# Patient Record
Sex: FEMALE | Race: WHITE | NOT HISPANIC OR LATINO | Employment: FULL TIME | URBAN - METROPOLITAN AREA
[De-identification: names, ages, dates, MRNs, and addresses within clinical notes are randomized per-mention and may not be internally consistent; named-entity substitution may affect disease eponyms.]

---

## 2018-03-05 ENCOUNTER — LAB REQUISITION (OUTPATIENT)
Dept: LAB | Facility: HOSPITAL | Age: 63
End: 2018-03-05
Payer: MEDICARE

## 2018-03-05 ENCOUNTER — LAB REQUISITION (OUTPATIENT)
Dept: LAB | Facility: CLINIC | Age: 63
End: 2018-03-05
Payer: MEDICARE

## 2018-03-05 DIAGNOSIS — E66.01 MORBID (SEVERE) OBESITY DUE TO EXCESS CALORIES (CMS/HCC): ICD-10-CM

## 2018-03-05 DIAGNOSIS — E53.8 DEFICIENCY OF OTHER SPECIFIED B GROUP VITAMINS (CODE): ICD-10-CM

## 2018-03-05 DIAGNOSIS — I48.91 ATRIAL FIBRILLATION (CMS/HCC): ICD-10-CM

## 2018-03-05 DIAGNOSIS — E56.9 VITAMIN DEFICIENCY: ICD-10-CM

## 2018-03-05 DIAGNOSIS — E44.1 MILD PROTEIN-CALORIE MALNUTRITION (CMS/HCC): ICD-10-CM

## 2018-03-05 DIAGNOSIS — E63.9 NUTRITIONAL DEFICIENCY: ICD-10-CM

## 2018-03-05 DIAGNOSIS — E55.9 VITAMIN D DEFICIENCY: ICD-10-CM

## 2018-03-05 LAB
25(OH)D3 SERPL-MCNC: 37 NG/ML (ref 30–100)
ALBUMIN SERPL-MCNC: 4 G/DL (ref 3.4–5)
ALP SERPL-CCNC: 116 IU/L (ref 35–126)
ALT SERPL-CCNC: 57 IU/L (ref 11–54)
ANION GAP SERPL CALC-SCNC: 9 MEQ/L (ref 3–15)
AST SERPL-CCNC: 37 IU/L (ref 15–41)
BASOPHILS # BLD: 0.04 K/UL (ref 0.01–0.1)
BASOPHILS NFR BLD: 0.8 %
BILIRUB SERPL-MCNC: 1 MG/DL (ref 0.3–1.2)
BUN SERPL-MCNC: 20 MG/DL (ref 8–20)
CALCIUM SERPL-MCNC: 10 MG/DL (ref 8.9–10.3)
CALCIUM SERPL-MCNC: 10 MG/DL (ref 8.9–10.3)
CHLORIDE SERPL-SCNC: 108 MMOL/L (ref 98–109)
CHOLEST SERPL-MCNC: 152 MG/DL
CO2 SERPL-SCNC: 28 MMOL/L (ref 22–32)
CREAT SERPL-MCNC: 0.6 MG/DL (ref 0.6–1.1)
EOSINOPHIL # BLD: 0.15 K/UL (ref 0.04–0.36)
EOSINOPHIL NFR BLD: 2.9 %
ERYTHROCYTE [DISTWIDTH] IN BLOOD BY AUTOMATED COUNT: 15.3 % (ref 11.7–14.4)
FERRITIN SERPL-MCNC: 33 NG/ML (ref 11–250)
FOLATE SERPL-MCNC: >20 NG/ML
GFR SERPL CREATININE-BSD FRML MDRD: >60 ML/MIN/1.73M*2
GLUCOSE SERPL-MCNC: 90 MG/DL (ref 70–99)
HCT VFR BLDCO AUTO: 38.8 % (ref 35–45)
HDLC SERPL-MCNC: 63 MG/DL
HDLC SERPL: 2.4 {RATIO}
HGB BLD-MCNC: 12.5 G/DL (ref 11.8–15.7)
IMM GRANULOCYTES # BLD AUTO: 0.01 K/UL (ref 0–0.08)
IMM GRANULOCYTES NFR BLD AUTO: 0.2 %
INR PPP: 3.3 INR
IRON SATN MFR SERPL: 26 % (ref 15–45)
IRON SERPL-MCNC: 101 UG/DL (ref 35–150)
LDLC SERPL CALC-MCNC: 72 MG/DL
LYMPHOCYTES # BLD: 1.83 K/UL (ref 1.2–3.5)
LYMPHOCYTES NFR BLD: 35.5 %
MCH RBC QN AUTO: 29.9 PG (ref 28–33.2)
MCHC RBC AUTO-ENTMCNC: 32.2 G/DL (ref 32.2–35.5)
MCV RBC AUTO: 92.8 FL (ref 83–98)
MONOCYTES # BLD: 0.49 K/UL (ref 0.28–0.8)
MONOCYTES NFR BLD: 9.5 %
NEUTROPHILS # BLD: 2.63 K/UL (ref 1.7–7)
NEUTS SEG NFR BLD: 51.1 %
NONHDLC SERPL-MCNC: 89 MG/DL
NRBC BLD-RTO: 0 %
PDW BLD AUTO: 11.5 FL (ref 9.4–12.3)
PLATELET # BLD AUTO: 206 K/UL (ref 150–369)
POTASSIUM SERPL-SCNC: 4.3 MMOL/L (ref 3.6–5.1)
PREALB SERPL-MCNC: 27 MG/DL (ref 18–38)
PROT SERPL-MCNC: 6.3 G/DL (ref 6–8.2)
PROTHROMBIN TIME: 34 SEC. (ref 12.2–14.5)
PTH-INTACT SERPL-MCNC: 52.9 PG/ML (ref 12–88)
RBC # BLD AUTO: 4.18 M/UL (ref 3.93–5.22)
SODIUM SERPL-SCNC: 145 MMOL/L (ref 136–144)
TIBC SERPL-MCNC: 383 UG/DL (ref 270–460)
TRIGL SERPL-MCNC: 84 MG/DL (ref 30–149)
TSH SERPL DL<=0.05 MIU/L-ACNC: 1.91 MIU/ML (ref 0.34–5.6)
UIBC SERPL-MCNC: 282 UG/DL (ref 180–360)
VIT B12 SERPL-MCNC: 756 PG/ML (ref 180–914)
WBC # BLD AUTO: 5.15 K/UL (ref 3.8–10.5)

## 2018-03-05 PROCEDURE — 84425 ASSAY OF VITAMIN B-1: CPT | Performed by: SURGERY

## 2018-03-05 PROCEDURE — 83970 ASSAY OF PARATHORMONE: CPT

## 2018-03-05 PROCEDURE — 82306 VITAMIN D 25 HYDROXY: CPT

## 2018-03-05 PROCEDURE — 84443 ASSAY THYROID STIM HORMONE: CPT

## 2018-03-05 PROCEDURE — 80053 COMPREHEN METABOLIC PANEL: CPT

## 2018-03-05 PROCEDURE — 84134 ASSAY OF PREALBUMIN: CPT | Performed by: SURGERY

## 2018-03-05 PROCEDURE — 80053 COMPREHEN METABOLIC PANEL: CPT | Performed by: SURGERY

## 2018-03-05 PROCEDURE — 82746 ASSAY OF FOLIC ACID SERUM: CPT | Performed by: SURGERY

## 2018-03-05 PROCEDURE — 82306 VITAMIN D 25 HYDROXY: CPT | Performed by: SURGERY

## 2018-03-05 PROCEDURE — 83550 IRON BINDING TEST: CPT | Performed by: SURGERY

## 2018-03-05 PROCEDURE — 84443 ASSAY THYROID STIM HORMONE: CPT | Performed by: SURGERY

## 2018-03-05 PROCEDURE — 83550 IRON BINDING TEST: CPT

## 2018-03-05 PROCEDURE — 82746 ASSAY OF FOLIC ACID SERUM: CPT

## 2018-03-05 PROCEDURE — 85025 COMPLETE CBC W/AUTO DIFF WBC: CPT | Performed by: SURGERY

## 2018-03-05 PROCEDURE — 84590 ASSAY OF VITAMIN A: CPT

## 2018-03-05 PROCEDURE — 85610 PROTHROMBIN TIME: CPT | Performed by: INTERNAL MEDICINE

## 2018-03-05 PROCEDURE — 82607 VITAMIN B-12: CPT | Performed by: SURGERY

## 2018-03-05 PROCEDURE — 84590 ASSAY OF VITAMIN A: CPT | Performed by: SURGERY

## 2018-03-05 PROCEDURE — 82607 VITAMIN B-12: CPT

## 2018-03-05 PROCEDURE — 82728 ASSAY OF FERRITIN: CPT

## 2018-03-05 PROCEDURE — 80061 LIPID PANEL: CPT

## 2018-03-05 PROCEDURE — 82728 ASSAY OF FERRITIN: CPT | Performed by: SURGERY

## 2018-03-05 PROCEDURE — 83970 ASSAY OF PARATHORMONE: CPT | Performed by: SURGERY

## 2018-03-05 PROCEDURE — 84134 ASSAY OF PREALBUMIN: CPT

## 2018-03-05 PROCEDURE — 80061 LIPID PANEL: CPT | Performed by: SURGERY

## 2018-03-05 PROCEDURE — 84425 ASSAY OF VITAMIN B-1: CPT

## 2018-03-05 PROCEDURE — 85025 COMPLETE CBC W/AUTO DIFF WBC: CPT

## 2018-03-08 LAB — VIT A SERPL-MCNC: 47 MCG/DL (ref 38–98)

## 2018-03-09 LAB — VIT B1 SERPL-SCNC: 20 NMOL/L (ref 8–30)

## 2018-03-14 ENCOUNTER — OFFICE VISIT (OUTPATIENT)
Dept: PRIMARY CARE | Facility: CLINIC | Age: 63
End: 2018-03-14
Attending: FAMILY MEDICINE
Payer: MEDICARE

## 2018-03-14 ENCOUNTER — DOCUMENTATION (OUTPATIENT)
Dept: BARIATRICS | Facility: CLINIC | Age: 63
End: 2018-03-14

## 2018-03-14 VITALS
HEART RATE: 59 BPM | HEIGHT: 63 IN | WEIGHT: 185 LBS | SYSTOLIC BLOOD PRESSURE: 98 MMHG | DIASTOLIC BLOOD PRESSURE: 64 MMHG | BODY MASS INDEX: 32.78 KG/M2 | TEMPERATURE: 98.6 F | RESPIRATION RATE: 17 BRPM | OXYGEN SATURATION: 98 %

## 2018-03-14 VITALS — HEIGHT: 63 IN | BODY MASS INDEX: 30.85 KG/M2 | WEIGHT: 174.1 LBS

## 2018-03-14 DIAGNOSIS — G47.33 OBSTRUCTIVE SLEEP APNEA: Chronic | ICD-10-CM

## 2018-03-14 DIAGNOSIS — J45.20 MILD INTERMITTENT ASTHMA WITHOUT COMPLICATION: Chronic | ICD-10-CM

## 2018-03-14 DIAGNOSIS — Z13.9 ENCOUNTER FOR SCREENING: ICD-10-CM

## 2018-03-14 DIAGNOSIS — M15.9 POLYOSTEOARTHRITIS, UNSPECIFIED: Chronic | ICD-10-CM

## 2018-03-14 DIAGNOSIS — E78.00 PURE HYPERCHOLESTEROLEMIA: Chronic | ICD-10-CM

## 2018-03-14 DIAGNOSIS — Z12.39 BREAST CANCER SCREENING: ICD-10-CM

## 2018-03-14 DIAGNOSIS — I26.99 OTHER PULMONARY EMBOLISM WITHOUT ACUTE COR PULMONALE, UNSPECIFIED CHRONICITY (CMS/HCC): Chronic | ICD-10-CM

## 2018-03-14 DIAGNOSIS — M10.9 ACUTE GOUT INVOLVING TOE OF LEFT FOOT, UNSPECIFIED CAUSE: Primary | ICD-10-CM

## 2018-03-14 DIAGNOSIS — K44.9 DIAPHRAGMATIC HERNIA WITHOUT OBSTRUCTION OR GANGRENE: Chronic | ICD-10-CM

## 2018-03-14 DIAGNOSIS — E66.09 OBESITY DUE TO EXCESS CALORIES WITHOUT SERIOUS COMORBIDITY, UNSPECIFIED CLASSIFICATION: Chronic | ICD-10-CM

## 2018-03-14 DIAGNOSIS — R73.01 IFG (IMPAIRED FASTING GLUCOSE): ICD-10-CM

## 2018-03-14 DIAGNOSIS — M79.675 TOE PAIN, LEFT: ICD-10-CM

## 2018-03-14 PROBLEM — E66.9 OBESITY: Status: ACTIVE | Noted: 2017-09-21

## 2018-03-14 PROBLEM — E66.9 OBESITY: Chronic | Status: ACTIVE | Noted: 2017-09-21

## 2018-03-14 PROCEDURE — 99214 OFFICE O/P EST MOD 30 MIN: CPT | Performed by: FAMILY MEDICINE

## 2018-03-14 RX ORDER — ALBUTEROL SULFATE 0.83 MG/ML
SOLUTION RESPIRATORY (INHALATION)
COMMUNITY
Start: 2016-07-29 | End: 2018-12-20

## 2018-03-14 RX ORDER — WARFARIN 4 MG/1
4 TABLET ORAL DAILY
COMMUNITY
End: 2018-08-16 | Stop reason: SDUPTHER

## 2018-03-14 RX ORDER — DILTIAZEM HYDROCHLORIDE 120 MG/1
120 CAPSULE, COATED, EXTENDED RELEASE ORAL
Refills: 1 | COMMUNITY
Start: 2017-12-12 | End: 2018-08-23 | Stop reason: SDUPTHER

## 2018-03-14 RX ORDER — ALBUTEROL SULFATE 90 UG/1
INHALANT RESPIRATORY (INHALATION)
COMMUNITY
Start: 2016-07-29 | End: 2018-12-20 | Stop reason: SDUPTHER

## 2018-03-14 RX ORDER — ATORVASTATIN CALCIUM 40 MG/1
40 TABLET, FILM COATED ORAL DAILY
Qty: 90 TABLET | Refills: 3 | Status: SHIPPED | OUTPATIENT
Start: 2018-03-14 | End: 2018-08-23 | Stop reason: SDUPTHER

## 2018-03-14 RX ORDER — ALPRAZOLAM 0.25 MG/1
0.25 TABLET ORAL
COMMUNITY
Start: 2016-07-29 | End: 2019-04-26 | Stop reason: ALTCHOICE

## 2018-03-14 RX ORDER — ATORVASTATIN CALCIUM 40 MG/1
40 TABLET, FILM COATED ORAL
Refills: 1 | COMMUNITY
Start: 2017-12-27 | End: 2018-03-14

## 2018-03-14 RX ORDER — INDOMETHACIN 50 MG/1
50 CAPSULE ORAL 3 TIMES DAILY PRN
Qty: 30 CAPSULE | Refills: 2 | Status: SHIPPED | OUTPATIENT
Start: 2018-03-14 | End: 2019-04-26 | Stop reason: ALTCHOICE

## 2018-03-14 RX ORDER — ATORVASTATIN CALCIUM 40 MG/1
40 TABLET, FILM COATED ORAL
COMMUNITY
Start: 2017-09-14 | End: 2018-03-14 | Stop reason: SDUPTHER

## 2018-03-14 RX ORDER — FLUTICASONE PROPIONATE 50 MCG
SPRAY, SUSPENSION (ML) NASAL
COMMUNITY
Start: 2016-03-18 | End: 2019-03-06 | Stop reason: SDUPTHER

## 2018-03-14 RX ORDER — VERAPAMIL HYDROCHLORIDE 120 MG/1
120 CAPSULE, EXTENDED RELEASE ORAL
COMMUNITY
Start: 2017-12-19 | End: 2018-08-23 | Stop reason: SDUPTHER

## 2018-03-14 RX ORDER — ATORVASTATIN CALCIUM 10 MG/1
40 TABLET, FILM COATED ORAL
COMMUNITY
Start: 2017-09-14 | End: 2018-03-14

## 2018-03-14 RX ORDER — CLOBETASOL PROPIONATE 0.5 MG/G
0.05 CREAM TOPICAL
COMMUNITY
Start: 2016-03-18 | End: 2019-04-26 | Stop reason: ALTCHOICE

## 2018-03-14 RX ORDER — DILTIAZEM HYDROCHLORIDE 120 MG/1
120 CAPSULE, EXTENDED RELEASE ORAL
COMMUNITY
Start: 2016-09-30 | End: 2018-08-23 | Stop reason: SDUPTHER

## 2018-03-14 RX ORDER — PSYLLIUM HUSK 0.4 G
1 CAPSULE ORAL DAILY
COMMUNITY
Start: 2016-03-18

## 2018-03-14 ASSESSMENT — ENCOUNTER SYMPTOMS
COUGH: 0
EYE ITCHING: 0
ABDOMINAL PAIN: 0
ACTIVITY CHANGE: 0
VOMITING: 0
APPETITE CHANGE: 0
TROUBLE SWALLOWING: 0
DYSURIA: 0
COLOR CHANGE: 0
PALPITATIONS: 0
MYALGIAS: 0
EYE PAIN: 0
EYE DISCHARGE: 0
NERVOUS/ANXIOUS: 0
NAUSEA: 0
FATIGUE: 0
CHEST TIGHTNESS: 0
DIARRHEA: 0
SINUS PRESSURE: 0
JOINT SWELLING: 1
DIZZINESS: 0
SINUS PAIN: 0
SHORTNESS OF BREATH: 0
FEVER: 0
ARTHRALGIAS: 1
CONFUSION: 0
BLOOD IN STOOL: 0
SORE THROAT: 0
HEADACHES: 0
ADENOPATHY: 0
BACK PAIN: 0
AGITATION: 0

## 2018-03-14 NOTE — PROGRESS NOTES
"Subjective      Patient ID: Jennifer Aly is a 62 y.o. female.    63 yo female p/f f/u of all chronic medical conditions, medication check, and chart review. Pt also c/o left toe pain which onset about 2-3 months ago. Pt feels it is gout, and describes the pain as localized, sharp, and intense. Erythema noted at great toe joint, no tophi noted on assessment. Pt has been using her boyfriend's allopurinol when her \"flares\" onset, but she needs her own medications.     CHRONIC CONDITIONS    IFG  Diet  Exercise  Blood sugar at home doing well  A1C 5.5    Lipids  Diet  Exercise  On atorvastatin 40 mg  Totally stable last check in August - would like to have checked today again    Asthma  Stable  Continue meds  Follow up with pulmonologist PRN    HTN  Diet  TLC  Lisinopril  Sees cards regularly     KIM  On c-pap  Sleep study completed        The following have been reviewed and updated as appropriate in this visit:  Problems       Review of Systems   Constitutional: Negative for activity change, appetite change, fatigue and fever.   HENT: Negative for congestion, sinus pain, sinus pressure, sore throat and trouble swallowing.    Eyes: Negative for pain, discharge, itching and visual disturbance.   Respiratory: Negative for cough, chest tightness and shortness of breath.    Cardiovascular: Negative for chest pain, palpitations and leg swelling.   Gastrointestinal: Negative for abdominal pain, blood in stool, diarrhea, nausea and vomiting.   Endocrine: Negative for cold intolerance and heat intolerance.   Genitourinary: Negative for dysuria.   Musculoskeletal: Positive for arthralgias and joint swelling. Negative for back pain, gait problem and myalgias.        Left great toe joint pain, no tophi noted.   Point tenderness noted.   Decreased ROM in toe joint.   Skin: Negative for color change and rash.   Allergic/Immunologic: Negative for environmental allergies and food allergies.   Neurological: Negative for dizziness and " headaches.   Hematological: Negative for adenopathy.   Psychiatric/Behavioral: Negative for agitation, behavioral problems and confusion. The patient is not nervous/anxious.        Objective     Physical Exam   Constitutional: She is oriented to person, place, and time. She appears well-developed and well-nourished.   HENT:   Head: Normocephalic and atraumatic.   Right Ear: External ear normal.   Left Ear: External ear normal.   Nose: Nose normal.   Mouth/Throat: Oropharynx is clear and moist.   Eyes: Conjunctivae and EOM are normal. Pupils are equal, round, and reactive to light.   Neck: Normal range of motion. Neck supple. No thyromegaly present.   Cardiovascular: Normal rate, regular rhythm, normal heart sounds and intact distal pulses.    No murmur heard.  Pulmonary/Chest: Effort normal and breath sounds normal. No respiratory distress. She has no wheezes. She has no rales.   Abdominal: Soft. Bowel sounds are normal. She exhibits no distension and no mass. There is no tenderness. There is no guarding.   Musculoskeletal: She exhibits no edema or tenderness.   Left great toe joint pain, no tophi noted.   Point tenderness noted.   Decreased ROM in toe joint.   Neurological: She is alert and oriented to person, place, and time. She displays normal reflexes. No cranial nerve deficit.   Skin: Skin is warm and dry. Capillary refill takes less than 2 seconds. No rash noted.   Psychiatric: She has a normal mood and affect. Her behavior is normal. Judgment and thought content normal.       Assessment/Plan   Problem List Items Addressed This Visit        Other    Acute gout involving toe of left foot - Primary (Chronic)     Left great toe joint pain onset a few months prior after a surgery. Pt states pain is extremely intense, she can barely tolerate the weight of a sheet on the toe.   Mild erythema, inflammation, and irritation noted at great toe joint.   No tophi noted at joint site.   Point tenderness noted on  assessment.   Decreased ROM in toe joint.         Toe pain, left (Chronic)     Left great toe joint pain onset a few months prior after a surgery. Pt states pain is extremely intense, she can barely tolerate the weight of a sheet on the toe.   Mild erythema, inflammation, and irritation noted at great toe joint.   No tophi noted at joint site.   Point tenderness noted on assessment.   Decreased ROM in toe joint.         Polyosteoarthritis, unspecified (Chronic)     Take medications, both OTC and prescribed, as ordered for acute pain.   Rest, ice, and elevation of affected extremity(ies).   Continue to monitor for worsening or change in pain quality or quantity.   F/U PRN for new or worsening pain.         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 Pr adventitious breath sounds on exam today  Continue current treatment and monitor at this time         Pure hypercholesterolemia (Chronic)     Continue current medication regimen as discussed and prescribed. Diet/TLC reinforced this visit, and pt verbalizes an understanding of the importance of both. Continue to monitor pt's progress with lipid panel/CMP as indicated/ordered. Exercise regimen reinforced and discussed today.          Relevant Medications    atorvastatin (LIPITOR) 40 mg tablet    Other Relevant Orders    Comprehensive metabolic panel    Lipid panel    IFG (impaired fasting glucose) (Chronic)     Pt has a present PMH of IFG.   Would like to have her HGB A1C done today.   Last done in August 2017 - 5.5   Diet, TLC reinforced today  No current med regimen         Relevant Orders    Hemoglobin A1c    Diaphragmatic hernia without obstruction or gangrene (Chronic)     Stable at this time  No new s/s  Continue current management           Obesity (Chronic)     Encouraged diet, TLC, weight loss  Exercise regimen reinforced  Bariatric surgery in the past  Continue current care          Obstructive sleep apnea (Chronic)     Encouraged CPAP use  Educated re: risks of KIM and non-compliance with CPAP or prescribed treatment. Pt verbalized an understanding of edu.   Continue current management and f/u w/ pulm PRN.         Unspecified asthma, uncomplicated (Chronic)     Inhalers PRN  Stable at this time - no acute exacerbations or complications noted.          Relevant Medications    albuterol 2.5 mg /3 mL (0.083 %) nebulizer solution    albuterol HFA (VENTOLIN HFA) 90 mcg/actuation inhaler    Encounter for screening     FLU SHOT: UTD  LIPID PANEL/CMP: UTD  MAMMOGRAMS: Due 4/18/18 - script given today  COLONOSCOPY: Due this summer with Dr. Rock - Dr. Rock from [facility] addresses this area.   GYN CARE/PAPS: UTD per patient  PCV-13: UTD  PPSV-23: UTD           Breast cancer screening    Relevant Orders    BI SCREENING MAMMOGRAM BILATERAL          No exam data present      Demarco Moreau, DO  3/14/2018

## 2018-03-14 NOTE — PATIENT INSTRUCTIONS
Patient Education     Gout  Gout is painful swelling that can occur in some of your joints. Gout is a type of arthritis. This condition is caused by having too much uric acid in your body. Uric acid is a chemical that forms when your body breaks down substances called purines. Purines are important for building body proteins.  When your body has too much uric acid, sharp crystals can form and build up inside your joints. This causes pain and swelling. Gout attacks can happen quickly and be very painful (acute gout). Over time, the attacks can affect more joints and become more frequent (chronic gout). Gout can also cause uric acid to build up under your skin and inside your kidneys.  What are the causes?  This condition is caused by too much uric acid in your blood. This can occur because:  · Your kidneys do not remove enough uric acid from your blood. This is the most common cause.  · Your body makes too much uric acid. This can occur with some cancers and cancer treatments. It can also occur if your body is breaking down too many red blood cells (hemolytic anemia).  · You eat too many foods that are high in purines. These foods include organ meats and some seafood. Alcohol, especially beer, is also high in purines.  A gout attack may be triggered by trauma or stress.  What increases the risk?  This condition is more likely to develop in people who:  · Have a family history of gout.  · Are male and middle-aged.  · Are female and have gone through menopause.  · Are obese.  · Frequently drink alcohol, especially beer.  · Are dehydrated.  · Lose weight too quickly.  · Have an organ transplant.  · Have lead poisoning.  · Take certain medicines, including aspirin, cyclosporine, diuretics, levodopa, and niacin.  · Have kidney disease or psoriasis.  What are the signs or symptoms?  An attack of acute gout happens quickly. It usually occurs in just one joint. The most common place is the big toe. Attacks often start at  night. Other joints that may be affected include joints of the feet, ankle, knee, fingers, wrist, or elbow. Symptoms may include:  · Severe pain.  · Warmth.  · Swelling.  · Stiffness.  · Tenderness. The affected joint may be very painful to touch.  · Shiny, red, or purple skin.  · Chills and fever.  Chronic gout may cause symptoms more frequently. More joints may be involved. You may also have white or yellow lumps (tophi) on your hands or feet or in other areas near your joints.  How is this diagnosed?  This condition is diagnosed based on your symptoms, medical history, and physical exam. You may have tests, such as:  · Blood tests to measure uric acid levels.  · Removal of joint fluid with a needle (aspiration) to look for uric acid crystals.  · X-rays to look for joint damage.  How is this treated?  Treatment for this condition has two phases: treating an acute attack and preventing future attacks. Acute gout treatment may include medicines to reduce pain and swelling, including:  · NSAIDs.  · Steroids. These are strong anti-inflammatory medicines that can be taken by mouth (orally) or injected into a joint.  · Colchicine. This medicine relieves pain and swelling when it is taken soon after an attack. It can be given orally or through an IV tube.  Preventive treatment may include:  · Daily use of smaller doses of NSAIDs or colchicine.  · Use of a medicine that reduces uric acid levels in your blood.  · Changes to your diet. You may need to see a specialist about healthy eating (dietitian).  Follow these instructions at home:  During a Gout Attack  · If directed, apply ice to the affected area:  ¨ Put ice in a plastic bag.  ¨ Place a towel between your skin and the bag.  ¨ Leave the ice on for 20 minutes, 2-3 times a day.  · Rest the joint as much as possible. If the affected joint is in your leg, you may be given crutches to use.  · Raise (elevate) the affected joint above the level of your heart as often as  possible.  · Drink enough fluids to keep your urine clear or pale yellow.  · Take over-the-counter and prescription medicines only as told by your health care provider.  · Do not drive or operate heavy machinery while taking prescription pain medicine.  · Follow instructions from your health care provider about eating or drinking restrictions.  · Return to your normal activities as told by your health care provider. Ask your health care provider what activities are safe for you.  Avoiding Future Gout Attacks  · Follow a low-purine diet as told by your dietitian or health care provider. Avoid foods and drinks that are high in purines, including liver, kidney, anchovies, asparagus, herring, mushrooms, mussels, and beer.  · Limit alcohol intake to no more than 1 drink a day for nonpregnant women and 2 drinks a day for men. One drink equals 12 oz of beer, 5 oz of wine, or 1½ oz of hard liquor.  · Maintain a healthy weight or lose weight if you are overweight. If you want to lose weight, talk with your health care provider. It is important that you do not lose weight too quickly.  · Start or maintain an exercise program as told by your health care provider.  · Drink enough fluids to keep your urine clear or pale yellow.  · Take over-the-counter and prescription medicines only as told by your health care provider.  · Keep all follow-up visits as told by your health care provider. This is important.  Contact a health care provider if:  · You have another gout attack.  · You continue to have symptoms of a gout attack after10 days of treatment.  · You have side effects from your medicines.  · You have chills or a fever.  · You have burning pain when you urinate.  · You have pain in your lower back or belly.  Get help right away if:  · You have severe or uncontrolled pain.  · You cannot urinate.  This information is not intended to replace advice given to you by your health care provider. Make sure you discuss any questions  you have with your health care provider.  Document Released: 12/15/2001 Document Revised: 05/25/2017 Document Reviewed: 09/29/2016  [employer]vier Interactive Patient Education © 2017 [employer]

## 2018-03-15 ENCOUNTER — TELEPHONE (OUTPATIENT)
Dept: PRIMARY CARE | Facility: CLINIC | Age: 63
End: 2018-03-15

## 2018-03-15 NOTE — TELEPHONE ENCOUNTER
Jennifer at [facility] in [place] called and needed to talk to Dr Moreau about filling patient's RX for Indomethacin, since patient is already taking Warfarin Jennifer wanted to let the doctor know that this will increase bleeding, please call Jennifer at [phone]

## 2018-03-15 NOTE — TELEPHONE ENCOUNTER
I called and spoke with pharmacist.  Pt rarely gets gout - pt is aware of increased risk, and will hold on Wrfarin day she takes and call and knows to stop if any increased bruising/bleeding etc.

## 2018-03-19 ENCOUNTER — OFFICE VISIT (OUTPATIENT)
Dept: BARIATRICS | Facility: CLINIC | Age: 63
End: 2018-03-19
Payer: MEDICARE

## 2018-03-19 VITALS
WEIGHT: 184.4 LBS | TEMPERATURE: 97.3 F | BODY MASS INDEX: 32.67 KG/M2 | HEART RATE: 52 BPM | DIASTOLIC BLOOD PRESSURE: 68 MMHG | HEIGHT: 63 IN | SYSTOLIC BLOOD PRESSURE: 106 MMHG

## 2018-03-19 DIAGNOSIS — Z11.8 ENCOUNTER FOR SCREENING FOR OTHER INFECTIOUS AND PARASITIC DISEASES: ICD-10-CM

## 2018-03-19 DIAGNOSIS — K21.9 GASTROESOPHAGEAL REFLUX DISEASE WITHOUT ESOPHAGITIS: ICD-10-CM

## 2018-03-19 DIAGNOSIS — K91.2 POSTSURGICAL MALABSORPTION: Primary | ICD-10-CM

## 2018-03-19 DIAGNOSIS — E44.1 PROTEIN-CALORIE MALNUTRITION, MILD (CMS/HCC): ICD-10-CM

## 2018-03-19 DIAGNOSIS — E66.01 MORBID OBESITY (CMS/HCC): ICD-10-CM

## 2018-03-19 DIAGNOSIS — E78.00 PURE HYPERCHOLESTEROLEMIA: Chronic | ICD-10-CM

## 2018-03-19 DIAGNOSIS — E56.9 MULTIPLE VITAMIN DEFICIENCY: ICD-10-CM

## 2018-03-19 DIAGNOSIS — G47.33 OBSTRUCTIVE SLEEP APNEA: Chronic | ICD-10-CM

## 2018-03-19 DIAGNOSIS — E63.9 NUTRITIONAL DEFICIENCY: ICD-10-CM

## 2018-03-19 DIAGNOSIS — Z98.84 BARIATRIC SURGERY STATUS: ICD-10-CM

## 2018-03-19 DIAGNOSIS — I26.99 OTHER PULMONARY EMBOLISM WITHOUT ACUTE COR PULMONALE, UNSPECIFIED CHRONICITY (CMS/HCC): Chronic | ICD-10-CM

## 2018-03-19 DIAGNOSIS — E66.01 MORBID OBESITY DUE TO EXCESS CALORIES (CMS/HCC): ICD-10-CM

## 2018-03-19 DIAGNOSIS — K44.9 DIAPHRAGMATIC HERNIA WITHOUT OBSTRUCTION OR GANGRENE: Chronic | ICD-10-CM

## 2018-03-19 DIAGNOSIS — E55.9 VITAMIN D DEFICIENCY: ICD-10-CM

## 2018-03-19 PROCEDURE — 99214 OFFICE O/P EST MOD 30 MIN: CPT | Performed by: SURGERY

## 2018-03-19 ASSESSMENT — ENCOUNTER SYMPTOMS
CARDIOVASCULAR NEGATIVE: 1
GASTROINTESTINAL NEGATIVE: 1
HEMATOLOGIC/LYMPHATIC NEGATIVE: 1
NEUROLOGICAL NEGATIVE: 1
ENDOCRINE NEGATIVE: 1
RESPIRATORY NEGATIVE: 1
MUSCULOSKELETAL NEGATIVE: 1
CONSTITUTIONAL NEGATIVE: 1
PSYCHIATRIC NEGATIVE: 1

## 2018-03-19 NOTE — PROGRESS NOTES
Patient ID: Jennifer Aly                              : 1955  MRN: [id number]                                            Visit Date: 3/19/2018  Encounter Provider: Yimi Osman  Referring Provider: Demarco Moreau DO      HPI:  Jennifer Aly is a 62 y.o. female who presents in the office today for their 1 year  follow-up visit.   She is s/p Lap Alexander-en-Y gastric bypass, performed by myself on  2017 at [facility].  The patient's postoperative course has not been complicated.  She is tolerating a bariatric diet. Activity is progressing at the recommended level.    She has resolved the following co-morbidities since having bariatric surgery, none.   We have reviewed the patient’s SF-12 form.    Labs reviewed, she was aware to decrease her B12, 2017.     Labs ok, 2017. Her A1c was checked by PCP and was 5.5. She had come off diabetes medications and b/p medications.    Her cholesterol medication was decreased in half.   She saw exercise and has an appt with nutrition next week.   She saw hematology as well and states everything was ok.    Her labs are OK 2018. She saw exercise and nutrition.  She gained 10 lbs over the holidays. She knows she needs to get back on track.  She is not tracking her food intake, but she will start.  She reports pain in her knees which is affecting her ability to exercise. She will try other options.  She is having occasional glass of wine.    Past Medical History: She  has a past medical history of Arthritis; DMII (diabetes mellitus, type 2) (CMS/[facility]) ([facility]); Fistula; Gout; HLD (hyperlipidemia); HTN (hypertension); IFG (impaired fasting glucose); Obesity; KIM (obstructive sleep apnea); Polyarthritis; Pulmonary emboli (CMS/[facility]) ([facility]); and Torn rotator cuff..  Past Surgical History: She  has a past surgical history that includes Rotator cuff repair (Left, 2016); rectourethral fistula repair (, 2017); Total knee arthroplasty (Bilateral, );  Trigger finger release (Right, 2012); Cardiac catheterization (11/2012); IVC filter retrieval (2012); Total knee arthroplasty (Right, 2012); IVC filter retrieval (02/2013); Ulnar nerve repair (2013); Appendectomy (2008); Total abdominal hysterectomy (1987); and Alexander-en-y procedure (02/07/2017)..  Medication: She has a current medication list which includes the following prescription(s): albuterol, albuterol hfa, atorvastatin, calcium carbonate-vitamin d3, diltiazem cd, diltiazem xr, fluticasone, warfarin, alprazolam, clobetasol, cyanocobalamin-cobamamide, indomethacin, and verapamil pm..  Allergies: She is allergic to sulfa (sulfonamide antibiotics)..    Nutrition:   Jennifer ACOSTA's food preferences include poor food choices. She started doing more protein shake meal replacements to get back on track.  She is taking their vitamin supplements  She is taking their protein supplements  She is following a proper nutritional diet, high in protein and low in fat, 3 meals a day plus 2 snacks.    Exercise:  Jennifer ACOSTA reports they exercise: irregularly . Her exercise is limited by knee pain.  We discussed both her short and long term program goals. We discussed her exercise options.  We discussed the importance of exercise counselor follow-up, achieving a sustained target heart rate, tracking her exercise routine weekly, pursing aerobic exercise at least 30 minutes per day, 5 days per week.    Social History:   We have advised the patient against using tobacco products.  We have advised the Jennifer ACOSTA to avoid aspirin, NSAID medications, both prescription and over the counter products, as these medications can cause ulcers and bleeding.  There use should be only under the direction of the her  own medical specialist.  The patient has been counseled about this and will comply.  We have additionally advised the Jennifer ACOSTA we do not recommend alcohol consumption following bariatric surgery. The calories from alcohol have no benefit in their  "daily requirements, and sugars and carbohydrates are high. Patients may experience unexpected responses to alcohol after bariatric surgery and should avoid it if possible.  She has been advised we have psychological services available to discuss concerns regarding alcohol intake an other addictive or impulsive behavior both before and after surgery. If you do consume alcohol, you may experience metabolic and digestive issues..  History   Smoking Status   • Former Smoker   • Years: 15.00   • Quit date: 1987   Smokeless Tobacco   • Never Used     History   Alcohol Use   • Yes     Comment: occasional wine       Review of Systems  Review of Systems   Constitutional: Negative.    Respiratory: Negative.    Cardiovascular: Negative.    Gastrointestinal: Negative.    Endocrine: Negative.    Genitourinary: Negative.    Musculoskeletal: Negative.    Skin: Negative.    Neurological: Negative.    Hematological: Negative.    Psychiatric/Behavioral: Negative.        Vitals:  Her  height is 1.588 m (5' 2.5\") and weight is 83.6 kg (184 lb 6.4 oz). Her temporal temperature is 36.3 °C (97.3 °F). Her blood pressure is 106/68 and her pulse is 52 (abnormal).   Weight trend:   Wt Readings from Last 5 Encounters:   03/19/18 83.6 kg (184 lb 6.4 oz)   09/21/17 79 kg (174 lb 1.6 oz)   03/14/18 83.9 kg (185 lb)    body mass index is 33.19 kg/m².    Physical Exam:  Physical Exam   Constitutional: She is oriented to person, place, and time. She appears well-developed.   HENT:   Head: Normocephalic.   Neck: Normal range of motion.   Cardiovascular: Normal rate and regular rhythm.    Pulmonary/Chest: Effort normal and breath sounds normal.   Abdominal: Soft. Bowel sounds are normal.   Musculoskeletal: Normal range of motion.   Neurological: She is alert and oriented to person, place, and time.   Skin: Skin is warm and dry.   Psychiatric: She has a normal mood and affect. Her behavior is normal.   Vitals reviewed.    Jennifer ACOSTA  does not have any " pertinent problems on file.  Her incisions are well healed and without complications.    Assessment/Plan   Assessment /Plan    We have discussed the importance of tracking her food intake and exercise either online, in a mobile application on a smartphone device or on paper.  We have discussed the importance of regular aerobic exercise at least 30 minutes per day, 5 days a week including achieving and measuring their target heart rate.  We discussed avoiding NSAIDS and ASA products unless prescribed by her own physician to reduce the incidence of ulcers or bleeding.  We discussed the importance of taking her bariatric protein and vitamin supplements regularly and the importance of regular follow-up blood work, regular attendance at the support group meetings and regular follow-up with the dietician and exercise counselors to help maintain their success with weight loss and resolution of co-morbidites.  We again discussed the importance of following a healthy diet, reducing alcohol and calorie intake, the risks of transfer of addictions and the need to continue with all of their healthy habits even after they have reached their goal just to maintain their weight loss and prevent weight regain and recurrence of co-morbidities.  We discussed the difference between dieting and exercise and alone and that after bariatric surgery is that the gut hormone physiology and food intake and absorption are changed to reduce hunger and allow regaining of self control that would otherwise be difficult to achieve on their own.    Jennifer ACOSTA is somewhat compliant with a healthy lifestyle, diet and level of exercise needed to maintain their health and weight loss.  She   has been instructed to take  g  of protein, all vitamins as listed by our program (myself, nutritionist and nurses), and up to 64 oz of fluid daily.  They have been instructed on a nutritious and healthy diet and the importance of daily regular exercise.  We have  No discussed together that the patient will comply with all future aspects of our program including diet, exercise, follow-up, future testing and all recommendations I make to them now and in all upcoming and future appointments.  They understand it is vitally important to comply with this and may compromise their health in the future if they do not do as requested.   Jennifer ACOSTA will need nutrition, physical therapy in 1 year .       Orders Placed This Encounter   Procedures   • CBC and differential     Standing Status:   Future     Standing Expiration Date:   3/19/2019   • Comprehensive metabolic panel     Standing Status:   Future     Standing Expiration Date:   3/19/2019   • Ferritin     Standing Status:   Future     Standing Expiration Date:   3/19/2019   • Folate     Standing Status:   Future     Standing Expiration Date:   3/19/2019   • Hemoglobin A1c     Standing Status:   Future     Standing Expiration Date:   3/19/2019   • Iron and TIBC     Standing Status:   Future     Standing Expiration Date:   3/19/2019   • Lipid panel     Standing Status:   Future     Standing Expiration Date:   3/19/2019   • Prealbumin     Standing Status:   Future     Standing Expiration Date:   3/19/2019   • PTH, intact     Standing Status:   Future     Standing Expiration Date:   3/19/2019   • TSH 3rd generation     Standing Status:   Future     Standing Expiration Date:   3/19/2019   • Vitamin A     Standing Status:   Future     Standing Expiration Date:   3/19/2019     Order Specific Question:   QUEST Has the patient been fasting for 8 hours?     Answer:   Yes   • Vitamin B1, whole blood ( thiamine)     Standing Status:   Future     Standing Expiration Date:   3/19/2019     Order Specific Question:   QUEST Has the patient been fasting for 8 hours?     Answer:   Yes   • Vitamin B12     Standing Status:   Future     Standing Expiration Date:   3/19/2019   • Vitamin D, 25- hydroxy     Standing Status:   Future     Standing Expiration Date:    3/19/2019   • Ambulatory referral to Nutrition Services     Standing Status:   Future     Standing Expiration Date:   9/19/2018     Referral Priority:   Routine     Referral Type:   Consultation     Referral Reason:   Specialty Services Required     Requested Specialty:   Nutrition     Number of Visits Requested:   1   • Ambulatory Referral to Cardiac Rehab (Exercise)     Order Specific Question:   Admit to outpatient Cardiac Rehab Program?     Answer:   Yes     Order Specific Question:   Please select the appropriate guidelines from the following:     Answer:   Have MEDICAL DIRECTOR complete renewal orders and follow-up related progress     Order Specific Question:   Patient will attend exercise sessions how many times per week?     Answer:   3 days per week     No Follow-up on file.    I have spent 25 minutes with this patient and > 50% of the time spent counseling  the patient.    Yimi Osman MD 3/19/2018

## 2018-04-18 ENCOUNTER — OFFICE VISIT (OUTPATIENT)
Dept: FAMILY MEDICINE | Facility: CLINIC | Age: 63
End: 2018-04-18
Payer: MEDICARE

## 2018-04-18 VITALS
RESPIRATION RATE: 22 BRPM | SYSTOLIC BLOOD PRESSURE: 124 MMHG | DIASTOLIC BLOOD PRESSURE: 68 MMHG | OXYGEN SATURATION: 98 % | WEIGHT: 190 LBS | HEART RATE: 55 BPM | TEMPERATURE: 97.1 F

## 2018-04-18 DIAGNOSIS — H66.006 RECURRENT ACUTE SUPPURATIVE OTITIS MEDIA WITHOUT SPONTANEOUS RUPTURE OF TYMPANIC MEMBRANE OF BOTH SIDES: Primary | ICD-10-CM

## 2018-04-18 PROCEDURE — 99213 OFFICE O/P EST LOW 20 MIN: CPT | Performed by: PHYSICIAN ASSISTANT

## 2018-04-18 NOTE — MR AVS SNAPSHOT
"              After Visit Summary   2018    Berna Uribe    MRN: [id number]           Patient Information     Date Of Birth          1955        Visit Information        Provider Department      2018 2:00 PM Devin Bloom PA-C [facility]        Today's Diagnoses     Recurrent acute suppurative otitis media without spontaneous rupture of tympanic membrane of both sides    -  1       Follow-ups after your visit        Who to contact     If you have questions or need follow up information about today's clinic visit or your schedule please contact [facility] directly at [phone].  Normal or non-critical lab and imaging results will be communicated to you by [employer], letter or phone within 4 business days after the clinic has received the results. If you do not hear from us within 7 days, please contact the clinic through [employer] or phone. If you have a critical or abnormal lab result, we will notify you by phone as soon as possible.  Submit refill requests through [facility] or call your pharmacy and they will forward the refill request to us. Please allow 3 business days for your refill to be completed.          Additional Information About Your Visit        MyChart Information     [facility] lets you send messages to your doctor, view your test results, renew your prescriptions, schedule appointments and more. To sign up, go to [email or web address] . Click on \"Log in\" on the left side of the screen, which will take you to the Welcome page. Then click on \"Sign up Now\" on the right side of the page.     You will be asked to enter the access code listed below, as well as some personal information. Please follow the directions to create your username and password.     Your access code is: [email or web address]  Expires: 2018  2:59 PM     Your access code will  in 90 days. If you need help or a new code, please call your [facility] or [phone].        Care " EveryWhere ID     This is your Care EveryWhere ID. This could be used by other organizations to access your [place] medical records  [id number]        Your Vitals Were     Pulse Temperature Respirations Pulse Oximetry          55 97.1  F (36.2  C) (Tympanic) 22 98%         Blood Pressure from Last 3 Encounters:   04/18/18 124/68   07/21/16 140/81    Weight from Last 3 Encounters:   04/18/18 190 lb (86.2 kg)              Today, you had the following     No orders found for display         Today's Medication Changes          These changes are accurate as of 4/18/18  2:59 PM.  If you have any questions, ask your nurse or doctor.               Start taking these medicines.        Dose/Directions    amoxicillin-clavulanate 875-125 MG per tablet   Commonly known as:  AUGMENTIN   Used for:  Recurrent acute suppurative otitis media without spontaneous rupture of tympanic membrane of both sides   Started by:  Devin Bloom PA-C        Dose:  1 tablet   Take 1 tablet by mouth 2 times daily   Quantity:  20 tablet   Refills:  0            Where to get your medicines      These medications were sent to [facility] [address] [place] [address] [address]  [address] [address]     Phone:  [phone]     amoxicillin-clavulanate 875-125 MG per tablet                Primary Care Provider Office Phone # Fax #    [facility] [phone] [address] [place] [address]        Equal Access to Services     HEIDI ESPITIA AH: Hadii sarai riverao Soniels, waaxda luqadaha, qaybta kaalmada adeegyada, waxlizbeth praveen weiss. So [facility] [phone].    ATENCIÓN: Si habla español, tiene a hill disposición servicios gratuitos de asistencia lingüística. Llame al [phone].    We comply with applicable federal civil rights laws and [place] laws. We do not discriminate on the basis of race, color, national origin, age, disability, sex, sexual orientation,  or gender identity.            Thank you!     Thank you for choosing [facility]  for your care. Our goal is always to provide you with excellent care. Hearing back from our patients is one way we can continue to improve our services. Please take a few minutes to complete the written survey that you may receive in the mail after your visit with us. Thank you!             Your Updated Medication List - Protect others around you: Learn how to safely use, store and throw away your medicines at www.disposemymeds.org.          This list is accurate as of 4/18/18  2:59 PM.  Always use your most recent med list.                   Brand Name Dispense Instructions for use Diagnosis    albuterol 108 (90 Base) MCG/ACT Inhaler    PROAIR HFA/PROVENTIL HFA/VENTOLIN HFA     Inhale 2 puffs into the lungs every 6 hours        amoxicillin-clavulanate 875-125 MG per tablet    AUGMENTIN    20 tablet    Take 1 tablet by mouth 2 times daily    Recurrent acute suppurative otitis media without spontaneous rupture of tympanic membrane of both sides       ATORVASTATIN CALCIUM PO      Take 40 mg by mouth        B-12 500 MCG Tabs           Calcium carb-Vitamin D 500 mg [place]-200 units 500-200 MG-UNIT per tablet    OSCAL with D;Oyster Shell Calcium     Take 1 tablet by mouth 2 times daily (with meals)        CENTRUM ADULTS PO           diltiazem 120 MG 24 hr ER beaded capsule    TIAZAC     Take by mouth daily        fluticasone 50 MCG/BLIST Aepb    FLOVENT DISKUS     Inhale 1 puff into the lungs every 12 hours        WARFARIN SODIUM PO      Take 8 mg by mouth        XANAX PO      Take 0.25 mg by mouth as needed for anxiety

## 2018-04-18 NOTE — NURSING NOTE
Chief Complaint   Patient presents with     URI       Initial /68 (BP Location: Right arm, Patient Position: Sitting, Cuff Size: Adult Regular)  Pulse 55  Temp 97.1  F (36.2  C) (Tympanic)  Resp 22  Wt 190 lb (86.2 kg)  SpO2 98% There is no height or weight on file to calculate BMI.    Rogerio Bloom ordered bilateral ear irrigation.  Patient tolerated procedure well and Rogerio checked ears after irrigations. Emilia Handy,CMA

## 2018-04-18 NOTE — PROGRESS NOTES
HPI    SUBJECTIVE:   Berna Uribe is a 62 year old female who presents to clinic today for the following health issues:    ENT Symptoms             Symptoms: cc Present Absent Comment   Fever/Chills   x    Fatigue   x    Muscle Aches   x    Eye Irritation  x  Itching   Sneezing  x     Nasal Wade/Drg  x     Sinus Pressure/Pain  x     Loss of smell   x    Dental pain   x    Sore Throat   x    Swollen Glands   x    Ear Pain/Fullness  x     Cough  x     Wheeze   x    Chest Pain   x    Shortness of breath   x    Rash       Other         Symptom duration:  Monday    Symptom severity:     Treatments tried:     Contacts:  Family     Problem list and histories reviewed & adjusted, as indicated.  Additional history: as documented    Patient Active Problem List   Diagnosis     On anticoagulant therapy     Past Surgical History:   Procedure Laterality Date     CARDIAC SURGERY         Social History   Substance Use Topics     Smoking status: Never Smoker     Smokeless tobacco: Never Used     Alcohol use Not on file     History reviewed. No pertinent family history.      Current Outpatient Prescriptions   Medication Sig Dispense Refill     albuterol (PROAIR HFA, PROVENTIL HFA, VENTOLIN HFA) 108 (90 BASE) MCG/ACT inhaler Inhale 2 puffs into the lungs every 6 hours       ALPRAZolam (XANAX PO) Take 0.25 mg by mouth as needed for anxiety       amoxicillin-clavulanate (AUGMENTIN) 875-125 MG per tablet Take 1 tablet by mouth 2 times daily 20 tablet 0     ATORVASTATIN CALCIUM PO Take 40 mg by mouth        calcium carb 1250 mg, 500 mg [place],/vitamin D 200 units (OSCAL WITH D) 500-200 MG-UNIT per tablet Take 1 tablet by mouth 2 times daily (with meals)       Cyanocobalamin (B-12) 500 MCG TABS        diltiazem (TIAZAC) 120 MG 24 hr ER beaded capsule Take by mouth daily       fluticasone (FLOVENT DISKUS) 50 MCG/BLIST AEPB Inhale 1 puff into the lungs every 12 hours       Multiple Vitamins-Minerals (CENTRUM ADULTS PO)        WARFARIN SODIUM PO  Take 8 mg by mouth        Allergies   Allergen Reactions     Sulfa Drugs Hives     Labs reviewed in EPIC    Reviewed and updated as needed this visit by clinical staff  Tobacco  Allergies  Meds  Med Hx  Surg Hx  Fam Hx  Soc Hx      Reviewed and updated as needed this visit by Provider       Review of Systems   Constitutional: Negative for chills and fever.   HENT: Positive for congestion, ear pain and hearing loss. Negative for ear discharge and sore throat.    Eyes: Negative for pain, discharge and redness.   Respiratory: Positive for cough. Negative for sputum production, shortness of breath, wheezing and stridor.    Cardiovascular: Negative for chest pain.   Gastrointestinal: Negative for nausea and vomiting.   Genitourinary: Negative.    Musculoskeletal: Negative.    Skin: Negative for itching and rash.   Neurological: Negative.  Negative for weakness and headaches.   Endo/Heme/Allergies: Negative.    Psychiatric/Behavioral: Negative.          Physical Exam   Constitutional: She is oriented to person, place, and time and well-developed, well-nourished, and in no distress.   HENT:   Head: Normocephalic and atraumatic.   Right Ear: Hearing, external ear and ear canal normal. Tympanic membrane is injected, erythematous and bulging. Tympanic membrane mobility is abnormal. A middle ear effusion is present.   Left Ear: Hearing, external ear and ear canal normal. Tympanic membrane is injected, erythematous and bulging. Tympanic membrane mobility is abnormal. A middle ear effusion is present.   Nose: Rhinorrhea present.   Mouth/Throat: Oropharyngeal exudate present.   Bilateral cerumen impactions are present.  This was irrigated until clear.   Eyes: Conjunctivae, EOM and lids are normal. Pupils are equal, round, and reactive to light.   Neck: Normal range of motion and full passive range of motion without pain. Neck supple. Carotid bruit is not present. No tracheal deviation present. No thyroid mass and no  thyromegaly present.   Cardiovascular: Normal rate, regular rhythm, normal heart sounds and normal pulses.    Pulmonary/Chest: Effort normal and breath sounds normal.   Abdominal: Soft. Normal appearance. There is no tenderness.   Musculoskeletal: Normal range of motion.   Lymphadenopathy:     She has no cervical adenopathy.   Neurological: She is alert and oriented to person, place, and time.   Skin: Skin is warm, dry and intact.   Psychiatric: Mood, memory, affect and judgment normal.       (H66.006) Recurrent acute suppurative otitis media without spontaneous rupture of tympanic membrane of both sides  (primary encounter diagnosis)  Comment:   Plan: amoxicillin-clavulanate (AUGMENTIN) 875-125 MG         per tablet            Follow-up if not improving

## 2018-04-18 NOTE — NURSING NOTE
Chief Complaint   Patient presents with     URI       Initial /68 (BP Location: Right arm, Patient Position: Sitting, Cuff Size: Adult Regular)  Pulse 55  Temp 97.1  F (36.2  C) (Tympanic)  Resp 22  Wt 190 lb (86.2 kg)  SpO2 98% There is no height or weight on file to calculate BMI.      Health Maintenance that is potentially due pending provider review:  Mammogram, Pap Smear and Colonoscopy/FIT    Patient here visiting Will have done at home     Is there anyone who you would like to be able to receive your results? No  If yes have patient fill out MARIANNE GUTIÉRREZ, [place]

## 2018-04-19 ASSESSMENT — ENCOUNTER SYMPTOMS
CHILLS: 0
PSYCHIATRIC NEGATIVE: 1
WHEEZING: 0
COUGH: 1
EYE DISCHARGE: 0
EYE PAIN: 0
FEVER: 0
EYE REDNESS: 0
WEAKNESS: 0
MUSCULOSKELETAL NEGATIVE: 1
NEUROLOGICAL NEGATIVE: 1
NAUSEA: 0
VOMITING: 0
STRIDOR: 0
SHORTNESS OF BREATH: 0
HEADACHES: 0
SORE THROAT: 0
SPUTUM PRODUCTION: 0

## 2018-04-30 ENCOUNTER — OFFICE VISIT (OUTPATIENT)
Dept: PRIMARY CARE | Facility: CLINIC | Age: 63
End: 2018-04-30
Payer: MEDICARE

## 2018-04-30 ENCOUNTER — TELEPHONE (OUTPATIENT)
Dept: PRIMARY CARE | Facility: CLINIC | Age: 63
End: 2018-04-30

## 2018-04-30 VITALS
OXYGEN SATURATION: 97 % | WEIGHT: 191 LBS | HEIGHT: 63 IN | SYSTOLIC BLOOD PRESSURE: 118 MMHG | DIASTOLIC BLOOD PRESSURE: 60 MMHG | TEMPERATURE: 97.8 F | RESPIRATION RATE: 17 BRPM | HEART RATE: 58 BPM | BODY MASS INDEX: 33.84 KG/M2

## 2018-04-30 DIAGNOSIS — E78.00 PURE HYPERCHOLESTEROLEMIA: Chronic | ICD-10-CM

## 2018-04-30 DIAGNOSIS — T81.718D IATROGENIC PULMONARY EMBOLISM AND INFARCTION, SUBSEQUENT ENCOUNTER (CMS/HCC): Primary | ICD-10-CM

## 2018-04-30 DIAGNOSIS — D50.8 IRON DEFICIENCY ANEMIA SECONDARY TO INADEQUATE DIETARY IRON INTAKE: ICD-10-CM

## 2018-04-30 DIAGNOSIS — I48.91 ATRIAL FIBRILLATION, UNSPECIFIED TYPE (CMS/HCC): ICD-10-CM

## 2018-04-30 DIAGNOSIS — I27.82 OTHER CHRONIC PULMONARY EMBOLISM WITHOUT ACUTE COR PULMONALE: Chronic | ICD-10-CM

## 2018-04-30 DIAGNOSIS — I48.0 PAF (PAROXYSMAL ATRIAL FIBRILLATION) (CMS/HCC): ICD-10-CM

## 2018-04-30 DIAGNOSIS — E11.618 TYPE 2 DIABETES MELLITUS WITH OTHER DIABETIC ARTHROPATHY, WITHOUT LONG-TERM CURRENT USE OF INSULIN: ICD-10-CM

## 2018-04-30 DIAGNOSIS — G47.33 OBSTRUCTIVE SLEEP APNEA: Chronic | ICD-10-CM

## 2018-04-30 DIAGNOSIS — I26.99 IATROGENIC PULMONARY EMBOLISM AND INFARCTION, SUBSEQUENT ENCOUNTER (CMS/HCC): Primary | ICD-10-CM

## 2018-04-30 DIAGNOSIS — I48.20 CHRONIC ATRIAL FIBRILLATION (CMS/HCC): ICD-10-CM

## 2018-04-30 DIAGNOSIS — I25.10 CORONARY ARTERY DISEASE INVOLVING NATIVE CORONARY ARTERY OF NATIVE HEART WITHOUT ANGINA PECTORIS: ICD-10-CM

## 2018-04-30 DIAGNOSIS — I10 ESSENTIAL HYPERTENSION: ICD-10-CM

## 2018-04-30 PROCEDURE — 99214 OFFICE O/P EST MOD 30 MIN: CPT | Performed by: FAMILY MEDICINE

## 2018-04-30 ASSESSMENT — ENCOUNTER SYMPTOMS
CONFUSION: 0
TROUBLE SWALLOWING: 0
ABDOMINAL PAIN: 0
FATIGUE: 0
HEADACHES: 0
SINUS PRESSURE: 0
DIARRHEA: 0
BLOOD IN STOOL: 0
EYE ITCHING: 0
EYE DISCHARGE: 0
NAUSEA: 0
SORE THROAT: 0
SHORTNESS OF BREATH: 1
ADENOPATHY: 0
BACK PAIN: 0
DIZZINESS: 0
CHEST TIGHTNESS: 0
APPETITE CHANGE: 0
FEVER: 0
DYSURIA: 0
ARTHRALGIAS: 1
PALPITATIONS: 0
COUGH: 0
MYALGIAS: 1
VOMITING: 0
EYE PAIN: 0
COLOR CHANGE: 0
AGITATION: 0
ACTIVITY CHANGE: 0
SINUS PAIN: 0

## 2018-04-30 NOTE — ASSESSMENT & PLAN NOTE
Stable  Lab Results   Component Value Date    HGBA1C 5.5 08/25/2017   diet and TLC  No meds  Resolved post surgery

## 2018-04-30 NOTE — PROGRESS NOTES
Subjective      Patient ID: Jennifer Aly is a 62 y.o. female.    63 yo for check up-has papers to fill out... Remains on disability     presenting today for 3-6 mo. follow-up. pt had bariatric surgery in October 2016 and has since lost almost 90 lbs. Last A1C is 5.5, BP controlled and WNL, and pt is in good spirits. She does have multiple medical issues and has Hx PE... Pt currently still taking warfarin - UTD with hematologist appointments and management -     CHRONIC CONDITIONS    DM  Diet  Exercise  Blood sugar at home doing well  A1C 5.5    Lipids  Diet  Exercise  On atorvastatin AND WANTS TO LOWER TO 40mg    Asthma  Stable  Continue meds  Follow up with pulmonologist PRN    HTN  Diet  TLC  Lisinopril  Sees cards regularly - last appt yesterday    KIM  On c-pap  Sleep study soon    Hx PE  meds  Will follow    Hx CAD  Baseline    OA-mult joints  Stable  Baseline    PAF?  NSR today          The following have been reviewed and updated as appropriate in this visit:         Past Medical History:   Diagnosis Date   • Arthritis    • DMII (diabetes mellitus, type 2) (CMS/HCC) ([facility])    • Fistula    • Gout    • HLD (hyperlipidemia)    • HTN (hypertension)    • IFG (impaired fasting glucose)    • Obesity    • KIM (obstructive sleep apnea)     uses a c-pap   • Polyarthritis    • Pulmonary emboli (CMS/HCC) ([facility])     pulmonary emboli   • Torn rotator cuff        Past Surgical History:   Procedure Laterality Date   • APPENDECTOMY  2008   • CARDIAC CATHETERIZATION  11/2012   • IVC FILTER RETRIEVAL  2012   • IVC FILTER RETRIEVAL  02/2013   • RECTOURETHRAL FISTULA REPAIR  2008, 5/2017   • ROTATOR CUFF REPAIR Left 01/2016   • ALTAF-EN-Y PROCEDURE  02/07/2017   • TOTAL ABDOMINAL HYSTERECTOMY  1987   • TOTAL KNEE ARTHROPLASTY Bilateral 2010   • TOTAL KNEE ARTHROPLASTY Right 2012    revision   • TRIGGER FINGER RELEASE Right 2012    right thumb   • ULNAR NERVE REPAIR  2013       Family History   Problem Relation Age of Onset   •  Diabetes Mother    • Lung cancer Mother    • Alzheimer's disease Mother    • Diabetes type II Father    • Heart failure Father        Social History     Social History   • Marital status:      Spouse name: N/A   • Number of children: N/A   • Years of education: N/A     Occupational History   • Not on file.     Social History Main Topics   • Smoking status: Former Smoker     Years: 15.00     Quit date: 1987   • Smokeless tobacco: Never Used   • Alcohol use Yes      Comment: occasional wine   • Drug use: No   • Sexual activity: Not Currently     Other Topics Concern   • Not on file     Social History Narrative   • No narrative on file       Allergies   Allergen Reactions   • Sulfa (Sulfonamide Antibiotics) Hives       Current Outpatient Prescriptions   Medication Sig Dispense Refill   • albuterol 2.5 mg /3 mL (0.083 %) nebulizer solution inhale 3 milliliter by nebulization route as needed only every 4-6 hours for shortness of breath     • albuterol HFA (VENTOLIN HFA) 90 mcg/actuation inhaler inhale 2 puff by inhalation route  every 4 - 6 hours as needed     • ALPRAZolam (XANAX) 0.25 mg tablet 0.25 mg.     • atorvastatin (LIPITOR) 40 mg tablet Take 1 tablet (40 mg total) by mouth daily. 90 tablet 3   • calcium carbonate-vitamin D3 500 mg(1,250mg) -200 unit per tablet No SIG Entered     • clobetasol (TEMOVATE) 0.05 % cream 0.05 %.     • cyanocobalamin-cobamamide (B12) 5,000-100 mcg lozenge No SIG Entered     • diltiazem CD (CARDIZEM CD) 120 mg 24 hr capsule Take 120 mg by mouth once daily.  1   • diltiazem XR (DILT-XR) 120 mg 24 hr capsule 120 mg.     • fluticasone (FLONASE) 50 mcg/actuation nasal spray inhale 1 spray by intranasal route  every day in each nostril     • indomethacin (INDOCIN) 50 mg capsule Take 1 capsule (50 mg total) by mouth 3 (three) times a day as needed for mild pain. (Patient not taking: Reported on 3/19/2018 ) 30 capsule 2   • verapamil PM (VERELAN PM) 120 mg 24 hr capsule 120 mg.     •  warfarin (COUMADIN) 4 mg tablet Take 4 mg by mouth once daily. 8mg everyday except Friday, 10 mg       No current facility-administered medications for this visit.        Review of Systems   Constitutional: Negative for activity change, appetite change, fatigue and fever.   HENT: Negative for congestion, sinus pain, sinus pressure, sore throat and trouble swallowing.    Eyes: Negative for pain, discharge, itching and visual disturbance.   Respiratory: Positive for shortness of breath. Negative for cough and chest tightness.    Cardiovascular: Negative for chest pain, palpitations and leg swelling.   Gastrointestinal: Negative for abdominal pain, blood in stool, diarrhea, nausea and vomiting.   Endocrine: Negative for cold intolerance and heat intolerance.   Genitourinary: Negative for dysuria.   Musculoskeletal: Positive for arthralgias and myalgias. Negative for back pain and gait problem.   Skin: Negative for color change and rash.   Allergic/Immunologic: Negative for environmental allergies and food allergies.   Neurological: Negative for dizziness and headaches.   Hematological: Negative for adenopathy.   Psychiatric/Behavioral: Negative for agitation, behavioral problems and confusion.       Objective     Vitals:    04/30/18 1107   BP: 118/60   Pulse: (!) 58   Resp: 17   Temp: 36.6 °C (97.8 °F)   SpO2: 97%       Physical Exam   Constitutional: She is oriented to person, place, and time. She appears well-developed and well-nourished.   HENT:   Head: Normocephalic and atraumatic.   Right Ear: External ear normal.   Left Ear: External ear normal.   Nose: Nose normal.   Mouth/Throat: Oropharynx is clear and moist.   Eyes: Conjunctivae and EOM are normal. Pupils are equal, round, and reactive to light.   Neck: Neck supple. No thyromegaly present.   Cardiovascular: Normal rate, regular rhythm, normal heart sounds and intact distal pulses.    No murmur heard.  Pulmonary/Chest: Effort normal and breath sounds normal.  No respiratory distress. She has no wheezes. She has no rales.   Abdominal: Soft. Bowel sounds are normal. She exhibits no distension and no mass. There is no tenderness. There is no guarding.   Musculoskeletal: She exhibits no edema or tenderness.   Decreased ROM in joints, weakness and no real  strength R hand/UE with weakness, Joint pains throughout   Neurological: She is alert and oriented to person, place, and time. She displays normal reflexes. No cranial nerve deficit.   Limited ROM, with Mild OA - Joint pains   Skin: Skin is warm and dry. Capillary refill takes less than 2 seconds. No rash noted.   Psychiatric: She has a normal mood and affect. Her behavior is normal. Judgment and thought content normal.       Assessment/Plan     Problem List        Other    Other pulmonary embolism without acute cor pulmonale (CMS/HCC) (HCC) (Chronic)    Overview     Overview:          Current Assessment & Plan     Sees specialist  Was on warfarin  Will follow         Pure hypercholesterolemia (Chronic)    Overview     Overview:          Current Assessment & Plan     Continue to take all your medications as instructed.  Eat healthy with less cholesterol rich and fatty foods.  Maintain a healthy weight by dieting and exercising.  Eating well and maintaining a healthy weight and a good cholesterol panel can decrease your risk of future heart disease and stroke. Come to your next appointment as scheduled.   Lab Results   Component Value Date    CHOL 152 03/05/2018    CHOL 105 09/14/2017    CHOL 119 08/25/2017     Lab Results   Component Value Date    HDL 63 03/05/2018    HDL 42 (L) 09/14/2017    HDL 45 (L) 08/25/2017     Lab Results   Component Value Date    LDLCALC 72 03/05/2018    LDLCALC 49 09/14/2017    LDLCALC 57 08/25/2017     Lab Results   Component Value Date    TRIG 84 03/05/2018    TRIG 69 09/14/2017    TRIG 87 08/25/2017     No results found for: CHOLHDL         Obstructive sleep apnea (Chronic)    Overview      Overview:          Current Assessment & Plan     Stable  C-PAP         Essential hypertension    Current Assessment & Plan     Continue to take all your blood pressure medications as instructed,  Stay well by eating a healthy diet with less salt and fatty foods. Maintain a healthy weight, and keep your regularly scheduled doctor appointments.          Type 2 diabetes mellitus with diabetic arthropathy (CMS/HCC) ([facility])    Current Assessment & Plan     Stable  Lab Results   Component Value Date    HGBA1C 5.5 08/25/2017   diet and TLC  No meds  Resolved post surgery         Coronary artery disease involving native coronary artery of native heart without angina pectoris    Current Assessment & Plan     Baseline  Cont all meds         Iron deficiency anemia secondary to inadequate dietary iron intake    Current Assessment & Plan     Secondary to Gastric bypass  Will check          PAF (paroxysmal atrial fibrillation) (CMS/HCC) ([facility])    Current Assessment & Plan     NSR  Is on Warf for PE  Stable  Will follow         RESOLVED: Chronic atrial fibrillation (CMS/HCC) ([facility])    Current Assessment & Plan     Stable  meds  baseline               DIAGNOSIS  1. Iatrogenic pulmonary embolism and infarction, subsequent encounter (CMS/HCC) ([facility])    2. Atrial fibrillation, unspecified type (CMS/HCC) ([facility])    3. Essential hypertension    4. Pure hypercholesterolemia    5. Other chronic pulmonary embolism without acute cor pulmonale (CMS/HCC) ([facility])    6. Type 2 diabetes mellitus with other diabetic arthropathy, without long-term current use of insulin (CMS/HCC) ([facility])    7. Chronic atrial fibrillation (CMS/HCC) ([facility])    8. Obstructive sleep apnea    9. Coronary artery disease involving native coronary artery of native heart without angina pectoris    10. Iron deficiency anemia secondary to inadequate dietary iron intake    11. PAF (paroxysmal atrial fibrillation) (CMS/HCC) ([facility])        No exam data present    Demarco Moreau  DO  4/30/2018

## 2018-04-30 NOTE — ASSESSMENT & PLAN NOTE
Continue to take all your blood pressure medications as instructed,  Stay well by eating a healthy diet with less salt and fatty foods. Maintain a healthy weight, and keep your regularly scheduled doctor appointments.

## 2018-04-30 NOTE — ASSESSMENT & PLAN NOTE
Continue to take all your medications as instructed.  Eat healthy with less cholesterol rich and fatty foods.  Maintain a healthy weight by dieting and exercising.  Eating well and maintaining a healthy weight and a good cholesterol panel can decrease your risk of future heart disease and stroke. Come to your next appointment as scheduled.   Lab Results   Component Value Date    CHOL 152 03/05/2018    CHOL 105 09/14/2017    CHOL 119 08/25/2017     Lab Results   Component Value Date    HDL 63 03/05/2018    HDL 42 (L) 09/14/2017    HDL 45 (L) 08/25/2017     Lab Results   Component Value Date    LDLCALC 72 03/05/2018    LDLCALC 49 09/14/2017    LDLCALC 57 08/25/2017     Lab Results   Component Value Date    TRIG 84 03/05/2018    TRIG 69 09/14/2017    TRIG 87 08/25/2017     No results found for: CHOLHDL

## 2018-05-01 ENCOUNTER — APPOINTMENT (OUTPATIENT)
Dept: RADIOLOGY | Facility: CLINIC | Age: 63
End: 2018-05-01
Attending: FAMILY MEDICINE
Payer: MEDICARE

## 2018-05-01 ENCOUNTER — APPOINTMENT (OUTPATIENT)
Dept: LAB | Facility: CLINIC | Age: 63
End: 2018-05-01
Attending: FAMILY MEDICINE
Payer: MEDICARE

## 2018-05-01 DIAGNOSIS — E11.618 TYPE 2 DIABETES MELLITUS WITH OTHER DIABETIC ARTHROPATHY, WITHOUT LONG-TERM CURRENT USE OF INSULIN: ICD-10-CM

## 2018-05-01 DIAGNOSIS — T81.718D IATROGENIC PULMONARY EMBOLISM AND INFARCTION, SUBSEQUENT ENCOUNTER (CMS/HCC): ICD-10-CM

## 2018-05-01 DIAGNOSIS — D50.8 IRON DEFICIENCY ANEMIA SECONDARY TO INADEQUATE DIETARY IRON INTAKE: ICD-10-CM

## 2018-05-01 DIAGNOSIS — Z12.39 BREAST CANCER SCREENING: ICD-10-CM

## 2018-05-01 DIAGNOSIS — I26.99 IATROGENIC PULMONARY EMBOLISM AND INFARCTION, SUBSEQUENT ENCOUNTER (CMS/HCC): ICD-10-CM

## 2018-05-01 DIAGNOSIS — E78.00 PURE HYPERCHOLESTEROLEMIA: Chronic | ICD-10-CM

## 2018-05-01 LAB
ALBUMIN SERPL-MCNC: 3.9 G/DL (ref 3.4–5)
ALP SERPL-CCNC: 111 IU/L (ref 35–126)
ALT SERPL-CCNC: 43 IU/L (ref 11–54)
ANION GAP SERPL CALC-SCNC: 7 MEQ/L (ref 3–15)
AST SERPL-CCNC: 24 IU/L (ref 15–41)
BASOPHILS # BLD: 0.02 K/UL (ref 0.01–0.1)
BASOPHILS NFR BLD: 0.3 %
BILIRUB SERPL-MCNC: 1 MG/DL (ref 0.3–1.2)
BUN SERPL-MCNC: 22 MG/DL (ref 8–20)
CALCIUM SERPL-MCNC: 9.7 MG/DL (ref 8.9–10.3)
CHLORIDE SERPL-SCNC: 106 MMOL/L (ref 98–109)
CHOLEST SERPL-MCNC: 150 MG/DL
CO2 SERPL-SCNC: 29 MMOL/L (ref 22–32)
CREAT SERPL-MCNC: 0.6 MG/DL (ref 0.6–1.1)
DIFFERENTIAL METHOD BLD: NORMAL
EOSINOPHIL # BLD: 0.18 K/UL (ref 0.04–0.36)
EOSINOPHIL NFR BLD: 3 %
ERYTHROCYTE [DISTWIDTH] IN BLOOD BY AUTOMATED COUNT: 14.8 % (ref 11.7–14.4)
GFR SERPL CREATININE-BSD FRML MDRD: >60 ML/MIN/1.73M*2
GLUCOSE SERPL-MCNC: 85 MG/DL (ref 70–99)
HCT VFR BLDCO AUTO: 39.8 % (ref 35–45)
HDLC SERPL-MCNC: 57 MG/DL
HDLC SERPL: 2.6 {RATIO}
HGB BLD-MCNC: 12.9 G/DL (ref 11.8–15.7)
IMM GRANULOCYTES # BLD AUTO: 0.01 K/UL (ref 0–0.08)
IMM GRANULOCYTES NFR BLD AUTO: 0.2 %
INR PPP: 2.2 INR
LDLC SERPL CALC-MCNC: 75 MG/DL
LYMPHOCYTES # BLD: 1.85 K/UL (ref 1.2–3.5)
LYMPHOCYTES NFR BLD: 31.1 %
MCH RBC QN AUTO: 30.2 PG (ref 28–33.2)
MCHC RBC AUTO-ENTMCNC: 32.4 G/DL (ref 32.2–35.5)
MCV RBC AUTO: 93.2 FL (ref 83–98)
MONOCYTES # BLD: 0.44 K/UL (ref 0.28–0.8)
MONOCYTES NFR BLD: 7.4 %
NEUTROPHILS # BLD: 3.44 K/UL (ref 1.7–7)
NEUTS SEG NFR BLD: 58 %
NONHDLC SERPL-MCNC: 93 MG/DL
NRBC BLD-RTO: 0 %
PDW BLD AUTO: 11 FL (ref 9.4–12.3)
PLATELET # BLD AUTO: 207 K/UL (ref 150–369)
POTASSIUM SERPL-SCNC: 4 MMOL/L (ref 3.6–5.1)
PROT SERPL-MCNC: 6.3 G/DL (ref 6–8.2)
PROTHROMBIN TIME: 24.5 SEC. (ref 12.2–14.5)
RBC # BLD AUTO: 4.27 M/UL (ref 3.93–5.22)
SODIUM SERPL-SCNC: 142 MMOL/L (ref 136–144)
TRIGL SERPL-MCNC: 89 MG/DL (ref 30–149)
WBC # BLD AUTO: 5.94 K/UL (ref 3.8–10.5)

## 2018-05-01 PROCEDURE — 77067 SCR MAMMO BI INCL CAD: CPT

## 2018-05-01 PROCEDURE — 85025 COMPLETE CBC W/AUTO DIFF WBC: CPT

## 2018-05-01 PROCEDURE — 83036 HEMOGLOBIN GLYCOSYLATED A1C: CPT

## 2018-05-01 PROCEDURE — 80061 LIPID PANEL: CPT

## 2018-05-01 PROCEDURE — 80053 COMPREHEN METABOLIC PANEL: CPT

## 2018-05-01 PROCEDURE — 85610 PROTHROMBIN TIME: CPT

## 2018-05-01 PROCEDURE — 36415 COLL VENOUS BLD VENIPUNCTURE: CPT

## 2018-05-02 LAB
EST. AVERAGE GLUCOSE BLD GHB EST-MCNC: 143 MG/DL
HBA1C MFR BLD HPLC: 6.6 %

## 2018-05-02 RX ORDER — AMOXICILLIN 500 MG/1
TABLET, FILM COATED ORAL
Qty: 8 TABLET | Refills: 1 | Status: SHIPPED | OUTPATIENT
Start: 2018-05-02 | End: 2019-04-26 | Stop reason: ALTCHOICE

## 2018-05-02 NOTE — TELEPHONE ENCOUNTER
From: Jennifer Aly  Sent: 4/30/2018 2:40 PM EDT  Subject: Medication Renewal Request    Jennifer Aly would like a refill of the following medications:     Other - Amoxicillin 500 mg    Preferred pharmacy: [facility] [id number] [facility] [address]  Delivery method: Pickup

## 2018-05-02 NOTE — TELEPHONE ENCOUNTER
----- Message from Demarco Moreau, DO sent at 5/2/2018  9:46 AM EDT -----  Please call Jennifer and let know Labs area all stable and INR is 2.2-assure we have the correct dose of her Warfarin, and she can get INR in 1 mo, all else stable too - good job

## 2018-05-04 NOTE — TELEPHONE ENCOUNTER
"Left message informing patient that results were available and \"okay\". Encouraged her to continue her diet and to call back if she wanted specifics.   "

## 2018-05-04 NOTE — TELEPHONE ENCOUNTER
----- Message from Kala Mane RN sent at 5/4/2018  9:10 AM EDT -----      ----- Message -----  From: Demarco Moreau DO  Sent: 5/2/2018   2:22 PM  To: Mannie Gamez MA    Let her know her A1c crept up, but still in stable level, cont diet and eating well, with exercise, repeat in 6 mo

## 2018-06-21 ENCOUNTER — TELEPHONE (OUTPATIENT)
Dept: PRIMARY CARE | Facility: CLINIC | Age: 63
End: 2018-06-21

## 2018-06-21 RX ORDER — AZITHROMYCIN 250 MG/1
TABLET, FILM COATED ORAL
Qty: 6 TABLET | Refills: 0 | Status: SHIPPED | OUTPATIENT
Start: 2018-06-21 | End: 2018-12-20 | Stop reason: SDUPTHER

## 2018-06-21 RX ORDER — PROMETHAZINE HYDROCHLORIDE AND DEXTROMETHORPHAN HYDROBROMIDE 6.25; 15 MG/5ML; MG/5ML
5 SYRUP ORAL EVERY 4 HOURS PRN
Qty: 118 ML | Refills: 0 | Status: SHIPPED | OUTPATIENT
Start: 2018-06-21 | End: 2018-12-20 | Stop reason: SDUPTHER

## 2018-08-16 RX ORDER — WARFARIN 4 MG/1
4 TABLET ORAL DAILY
Qty: 200 TABLET | Refills: 3 | Status: SHIPPED | OUTPATIENT
Start: 2018-08-16 | End: 2019-03-06 | Stop reason: SDUPTHER

## 2018-08-23 ENCOUNTER — OFFICE VISIT (OUTPATIENT)
Dept: PRIMARY CARE | Facility: CLINIC | Age: 63
End: 2018-08-23
Payer: MEDICARE

## 2018-08-23 ENCOUNTER — APPOINTMENT (OUTPATIENT)
Dept: LAB | Facility: CLINIC | Age: 63
End: 2018-08-23
Attending: FAMILY MEDICINE
Payer: MEDICARE

## 2018-08-23 VITALS
HEIGHT: 63 IN | WEIGHT: 196 LBS | HEART RATE: 62 BPM | RESPIRATION RATE: 17 BRPM | TEMPERATURE: 98.2 F | BODY MASS INDEX: 34.73 KG/M2 | DIASTOLIC BLOOD PRESSURE: 66 MMHG | SYSTOLIC BLOOD PRESSURE: 116 MMHG

## 2018-08-23 DIAGNOSIS — R07.9 CHEST PAIN, UNSPECIFIED TYPE: ICD-10-CM

## 2018-08-23 DIAGNOSIS — I26.92 CHRONIC SADDLE PULMONARY EMBOLISM WITHOUT ACUTE COR PULMONALE (CMS/HCC): ICD-10-CM

## 2018-08-23 DIAGNOSIS — I27.82 OTHER CHRONIC PULMONARY EMBOLISM WITHOUT ACUTE COR PULMONALE: Chronic | ICD-10-CM

## 2018-08-23 DIAGNOSIS — E11.618 TYPE 2 DIABETES MELLITUS WITH OTHER DIABETIC ARTHROPATHY, WITHOUT LONG-TERM CURRENT USE OF INSULIN: ICD-10-CM

## 2018-08-23 DIAGNOSIS — E66.01 MORBID OBESITY DUE TO EXCESS CALORIES (CMS/HCC): Chronic | ICD-10-CM

## 2018-08-23 DIAGNOSIS — I10 ESSENTIAL HYPERTENSION: ICD-10-CM

## 2018-08-23 DIAGNOSIS — K21.9 GERD WITHOUT ESOPHAGITIS: ICD-10-CM

## 2018-08-23 DIAGNOSIS — I27.82 CHRONIC SADDLE PULMONARY EMBOLISM WITHOUT ACUTE COR PULMONALE (CMS/HCC): ICD-10-CM

## 2018-08-23 DIAGNOSIS — R10.11 RIGHT UPPER QUADRANT PAIN: Primary | ICD-10-CM

## 2018-08-23 DIAGNOSIS — M54.9 UPPER BACK PAIN: ICD-10-CM

## 2018-08-23 PROBLEM — E11.9 DMII (DIABETES MELLITUS, TYPE 2) (CMS/HCC): Status: ACTIVE | Noted: 2018-04-30

## 2018-08-23 LAB
INR PPP: 2.3 INR
PROTHROMBIN TIME: 25.5 SEC. (ref 12.2–14.5)

## 2018-08-23 PROCEDURE — 36415 COLL VENOUS BLD VENIPUNCTURE: CPT

## 2018-08-23 PROCEDURE — 93000 ELECTROCARDIOGRAM COMPLETE: CPT | Performed by: FAMILY MEDICINE

## 2018-08-23 PROCEDURE — 85610 PROTHROMBIN TIME: CPT

## 2018-08-23 PROCEDURE — 99214 OFFICE O/P EST MOD 30 MIN: CPT | Performed by: FAMILY MEDICINE

## 2018-08-23 RX ORDER — DILTIAZEM HYDROCHLORIDE 120 MG/1
120 CAPSULE, EXTENDED RELEASE ORAL DAILY
Qty: 90 CAPSULE | Refills: 3 | Status: SHIPPED | OUTPATIENT
Start: 2018-08-23 | End: 2019-03-06 | Stop reason: SDUPTHER

## 2018-08-23 RX ORDER — OMEPRAZOLE 20 MG/1
20 CAPSULE, DELAYED RELEASE ORAL
Qty: 90 CAPSULE | Refills: 1 | Status: SHIPPED | OUTPATIENT
Start: 2018-08-23 | End: 2019-07-29 | Stop reason: ALTCHOICE

## 2018-08-23 RX ORDER — ATORVASTATIN CALCIUM 40 MG/1
40 TABLET, FILM COATED ORAL DAILY
Qty: 90 TABLET | Refills: 3 | Status: SHIPPED | OUTPATIENT
Start: 2018-08-23 | End: 2019-03-06 | Stop reason: SDUPTHER

## 2018-08-23 ASSESSMENT — ENCOUNTER SYMPTOMS
PALPITATIONS: 0
ACTIVITY CHANGE: 0
SINUS PRESSURE: 0
DIARRHEA: 0
ADENOPATHY: 0
SHORTNESS OF BREATH: 0
CHEST TIGHTNESS: 1
EYE PAIN: 0
DYSURIA: 0
ARTHRALGIAS: 0
FEVER: 0
VOMITING: 0
COLOR CHANGE: 0
BACK PAIN: 1
FATIGUE: 0
NAUSEA: 1
EYE ITCHING: 0
APPETITE CHANGE: 0
BLOOD IN STOOL: 0
ABDOMINAL PAIN: 1
HEADACHES: 0
COUGH: 0
SINUS PAIN: 0
TROUBLE SWALLOWING: 0
CONFUSION: 0
SORE THROAT: 0
AGITATION: 0
MYALGIAS: 0
DIZZINESS: 0
EYE DISCHARGE: 0

## 2018-08-23 NOTE — PATIENT INSTRUCTIONS
Feel better soon. Let us know what the results of the gallbladder ultrasound are, and we can discuss next steps.    Come back and see us in September/october for your flu shot!

## 2018-08-23 NOTE — ASSESSMENT & PLAN NOTE
Upper back pain directly parallel to location of chest pain. Tender on palpation.    EKG to chest for abnormal EKG signs.

## 2018-08-23 NOTE — ASSESSMENT & PLAN NOTE
Consistent pain in upper right quadrant followed by nausea when eating.    Positive McBurney's sign.    Order ultrasound of gallbladder  Diet and LTC reinforced

## 2018-08-23 NOTE — PROGRESS NOTES
Subjective      Patient ID: Jennifer Aly is a 63 y.o. female.    64 yo for check up,.. Has been feeling nauseated with chest and back discomfort.. back and chest aches, sharp pain in sternum area and hurts to take deep breaths. started over weekend, does see cardio and last visit went well. nauseated after eating.     Pt had bariatric surgery in October 2016 and has since lost almost 90 lbs. Last A1C is 6.6, BP controlled and WNL, and pt is in good spirits. She does have multiple medical issues and has Hx PE... Pt currently still taking warfarin - UTD with hematologist appointments and management -     Patient has gained 20 lbs this summer. Still has kept 70lbs off since the surgery. Patient is visibly in pain just sitting on table. Pain is in center of upper back (stabbing with knife) and in sternum (pressure and stabbing pain). This ~5 days duration. Patient did not eat today to avoid the nausea it causes. Wakes up in the middle of the night from the pain in the back. Has had multiple loose stools recently.    Patient would like refill of diltiazem CD (CARDIZEM CD) 120 mg 24 hr capsule, warfarin, and atorvostatin today       CHRONIC CONDITIONS     DM  Diet  Exercise  Blood sugar at home doing well  Lab Results       Component                Value               Date                       HGBA1C                   6.6 (H)             05/01/2018                 Lipids  Diet  Exercise  On atorvastatin AND WANTS TO LOWER TO 40mg     Asthma  Stable  Continue meds  Follow up with pulmonologist PRN     HTN  Diet  TLC  Lisinopril  Sees cards regularly - last appt yesterday     KIM  On c-pap  Sleep study soon     Hx PE  meds  Will follow     Hx CAD  Baseline     OA-mult joints  Stable  Baseline     PAF?  NSR today        The following have been reviewed and updated as appropriate in this visit:         Past Medical History:   Diagnosis Date   • Arthritis    • DMII (diabetes mellitus, type 2) (CMS/HCC) (H[facility])    • Fistula    •  Gout    • HLD (hyperlipidemia)    • HTN (hypertension)    • IFG (impaired fasting glucose)    • Obesity    • KIM (obstructive sleep apnea)     uses a c-pap   • Polyarthritis    • Pulmonary emboli (CMS/HCC) (HCC)     pulmonary emboli   • Torn rotator cuff        Past Surgical History:   Procedure Laterality Date   • APPENDECTOMY  2008   • CARDIAC CATHETERIZATION  11/2012   • IVC FILTER RETRIEVAL  2012   • IVC FILTER RETRIEVAL  02/2013   • RECTOURETHRAL FISTULA REPAIR  2008, 5/2017   • ROTATOR CUFF REPAIR Left 01/2016   • ALTAF-EN-Y PROCEDURE  02/07/2017   • TOTAL ABDOMINAL HYSTERECTOMY  1987   • TOTAL KNEE ARTHROPLASTY Bilateral 2010   • TOTAL KNEE ARTHROPLASTY Right 2012    revision   • TRIGGER FINGER RELEASE Right 2012    right thumb   • ULNAR NERVE REPAIR  2013       Family History   Problem Relation Age of Onset   • Diabetes Mother    • Lung cancer Mother    • Alzheimer's disease Mother    • Diabetes type II Father    • Heart failure Father        Social History     Social History   • Marital status:      Spouse name: N/A   • Number of children: N/A   • Years of education: N/A     Occupational History   • Not on file.     Social History Main Topics   • Smoking status: Former Smoker     Years: 15.00     Quit date: 1987   • Smokeless tobacco: Never Used   • Alcohol use Yes      Comment: occasional wine   • Drug use: No   • Sexual activity: Not Currently     Other Topics Concern   • Not on file     Social History Narrative   • No narrative on file       Allergies   Allergen Reactions   • Sulfa (Sulfonamide Antibiotics) Hives       Current Outpatient Prescriptions   Medication Sig Dispense Refill   • albuterol 2.5 mg /3 mL (0.083 %) nebulizer solution inhale 3 milliliter by nebulization route as needed only every 4-6 hours for shortness of breath     • albuterol HFA (VENTOLIN HFA) 90 mcg/actuation inhaler inhale 2 puff by inhalation route  every 4 - 6 hours as needed     • ALPRAZolam (XANAX) 0.25 mg tablet 0.25  mg.     • amoxicillin (AMOXIL) 500 mg tablet Take four by mouth prior to appointment(s) 8 tablet 1   • atorvastatin (LIPITOR) 40 mg tablet Take 1 tablet (40 mg total) by mouth daily. 90 tablet 3   • calcium carbonate-vitamin D3 500 mg(1,250mg) -200 unit per tablet No SIG Entered     • clobetasol (TEMOVATE) 0.05 % cream 0.05 %.     • cyanocobalamin-cobamamide (B12) 5,000-100 mcg lozenge No SIG Entered     • diltiazem XR (DILT-XR) 120 mg 24 hr capsule Take 1 capsule (120 mg total) by mouth daily. 90 capsule 3   • fluticasone (FLONASE) 50 mcg/actuation nasal spray inhale 1 spray by intranasal route  every day in each nostril     • indomethacin (INDOCIN) 50 mg capsule Take 1 capsule (50 mg total) by mouth 3 (three) times a day as needed for mild pain. (Patient not taking: Reported on 3/19/2018 ) 30 capsule 2   • omeprazole (PriLOSEC) 20 mg capsule Take 1 capsule (20 mg total) by mouth daily before breakfast. 90 capsule 1   • warfarin (COUMADIN) 4 mg tablet Take 1 tablet (4 mg total) by mouth once daily. 8mg everyday , except once per week take two and a half pills 200 tablet 3     No current facility-administered medications for this visit.        Review of Systems   Constitutional: Negative for activity change, appetite change, fatigue and fever.   HENT: Negative for congestion, sinus pain, sinus pressure, sore throat and trouble swallowing.    Eyes: Negative for pain, discharge, itching and visual disturbance.   Respiratory: Positive for chest tightness. Negative for cough and shortness of breath.    Cardiovascular: Negative for chest pain, palpitations and leg swelling.   Gastrointestinal: Positive for abdominal pain and nausea. Negative for blood in stool, diarrhea and vomiting.   Endocrine: Negative for cold intolerance and heat intolerance.   Genitourinary: Negative for dysuria.   Musculoskeletal: Positive for back pain. Negative for arthralgias, gait problem and myalgias.   Skin: Negative for color change and  rash.   Allergic/Immunologic: Negative for environmental allergies and food allergies.   Neurological: Negative for dizziness and headaches.   Hematological: Negative for adenopathy.   Psychiatric/Behavioral: Negative for agitation, behavioral problems and confusion.       Objective     Vitals:    08/23/18 1102   BP: 116/66   Pulse: 62   Resp: 17   Temp: 36.8 °C (98.2 °F)       Physical Exam   Constitutional: She is oriented to person, place, and time. She appears well-developed and well-nourished.   HENT:   Head: Normocephalic and atraumatic.   Right Ear: External ear normal.   Left Ear: External ear normal.   Nose: Nose normal.   Mouth/Throat: Oropharynx is clear and moist.   Eyes: Conjunctivae and EOM are normal. Pupils are equal, round, and reactive to light.   Neck: Normal range of motion. Neck supple. No thyromegaly present.   Cardiovascular: Normal rate, regular rhythm, normal heart sounds and intact distal pulses.    No murmur heard.  Reproducible chest pain midepigastric/retrosternal area   Pulmonary/Chest: Effort normal and breath sounds normal. No respiratory distress. She has no wheezes. She has no rales.   Abdominal: Soft. Bowel sounds are normal. She exhibits no distension and no mass. There is tenderness. There is positive Pugh's sign. There is no rigidity, no rebound and no guarding.   Musculoskeletal: Normal range of motion. She exhibits no edema or tenderness.   Neurological: She is alert and oriented to person, place, and time. She displays normal reflexes. No cranial nerve deficit.   Skin: Skin is warm and dry. Capillary refill takes less than 2 seconds. No rash noted.   Psychiatric: She has a normal mood and affect. Her behavior is normal. Judgment and thought content normal.       Assessment/Plan     Problem List Items Addressed This Visit        Other    Morbid obesity due to excess calories (CMS/HCC) (HCC) (Chronic)    Overview     Overview:          Current Assessment & Plan     Patient  has kept 70lbs off since bariatric surgery.  Encouraged dieting and exercise         Other pulmonary embolism without acute cor pulmonale (CMS/HCC) ([facility]) (Chronic)    Overview     Overview:          Current Assessment & Plan     Is on Warfarin, told needs to have for life    Will follow         Relevant Orders    Protime-INR    Essential hypertension    Current Assessment & Plan     Stable  meds         Relevant Medications    diltiazem XR (DILT-XR) 120 mg 24 hr capsule    Right upper quadrant pain - Primary    Current Assessment & Plan     Consistent pain in upper right quadrant followed by nausea when eating.    Positive McBurney's sign.    Order ultrasound of gallbladder  Diet and LTC reinforced         Relevant Orders    ULTRASOUND GALLBLADDER    Chest pain    Current Assessment & Plan     Retrosternal chest pain of continuous duration.    EKG to chest for abnormal signs to be safe         Relevant Orders    ECG 12 LEAD OFFICE PERFORMED (Completed)    DMII (diabetes mellitus, type 2) (CMS/[facility]) ([facility])    Current Assessment & Plan     Lab Results   Component Value Date    HGBA1C 6.6 (H) 05/01/2018   Stable  No meds  Diet and TLC    Will follow          Upper back pain    Current Assessment & Plan     Upper back pain directly parallel to location of chest pain. Tender on palpation.    EKG to chest for abnormal EKG signs.         Relevant Orders    ECG 12 LEAD OFFICE PERFORMED (Completed)    GERD without esophagitis    Current Assessment & Plan     CHECK US to R/O GB disease  May be component of GERD  Add PPI  Will folow         Relevant Medications    omeprazole (PriLOSEC) 20 mg capsule      Other Visit Diagnoses     Chronic saddle pulmonary embolism without acute cor pulmonale (CMS/HCC) ([facility])         Relevant Orders    Protime-INR          DIAGNOSIS  1. Right upper quadrant pain    2. Chest pain, unspecified type    3. Upper back pain    4. Morbid obesity due to excess calories (CMS/HCC) ([facility])    5. Type 2 diabetes  mellitus with other diabetic arthropathy, without long-term current use of insulin (CMS/HCC) (HCC)    6. Essential hypertension    7. GERD without esophagitis    8. Other chronic pulmonary embolism without acute cor pulmonale (CMS/HCC) (HCC)    9. Chronic saddle pulmonary embolism without acute cor pulmonale (CMS/HCC) (HCC)         No exam data present        Demarco Moreau DO  8/23/2018

## 2018-08-24 ENCOUNTER — HOSPITAL ENCOUNTER (OUTPATIENT)
Dept: RADIOLOGY | Facility: HOSPITAL | Age: 63
Discharge: HOME | End: 2018-08-24
Attending: FAMILY MEDICINE
Payer: MEDICARE

## 2018-08-24 ENCOUNTER — TELEPHONE (OUTPATIENT)
Dept: PRIMARY CARE | Facility: CLINIC | Age: 63
End: 2018-08-24

## 2018-08-24 DIAGNOSIS — R10.11 RIGHT UPPER QUADRANT PAIN: ICD-10-CM

## 2018-08-24 PROCEDURE — 76705 ECHO EXAM OF ABDOMEN: CPT

## 2018-08-24 NOTE — TELEPHONE ENCOUNTER
----- Message from Demarco Moreau DO sent at 8/24/2018  7:38 AM EDT -----  wilfredo Chand Jennifer know her INR is at goal at 2.3, same dose warfarin [8 mg daily], (SHe takes 4mg , 2 pills daily, so same dose - confirm her dose and med rec in chart if need be, thx) repeat in 1 mo

## 2018-08-28 NOTE — TELEPHONE ENCOUNTER
Called patient and informed patient of INR results and to cont. Taking the same dosage on 8mg of warfarin. Patient is taking 2 of her 4mg.

## 2018-10-23 ENCOUNTER — OFFICE VISIT (OUTPATIENT)
Dept: FAMILY MEDICINE | Facility: CLINIC | Age: 63
End: 2018-10-23
Payer: MEDICARE

## 2018-10-23 VITALS
HEART RATE: 67 BPM | TEMPERATURE: 98.7 F | BODY MASS INDEX: 36.44 KG/M2 | WEIGHT: 198 LBS | OXYGEN SATURATION: 95 % | DIASTOLIC BLOOD PRESSURE: 74 MMHG | HEIGHT: 62 IN | SYSTOLIC BLOOD PRESSURE: 132 MMHG | RESPIRATION RATE: 16 BRPM

## 2018-10-23 DIAGNOSIS — I48.0 PAF (PAROXYSMAL ATRIAL FIBRILLATION) (CMS/HCC): Primary | ICD-10-CM

## 2018-10-23 DIAGNOSIS — N39.0 CHRONIC UTI: ICD-10-CM

## 2018-10-23 DIAGNOSIS — E11.618 TYPE 2 DIABETES MELLITUS WITH OTHER DIABETIC ARTHROPATHY, WITHOUT LONG-TERM CURRENT USE OF INSULIN: ICD-10-CM

## 2018-10-23 DIAGNOSIS — Z11.1 SCREENING-PULMONARY TB: ICD-10-CM

## 2018-10-23 DIAGNOSIS — I10 ESSENTIAL HYPERTENSION: ICD-10-CM

## 2018-10-23 LAB
EST. AVERAGE GLUCOSE BLD GHB EST-MCNC: 114 MG/DL
HBA1C MFR BLD HPLC: 5.6 %
INDURATION: 1 MM
INR PPP: 2.6 INR
PROTHROMBIN TIME: 28.2 SEC (ref 12.2–14.5)
TB SKIN TEST: NEGATIVE

## 2018-10-23 PROCEDURE — 86580 TB INTRADERMAL TEST: CPT | Performed by: FAMILY MEDICINE

## 2018-10-23 PROCEDURE — 85610 PROTHROMBIN TIME: CPT | Performed by: FAMILY MEDICINE

## 2018-10-23 PROCEDURE — 99214 OFFICE O/P EST MOD 30 MIN: CPT | Performed by: FAMILY MEDICINE

## 2018-10-23 PROCEDURE — 83036 HEMOGLOBIN GLYCOSYLATED A1C: CPT | Performed by: FAMILY MEDICINE

## 2018-10-23 RX ORDER — CIPROFLOXACIN 500 MG/1
500 TABLET ORAL 2 TIMES DAILY
Qty: 14 TABLET | Refills: 0 | Status: SHIPPED | OUTPATIENT
Start: 2018-10-23 | End: 2019-04-26 | Stop reason: ALTCHOICE

## 2018-10-23 ASSESSMENT — ENCOUNTER SYMPTOMS
CHEST TIGHTNESS: 0
AGITATION: 0
DIZZINESS: 0
HEADACHES: 0
SINUS PRESSURE: 0
SINUS PAIN: 0
COUGH: 0
SHORTNESS OF BREATH: 0
DIARRHEA: 0
ADENOPATHY: 0
COLOR CHANGE: 0
ARTHRALGIAS: 0
DYSURIA: 0
BACK PAIN: 0
EYE ITCHING: 0
ABDOMINAL PAIN: 0
APPETITE CHANGE: 0
FEVER: 0
MYALGIAS: 0
CONFUSION: 0
BLOOD IN STOOL: 0
ACTIVITY CHANGE: 0
VOMITING: 0
TROUBLE SWALLOWING: 0
SORE THROAT: 0
EYE PAIN: 0
FATIGUE: 0
NAUSEA: 0
PALPITATIONS: 0
EYE DISCHARGE: 0

## 2018-10-23 NOTE — PROGRESS NOTES
Subjective      Patient ID: Jennifer Aly is a 63 y.o. female.    63 year old presents for physical exam and needs Tb test for new job.  Due for A1C and INR repeat labs.      CHRONIC CONDITIONS     DM  Diet  Exercise  Blood sugar at home doing well  Lab Results       Component                Value               Date                       HGBA1C                   6.6 (H)             05/01/2018                  Lipids  Diet  Exercise  On atorvastatin AND WANTS TO LOWER TO 40mg  Lab Results       Component                Value               Date                       CHOL                     150                 05/01/2018                 CHOL                     152                 03/05/2018                 CHOL                     105                 09/14/2017            Lab Results       Component                Value               Date                       HDL                      57                  05/01/2018                 HDL                      63                  03/05/2018                 HDL                      42 (L)              09/14/2017            Lab Results       Component                Value               Date                       LDLCALC                  75                  05/01/2018                 LDLCALC                  72                  03/05/2018                 LDLCALC                  49                  09/14/2017            Lab Results       Component                Value               Date                       TRIG                     89                  05/01/2018                 TRIG                     84                  03/05/2018                 TRIG                     69                  09/14/2017            No results found for: CHOLHDL         Asthma  Stable  Continue meds  Follow up with pulmonologist PRN     HTN  Diet  TLC  Lisinopril  Sees cards regularly - last appt yesterday     KIM  On c-pap  Sleep study soon     Hx PE  meds  Will follow     Hx  CAD  Baseline     OA-mult joints  Stable  Baseline     PAF?  NSR today              The following have been reviewed and updated as appropriate in this visit:         Past Medical History:   Diagnosis Date   • Arthritis    • DMII (diabetes mellitus, type 2) (CMS/[facility]) ([facility])    • Fistula    • Gout    • HLD (hyperlipidemia)    • HTN (hypertension)    • IFG (impaired fasting glucose)    • Obesity    • KIM (obstructive sleep apnea)     uses a c-pap   • Polyarthritis    • Pulmonary emboli (CMS/[facility]) ([facility])     pulmonary emboli   • Torn rotator cuff        Past Surgical History:   Procedure Laterality Date   • APPENDECTOMY  2008   • CARDIAC CATHETERIZATION  11/2012   • IVC FILTER RETRIEVAL  2012   • IVC FILTER RETRIEVAL  02/2013   • RECTOURETHRAL FISTULA REPAIR  2008, 5/2017   • ROTATOR CUFF REPAIR Left 01/2016   • ALTAF-EN-Y PROCEDURE  02/07/2017   • TOTAL ABDOMINAL HYSTERECTOMY  1987   • TOTAL KNEE ARTHROPLASTY Bilateral 2010   • TOTAL KNEE ARTHROPLASTY Right 2012    revision   • TRIGGER FINGER RELEASE Right 2012    right thumb   • ULNAR NERVE REPAIR  2013       Family History   Problem Relation Age of Onset   • Diabetes Mother    • Lung cancer Mother    • Alzheimer's disease Mother    • Diabetes type II Father    • Heart failure Father        Social History     Social History   • Marital status:      Spouse name: N/A   • Number of children: N/A   • Years of education: N/A     Occupational History   • Not on file.     Social History Main Topics   • Smoking status: Former Smoker     Years: 15.00     Quit date: 1987   • Smokeless tobacco: Never Used   • Alcohol use Yes      Comment: occasional wine   • Drug use: No   • Sexual activity: Not Currently     Other Topics Concern   • Not on file     Social History Narrative   • No narrative on file       Allergies   Allergen Reactions   • Sulfa (Sulfonamide Antibiotics) Hives       Current Outpatient Prescriptions   Medication Sig Dispense Refill   • albuterol 2.5 mg /3 mL  (0.083 %) nebulizer solution inhale 3 milliliter by nebulization route as needed only every 4-6 hours for shortness of breath     • albuterol HFA (VENTOLIN HFA) 90 mcg/actuation inhaler inhale 2 puff by inhalation route  every 4 - 6 hours as needed     • ALPRAZolam (XANAX) 0.25 mg tablet 0.25 mg.     • amoxicillin (AMOXIL) 500 mg tablet Take four by mouth prior to appointment(s) 8 tablet 1   • atorvastatin (LIPITOR) 40 mg tablet Take 1 tablet (40 mg total) by mouth daily. 90 tablet 3   • calcium carbonate-vitamin D3 500 mg(1,250mg) -200 unit per tablet No SIG Entered     • clobetasol (TEMOVATE) 0.05 % cream 0.05 %.     • cyanocobalamin-cobamamide (B12) 5,000-100 mcg lozenge No SIG Entered     • diltiazem XR (DILT-XR) 120 mg 24 hr capsule Take 1 capsule (120 mg total) by mouth daily. 90 capsule 3   • fluticasone (FLONASE) 50 mcg/actuation nasal spray inhale 1 spray by intranasal route  every day in each nostril     • indomethacin (INDOCIN) 50 mg capsule Take 1 capsule (50 mg total) by mouth 3 (three) times a day as needed for mild pain. (Patient not taking: Reported on 3/19/2018 ) 30 capsule 2   • omeprazole (PriLOSEC) 20 mg capsule Take 1 capsule (20 mg total) by mouth daily before breakfast. 90 capsule 1   • warfarin (COUMADIN) 4 mg tablet Take 1 tablet (4 mg total) by mouth once daily. 8mg everyday , except once per week take two and a half pills 200 tablet 3     No current facility-administered medications for this visit.        Review of Systems   Constitutional: Negative for activity change, appetite change, fatigue and fever.   HENT: Negative for congestion, sinus pain, sinus pressure, sore throat and trouble swallowing.    Eyes: Negative for pain, discharge, itching and visual disturbance.   Respiratory: Negative for cough, chest tightness and shortness of breath.    Cardiovascular: Negative for chest pain, palpitations and leg swelling.   Gastrointestinal: Negative for abdominal pain, blood in stool,  diarrhea, nausea and vomiting.   Endocrine: Negative for cold intolerance and heat intolerance.   Genitourinary: Negative for dysuria.   Musculoskeletal: Negative for arthralgias, back pain, gait problem and myalgias.   Skin: Negative for color change and rash.   Allergic/Immunologic: Negative for environmental allergies and food allergies.   Neurological: Negative for dizziness and headaches.   Hematological: Negative for adenopathy.   Psychiatric/Behavioral: Negative for agitation, behavioral problems and confusion.       Objective     There were no vitals filed for this visit.    Physical Exam   Constitutional: She is oriented to person, place, and time. She appears well-developed and well-nourished.   HENT:   Head: Normocephalic and atraumatic.   Right Ear: External ear normal.   Left Ear: External ear normal.   Nose: Nose normal.   Mouth/Throat: Oropharynx is clear and moist.   Eyes: Conjunctivae and EOM are normal. Pupils are equal, round, and reactive to light.   Neck: Normal range of motion. Neck supple. No thyromegaly present.   Cardiovascular: Normal rate, regular rhythm, normal heart sounds and intact distal pulses.    No murmur heard.  Pulmonary/Chest: Effort normal and breath sounds normal. No respiratory distress. She has no wheezes. She has no rales.   Abdominal: Soft. Bowel sounds are normal. She exhibits no distension and no mass. There is no tenderness. There is no guarding.   Musculoskeletal: Normal range of motion. She exhibits no edema or tenderness.   Neurological: She is alert and oriented to person, place, and time. She displays normal reflexes. No cranial nerve deficit.   Skin: Skin is warm and dry. Capillary refill takes less than 2 seconds. No rash noted.   Psychiatric: She has a normal mood and affect. Her behavior is normal. Judgment and thought content normal.       Assessment/Plan     Problem List Items Addressed This Visit     None          DIAGNOSIS  No diagnosis found.    No exam  data present        Demarco Moreau, DO  10/23/2018

## 2018-10-26 ENCOUNTER — TELEPHONE (OUTPATIENT)
Dept: FAMILY MEDICINE | Facility: CLINIC | Age: 63
End: 2018-10-26

## 2018-10-26 NOTE — TELEPHONE ENCOUNTER
----- Message from Demarco Moreau DO sent at 10/26/2018  8:13 AM EDT -----  wilfredo Hoangn know her Sugar is excellent with an A1c of 5.6! And her INR is at goal at 2.6, so keep up  The great work and all same meds/doses. Repeat INR monthly.

## 2018-12-18 ENCOUNTER — TELEPHONE (OUTPATIENT)
Dept: PRIMARY CARE | Facility: CLINIC | Age: 63
End: 2018-12-18

## 2018-12-18 NOTE — TELEPHONE ENCOUNTER
Called patient back regarding request for abx for sinus infection and cough. Patient states her sx's are a lot of chest congestion and coughing a lot, sinus pressure, post nasal drip and when blowing her nose she has green mucus and blood with it, patient denies any fever and hasn't taken anything OTC at this time. Her sx's started on Sunday.

## 2018-12-18 NOTE — TELEPHONE ENCOUNTER
She can try otc mucinex for cough and chest congestion. advil 2-3 po tid for nasal congestion and fevers chills. Have her make appt this week if sx's are not improved

## 2018-12-18 NOTE — TELEPHONE ENCOUNTER
Jennifer is requesting antibiotics for a sinus infection and cough.  The patient can be reached at [phone].  Declined appointment. The patient can be reached at [phone].

## 2018-12-20 ENCOUNTER — OFFICE VISIT (OUTPATIENT)
Dept: PRIMARY CARE | Facility: CLINIC | Age: 63
End: 2018-12-20
Payer: MEDICARE

## 2018-12-20 VITALS
SYSTOLIC BLOOD PRESSURE: 112 MMHG | HEART RATE: 58 BPM | OXYGEN SATURATION: 97 % | HEIGHT: 62 IN | RESPIRATION RATE: 16 BRPM | TEMPERATURE: 98.3 F | DIASTOLIC BLOOD PRESSURE: 68 MMHG

## 2018-12-20 DIAGNOSIS — J22 LOWER RESP. TRACT INFECTION: Primary | ICD-10-CM

## 2018-12-20 PROCEDURE — 99213 OFFICE O/P EST LOW 20 MIN: CPT | Performed by: NURSE PRACTITIONER

## 2018-12-20 RX ORDER — ALBUTEROL SULFATE 90 UG/1
2 INHALANT RESPIRATORY (INHALATION) EVERY 4 HOURS PRN
Qty: 1 INHALER | Refills: 3 | Status: SHIPPED | OUTPATIENT
Start: 2018-12-20 | End: 2019-04-26 | Stop reason: ALTCHOICE

## 2018-12-20 RX ORDER — PROMETHAZINE HYDROCHLORIDE AND DEXTROMETHORPHAN HYDROBROMIDE 6.25; 15 MG/5ML; MG/5ML
5 SYRUP ORAL EVERY 6 HOURS PRN
Qty: 118 ML | Refills: 0 | Status: SHIPPED | OUTPATIENT
Start: 2018-12-20 | End: 2019-07-29 | Stop reason: ALTCHOICE

## 2018-12-20 RX ORDER — AZITHROMYCIN 250 MG/1
TABLET, FILM COATED ORAL
Qty: 6 TABLET | Refills: 0 | Status: SHIPPED | OUTPATIENT
Start: 2018-12-20 | End: 2019-07-29 | Stop reason: ALTCHOICE

## 2018-12-20 ASSESSMENT — ENCOUNTER SYMPTOMS
MYALGIAS: 0
VOMITING: 0
CHILLS: 0
RHINORRHEA: 1
SINUS PAIN: 0
PALPITATIONS: 0
NERVOUS/ANXIOUS: 0
ADENOPATHY: 0
SHORTNESS OF BREATH: 0
FEVER: 0
ARTHRALGIAS: 0
SORE THROAT: 1
SINUS PRESSURE: 1
ABDOMINAL DISTENTION: 0
WHEEZING: 0
FATIGUE: 1
ACTIVITY CHANGE: 0
ABDOMINAL PAIN: 0
STRIDOR: 0
DIAPHORESIS: 0
CHEST TIGHTNESS: 0
COUGH: 1
CONSTIPATION: 0
DIARRHEA: 0
DYSURIA: 0
NAUSEA: 1

## 2018-12-20 NOTE — ASSESSMENT & PLAN NOTE
No PMH of COPD, recurrent CAP or pneumonia subtypes, smoking history, or emphysema. History of asthma. Symptoms ongoing for approximately 7 days with no relief. Likely diagnosis consistent with CAP. CURB-65 criteria not met.     PLAN:   · Treat with Z-pack as prescribed.  · Continue Mucinex PRN.   · Start Flonase daily for sinus and nasal congestion.    · Refilled inhalers - continue to use PRN for SOB, wheezing, ROGERS.   · PO fluids and rest encouraged.  · Pt educated regarding all medications and treatments. Verbalized an understanding of all care.  · No CXR or lab diagnostics indicated at this time.  · Hand hygiene encouraged..   · Pt instructed to f/u in 7-10 days if symptoms have not improved, or have changed/worsened. Will consider CXR, labs, and possible med/treatment adjustment at this time.   · Present to the ER if you experience a worsening of symptoms, or if you experience the following new symptoms: chest pain, shortness of breath, dyspnea, vomiting, excessive or severe nausea, recurrent and/or frequent diarrhea, severe new pain, new onset headache, vision changes or ringing in the ears, syncope or syncopal episodes. Present for any notice of abnormal or unexpected bleeding. Call the office for any further questions or concerns.

## 2018-12-20 NOTE — PROGRESS NOTES
Kath Madden, CELESTE, CRNP, FNP-BC    [facility]  [address]  [place] [address]  Phone: [phone]  Fax: [phone]     History of Present Illness     Subjective     Patient ID: Jennifer Aly is a 63 y.o. female.    64 yo female pt of Dr. Moreau's presenting for cough, nasal congestion, sinus congestion, PND, and generalized malaise from s/s. No fever at home. No n/v/d/c. There has been a progression in s/s and no relief from OTC remedies. She has a history significant for asthma, but no bronchitis, emphysema, PNA, COPD. She denies SOB, ROGERS, CP, headache.          Past Medical/Surgical/Family/Social History     The following have been reviewed and updated as appropriate in this visit:  Tobacco  Allergies  Meds  Problems  Med Hx  Surg Hx  Fam Hx  Soc Hx          Past Medical History:   Diagnosis Date   • Arthritis    • DMII (diabetes mellitus, type 2) (CMS/HCC) ([facility])    • Fistula    • Gout    • HLD (hyperlipidemia)    • HTN (hypertension)    • IFG (impaired fasting glucose)    • Obesity    • KIM (obstructive sleep apnea)     uses a c-pap   • Polyarthritis    • Pulmonary emboli (CMS/HCC) ([facility])     pulmonary emboli   • Torn rotator cuff        Past Surgical History:   Procedure Laterality Date   • APPENDECTOMY  2008   • CARDIAC CATHETERIZATION  11/2012   • IVC FILTER RETRIEVAL  2012   • IVC FILTER RETRIEVAL  02/2013   • RECTOURETHRAL FISTULA REPAIR  2008, 5/2017   • ROTATOR CUFF REPAIR Left 01/2016   • ALTAF-EN-Y PROCEDURE  02/07/2017   • TOTAL ABDOMINAL HYSTERECTOMY  1987   • TOTAL KNEE ARTHROPLASTY Bilateral 2010   • TOTAL KNEE ARTHROPLASTY Right 2012    revision   • TRIGGER FINGER RELEASE Right 2012    right thumb   • ULNAR NERVE REPAIR  2013       Family History   Problem Relation Age of Onset   • Diabetes Mother    • Lung cancer Mother    • Alzheimer's disease Mother    • Diabetes type II Father    • Heart failure Father        Social History      Social History   • Marital status:      Spouse name: N/A   • Number of children: N/A   • Years of education: N/A     Occupational History   • Not on file.     Social History Main Topics   • Smoking status: Former Smoker     Years: 15.00     Quit date: 1987   • Smokeless tobacco: Never Used   • Alcohol use Yes      Comment: occasional wine   • Drug use: No   • Sexual activity: Not Currently     Other Topics Concern   • Not on file     Social History Narrative   • No narrative on file        Allergies and Medications     Allergies   Allergen Reactions   • Sulfa (Sulfonamide Antibiotics) Hives         Current Outpatient Prescriptions:   •  albuterol HFA (VENTOLIN HFA) 90 mcg/actuation inhaler, Inhale 2 puffs every 4 (four) hours as needed for wheezing., Disp: 1 Inhaler, Rfl: 3  •  ALPRAZolam (XANAX) 0.25 mg tablet, 0.25 mg., Disp: , Rfl:   •  amoxicillin (AMOXIL) 500 mg tablet, Take four by mouth prior to appointment(s), Disp: 8 tablet, Rfl: 1  •  atorvastatin (LIPITOR) 40 mg tablet, Take 1 tablet (40 mg total) by mouth daily., Disp: 90 tablet, Rfl: 3  •  azithromycin (ZITHROMAX) 250 mg tablet, Take 2 tablets the first day, then 1 tablet daily for 4 days., Disp: 6 tablet, Rfl: 0  •  calcium carbonate-vitamin D3 500 mg(1,250mg) -200 unit per tablet, No SIG Entered, Disp: , Rfl:   •  clobetasol (TEMOVATE) 0.05 % cream, 0.05 %., Disp: , Rfl:   •  cyanocobalamin-cobamamide (B12) 5,000-100 mcg lozenge, No SIG Entered, Disp: , Rfl:   •  diltiazem XR (DILT-XR) 120 mg 24 hr capsule, Take 1 capsule (120 mg total) by mouth daily., Disp: 90 capsule, Rfl: 3  •  fluticasone (FLONASE) 50 mcg/actuation nasal spray, inhale 1 spray by intranasal route  every day in each nostril, Disp: , Rfl:   •  indomethacin (INDOCIN) 50 mg capsule, Take 1 capsule (50 mg total) by mouth 3 (three) times a day as needed for mild pain. (Patient not taking: Reported on 3/19/2018 ), Disp: 30 capsule, Rfl: 2  •  omeprazole (PriLOSEC) 20 mg  "capsule, Take 1 capsule (20 mg total) by mouth daily before breakfast., Disp: 90 capsule, Rfl: 1  •  promethazine-DM (PROMETHAZINE-DM) 6.25-15 mg/5 mL syrup, Take 5 mL by mouth every 6 (six) hours as needed for cough for up to 10 days., Disp: 118 mL, Rfl: 0  •  warfarin (COUMADIN) 4 mg tablet, Take 1 tablet (4 mg total) by mouth once daily. 8mg everyday , except once per week take two and a half pills, Disp: 200 tablet, Rfl: 3     Review of Systems     Review of Systems   Constitutional: Positive for fatigue. Negative for activity change, chills, diaphoresis and fever.   HENT: Positive for congestion, postnasal drip, rhinorrhea, sinus pressure, sneezing and sore throat. Negative for sinus pain.         \"scratchy and raspy\" throat.    Respiratory: Positive for cough. Negative for chest tightness, shortness of breath, wheezing and stridor.    Cardiovascular: Negative for chest pain, palpitations and leg swelling.   Gastrointestinal: Positive for nausea. Negative for abdominal distention, abdominal pain, constipation, diarrhea and vomiting.        Relates nausea to PND   Genitourinary: Negative for dysuria.   Musculoskeletal: Negative for arthralgias and myalgias.   Skin: Negative for rash.   Hematological: Negative for adenopathy.   Psychiatric/Behavioral: The patient is not nervous/anxious.         Physical Examination     Objective     Vitals:    12/20/18 0958   BP: 112/68   BP Location: Left upper arm   Patient Position: Sitting   Pulse: (!) 58   Resp: 16   Temp: 36.8 °C (98.3 °F)   TempSrc: Oral   SpO2: 97%   Height: 1.575 m (5' 2\")       Wt Readings from Last 2 Encounters:   10/23/18 89.8 kg (198 lb)   08/23/18 88.9 kg (196 lb)       Ht Readings from Last 2 Encounters:   12/20/18 1.575 m (5' 2\")   10/23/18 1.575 m (5' 2\")       BP Readings from Last 2 Encounters:   12/20/18 112/68   10/23/18 132/74       Physical Exam   Constitutional: She is oriented to person, place, and time. Vital signs are normal.  Non-toxic " appearance. She does not have a sickly appearance. She does not appear ill. No distress.   HENT:   Head: Normocephalic and atraumatic.   Right Ear: Hearing, external ear and ear canal normal. Tympanic membrane is not erythematous. A middle ear effusion is present. No decreased hearing is noted.   Left Ear: Hearing, external ear and ear canal normal. Tympanic membrane is not erythematous. A middle ear effusion is present. No decreased hearing is noted.   Nose: Rhinorrhea present. No epistaxis.  No foreign bodies. Right sinus exhibits maxillary sinus tenderness and frontal sinus tenderness. Left sinus exhibits maxillary sinus tenderness and frontal sinus tenderness.   Mouth/Throat: Uvula is midline and mucous membranes are normal. Posterior oropharyngeal erythema present. No oropharyngeal exudate. Tonsils are 2+ on the right. Tonsils are 2+ on the left. No tonsillar exudate.   Eyes: Conjunctivae and lids are normal. Pupils are equal, round, and reactive to light. Lids are everted and swept, no foreign bodies found.   Neck: Trachea normal, normal range of motion, full passive range of motion without pain and phonation normal. Neck supple. No JVD present. No neck rigidity. Normal range of motion present.   Cardiovascular: Normal rate, regular rhythm and normal heart sounds.    Pulses:       Carotid pulses are 2+ on the right side, and 2+ on the left side.       Radial pulses are 2+ on the right side, and 2+ on the left side.   Pulmonary/Chest: Effort normal and breath sounds normal. No accessory muscle usage or stridor. No tachypnea and no bradypnea. No respiratory distress. She has no decreased breath sounds. She has no wheezes. She has no rhonchi. She has no rales.   Abdominal: Soft. Normal appearance and bowel sounds are normal. There is no tenderness.   Musculoskeletal: Normal range of motion.   Lymphadenopathy:        Head (right side): No submental, no submandibular, no preauricular and no posterior auricular  adenopathy present.        Head (left side): No submental, no submandibular, no preauricular and no posterior auricular adenopathy present.        Right cervical: No superficial cervical adenopathy present.       Left cervical: No superficial cervical adenopathy present.   Neurological: She is alert and oriented to person, place, and time.   Skin: Skin is warm, dry and intact. No rash noted.   Psychiatric: She has a normal mood and affect. Her speech is normal and behavior is normal. Judgment and thought content normal. Her mood appears not anxious. Cognition and memory are normal. She does not exhibit a depressed mood.        Laboratory Results     Lab Results   Component Value Date    WBC 5.94 05/01/2018    WBC 5.15 03/05/2018    HGB 12.9 05/01/2018    HGB 12.5 03/05/2018    HCT 39.8 05/01/2018    HCT 38.8 03/05/2018    MCV 93.2 05/01/2018    MCV 92.8 03/05/2018     05/01/2018     03/05/2018        Lab Results   Component Value Date    GLUCOSE 85 05/01/2018    GLUCOSE 90 03/05/2018    CALCIUM 9.7 05/01/2018    CALCIUM 10.0 03/05/2018    CALCIUM 10.0 03/05/2018     05/01/2018     (H) 03/05/2018    K 4.0 05/01/2018    K 4.3 03/05/2018    CO2 29 05/01/2018    CO2 28 03/05/2018     05/01/2018     03/05/2018    BUN 22 (H) 05/01/2018    BUN 20 03/05/2018    CREATININE 0.6 05/01/2018    CREATININE 0.6 03/05/2018    ALKPHOS 111 05/01/2018    ALKPHOS 116 03/05/2018    AST 24 05/01/2018    AST 37 03/05/2018    ALT 43 05/01/2018    ALT 57 (H) 03/05/2018    BILITOT 1.0 05/01/2018    BILITOT 1.0 03/05/2018    ALBUMIN 3.9 05/01/2018    ALBUMIN 4.0 03/05/2018       Lab Results   Component Value Date    CHOL 150 05/01/2018    CHOL 152 03/05/2018     Lab Results   Component Value Date    HDL 57 05/01/2018    HDL 63 03/05/2018     Lab Results   Component Value Date    LDLCALC 75 05/01/2018    LDLCALC 72 03/05/2018     Lab Results   Component Value Date    TRIG 89 05/01/2018    TRIG 84  03/05/2018       Lab Results   Component Value Date    TSH 1.91 03/05/2018    TSH 1.76 09/14/2017       Lab Results   Component Value Date    HGBA1C 5.6 10/23/2018    HGBA1C 6.6 (H) 05/01/2018       No results found for: HEPCAB      Immunization History   Administered Date(s) Administered   • Influenza Split High Dose Preservative Free IM 10/25/2016, 09/14/2017   • Influenza Vaccine High Dose 65 And Older 09/25/2018   • PPD Test 10/23/2018   • Pneumococcal Conjugate 13-Valent 10/25/2016, 09/14/2017   • Zoster 10/25/2017         Health Maintenance Topics with due status: Overdue       Topic Date Due    Medicare Annual Wellness Visit 1955    Hepatitis C Screening 1955    Dilated Retinal Exam 06/30/1963    Diabetic Foot Exam 06/30/1963    DTaP, Tdap, and Td Vaccines 06/30/1972    Urine Protein Screening 06/30/1972    DEXA Scan 06/30/2018    Annual Falls Risk Screening 06/30/2018    Pneumococcal 65+ Years/ Low and Medium Risk 09/14/2018     Health Maintenance Topics with due status: Not Due       Topic Last Completion Date    Colonoscopy 10/15/2013    Mammogram 05/01/2018    Hemoglobin A1C 10/23/2018     Health Maintenance Topics with due status: Completed / Addressed / Aged Out       Topic Last Completion Date    Zoster Vaccines 10/25/2017    Influenza Vaccine 09/25/2018    HPV Vaccines Aged Out    Meningococcal Vaccine Aged Out    HIB Vaccines Aged Out    IPV Vaccines Aged Out          Assessment and Plan     Assessment/Plan     Problem List Items Addressed This Visit     Lower resp. tract infection - Primary    Current Assessment & Plan     No PMH of COPD, recurrent CAP or pneumonia subtypes, smoking history, or emphysema. History of asthma. Symptoms ongoing for approximately 7 days with no relief. Likely diagnosis consistent with CAP. CURB-65 criteria not met.     PLAN:   · Treat with Z-pack as prescribed.  · Continue Mucinex PRN.   · Start Flonase daily for sinus and nasal congestion.    · Refilled  inhalers - continue to use PRN for SOB, wheezing, ROGERS.   · PO fluids and rest encouraged.  · Pt educated regarding all medications and treatments. Verbalized an understanding of all care.  · No CXR or lab diagnostics indicated at this time.  · Hand hygiene encouraged..   · Pt instructed to f/u in 7-10 days if symptoms have not improved, or have changed/worsened. Will consider CXR, labs, and possible med/treatment adjustment at this time.   · Present to the ER if you experience a worsening of symptoms, or if you experience the following new symptoms: chest pain, shortness of breath, dyspnea, vomiting, excessive or severe nausea, recurrent and/or frequent diarrhea, severe new pain, new onset headache, vision changes or ringing in the ears, syncope or syncopal episodes. Present for any notice of abnormal or unexpected bleeding. Call the office for any further questions or concerns.            Relevant Medications    albuterol HFA (VENTOLIN HFA) 90 mcg/actuation inhaler    azithromycin (ZITHROMAX) 250 mg tablet    promethazine-DM (PROMETHAZINE-DM) 6.25-15 mg/5 mL syrup          Return if symptoms worsen or fail to improve.    No orders of the defined types were placed in this encounter.             Kath Madden DNP    12/20/2018

## 2018-12-20 NOTE — PATIENT INSTRUCTIONS
You may pick-up Cortisone over-the-counter ointment for dryness and flaking in/by the ear canals. Apply a thin film daily as needed for dryness, flaking, and inflammation.

## 2019-03-06 ENCOUNTER — TELEPHONE (OUTPATIENT)
Dept: PRIMARY CARE | Facility: CLINIC | Age: 64
End: 2019-03-06

## 2019-03-06 ENCOUNTER — OFFICE VISIT (OUTPATIENT)
Dept: PRIMARY CARE | Facility: CLINIC | Age: 64
End: 2019-03-06
Payer: MEDICARE

## 2019-03-06 VITALS
HEART RATE: 61 BPM | DIASTOLIC BLOOD PRESSURE: 76 MMHG | WEIGHT: 198 LBS | TEMPERATURE: 97.9 F | BODY MASS INDEX: 36.44 KG/M2 | RESPIRATION RATE: 17 BRPM | HEIGHT: 62 IN | OXYGEN SATURATION: 98 % | SYSTOLIC BLOOD PRESSURE: 124 MMHG

## 2019-03-06 DIAGNOSIS — E11.618 TYPE 2 DIABETES MELLITUS WITH OTHER DIABETIC ARTHROPATHY, WITHOUT LONG-TERM CURRENT USE OF INSULIN: ICD-10-CM

## 2019-03-06 DIAGNOSIS — J30.2 SEASONAL ALLERGIES: ICD-10-CM

## 2019-03-06 DIAGNOSIS — I25.10 CORONARY ARTERY DISEASE INVOLVING NATIVE CORONARY ARTERY OF NATIVE HEART WITHOUT ANGINA PECTORIS: ICD-10-CM

## 2019-03-06 DIAGNOSIS — E78.00 PURE HYPERCHOLESTEROLEMIA: Chronic | ICD-10-CM

## 2019-03-06 DIAGNOSIS — H61.23 BILATERAL IMPACTED CERUMEN: ICD-10-CM

## 2019-03-06 DIAGNOSIS — R91.1 PULMONARY NODULE: ICD-10-CM

## 2019-03-06 DIAGNOSIS — H61.22 IMPACTED CERUMEN OF LEFT EAR: ICD-10-CM

## 2019-03-06 DIAGNOSIS — I48.0 PAF (PAROXYSMAL ATRIAL FIBRILLATION) (CMS/HCC): Primary | ICD-10-CM

## 2019-03-06 DIAGNOSIS — H65.492 CHRONIC OTITIS MEDIA OF LEFT EAR WITH EFFUSION: ICD-10-CM

## 2019-03-06 DIAGNOSIS — I10 ESSENTIAL HYPERTENSION: ICD-10-CM

## 2019-03-06 PROBLEM — H91.93 BILATERAL HEARING LOSS: Status: ACTIVE | Noted: 2019-03-06

## 2019-03-06 PROCEDURE — 99214 OFFICE O/P EST MOD 30 MIN: CPT | Performed by: FAMILY MEDICINE

## 2019-03-06 RX ORDER — DILTIAZEM HYDROCHLORIDE 120 MG/1
120 CAPSULE, EXTENDED RELEASE ORAL DAILY
Qty: 90 CAPSULE | Refills: 3 | Status: SHIPPED | OUTPATIENT
Start: 2019-03-06 | End: 2019-09-26 | Stop reason: SDUPTHER

## 2019-03-06 RX ORDER — WARFARIN 4 MG/1
TABLET ORAL
Qty: 200 TABLET | Refills: 1 | Status: SHIPPED | OUTPATIENT
Start: 2019-03-06 | End: 2019-07-17 | Stop reason: SDUPTHER

## 2019-03-06 RX ORDER — WARFARIN 4 MG/1
4 TABLET ORAL DAILY
Qty: 200 TABLET | Refills: 3 | Status: SHIPPED | OUTPATIENT
Start: 2019-03-06 | End: 2019-03-06 | Stop reason: SDUPTHER

## 2019-03-06 RX ORDER — ATORVASTATIN CALCIUM 40 MG/1
40 TABLET, FILM COATED ORAL DAILY
Qty: 90 TABLET | Refills: 3 | Status: SHIPPED | OUTPATIENT
Start: 2019-03-06 | End: 2019-08-13 | Stop reason: SDUPTHER

## 2019-03-06 RX ORDER — FLUTICASONE PROPIONATE 50 MCG
1 SPRAY, SUSPENSION (ML) NASAL DAILY PRN
Qty: 1 BOTTLE | Refills: 3 | Status: SHIPPED | OUTPATIENT
Start: 2019-03-06 | End: 2019-10-29 | Stop reason: SDUPTHER

## 2019-03-06 ASSESSMENT — ENCOUNTER SYMPTOMS
DIZZINESS: 0
APPETITE CHANGE: 0
CONFUSION: 0
BLOOD IN STOOL: 0
PALPITATIONS: 0
SINUS PRESSURE: 1
EYE ITCHING: 0
ADENOPATHY: 0
BACK PAIN: 0
SHORTNESS OF BREATH: 0
SORE THROAT: 0
ACTIVITY CHANGE: 0
DIARRHEA: 0
ARTHRALGIAS: 0
MYALGIAS: 0
TROUBLE SWALLOWING: 0
COLOR CHANGE: 0
FATIGUE: 0
DYSURIA: 0
COUGH: 1
HEADACHES: 0
NAUSEA: 0
EYE PAIN: 0
RHINORRHEA: 1
FEVER: 0
AGITATION: 0
VOMITING: 0
ABDOMINAL PAIN: 0
SINUS PAIN: 1
EYE DISCHARGE: 0

## 2019-03-06 NOTE — ASSESSMENT & PLAN NOTE
Lab Results   Component Value Date    CHOL 150 05/01/2018    CHOL 152 03/05/2018    CHOL 105 09/14/2017     Lab Results   Component Value Date    HDL 57 05/01/2018    HDL 63 03/05/2018    HDL 42 (L) 09/14/2017     Lab Results   Component Value Date    LDLCALC 75 05/01/2018    LDLCALC 72 03/05/2018    LDLCALC 49 09/14/2017     Lab Results   Component Value Date    TRIG 89 05/01/2018    TRIG 84 03/05/2018    TRIG 69 09/14/2017     No results found for: CHOLHDL   Continue to take all your medications as instructed.  Eat healthy with less cholesterol rich and fatty foods.  Maintain a healthy weight by dieting and exercising.  Eating well and maintaining a healthy weight and a good cholesterol panel can decrease your risk of future heart disease and stroke. Come to your next appointment as scheduled. Stay well, God Bless.

## 2019-03-06 NOTE — ASSESSMENT & PLAN NOTE
Stable  Continue current meds as prescribed  Diet and TLC reinforced  Stable on exam today  Continue to monitor

## 2019-03-06 NOTE — PROCEDURES
Ear cerumen removal  Date/Time: 3/6/2019 10:27 AM  Performed by: JULIA MORGAN  Authorized by: JULIA MORGAN     Consent:     Consent obtained:  Verbal    Consent given by:  Patient    Risks discussed:  Bleeding, infection, pain, dizziness, incomplete removal and TM perforation    Alternatives discussed:  No treatment  Procedure details:     Location:  L ear and R ear    Procedure type: irrigation    Post-procedure details:     Inspection:  TM intact    Hearing quality:  Improved    Patient tolerance of procedure:  Tolerated well, no immediate complications

## 2019-03-06 NOTE — PROGRESS NOTES
Subjective      Patient ID: Jennifer Aly is a 63 y.o. female.    63 yr old female presents in for the follow up of active and chronic illnesses.She also has complaints of  ear fullness, nose bleed when blowing nose,nasal congestion,post nasal drip,facial congestion,non productive cough for 2 weeks.Denies any fever,headache,diarrhea or vomiting.    (Wants ears cleaned out and wants to see ENT with LEft ear effusions).    Pt also had Hx of Pumonary nodule and wants CT chest ordered to follow that.       CHRONIC CONDITIONS     DM  Diet  Exercise  Blood sugar at home doing well  Lab Results       Component                Value               Date                       HGBA1C                   5.6                 10/23/2018              Lipids  Diet  Exercise  On atorvastatin AND WANTS TO LOWER TO 40mg  Lab Results       Component                Value               Date                       CHOL                     150                 05/01/2018                 CHOL                     152                 03/05/2018                 CHOL                     105                 09/14/2017            Lab Results       Component                Value               Date                       HDL                      57                  05/01/2018                 HDL                      63                  03/05/2018                 HDL                      42 (L)              09/14/2017            Lab Results       Component                Value               Date                       LDLCALC                  75                  05/01/2018                 LDLCALC                  72                  03/05/2018                 LDLCALC                  49                  09/14/2017            Lab Results       Component                Value               Date                       TRIG                     89                  05/01/2018                 TRIG                     84                  03/05/2018                 TRIG                      69                  09/14/2017            No results found for: CHOLHDL        The following have been reviewed and updated as appropriate in this visit:         Past Medical History:   Diagnosis Date   • Arthritis    • DMII (diabetes mellitus, type 2) (CMS/HCC) ([facility])    • Fistula    • Gout    • HLD (hyperlipidemia)    • HTN (hypertension)    • IFG (impaired fasting glucose)    • Obesity    • KIM (obstructive sleep apnea)     uses a c-pap   • Polyarthritis    • Pulmonary emboli (CMS/HCC) ([facility])     pulmonary emboli   • Torn rotator cuff        Past Surgical History:   Procedure Laterality Date   • APPENDECTOMY  2008   • CARDIAC CATHETERIZATION  11/2012   • IVC FILTER RETRIEVAL  2012   • IVC FILTER RETRIEVAL  02/2013   • RECTOURETHRAL FISTULA REPAIR  2008, 5/2017   • ROTATOR CUFF REPAIR Left 01/2016   • ALTAF-EN-Y PROCEDURE  02/07/2017   • TOTAL ABDOMINAL HYSTERECTOMY  1987   • TOTAL KNEE ARTHROPLASTY Bilateral 2010   • TOTAL KNEE ARTHROPLASTY Right 2012    revision   • TRIGGER FINGER RELEASE Right 2012    right thumb   • ULNAR NERVE REPAIR  2013       Family History   Problem Relation Age of Onset   • Diabetes Mother    • Lung cancer Mother    • Alzheimer's disease Mother    • Diabetes type II Father    • Heart failure Father        Social History     Social History   • Marital status:      Spouse name: N/A   • Number of children: N/A   • Years of education: N/A     Occupational History   • Not on file.     Social History Main Topics   • Smoking status: Former Smoker     Years: 15.00     Quit date: 1987   • Smokeless tobacco: Never Used   • Alcohol use Yes      Comment: occasional wine   • Drug use: No   • Sexual activity: Not Currently     Other Topics Concern   • Not on file     Social History Narrative   • No narrative on file       Allergies   Allergen Reactions   • Sulfa (Sulfonamide Antibiotics) Hives       Current Outpatient Prescriptions   Medication Sig Dispense Refill   • albuterol  HFA (VENTOLIN HFA) 90 mcg/actuation inhaler Inhale 2 puffs every 4 (four) hours as needed for wheezing. 1 Inhaler 3   • ALPRAZolam (XANAX) 0.25 mg tablet 0.25 mg.     • amoxicillin (AMOXIL) 500 mg tablet Take four by mouth prior to appointment(s) 8 tablet 1   • atorvastatin (LIPITOR) 40 mg tablet Take 1 tablet (40 mg total) by mouth daily. 90 tablet 3   • calcium carbonate-vitamin D3 500 mg(1,250mg) -200 unit per tablet No SIG Entered     • clobetasol (TEMOVATE) 0.05 % cream 0.05 %.     • cyanocobalamin-cobamamide (B12) 5,000-100 mcg lozenge No SIG Entered     • diltiazem XR (DILT-XR) 120 mg 24 hr capsule Take 1 capsule (120 mg total) by mouth daily. 90 capsule 3   • fluticasone (FLONASE) 50 mcg/actuation nasal spray Administer 1 spray into each nostril daily as needed for rhinitis. 1 Bottle 3   • indomethacin (INDOCIN) 50 mg capsule Take 1 capsule (50 mg total) by mouth 3 (three) times a day as needed for mild pain. (Patient not taking: Reported on 3/19/2018 ) 30 capsule 2   • warfarin (COUMADIN) 4 mg tablet Take 1 tablet (4 mg total) by mouth once daily. 8mg everyday , except once per week take two and a half pills 200 tablet 3     No current facility-administered medications for this visit.        Review of Systems   Constitutional: Negative for activity change, appetite change, fatigue and fever.   HENT: Positive for congestion, ear discharge, ear pain, nosebleeds, rhinorrhea, sinus pain, sinus pressure and sneezing. Negative for sore throat and trouble swallowing.         Ear discharge from left ear   Eyes: Negative for pain, discharge, itching and visual disturbance.   Respiratory: Positive for cough. Negative for shortness of breath.    Cardiovascular: Negative for chest pain, palpitations and leg swelling.   Gastrointestinal: Negative for abdominal pain, blood in stool, diarrhea, nausea and vomiting.   Endocrine: Negative for cold intolerance and heat intolerance.   Genitourinary: Negative for dysuria.    Musculoskeletal: Negative for arthralgias, back pain, gait problem and myalgias.   Skin: Negative for color change and rash.   Allergic/Immunologic: Negative for environmental allergies and food allergies.   Neurological: Negative for dizziness and headaches.   Hematological: Negative for adenopathy.   Psychiatric/Behavioral: Negative for agitation, behavioral problems and confusion.       Objective     Vitals:    03/06/19 0954   BP: 124/76   Pulse: 61   Resp: 17   Temp: 36.6 °C (97.9 °F)   SpO2: 98%       Physical Exam   Constitutional: She is oriented to person, place, and time. She appears well-developed and well-nourished.   HENT:   Head: Normocephalic and atraumatic.   Right Ear: External ear normal.   Left Ear: External ear normal.   Nose: Nose normal.   Mouth/Throat: Oropharynx is clear and moist.   PND,nasal mucosal erythema  Ear cerumen L>R   Eyes: Conjunctivae and EOM are normal. Pupils are equal, round, and reactive to light.   Neck: Normal range of motion. Neck supple. No thyromegaly present.   Cardiovascular: Normal rate, regular rhythm, normal heart sounds and intact distal pulses.    No murmur heard.  Pulmonary/Chest: Effort normal and breath sounds normal. No respiratory distress. She has no wheezes. She has no rales.   Abdominal: Soft. Bowel sounds are normal. She exhibits no distension and no mass. There is no tenderness. There is no guarding.   Musculoskeletal: Normal range of motion. She exhibits no edema or tenderness.   Neurological: She is alert and oriented to person, place, and time. She displays normal reflexes. No cranial nerve deficit.   Skin: Skin is warm and dry. Capillary refill takes less than 2 seconds. No rash noted.   Psychiatric: She has a normal mood and affect. Her behavior is normal. Judgment and thought content normal.       Assessment/Plan     Problem List Items Addressed This Visit        Other    Pure hypercholesterolemia (Chronic)    Overview     Overview:           Current Assessment & Plan     Lab Results   Component Value Date    CHOL 150 05/01/2018    CHOL 152 03/05/2018    CHOL 105 09/14/2017     Lab Results   Component Value Date    HDL 57 05/01/2018    HDL 63 03/05/2018    HDL 42 (L) 09/14/2017     Lab Results   Component Value Date    LDLCALC 75 05/01/2018    LDLCALC 72 03/05/2018    LDLCALC 49 09/14/2017     Lab Results   Component Value Date    TRIG 89 05/01/2018    TRIG 84 03/05/2018    TRIG 69 09/14/2017     No results found for: CHOLHDL   Continue to take all your medications as instructed.  Eat healthy with less cholesterol rich and fatty foods.  Maintain a healthy weight by dieting and exercising.  Eating well and maintaining a healthy weight and a good cholesterol panel can decrease your risk of future heart disease and stroke. Come to your next appointment as scheduled. Stay well, God Bless.         Relevant Medications    atorvastatin (LIPITOR) 40 mg tablet    Other Relevant Orders    Lipid panel    Comprehensive metabolic panel    PAF (paroxysmal atrial fibrillation) (CMS/[facility]) ([facility]) - Primary    Current Assessment & Plan     On coumadin  Follows up with cardiologist           Relevant Medications    atorvastatin (LIPITOR) 40 mg tablet    diltiazem XR (DILT-XR) 120 mg 24 hr capsule    warfarin (COUMADIN) 4 mg tablet    Other Relevant Orders    Protime-INR    Coronary artery disease involving native coronary artery of native heart without angina pectoris    Current Assessment & Plan     Stable  TLC reviewed         Relevant Medications    atorvastatin (LIPITOR) 40 mg tablet    diltiazem XR (DILT-XR) 120 mg 24 hr capsule    Essential hypertension    Current Assessment & Plan     Stable  Continue current meds as prescribed  Diet and TLC reinforced  Stable on exam today  Continue to monitor          Relevant Medications    diltiazem XR (DILT-XR) 120 mg 24 hr capsule    DMII (diabetes mellitus, type 2) (CMS/[facility]) ([facility])    Current Assessment & Plan     Diet  controlled         Relevant Orders    Hemoglobin A1c    Comprehensive metabolic panel    Seasonal allergies    Current Assessment & Plan     Rest  Fluids encouraged  Flonase nasal drops         Bilateral impacted cerumen    Current Assessment & Plan     Chronic issue  Irrigated today         Chronic otitis media of left ear with effusion    Current Assessment & Plan     ENT eval         Relevant Orders    Ambulatory referral to ENT          DIAGNOSIS  1. PAF (paroxysmal atrial fibrillation) (CMS/HCC) ([facility])    2. Coronary artery disease involving native coronary artery of native heart without angina pectoris    3. Pure hypercholesterolemia    4. Essential hypertension    5. Type 2 diabetes mellitus with other diabetic arthropathy, without long-term current use of insulin (CMS/[facility])    6. Seasonal allergies    7. Impacted cerumen of left ear    8. Chronic otitis media of left ear with effusion    9. Bilateral impacted cerumen        No exam data present        Demarco Moreau,   3/6/2019

## 2019-03-07 ENCOUNTER — APPOINTMENT (OUTPATIENT)
Dept: LAB | Facility: CLINIC | Age: 64
End: 2019-03-07
Attending: FAMILY MEDICINE
Payer: MEDICARE

## 2019-03-07 DIAGNOSIS — E78.00 PURE HYPERCHOLESTEROLEMIA: Chronic | ICD-10-CM

## 2019-03-07 DIAGNOSIS — I48.0 PAF (PAROXYSMAL ATRIAL FIBRILLATION) (CMS/HCC): ICD-10-CM

## 2019-03-07 DIAGNOSIS — E11.618 TYPE 2 DIABETES MELLITUS WITH OTHER DIABETIC ARTHROPATHY, WITHOUT LONG-TERM CURRENT USE OF INSULIN: ICD-10-CM

## 2019-03-07 LAB
ALBUMIN SERPL-MCNC: 3.4 G/DL (ref 3.4–5)
ALP SERPL-CCNC: 120 IU/L (ref 35–126)
ALT SERPL-CCNC: 29 IU/L (ref 11–54)
ANION GAP SERPL CALC-SCNC: 12 MEQ/L (ref 3–15)
AST SERPL-CCNC: 26 IU/L (ref 15–41)
BILIRUB SERPL-MCNC: 0.7 MG/DL (ref 0.3–1.2)
BUN SERPL-MCNC: 22 MG/DL (ref 8–20)
CALCIUM SERPL-MCNC: 9.6 MG/DL (ref 8.9–10.3)
CHLORIDE SERPL-SCNC: 104 MEQ/L (ref 98–109)
CHOLEST SERPL-MCNC: 144 MG/DL
CO2 SERPL-SCNC: 25 MEQ/L (ref 22–32)
CREAT SERPL-MCNC: 0.5 MG/DL
EST. AVERAGE GLUCOSE BLD GHB EST-MCNC: 120 MG/DL
GFR SERPL CREATININE-BSD FRML MDRD: >60 ML/MIN/1.73M*2
GLUCOSE SERPL-MCNC: 87 MG/DL (ref 70–99)
HBA1C MFR BLD HPLC: 5.8 %
HDLC SERPL-MCNC: 59 MG/DL
HDLC SERPL: 2.4 {RATIO}
INR PPP: 2.4 INR
LDLC SERPL CALC-MCNC: 74 MG/DL
NONHDLC SERPL-MCNC: 85 MG/DL
POTASSIUM SERPL-SCNC: 4.4 MEQ/L (ref 3.6–5.1)
PROT SERPL-MCNC: 6.5 G/DL (ref 6–8.2)
PROTHROMBIN TIME: 25 SEC (ref 12.2–14.5)
SODIUM SERPL-SCNC: 141 MEQ/L (ref 136–144)
TRIGL SERPL-MCNC: 53 MG/DL (ref 30–149)

## 2019-03-07 PROCEDURE — 36415 COLL VENOUS BLD VENIPUNCTURE: CPT

## 2019-03-07 PROCEDURE — 80061 LIPID PANEL: CPT

## 2019-03-07 PROCEDURE — 80053 COMPREHEN METABOLIC PANEL: CPT

## 2019-03-07 PROCEDURE — 85610 PROTHROMBIN TIME: CPT

## 2019-03-07 PROCEDURE — 83036 HEMOGLOBIN GLYCOSYLATED A1C: CPT

## 2019-03-07 NOTE — TELEPHONE ENCOUNTER
----- Message from Demarco Moreau, DO sent at 3/7/2019  3:43 PM EST -----  For now, let Jennifer Know INR stable at 2.4 so same dose warfarin and repeat in 1 mo, other labs still pending, THX

## 2019-03-07 NOTE — TELEPHONE ENCOUNTER
Thx Kala,I forgot to order it yesterday, I appended the note and ordered it, thx, if she needs PA or whatever, let her know thx

## 2019-03-07 NOTE — TELEPHONE ENCOUNTER
Pt informed of lab results, and verbalized understanding. Pt asking if CT of chest was ordered. Will forward to Dr. Moreau.

## 2019-03-08 NOTE — TELEPHONE ENCOUNTER
Pt informed CT ordered. Requesting test req mailed. Done. Pt will schedule when she receives slip after going away this wknd.

## 2019-03-21 ENCOUNTER — HOSPITAL ENCOUNTER (OUTPATIENT)
Dept: RADIOLOGY | Facility: CLINIC | Age: 64
Discharge: HOME | End: 2019-03-21
Attending: FAMILY MEDICINE
Payer: MEDICARE

## 2019-03-21 DIAGNOSIS — R91.1 PULMONARY NODULE: ICD-10-CM

## 2019-03-21 PROCEDURE — 71250 CT THORAX DX C-: CPT

## 2019-03-22 ENCOUNTER — TELEPHONE (OUTPATIENT)
Dept: PRIMARY CARE | Facility: CLINIC | Age: 64
End: 2019-03-22

## 2019-03-22 NOTE — TELEPHONE ENCOUNTER
----- Message from Mannie Gamez MA sent at 3/21/2019  2:12 PM EDT -----      ----- Message -----  From: Demarco Moreau DO  Sent: 3/21/2019   1:56 PM  To: PAOLA Stokes know the CT was stable and shows Multiple small nodules so to be safe it is recommended by the Radiology society to have a repeat CT in 1 year (please put in a reminder for yourself to order, thx)

## 2019-03-22 NOTE — TELEPHONE ENCOUNTER
Spoke with pt, and informed her of test results as per message below. Pt verbalized understanding, and has no questions at this time. Pt will get repeat CT in 1 year.

## 2019-04-02 ENCOUNTER — TELEPHONE (OUTPATIENT)
Dept: BARIATRICS | Facility: CLINIC | Age: 64
End: 2019-04-02

## 2019-04-02 ENCOUNTER — TRANSCRIBE ORDERS (OUTPATIENT)
Dept: SCHEDULING | Age: 64
End: 2019-04-02

## 2019-04-02 DIAGNOSIS — E44.1 MILD PROTEIN-CALORIE MALNUTRITION (CMS/HCC): ICD-10-CM

## 2019-04-02 DIAGNOSIS — E55.9 VITAMIN D DEFICIENCY: ICD-10-CM

## 2019-04-02 DIAGNOSIS — E78.00 PURE HYPERCHOLESTEROLEMIA: Chronic | ICD-10-CM

## 2019-04-02 DIAGNOSIS — Z13.21 ENCOUNTER FOR SCREENING FOR NUTRITIONAL DISORDER: ICD-10-CM

## 2019-04-02 DIAGNOSIS — E46 PROTEIN-CALORIE MALNUTRITION, UNSPECIFIED SEVERITY (CMS/HCC): ICD-10-CM

## 2019-04-02 DIAGNOSIS — Z13.21 ENCOUNTER FOR VITAMIN DEFICIENCY SCREENING: ICD-10-CM

## 2019-04-02 DIAGNOSIS — Z13.228 ENCOUNTER FOR SCREENING FOR METABOLIC DISORDER: ICD-10-CM

## 2019-04-02 DIAGNOSIS — I10 ESSENTIAL HYPERTENSION: ICD-10-CM

## 2019-04-02 DIAGNOSIS — E63.9 NUTRITIONAL DEFICIENCY: Primary | ICD-10-CM

## 2019-04-02 DIAGNOSIS — Z98.84 BARIATRIC SURGERY STATUS: Primary | ICD-10-CM

## 2019-04-02 DIAGNOSIS — E11.618 TYPE 2 DIABETES MELLITUS WITH OTHER DIABETIC ARTHROPATHY, WITHOUT LONG-TERM CURRENT USE OF INSULIN: ICD-10-CM

## 2019-04-03 ENCOUNTER — OFFICE VISIT (OUTPATIENT)
Dept: NUTRITION | Facility: CLINIC | Age: 64
End: 2019-04-03
Attending: SURGERY
Payer: MEDICARE

## 2019-04-03 VITALS — WEIGHT: 198 LBS | BODY MASS INDEX: 36.44 KG/M2 | HEIGHT: 62 IN

## 2019-04-03 DIAGNOSIS — E11.00 TYPE 2 DIABETES MELLITUS WITH HYPEROSMOLARITY WITHOUT COMA, WITHOUT LONG-TERM CURRENT USE OF INSULIN (CMS/HCC): Primary | ICD-10-CM

## 2019-04-03 DIAGNOSIS — E63.9 NUTRITIONAL DEFICIENCY: ICD-10-CM

## 2019-04-03 PROCEDURE — 97802 MEDICAL NUTRITION INDIV IN: CPT

## 2019-04-03 NOTE — PATIENT INSTRUCTIONS
Follow 4436-6889 calorie/d meal plan, 100g PRO/d, 100-135g Carb/d  Read labels  Add lean protein to high fiber carb  Use My Fitness Pal/similiar to track

## 2019-04-03 NOTE — PROGRESS NOTES
"The [facility]ariat[facility] at [facility]  Post-Operative Nutrition    Patient: Jennifer Aly       : 1955  MRN: [id number]    Last visit Wt: 183 lb 18       Current Wt: 198 lb Ht:Height: 157.5 cm (5' 2\")    IBW: 121 lb BMI: Body mass index is 36.21 kg/m².     Pt Goal Wt/Reasonable BW: 145 lb  Jennifer Aly was seen for :2 year s/p RYGB    The following services were provided:  Reinforced Healthy Life Long Eating Habits  Reviewed patient compliance with:  Protein goals (at least 100g)/d  Fluid intake (64 fl oz) Recommend increase !  Vitamin and mineral consumption  Instructed on label reading  Individualized meal plan completed: 1835-7575 calories/day 100-135 (g) carb/day  Discussed add lean protein to high fiber CHO (follow meal plan as recommended) !  Pt reports walking 12,000-14,000 steps/d (has lower back pain/stenosis of spine)    Follow up  Jennifer ACOSTA recommended to return for further nutrition counseling in 1 year/prn     Jennifer ACOSTA recommended to follow yearly visits with dietitian or as needed.    BACILIO Casey,RDN,CDE   19 6:31 PM    "

## 2019-04-04 ENCOUNTER — APPOINTMENT (OUTPATIENT)
Dept: LAB | Facility: CLINIC | Age: 64
End: 2019-04-04
Attending: FAMILY MEDICINE
Payer: MEDICARE

## 2019-04-04 DIAGNOSIS — E55.9 VITAMIN D DEFICIENCY: ICD-10-CM

## 2019-04-04 DIAGNOSIS — E78.00 PURE HYPERCHOLESTEROLEMIA: Chronic | ICD-10-CM

## 2019-04-04 DIAGNOSIS — I26.99 IATROGENIC PULMONARY EMBOLISM AND INFARCTION, SUBSEQUENT ENCOUNTER (CMS/HCC): ICD-10-CM

## 2019-04-04 DIAGNOSIS — Z13.21 ENCOUNTER FOR VITAMIN DEFICIENCY SCREENING: ICD-10-CM

## 2019-04-04 DIAGNOSIS — I10 ESSENTIAL HYPERTENSION: ICD-10-CM

## 2019-04-04 DIAGNOSIS — Z13.228 ENCOUNTER FOR SCREENING FOR METABOLIC DISORDER: ICD-10-CM

## 2019-04-04 DIAGNOSIS — Z13.21 ENCOUNTER FOR SCREENING FOR NUTRITIONAL DISORDER: ICD-10-CM

## 2019-04-04 DIAGNOSIS — E46 PROTEIN-CALORIE MALNUTRITION, UNSPECIFIED SEVERITY (CMS/HCC): ICD-10-CM

## 2019-04-04 DIAGNOSIS — T81.718D IATROGENIC PULMONARY EMBOLISM AND INFARCTION, SUBSEQUENT ENCOUNTER (CMS/HCC): ICD-10-CM

## 2019-04-04 DIAGNOSIS — E44.1 MILD PROTEIN-CALORIE MALNUTRITION (CMS/HCC): ICD-10-CM

## 2019-04-04 DIAGNOSIS — Z98.84 BARIATRIC SURGERY STATUS: ICD-10-CM

## 2019-04-04 DIAGNOSIS — I48.91 ATRIAL FIBRILLATION, UNSPECIFIED TYPE (CMS/HCC): ICD-10-CM

## 2019-04-04 DIAGNOSIS — E11.618 TYPE 2 DIABETES MELLITUS WITH OTHER DIABETIC ARTHROPATHY, WITHOUT LONG-TERM CURRENT USE OF INSULIN: ICD-10-CM

## 2019-04-04 LAB
25(OH)D3 SERPL-MCNC: 31 NG/ML (ref 30–100)
ALBUMIN SERPL-MCNC: 3.7 G/DL (ref 3.4–5)
ALP SERPL-CCNC: 123 IU/L (ref 35–126)
ALT SERPL-CCNC: 33 IU/L (ref 11–54)
ANION GAP SERPL CALC-SCNC: 9 MEQ/L (ref 3–15)
AST SERPL-CCNC: 28 IU/L (ref 15–41)
BASOPHILS # BLD: 0.02 K/UL (ref 0.01–0.1)
BASOPHILS NFR BLD: 0.3 %
BILIRUB SERPL-MCNC: 0.7 MG/DL (ref 0.3–1.2)
BUN SERPL-MCNC: 16 MG/DL (ref 8–20)
CALCIUM SERPL-MCNC: 9.8 MG/DL (ref 8.9–10.3)
CALCIUM SERPL-MCNC: 9.8 MG/DL (ref 8.9–10.3)
CHLORIDE SERPL-SCNC: 110 MEQ/L (ref 98–109)
CHOLEST SERPL-MCNC: 157 MG/DL
CO2 SERPL-SCNC: 23 MEQ/L (ref 22–32)
CREAT SERPL-MCNC: 0.5 MG/DL
DIFFERENTIAL METHOD BLD: NORMAL
EOSINOPHIL # BLD: 0.12 K/UL (ref 0.04–0.36)
EOSINOPHIL NFR BLD: 1.9 %
ERYTHROCYTE [DISTWIDTH] IN BLOOD BY AUTOMATED COUNT: 14.7 % (ref 11.7–14.4)
EST. AVERAGE GLUCOSE BLD GHB EST-MCNC: 123 MG/DL
FERRITIN SERPL-MCNC: 44 NG/ML (ref 11–250)
FOLATE SERPL-MCNC: >20 NG/ML
GFR SERPL CREATININE-BSD FRML MDRD: >60 ML/MIN/1.73M*2
GLUCOSE SERPL-MCNC: 84 MG/DL (ref 70–99)
HBA1C MFR BLD HPLC: 5.9 %
HCT VFR BLDCO AUTO: 39.5 %
HDLC SERPL-MCNC: 56 MG/DL
HDLC SERPL: 2.8 {RATIO}
HGB BLD-MCNC: 12.9 G/DL
IMM GRANULOCYTES # BLD AUTO: 0.02 K/UL (ref 0–0.08)
IMM GRANULOCYTES NFR BLD AUTO: 0.3 %
INR PPP: 2.5 INR
IRON SATN MFR SERPL: 24 % (ref 15–45)
IRON SERPL-MCNC: 88 UG/DL (ref 35–150)
LDLC SERPL CALC-MCNC: 90 MG/DL
LYMPHOCYTES # BLD: 1.7 K/UL (ref 1.2–3.5)
LYMPHOCYTES NFR BLD: 27.1 %
MCH RBC QN AUTO: 30.4 PG (ref 28–33.2)
MCHC RBC AUTO-ENTMCNC: 32.7 G/DL (ref 32.2–35.5)
MCV RBC AUTO: 92.9 FL (ref 83–98)
MONOCYTES # BLD: 0.46 K/UL (ref 0.28–0.8)
MONOCYTES NFR BLD: 7.3 %
NEUTROPHILS # BLD: 3.95 K/UL (ref 1.7–7)
NEUTS SEG NFR BLD: 63.1 %
NONHDLC SERPL-MCNC: 101 MG/DL
NRBC BLD-RTO: 0 %
PDW BLD AUTO: 10.8 FL (ref 9.4–12.3)
PLATELET # BLD AUTO: 213 K/UL
POTASSIUM SERPL-SCNC: 4.5 MEQ/L (ref 3.6–5.1)
PREALB SERPL-MCNC: 24.5 MG/DL (ref 18–38)
PROT SERPL-MCNC: 6.3 G/DL (ref 6–8.2)
PROTHROMBIN TIME: 26 SEC (ref 12.2–14.5)
PTH-INTACT SERPL-MCNC: 97.4 PG/ML (ref 12–88)
RBC # BLD AUTO: 4.25 M/UL (ref 3.93–5.22)
SODIUM SERPL-SCNC: 142 MEQ/L (ref 136–144)
TIBC SERPL-MCNC: 372 UG/DL (ref 270–460)
TRIGL SERPL-MCNC: 56 MG/DL (ref 30–149)
TSH SERPL DL<=0.05 MIU/L-ACNC: 1.03 MIU/L (ref 0.34–5.6)
UIBC SERPL-MCNC: 284 UG/DL (ref 180–360)
VIT B12 SERPL-MCNC: 673 PG/ML (ref 180–914)
WBC # BLD AUTO: 6.27 K/UL

## 2019-04-04 PROCEDURE — 84425 ASSAY OF VITAMIN B-1: CPT

## 2019-04-04 PROCEDURE — 85610 PROTHROMBIN TIME: CPT

## 2019-04-04 PROCEDURE — 83550 IRON BINDING TEST: CPT

## 2019-04-04 PROCEDURE — 83970 ASSAY OF PARATHORMONE: CPT

## 2019-04-04 PROCEDURE — 36415 COLL VENOUS BLD VENIPUNCTURE: CPT

## 2019-04-04 PROCEDURE — 84443 ASSAY THYROID STIM HORMONE: CPT

## 2019-04-04 PROCEDURE — 82728 ASSAY OF FERRITIN: CPT

## 2019-04-04 PROCEDURE — 80053 COMPREHEN METABOLIC PANEL: CPT

## 2019-04-04 PROCEDURE — 84134 ASSAY OF PREALBUMIN: CPT

## 2019-04-04 PROCEDURE — 83036 HEMOGLOBIN GLYCOSYLATED A1C: CPT

## 2019-04-04 PROCEDURE — 82306 VITAMIN D 25 HYDROXY: CPT

## 2019-04-04 PROCEDURE — 80061 LIPID PANEL: CPT

## 2019-04-04 PROCEDURE — 82746 ASSAY OF FOLIC ACID SERUM: CPT

## 2019-04-04 PROCEDURE — 82607 VITAMIN B-12: CPT

## 2019-04-04 PROCEDURE — 84590 ASSAY OF VITAMIN A: CPT

## 2019-04-04 PROCEDURE — 85025 COMPLETE CBC W/AUTO DIFF WBC: CPT

## 2019-04-10 ENCOUNTER — TREATMENT (OUTPATIENT)
Dept: CARDIAC REHAB | Facility: CLINIC | Age: 64
End: 2019-04-10
Attending: SURGERY
Payer: MEDICARE

## 2019-04-10 VITALS — BODY MASS INDEX: 35.44 KG/M2 | HEIGHT: 63 IN | WEIGHT: 200 LBS

## 2019-04-10 DIAGNOSIS — Z98.84 STATUS POST BARIATRIC SURGERY: Primary | ICD-10-CM

## 2019-04-10 NOTE — PROGRESS NOTES
BARIATRIC INITIAL AND FOLLOW-UP FITNESS ASSESSMENTS    Patient Name: Jennifer Aly          04/10/19  :  1955  AGE: 63 y.o.        Referring Physician:  Yimi Osman MD      Today's Assessment: 2 Year Follow-up    PATIENT HISTORY AND INTAKE INFORMATION:    HPI: 2 year post op     OTHER COMPLAINT: Lower back pain,     PRECAUTIONS: NA    RECENT FALL: no    CURRENT EXERCISE FUNCTION: NA,     LIMITS FOR EXERCISE: Back pain    EXERCISE/ENVIRONMENTAL BARRIERS: Boyfriend and brother are both dealing with cancer    RECREATIONAL ACTIVITIES: Some walking through work,     OCCUPATION: Part time      TRANSPORTATION: Drive    MARITAL STATUS: Co-habitating    LIVING ARRANGEMENTS:  Number of Floors: 2          Steps to Entry: 3     CHILDREN/SIGNIFICANT OTHERS: 1      EDUCATIONAL NEEDS ASSESSMENT:    Learning Readiness:  Willing to learn    Education Plan: Family involvement, Written material and Demonstration    Barriers to Education/Goal Achievement: No barriers          PATIENT GOALS:    1. Do more structured Ex Goal Status: NOT MET    2. Have back examined by Dr  Goal Status: NOT MET    3.  Goal Status: NOT MET      FOLLOW-UP GOALS:    1. Is the patient following exercise prescription?  Goal Status: NOT MET    2.     3.       TRAINING TOOLS REVIEWED:    Device: Fit Bit, Smartphone Apps and Exercise Attire        BIOMETRIC MEASUREMENTS:         6 MINUTE WALK TEST EVALUATION:           COMMENTS:     Pt does not do any regular Ex and states that she really cant do it for a number of reasons. She attributes most of it to her back problems that have gotten worse and for taking care of her brother and boyfriend who are dealing with cancer. Pt states I don't get a lot of this nutrition./ Ex stuff. I asked her specifically what she didn't understand and she could not answer me. I stated she needs to begin doing regular Ex with structure to it, not just relying on her walking at work and steps per day. I  discussed what she can do to negate her back issues and still get in moderate intensity Ex.    She did well on the 6MWT.  Pt given updated Ex-Rx.             Performed By:   Don Bob   04/10/19

## 2019-04-10 NOTE — PROGRESS NOTES
Patient ID: Jennifer Aly                              : 1955  MRN: [id number]                                            Visit Date: 4/15/2019  Encounter Provider: Renee Ortiz  Referring Provider: No ref. provider found      HPI:  Jennifer Aly is a 63 y.o. female who presents in the office today for their 2 year  follow-up visit.   She is s/p Lap Alexander-en-Y gastric bypass, performed by Dr. Osman on  2017 at [facility].  The patient's postoperative course has not been complicated.  She is tolerating a bariatric diet. Activity is progressing at the recommended level.    She has resolved the following co-morbidities since having bariatric surgery, none.   We have reviewed the patient’s SF-12 form.    Labs reviewed, she was aware to decrease her B12, 2017.     Labs ok, 2017. Her A1c was checked by PCP and was 5.5. She had come off diabetes medications and b/p medications.    Her cholesterol medication was decreased in half.    She saw exercise and had an appt with nutrition next week.   She saw hematology as well and stated everything was ok.    Her labs were OK 2018. She saw exercise and nutrition.  She gained 10 lbs over the holidays. She knew she needed to get back on track.  She was not tracking her food intake, but she was to start.  She reported pain in her knees which was affecting her ability to exercise. She was to try other options.  She was having an occasional glass of wine.    She had lab work 2019. Her A1C was 5.9. Her PTH was slightly elevated with high normal calcium. Her PCP is planning to recheck this in 3-6 months.  She saw the dietician. She was advised to add protein to high fiber CHO.  She saw the exercise physiologist. She is not doing regular exercise.  Her lowest weight was 175 lbs 6 months after surgery. Her goal is 160 lbs.  She is doing protein shakes in the morning for breakfast. She was recommended to have another during the afternoon.   She has  a lot of family stress. One of her brothers , other brother has terminal cancer, her boyfriend has cancer.   I recommended she see Juan Luis Tuttle, the psychologist.   She has back pain which limits her exercise. She feels like she doesn't have the time to do water aerobics.  She has not seen anyone for her back pain yet.    Past Medical History: She  has a past medical history of Arthritis; DMII (diabetes mellitus, type 2) (CMS/HCC) (Formerly [facility]); Fistula; Gout; HLD (hyperlipidemia); HTN (hypertension); IFG (impaired fasting glucose); Obesity; KIM (obstructive sleep apnea); Polyarthritis; Pulmonary emboli (CMS/HCC) (Formerly [facility]); and Torn rotator cuff..  Past Surgical History: She  has a past surgical history that includes Rotator cuff repair (Left, 2016); rectourethral fistula repair (, 2017); Total knee arthroplasty (Bilateral, ); Trigger finger release (Right, ); Cardiac catheterization (2012); IVC filter retrieval (); Total knee arthroplasty (Right, ); IVC filter retrieval (2013); Ulnar nerve repair (); Appendectomy (); Total abdominal hysterectomy (); and Alexander-en-y procedure (2017)..  Medication: She has a current medication list which includes the following prescription(s): albuterol hfa, alprazolam, atorvastatin, calcium carbonate-vitamin d3, clobetasol, diltiazem cd, warfarin, amoxicillin, cyanocobalamin-cobamamide, diltiazem xr, fluticasone propionate, and indomethacin..  Allergies: She is allergic to sulfa (sulfonamide antibiotics)..    Nutrition:   Jennifer ACOSTA's food preferences include healthy food choices.  Jennifer ACOSTA's eating habits are otherwise healthy except for large portions.   She is taking their vitamin supplements  She is taking their protein supplements  She is following a proper nutritional diet, high in protein and low in fat, 3 meals a day plus 2 snacks.    Exercise:  Jennifer ACOSTA reports they exercise: walking 10,000-14,000 steps per day .  We discussed both her short  "and long term program goals. We discussed her exercise options.  We discussed the importance of exercise counselor follow-up, achieving a sustained target heart rate, tracking her exercise routine weekly, pursing aerobic exercise at least 30 minutes per day, 5 days per week.    Social History:   We have advised the patient against using tobacco products.  We have additionally advised the Jennifer R we do not recommend alcohol consumption following bariatric surgery. The calories from alcohol have no benefit in their daily requirements, and sugars and carbohydrates are high. Patients may experience unexpected responses to alcohol after bariatric surgery and should avoid it if possible.  She has been advised we have psychological services available to discuss concerns regarding alcohol intake an other addictive or impulsive behavior both before and after surgery. If you do consume alcohol, you may experience metabolic and digestive issues..  History   Smoking Status   • Former Smoker   • Years: 15.00   • Quit date: 1987   Smokeless Tobacco   • Never Used     History   Alcohol Use   • Yes     Comment: occasional wine       Review of Systems  Review of Systems   Constitutional: Negative.    Respiratory: Negative.    Cardiovascular: Negative.    Gastrointestinal: Negative.    Endocrine: Negative.    Genitourinary: Negative.    Musculoskeletal: Positive for back pain.   Skin: Negative.    Neurological: Negative.    Hematological: Negative.    Psychiatric/Behavioral: Negative.        Vitals:  Her  height is 1.588 m (5' 2.5\") and weight is 90.4 kg (199 lb 4.8 oz). Her temperature is 36.9 °C (98.5 °F). Her blood pressure is 134/70 and her pulse is 63. Her oxygen saturation is 94%.   Weight trend:   Wt Readings from Last 5 Encounters:   04/15/19 90.4 kg (199 lb 4.8 oz)   04/10/19 90.7 kg (200 lb)   04/03/19 89.8 kg (198 lb)   03/06/19 89.8 kg (198 lb)   10/23/18 89.8 kg (198 lb)    body mass index is 35.87 kg/m².    Physical " Exam:  Physical Exam   Constitutional: She is oriented to person, place, and time. She appears well-developed.   HENT:   Head: Normocephalic.   Eyes: Conjunctivae are normal.   Neck: Normal range of motion.   Cardiovascular: Normal rate and regular rhythm.    Pulmonary/Chest: Effort normal and breath sounds normal.   Abdominal: Soft. Bowel sounds are normal.   Musculoskeletal: Normal range of motion.   Neurological: She is alert and oriented to person, place, and time.   Skin: Skin is warm and dry.   Psychiatric: She has a normal mood and affect. Her behavior is normal.   Vitals reviewed.      Her incisions are well healed and without complications.    Patient Active Problem List   Diagnosis   • Diaphragmatic hernia without obstruction or gangrene   • Morbid obesity due to excess calories (CMS/[facility])   • Obstructive sleep apnea   • Polyosteoarthritis, unspecified   • Other pulmonary embolism without acute cor pulmonale (CMS/[facility])   • Unspecified asthma, uncomplicated   • Pure hypercholesterolemia   • Encounter for screening   • Acute gout involving toe of left foot   • Toe pain, left   • Breast cancer screening   • IFG (impaired fasting glucose)   • Essential hypertension   • DMII (diabetes mellitus, type 2) (CMS/[facility]) ([facility])   • Coronary artery disease involving native coronary artery of native heart without angina pectoris   • Iron deficiency anemia secondary to inadequate dietary iron intake   • PAF (paroxysmal atrial fibrillation) (CMS/[facility]) ([facility])   • Chest pain   • Right upper quadrant pain   • Upper back pain   • GERD without esophagitis   • Screening-pulmonary TB   • Chronic UTI   • Lower resp. tract infection   • Seasonal allergies   • Bilateral impacted cerumen   • Bilateral hearing loss   • Pulmonary nodule   • Chronic otitis media of left ear with effusion       Assessment/Plan     We have discussed the importance of tracking her food intake and exercise either online, in a mobile application on a smartphone  device or on paper.  We have discussed the importance of regular aerobic exercise at least 30 minutes per day, 5 days a week including achieving and measuring their target heart rate.  We discussed avoiding aspirin, NSAID medications, both prescription and over the counter products, as these medications can cause ulcers and bleeding.  Their use should be only under the direction of the patient's own medical specialist.  The patient has been counseled about this and will comply.  We discussed the importance of taking her bariatric protein and vitamin supplements regularly and the importance of regular follow-up blood work, regular attendance at the support group meetings and regular follow-up with the dietician and exercise counselors to help maintain their success with weight loss and resolution of co-morbidites.  We again discussed the importance of following a healthy diet, reducing alcohol and calorie intake, the risks of transfer of addictions and the need to continue with all of their healthy habits even after they have reached their goal just to maintain their weight loss and prevent weight regain and recurrence of co-morbidities.  We discussed the difference between dieting and exercise and alone and that after bariatric surgery is that the gut hormone physiology and food intake and absorption are changed to reduce hunger and allow regaining of self control that would otherwise be difficult to achieve on their own.    Jennifer ACOSTA is somewhat compliant with a healthy lifestyle, diet and level of exercise needed to maintain their health and weight loss.  She   has been instructed to take  g  of protein, all vitamins as listed by our program (myself, nutritionist and nurses), and up to 64 oz of fluid daily.  They have been instructed on a nutritious and healthy diet and the importance of daily regular exercise.  We have discussed together that the patient will comply with all future aspects of our program  including diet, exercise, follow-up, future testing and all recommendations I make to them now and in all upcoming and future appointments.  They understand it is vitally important to comply with this and may compromise their health in the future if they do not do as requested.   Jennifer ACOSTA will need dietician and exercise physiologist follow-up in 1 year .       Orders Placed This Encounter   Procedures   • CBC and differential     Standing Status:   Future     Standing Expiration Date:   4/15/2020   • Comprehensive metabolic panel     Standing Status:   Future     Standing Expiration Date:   4/15/2020   • Ferritin     Standing Status:   Future     Standing Expiration Date:   4/15/2020   • Folate     Standing Status:   Future     Standing Expiration Date:   4/15/2020   • Iron and TIBC     Standing Status:   Future     Standing Expiration Date:   4/15/2020   • Lipid panel     Standing Status:   Future     Standing Expiration Date:   4/15/2020   • Prealbumin     Standing Status:   Future     Standing Expiration Date:   4/15/2020   • PTH, intact     Standing Status:   Future     Standing Expiration Date:   4/15/2020   • TSH 3rd generation     Standing Status:   Future     Standing Expiration Date:   4/15/2020   • Vitamin A     Standing Status:   Future     Standing Expiration Date:   4/15/2020   • Vitamin B1, whole blood ( thiamine)     Standing Status:   Future     Standing Expiration Date:   4/15/2020   • Vitamin B12     Standing Status:   Future     Standing Expiration Date:   4/15/2020   • Vitamin D, 25- hydroxy     Standing Status:   Future     Standing Expiration Date:   4/15/2020   • Hemoglobin A1c     Standing Status:   Future     Standing Expiration Date:   4/15/2020   • Ambulatory referral to Nutrition Services     Standing Status:   Future     Standing Expiration Date:   10/15/2019     Referral Priority:   Routine     Referral Type:   Consultation     Referral Reason:   Specialty Services Required     Requested  Specialty:   Nutrition     Number of Visits Requested:   1   • Ambulatory referral to Psychology     Standing Status:   Future     Standing Expiration Date:   10/15/2019     Referral Priority:   Routine     Referral Type:   Psychiatric     Referral Reason:   Specialty Services Required     Requested Specialty:   Psychology     Number of Visits Requested:   10   • Ambulatory Referral to Cardiac Rehab (Exercise)     Scheduling Instructions:      **EMERGENCY ORDERS**      -  Use ACLS protocols in case of emergency      -  Activate and follow Code Blue guidelines      -  Nitroglycerin GR 1/150 SL.PRN-chest pain      - Follow approved emergency treatment protocols      - Call Emergency Response Team, or 911 if ambulatory, if patient is unstable      - Implement diabetic protocol for monitoring safe blood sugar levels     Order Specific Question:   Enter patient into the Outpatient Cardiac Rehab Service     Answer:   Yes     Order Specific Question:   Nurse will complete initial interview, medical record review and cardiovascular assessment     Answer:   Yes     Order Specific Question:   Perform 6 minute walk test as part of pre and post cardiac rehab to evaluate functional status     Answer:   Yes     Return in about 6 months (around 10/15/2019).    I have spent 15 minutes with this patient and > 50% of the time spent counseling  the patient.    CHON Nagy 4/15/2019

## 2019-04-11 LAB
VIT A SERPL-MCNC: 48 MCG/DL (ref 38–98)
VIT B1 BLD-SCNC: 157 NMOL/L (ref 78–185)

## 2019-04-15 ENCOUNTER — OFFICE VISIT (OUTPATIENT)
Dept: BARIATRICS | Facility: CLINIC | Age: 64
End: 2019-04-15
Payer: MEDICARE

## 2019-04-15 VITALS
WEIGHT: 199.3 LBS | TEMPERATURE: 98.5 F | HEART RATE: 63 BPM | DIASTOLIC BLOOD PRESSURE: 70 MMHG | HEIGHT: 63 IN | SYSTOLIC BLOOD PRESSURE: 134 MMHG | OXYGEN SATURATION: 94 % | BODY MASS INDEX: 35.31 KG/M2

## 2019-04-15 DIAGNOSIS — I10 ESSENTIAL HYPERTENSION: ICD-10-CM

## 2019-04-15 DIAGNOSIS — E55.9 VITAMIN D DEFICIENCY: ICD-10-CM

## 2019-04-15 DIAGNOSIS — Z13.21 ENCOUNTER FOR SCREENING FOR NUTRITIONAL DISORDER: ICD-10-CM

## 2019-04-15 DIAGNOSIS — Z13.228 ENCOUNTER FOR SCREENING FOR METABOLIC DISORDER: ICD-10-CM

## 2019-04-15 DIAGNOSIS — E46 PROTEIN-CALORIE MALNUTRITION, UNSPECIFIED SEVERITY (CMS/HCC): ICD-10-CM

## 2019-04-15 DIAGNOSIS — K91.2 POSTSURGICAL MALABSORPTION: ICD-10-CM

## 2019-04-15 DIAGNOSIS — E11.618 TYPE 2 DIABETES MELLITUS WITH OTHER DIABETIC ARTHROPATHY, WITHOUT LONG-TERM CURRENT USE OF INSULIN: ICD-10-CM

## 2019-04-15 DIAGNOSIS — Z98.84 STATUS POST BARIATRIC SURGERY: Primary | ICD-10-CM

## 2019-04-15 DIAGNOSIS — Z13.21 ENCOUNTER FOR VITAMIN DEFICIENCY SCREENING: ICD-10-CM

## 2019-04-15 PROCEDURE — 99213 OFFICE O/P EST LOW 20 MIN: CPT | Performed by: NURSE PRACTITIONER

## 2019-04-15 RX ORDER — DILTIAZEM HYDROCHLORIDE 120 MG/1
120 CAPSULE, COATED, EXTENDED RELEASE ORAL DAILY
COMMUNITY
Start: 2019-03-28 | End: 2019-04-26 | Stop reason: ALTCHOICE

## 2019-04-15 ASSESSMENT — ENCOUNTER SYMPTOMS
HEMATOLOGIC/LYMPHATIC NEGATIVE: 1
ENDOCRINE NEGATIVE: 1
GASTROINTESTINAL NEGATIVE: 1
PSYCHIATRIC NEGATIVE: 1
NEUROLOGICAL NEGATIVE: 1
CARDIOVASCULAR NEGATIVE: 1
BACK PAIN: 1
RESPIRATORY NEGATIVE: 1
CONSTITUTIONAL NEGATIVE: 1

## 2019-04-15 NOTE — LETTER
April 15, 2019     Demarco Moreau DO  [address] [facility] [address]  [facility] [address]    Patient: Jennifer Aly   YOB: 1955   Date of Visit: 4/15/2019       Dear Dr. Moreau:    Thank you for referring Jennifer Aly to me for evaluation. Below are my notes for this consultation.    If you have questions, please do not hesitate to call me. I look forward to following your patient along with you.         Sincerely,        CHON Nagy        CC: No Recipients  Renee Ortiz CRNP  4/15/2019  9:26 AM  Sign at close encounter  Patient ID: Jennifer Aly                              : 1955  MRN: [id number]                                            Visit Date: 4/15/2019  Encounter Provider: Renee Ortiz  Referring Provider: No ref. provider found      HPI:  Jennifer Aly is a 63 y.o. female who presents in the office today for their 2 year  follow-up visit.   She is s/p Lap Alexander-en-Y gastric bypass, performed by Dr. Osman on  2017 at [facility].  The patient's postoperative course has not been complicated.  She is tolerating a bariatric diet. Activity is progressing at the recommended level.    She has resolved the following co-morbidities since having bariatric surgery, none.   We have reviewed the patient’s SF-12 form.    Labs reviewed, she was aware to decrease her B12, 2017.     Labs ok, 2017. Her A1c was checked by PCP and was 5.5. She had come off diabetes medications and b/p medications.    Her cholesterol medication was decreased in half.    She saw exercise and had an appt with nutrition next week.   She saw hematology as well and stated everything was ok.    Her labs were OK 2018. She saw exercise and nutrition.  She gained 10 lbs over the holidays. She knew she needed to get back on track.  She was not tracking her food intake, but she was to start.  She reported pain in her knees which was affecting her ability to exercise. She was to  try other options.  She was having an occasional glass of wine.    She had lab work 2019. Her A1C was 5.9. Her PTH was slightly elevated with high normal calcium. Her PCP is planning to recheck this in 3-6 months.  She saw the dietician. She was advised to add protein to high fiber CHO.  She saw the exercise physiologist. She is not doing regular exercise.  Her lowest weight was 175 lbs 6 months after surgery. Her goal is 160 lbs.  She is doing protein shakes in the morning for breakfast. She was recommended to have another during the afternoon.   She has a lot of family stress. One of her brothers , other brother has terminal cancer, her boyfriend has cancer.   I recommended she see Juan Luis Tuttle, the psychologist.   She has back pain which limits her exercise. She feels like she doesn't have the time to do water aerobics.  She has not seen anyone for her back pain yet.    Past Medical History: She  has a past medical history of Arthritis; DMII (diabetes mellitus, type 2) (CMS/HCC) ([facility]); Fistula; Gout; HLD (hyperlipidemia); HTN (hypertension); IFG (impaired fasting glucose); Obesity; KIM (obstructive sleep apnea); Polyarthritis; Pulmonary emboli (CMS/HCC) ([facility]); and Torn rotator cuff..  Past Surgical History: She  has a past surgical history that includes Rotator cuff repair (Left, 2016); rectourethral fistula repair (, 2017); Total knee arthroplasty (Bilateral, ); Trigger finger release (Right, ); Cardiac catheterization (2012); IVC filter retrieval (); Total knee arthroplasty (Right, ); IVC filter retrieval (2013); Ulnar nerve repair (); Appendectomy (); Total abdominal hysterectomy (); and Alexander-en-y procedure (2017)..  Medication: She has a current medication list which includes the following prescription(s): albuterol hfa, alprazolam, atorvastatin, calcium carbonate-vitamin d3, clobetasol, diltiazem cd, warfarin, amoxicillin, cyanocobalamin-cobamamide,  diltiazem xr, fluticasone propionate, and indomethacin..  Allergies: She is allergic to sulfa (sulfonamide antibiotics)..    Nutrition:   Jennifer ACOSTA's food preferences include healthy food choices.  Jennifer ACOSTA's eating habits are otherwise healthy except for large portions.   She is taking their vitamin supplements  She is taking their protein supplements  She is following a proper nutritional diet, high in protein and low in fat, 3 meals a day plus 2 snacks.    Exercise:  Jennifer ACOSTA reports they exercise: walking 10,000-14,000 steps per day .  We discussed both her short and long term program goals. We discussed her exercise options.  We discussed the importance of exercise counselor follow-up, achieving a sustained target heart rate, tracking her exercise routine weekly, pursing aerobic exercise at least 30 minutes per day, 5 days per week.    Social History:   We have advised the patient against using tobacco products.  We have additionally advised the Jennifer ACOSTA we do not recommend alcohol consumption following bariatric surgery. The calories from alcohol have no benefit in their daily requirements, and sugars and carbohydrates are high. Patients may experience unexpected responses to alcohol after bariatric surgery and should avoid it if possible.  She has been advised we have psychological services available to discuss concerns regarding alcohol intake an other addictive or impulsive behavior both before and after surgery. If you do consume alcohol, you may experience metabolic and digestive issues..  History   Smoking Status   • Former Smoker   • Years: 15.00   • Quit date: 1987   Smokeless Tobacco   • Never Used     History   Alcohol Use   • Yes     Comment: occasional wine       Review of Systems  Review of Systems   Constitutional: Negative.    Respiratory: Negative.    Cardiovascular: Negative.    Gastrointestinal: Negative.    Endocrine: Negative.    Genitourinary: Negative.    Musculoskeletal: Positive for back pain.  "  Skin: Negative.    Neurological: Negative.    Hematological: Negative.    Psychiatric/Behavioral: Negative.        Vitals:  Her  height is 1.588 m (5' 2.5\") and weight is 90.4 kg (199 lb 4.8 oz). Her temperature is 36.9 °C (98.5 °F). Her blood pressure is 134/70 and her pulse is 63. Her oxygen saturation is 94%.   Weight trend:   Wt Readings from Last 5 Encounters:   04/15/19 90.4 kg (199 lb 4.8 oz)   04/10/19 90.7 kg (200 lb)   04/03/19 89.8 kg (198 lb)   03/06/19 89.8 kg (198 lb)   10/23/18 89.8 kg (198 lb)    body mass index is 35.87 kg/m².    Physical Exam:  Physical Exam   Constitutional: She is oriented to person, place, and time. She appears well-developed.   HENT:   Head: Normocephalic.   Eyes: Conjunctivae are normal.   Neck: Normal range of motion.   Cardiovascular: Normal rate and regular rhythm.    Pulmonary/Chest: Effort normal and breath sounds normal.   Abdominal: Soft. Bowel sounds are normal.   Musculoskeletal: Normal range of motion.   Neurological: She is alert and oriented to person, place, and time.   Skin: Skin is warm and dry.   Psychiatric: She has a normal mood and affect. Her behavior is normal.   Vitals reviewed.      Her incisions are well healed and without complications.    Patient Active Problem List   Diagnosis   • Diaphragmatic hernia without obstruction or gangrene   • Morbid obesity due to excess calories (CMS/[facility])   • Obstructive sleep apnea   • Polyosteoarthritis, unspecified   • Other pulmonary embolism without acute cor pulmonale (CMS/[facility])   • Unspecified asthma, uncomplicated   • Pure hypercholesterolemia   • Encounter for screening   • Acute gout involving toe of left foot   • Toe pain, left   • Breast cancer screening   • IFG (impaired fasting glucose)   • Essential hypertension   • DMII (diabetes mellitus, type 2) (CMS/HCC) ([facility])   • Coronary artery disease involving native coronary artery of native heart without angina pectoris   • Iron deficiency anemia secondary to " inadequate dietary iron intake   • PAF (paroxysmal atrial fibrillation) (CMS/HCC) ([facility])   • Chest pain   • Right upper quadrant pain   • Upper back pain   • GERD without esophagitis   • Screening-pulmonary TB   • Chronic UTI   • Lower resp. tract infection   • Seasonal allergies   • Bilateral impacted cerumen   • Bilateral hearing loss   • Pulmonary nodule   • Chronic otitis media of left ear with effusion       Assessment/Plan     We have discussed the importance of tracking her food intake and exercise either online, in a mobile application on a smartphone device or on paper.  We have discussed the importance of regular aerobic exercise at least 30 minutes per day, 5 days a week including achieving and measuring their target heart rate.  We discussed avoiding aspirin, NSAID medications, both prescription and over the counter products, as these medications can cause ulcers and bleeding.  Their use should be only under the direction of the patient's own medical specialist.  The patient has been counseled about this and will comply.  We discussed the importance of taking her bariatric protein and vitamin supplements regularly and the importance of regular follow-up blood work, regular attendance at the support group meetings and regular follow-up with the dietician and exercise counselors to help maintain their success with weight loss and resolution of co-morbidites.  We again discussed the importance of following a healthy diet, reducing alcohol and calorie intake, the risks of transfer of addictions and the need to continue with all of their healthy habits even after they have reached their goal just to maintain their weight loss and prevent weight regain and recurrence of co-morbidities.  We discussed the difference between dieting and exercise and alone and that after bariatric surgery is that the gut hormone physiology and food intake and absorption are changed to reduce hunger and allow regaining of self  control that would otherwise be difficult to achieve on their own.    Jennifer ACOSTA is somewhat compliant with a healthy lifestyle, diet and level of exercise needed to maintain their health and weight loss.  She   has been instructed to take  g  of protein, all vitamins as listed by our program (myself, nutritionist and nurses), and up to 64 oz of fluid daily.  They have been instructed on a nutritious and healthy diet and the importance of daily regular exercise.  We have discussed together that the patient will comply with all future aspects of our program including diet, exercise, follow-up, future testing and all recommendations I make to them now and in all upcoming and future appointments.  They understand it is vitally important to comply with this and may compromise their health in the future if they do not do as requested.   Jennifer ACOSTA will need dietician and exercise physiologist follow-up in 1 year .       Orders Placed This Encounter   Procedures   • CBC and differential     Standing Status:   Future     Standing Expiration Date:   4/15/2020   • Comprehensive metabolic panel     Standing Status:   Future     Standing Expiration Date:   4/15/2020   • Ferritin     Standing Status:   Future     Standing Expiration Date:   4/15/2020   • Folate     Standing Status:   Future     Standing Expiration Date:   4/15/2020   • Iron and TIBC     Standing Status:   Future     Standing Expiration Date:   4/15/2020   • Lipid panel     Standing Status:   Future     Standing Expiration Date:   4/15/2020   • Prealbumin     Standing Status:   Future     Standing Expiration Date:   4/15/2020   • PTH, intact     Standing Status:   Future     Standing Expiration Date:   4/15/2020   • TSH 3rd generation     Standing Status:   Future     Standing Expiration Date:   4/15/2020   • Vitamin A     Standing Status:   Future     Standing Expiration Date:   4/15/2020   • Vitamin B1, whole blood ( thiamine)     Standing Status:   Future      Standing Expiration Date:   4/15/2020   • Vitamin B12     Standing Status:   Future     Standing Expiration Date:   4/15/2020   • Vitamin D, 25- hydroxy     Standing Status:   Future     Standing Expiration Date:   4/15/2020   • Hemoglobin A1c     Standing Status:   Future     Standing Expiration Date:   4/15/2020   • Ambulatory referral to Nutrition Services     Standing Status:   Future     Standing Expiration Date:   10/15/2019     Referral Priority:   Routine     Referral Type:   Consultation     Referral Reason:   Specialty Services Required     Requested Specialty:   Nutrition     Number of Visits Requested:   1   • Ambulatory referral to Psychology     Standing Status:   Future     Standing Expiration Date:   10/15/2019     Referral Priority:   Routine     Referral Type:   Psychiatric     Referral Reason:   Specialty Services Required     Requested Specialty:   Psychology     Number of Visits Requested:   10   • Ambulatory Referral to Cardiac Rehab (Exercise)     Scheduling Instructions:      **EMERGENCY ORDERS**      -  Use ACLS protocols in case of emergency      -  Activate and follow Code Blue guidelines      -  Nitroglycerin GR 1/150 SL.PRN-chest pain      - Follow approved emergency treatment protocols      - Call Emergency Response Team, or 911 if ambulatory, if patient is unstable      - Implement diabetic protocol for monitoring safe blood sugar levels     Order Specific Question:   Enter patient into the Outpatient Cardiac Rehab Service     Answer:   Yes     Order Specific Question:   Nurse will complete initial interview, medical record review and cardiovascular assessment     Answer:   Yes     Order Specific Question:   Perform 6 minute walk test as part of pre and post cardiac rehab to evaluate functional status     Answer:   Yes     Return in about 6 months (around 10/15/2019).    I have spent 15 minutes with this patient and > 50% of the time spent counseling  the patient.    Renee Ortiz,  CHON 4/15/2019

## 2019-04-26 ENCOUNTER — OFFICE VISIT (OUTPATIENT)
Dept: OTOLARYNGOLOGY | Facility: CLINIC | Age: 64
End: 2019-04-26
Payer: MEDICARE

## 2019-04-26 ENCOUNTER — PROCEDURE VISIT (OUTPATIENT)
Dept: OTOLARYNGOLOGY | Facility: CLINIC | Age: 64
End: 2019-04-26
Payer: MEDICARE

## 2019-04-26 VITALS
HEART RATE: 59 BPM | BODY MASS INDEX: 36.62 KG/M2 | HEIGHT: 62 IN | OXYGEN SATURATION: 98 % | SYSTOLIC BLOOD PRESSURE: 128 MMHG | DIASTOLIC BLOOD PRESSURE: 82 MMHG | WEIGHT: 199 LBS

## 2019-04-26 DIAGNOSIS — H90.41 SENSORINEURAL HEARING LOSS (SNHL) OF RIGHT EAR WITH UNRESTRICTED HEARING OF LEFT EAR: Primary | ICD-10-CM

## 2019-04-26 DIAGNOSIS — H61.23 BILATERAL IMPACTED CERUMEN: Primary | ICD-10-CM

## 2019-04-26 DIAGNOSIS — J31.0 CHRONIC RHINITIS: ICD-10-CM

## 2019-04-26 DIAGNOSIS — H91.90 PERCEIVED HEARING LOSS: ICD-10-CM

## 2019-04-26 PROBLEM — H65.492 CHRONIC OTITIS MEDIA OF LEFT EAR WITH EFFUSION: Status: RESOLVED | Noted: 2019-03-06 | Resolved: 2019-04-26

## 2019-04-26 PROBLEM — H91.93 BILATERAL HEARING LOSS: Status: RESOLVED | Noted: 2019-03-06 | Resolved: 2019-04-26

## 2019-04-26 PROCEDURE — 99999 PR OFFICE/OUTPT VISIT,PROCEDURE ONLY: CPT | Performed by: AUDIOLOGIST

## 2019-04-26 PROCEDURE — 92567 TYMPANOMETRY: CPT | Performed by: AUDIOLOGIST

## 2019-04-26 PROCEDURE — 99203 OFFICE O/P NEW LOW 30 MIN: CPT | Mod: 25 | Performed by: OTOLARYNGOLOGY

## 2019-04-26 PROCEDURE — 92557 COMPREHENSIVE HEARING TEST: CPT | Performed by: AUDIOLOGIST

## 2019-04-26 RX ORDER — CLOTRIMAZOLE AND BETAMETHASONE DIPROPIONATE 10; .64 MG/G; MG/G
CREAM TOPICAL NIGHTLY
Qty: 45 G | Refills: 3 | Status: SHIPPED | OUTPATIENT
Start: 2019-04-26 | End: 2019-07-30 | Stop reason: HOSPADM

## 2019-04-26 ASSESSMENT — ENCOUNTER SYMPTOMS
SHORTNESS OF BREATH: 0
CONSTIPATION: 0
POLYPHAGIA: 0
WHEEZING: 0
ADENOPATHY: 0
WEAKNESS: 0
DIZZINESS: 0
SINUS PRESSURE: 0
SLEEP DISTURBANCE: 0
DYSPHORIC MOOD: 0
VOICE CHANGE: 0
ARTHRALGIAS: 1
MYALGIAS: 0
DIARRHEA: 0
BACK PAIN: 0
NAUSEA: 0
COUGH: 0
RHINORRHEA: 0
TROUBLE SWALLOWING: 0
NERVOUS/ANXIOUS: 0
FATIGUE: 0
HEADACHES: 0
PALPITATIONS: 0
FEVER: 0
FREQUENCY: 0
VOMITING: 0

## 2019-04-26 NOTE — PATIENT INSTRUCTIONS
1.  You do not have hearing loss at all.  We will check your hearing again in a few years.  2.  Debrox, 2 drops to each ear on the first and 15th nights of the month.  3.  Apply Lotrisone cream to the external ear canal opening at bedtime for 2 weeks and then use one night per week as needed.  4.  NeilMed saline sinus rinse once daily.  Use distilled water and salt packet.  5.  Ayr saline gel, apply in the nostrils with a Q-tip at bedtime.  6.  Take a break from fluticasone for now.  In 10 days begin Rhinocort, 1 squirt to each nostril daily.  7.  Follow-up for ear check in 6 months.

## 2019-04-26 NOTE — PROGRESS NOTES
[facility]  [address], [address]  [place] [address]  Phone: [phone]  Fax: [phone]      Patient ID: Jennifer Aly                              : 1955    Visit Date: 2019  Referring Provider: Demarco Moreau DO    Chief Complaint: Cerumen Impaction and Hearing Loss      Jennifer Aly is a 63 y.o. female who presented to the [place] office today for consultation in regard to frequent cerumen impaction formation, postnasal drainage and concerns about possible hearing loss.    Jennifer has had frequent cerumen impactions for years.  She often gets her ears flushed out.  She wonders if there is anything she can do proactively to minimize the cerumen buildup.  Recently her ears have felt blocked and her hearing has seemed diminished.  She has never undergone an audiogram and would like a baseline test because of episodic perceptions of hearing loss.  She has never undergone otologic surgery.  She has not been exposed to excessive amounts of loud noise.  She does not suffer from tinnitus.    Jennifer does experience postnasal drainage.  Her secretions are clear.  Right now her nose is a bit stuffy to when she relates this to the spring allergy season.  She has not had an acute upper respiratory infection nor the flu.  She does use CPAP for obstructive sleep apnea.    Review of Systems   Constitutional: Negative for fatigue and fever.   HENT: Positive for congestion, hearing loss and postnasal drip. Negative for nosebleeds, rhinorrhea, sinus pressure, tinnitus, trouble swallowing and voice change.         Ear fullness.  Hyposmia.   Eyes: Negative for visual disturbance.   Respiratory: Negative for cough, shortness of breath and wheezing.    Cardiovascular: Negative for chest pain and palpitations.   Gastrointestinal: Negative for constipation, diarrhea, nausea and vomiting.   Endocrine: Negative for polyphagia and polyuria.   Genitourinary: Negative for frequency.  "  Musculoskeletal: Positive for arthralgias. Negative for back pain, gait problem and myalgias.   Skin: Negative for rash.   Allergic/Immunologic: Negative for environmental allergies.   Neurological: Negative for dizziness, weakness and headaches.   Hematological: Negative for adenopathy.   Psychiatric/Behavioral: Negative for dysphoric mood and sleep disturbance. The patient is not nervous/anxious.        Current Medications: Atorvastatin, calcium carbonate-vitamin d3, diltiazem xr, fluticasone propionate, and warfarin.    Past Medical History: Obstructive sleep apnea treated with CPAP.  History of pulmonary embolism.  Coronary artery disease.  Hypertension.  History of atrial fibrillation.  GERD.  Chronic UTI.  Osteoarthritis.  History of morbid obesity now status post gastric bypass surgery with significant weight loss.  Hyperlipidemia.  Type 2 diabetes mellitus.  Gout.    Past Surgical History: Status post appendectomy, cardiac catheterization, IVC filter placement and retrieval, rectourethral fistula repair, left rotator cuff surgery, Alexander-en-Y gastric bypass surgery, total abdominal hysterectomy, bilateral total knee replacements, trigger finger release and ulnar nerve repair.    Social History: The patient is .  She has 1 adult child.  She is a .  She rarely drinks alcohol on a social basis.  She quit smoking over 35 years ago.    Family History: Mother: Lung cancer.  Father: Diabetes mellitus.    Allergies: Sulfa (sulfonamide antibiotics)    Physical Exam:  Vitals: /82 (BP Location: Left upper arm, Patient Position: Sitting)   Pulse (!) 59   Ht 1.575 m (5' 2\")   Wt 90.3 kg (199 lb)   SpO2 98%   BMI 36.40 kg/m²   General:  Well-developed, well-nourished 63 y.o. in no acute distress.  Voice: Normal without hoarseness, breathiness or stridor.  Head/face:  No scars or lesions.  No parotid masses. No presinus tenderness. Seventh cranial nerves intact.  Eyes:  Extraocular movements " and gaze alignment normal.  Ears: Auricles normal.  Cerumen removed bilaterally. Eczema at external auditory meati.  Tympanic membranes clear, mobile and without retractions.  Nose:  Dorsum straight.  Septum sinsuoidal.  Turbinates edematous, normal in size and orientation.  No pus, polyps or crusts.  Oral Cavity/oropharynx:  Normal tongue thrust. Normal gag reflex. No masses, leukoplakia, erythroplakia, ulcers or other abnormalities at the tongue, floor of mouth, buccal mucosa, palate or posterior pharyngeal wall.  Larynx/hypopharynx:  Normal supraglottis.  Vocal folds smooth and white.  Arytenoids edematous and mobile.  Interarytenoid and post-glottic mucosa edematous.  No masses at the base of tongue, vallecula or piriform sinuses.  Neck:  No masses, adenopathy, cervical spasm or thyroid enlargement.  Cranial Nerves II through XII: Grossly intact.  Lungs: Clear throughout.  Heart: Regular rate and rhythm.  Mental status: Awake, alert and oriented ×3.  No depression or anxiety.    Audiogram: Basically normal hearing across all frequencies bilaterally with a tiny sensorineural loss at 8000 Hz on the right only.  Discrimination scores: 96% at 60 dB bilaterally.    Tympanogram: Normal bilaterally.      Impression:  1.  Bilateral cerumen impactions, removed with symptom relief.  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  2.  Chronic rhinitis.  3.  Perceived hearing loss.  The patient's hearing is actually excellent.    Recommendations and Plan:  1.  The patient was reassured about her excellent hearing.  2.  Debrox, 2 drops to each ear on the first and 15th nights of the month.  3.  Apply Lotrisone cream to the external ear canal opening at bedtime for 2 weeks and then use one night per week as needed.  4.  NeilMed saline sinus rinse once daily.  Use distilled water and salt packet.  5.  Ayr saline gel, apply in the nostrils with a Q-tip at bedtime.  6.  Take a break from fluticasone for now.  In 10 days  begin Rhinocort, 1 squirt to each nostril daily.  7.  Follow-up for ear check in 6 months.        Tita Man MD

## 2019-05-09 NOTE — TELEPHONE ENCOUNTER
Pt called, severe head cold with productive green mucus, chest and nasal airway, asking for abx   Patient Refused

## 2019-05-16 ENCOUNTER — TRANSCRIBE ORDERS (OUTPATIENT)
Dept: SCHEDULING | Age: 64
End: 2019-05-16

## 2019-05-16 DIAGNOSIS — Z12.31 ENCOUNTER FOR SCREENING MAMMOGRAM FOR MALIGNANT NEOPLASM OF BREAST: Primary | ICD-10-CM

## 2019-05-21 ENCOUNTER — HOSPITAL ENCOUNTER (OUTPATIENT)
Dept: RADIOLOGY | Facility: CLINIC | Age: 64
Discharge: HOME | End: 2019-05-21
Attending: FAMILY MEDICINE
Payer: MEDICARE

## 2019-05-21 DIAGNOSIS — Z12.31 ENCOUNTER FOR SCREENING MAMMOGRAM FOR MALIGNANT NEOPLASM OF BREAST: ICD-10-CM

## 2019-05-21 PROCEDURE — 77067 SCR MAMMO BI INCL CAD: CPT

## 2019-06-13 ENCOUNTER — PREP FOR CASE (OUTPATIENT)
Dept: SURGERY | Facility: CLINIC | Age: 64
End: 2019-06-13

## 2019-06-13 DIAGNOSIS — Z86.0100 HISTORY OF COLONIC POLYPS: Primary | ICD-10-CM

## 2019-06-13 RX ORDER — SODIUM CHLORIDE 9 MG/ML
INJECTION, SOLUTION INTRAVENOUS CONTINUOUS
Status: CANCELLED | OUTPATIENT
Start: 2019-06-13 | End: 2019-06-14

## 2019-06-13 NOTE — PROGRESS NOTES
Scheduled for colonoscopy in July.  She requested a low volume prep because of her history of gastric bypass.  Prepopik ordered.  There is commonly problems with insurance coverage for this, she may need to pay out-of-pocket.  Most of my patients say it is worth it!

## 2019-07-15 ENCOUNTER — TELEPHONE (OUTPATIENT)
Dept: PRIMARY CARE | Facility: CLINIC | Age: 64
End: 2019-07-15

## 2019-07-15 DIAGNOSIS — I25.10 CORONARY ARTERY DISEASE INVOLVING NATIVE CORONARY ARTERY OF NATIVE HEART WITHOUT ANGINA PECTORIS: ICD-10-CM

## 2019-07-15 DIAGNOSIS — I48.0 PAF (PAROXYSMAL ATRIAL FIBRILLATION) (CMS/HCC): Primary | ICD-10-CM

## 2019-07-15 NOTE — TELEPHONE ENCOUNTER
Sent her a script for INR she requested, also asking if it is okay to hold inr 1 week prior to colonoscopy

## 2019-07-17 ENCOUNTER — TELEPHONE (OUTPATIENT)
Dept: PRIMARY CARE | Facility: CLINIC | Age: 64
End: 2019-07-17

## 2019-07-17 LAB
INR PPP: 3.3 (ref 0.8–1.2)
PROTHROMBIN TIME: 32.1 SEC (ref 9.1–12)

## 2019-07-17 RX ORDER — WARFARIN 4 MG/1
TABLET ORAL
Qty: 200 TABLET | Refills: 1
Start: 2019-07-17 | End: 2019-07-29 | Stop reason: SDUPTHER

## 2019-07-17 NOTE — TELEPHONE ENCOUNTER
Adjusted dose coumadin    I called Jennifer, Mild elevation of INR-so take 8 mg daily except T/TH only take one tablet (4 mg) those days, repeat INR at next visit on the 29 th.

## 2019-07-29 ENCOUNTER — CONSULT (OUTPATIENT)
Dept: PRIMARY CARE | Facility: CLINIC | Age: 64
End: 2019-07-29
Payer: MEDICARE

## 2019-07-29 VITALS
TEMPERATURE: 98.1 F | HEIGHT: 63 IN | HEART RATE: 53 BPM | WEIGHT: 197 LBS | BODY MASS INDEX: 34.91 KG/M2 | OXYGEN SATURATION: 96 % | SYSTOLIC BLOOD PRESSURE: 118 MMHG | DIASTOLIC BLOOD PRESSURE: 82 MMHG | RESPIRATION RATE: 15 BRPM

## 2019-07-29 DIAGNOSIS — Z87.440 HISTORY OF UTI: ICD-10-CM

## 2019-07-29 DIAGNOSIS — G47.33 OBSTRUCTIVE SLEEP APNEA: Chronic | ICD-10-CM

## 2019-07-29 DIAGNOSIS — E78.5 HYPERLIPIDEMIA, UNSPECIFIED HYPERLIPIDEMIA TYPE: ICD-10-CM

## 2019-07-29 DIAGNOSIS — Z01.818 PRE-OP EVALUATION: ICD-10-CM

## 2019-07-29 DIAGNOSIS — E78.00 PURE HYPERCHOLESTEROLEMIA: Chronic | ICD-10-CM

## 2019-07-29 DIAGNOSIS — I27.82 CHRONIC SADDLE PULMONARY EMBOLISM WITHOUT ACUTE COR PULMONALE (CMS/HCC): ICD-10-CM

## 2019-07-29 DIAGNOSIS — H25.12 AGE-RELATED NUCLEAR CATARACT OF LEFT EYE: Primary | ICD-10-CM

## 2019-07-29 DIAGNOSIS — I27.82 OTHER CHRONIC PULMONARY EMBOLISM WITHOUT ACUTE COR PULMONALE: Chronic | ICD-10-CM

## 2019-07-29 DIAGNOSIS — I26.92 CHRONIC SADDLE PULMONARY EMBOLISM WITHOUT ACUTE COR PULMONALE (CMS/HCC): ICD-10-CM

## 2019-07-29 PROBLEM — H25.092 AGE-RELATED INCIPIENT CATARACT OF LEFT EYE: Status: ACTIVE | Noted: 2019-07-29

## 2019-07-29 PROBLEM — E11.9 DMII (DIABETES MELLITUS, TYPE 2) (CMS/HCC): Status: RESOLVED | Noted: 2018-04-30 | Resolved: 2019-07-29

## 2019-07-29 PROCEDURE — 99214 OFFICE O/P EST MOD 30 MIN: CPT | Performed by: FAMILY MEDICINE

## 2019-07-29 PROCEDURE — 93000 ELECTROCARDIOGRAM COMPLETE: CPT | Performed by: FAMILY MEDICINE

## 2019-07-29 RX ORDER — DIFLUPREDNATE 0.5 MG/ML
EMULSION OPHTHALMIC
COMMUNITY
Start: 2019-07-18 | End: 2019-10-29 | Stop reason: ALTCHOICE

## 2019-07-29 RX ORDER — ALBUTEROL SULFATE 90 UG/1
INHALANT RESPIRATORY (INHALATION)
COMMUNITY
Start: 2019-05-21 | End: 2019-10-29 | Stop reason: ALTCHOICE

## 2019-07-29 RX ORDER — KETOCONAZOLE 20 MG/G
CREAM TOPICAL
Refills: 2 | COMMUNITY
Start: 2019-05-23 | End: 2020-01-25 | Stop reason: ENTERED-IN-ERROR

## 2019-07-29 RX ORDER — NITROFURANTOIN 25; 75 MG/1; MG/1
CAPSULE ORAL
COMMUNITY
Start: 2019-07-15 | End: 2019-07-29 | Stop reason: ALTCHOICE

## 2019-07-29 RX ORDER — DILTIAZEM HYDROCHLORIDE 120 MG/1
120 CAPSULE, EXTENDED RELEASE ORAL
Refills: 4 | COMMUNITY
Start: 2019-05-21 | End: 2019-07-29 | Stop reason: ALTCHOICE

## 2019-07-29 RX ORDER — NEPAFENAC 3 MG/ML
SUSPENSION/ DROPS OPHTHALMIC
COMMUNITY
Start: 2019-07-18 | End: 2019-10-29 | Stop reason: ALTCHOICE

## 2019-07-29 RX ORDER — CIPROFLOXACIN 500 MG/1
500 TABLET ORAL 2 TIMES DAILY
Qty: 14 TABLET | Refills: 0 | Status: SHIPPED | OUTPATIENT
Start: 2019-07-29 | End: 2019-12-31

## 2019-07-29 RX ORDER — WARFARIN 4 MG/1
TABLET ORAL
Qty: 200 TABLET | Refills: 1 | Status: ON HOLD | OUTPATIENT
Start: 2019-07-29 | End: 2019-07-30

## 2019-07-29 RX ORDER — WARFARIN 4 MG/1
TABLET ORAL
Qty: 200 TABLET | Refills: 1 | Status: SHIPPED | OUTPATIENT
Start: 2019-07-29 | End: 2019-07-29 | Stop reason: SDUPTHER

## 2019-07-29 RX ORDER — BETAMETHASONE DIPROPIONATE 0.5 MG/G
CREAM TOPICAL
Refills: 3 | COMMUNITY
Start: 2019-05-24 | End: 2020-01-25

## 2019-07-29 RX ORDER — WARFARIN 4 MG/1
TABLET ORAL
Qty: 68 TABLET | Refills: 2 | Status: CANCELLED | OUTPATIENT
Start: 2019-07-29

## 2019-07-29 RX ORDER — BESIFLOXACIN 6 MG/ML
SUSPENSION OPHTHALMIC
COMMUNITY
Start: 2019-07-18 | End: 2019-10-29 | Stop reason: ALTCHOICE

## 2019-07-29 ASSESSMENT — ENCOUNTER SYMPTOMS
SORE THROAT: 0
PALPITATIONS: 0
AGITATION: 0
MYALGIAS: 0
ACTIVITY CHANGE: 0
EYE PAIN: 0
SINUS PRESSURE: 0
ARTHRALGIAS: 0
SHORTNESS OF BREATH: 0
NAUSEA: 0
FATIGUE: 0
FEVER: 0
DYSURIA: 0
DIZZINESS: 0
COUGH: 0
EYE ITCHING: 0
ABDOMINAL PAIN: 0
HEADACHES: 0
COLOR CHANGE: 0
ADENOPATHY: 0
DIARRHEA: 0
CHEST TIGHTNESS: 0
TROUBLE SWALLOWING: 0
CONFUSION: 0
SINUS PAIN: 0
BACK PAIN: 0
BLOOD IN STOOL: 0
APPETITE CHANGE: 0
EYE DISCHARGE: 0
VOMITING: 0

## 2019-07-29 NOTE — ASSESSMENT & PLAN NOTE
Full Clearance done today  Previous Anaesthesia Complications: NO  Etoh Risk: NO  Pulmonary Risk: NO  Pe/dvt Risk: MILD with Hx PE-will check about Using Coumadin with minimal to no blood loss surgery   Delirium Risk: NO   Labs/EKG: Obtain EKG today  Stable for IOLI

## 2019-07-29 NOTE — PATIENT INSTRUCTIONS
Overall you're doing well. We have completed our pre-op evaluation, and you are all cleared. Please continue your medication as instructed. Stay hydrated and enjoy the shore!

## 2019-07-29 NOTE — ASSESSMENT & PLAN NOTE
Lab Results   Component Value Date    CHOL 157 04/04/2019    CHOL 144 03/07/2019    CHOL 150 05/01/2018     Lab Results   Component Value Date    HDL 56 04/04/2019    HDL 59 03/07/2019    HDL 57 05/01/2018     Lab Results   Component Value Date    LDLCALC 90 04/04/2019    LDLCALC 74 03/07/2019    LDLCALC 75 05/01/2018     Lab Results   Component Value Date    TRIG 56 04/04/2019    TRIG 53 03/07/2019    TRIG 89 05/01/2018     No results found for: CHOLHDL  Diet, exercise  Stable on med

## 2019-07-29 NOTE — TELEPHONE ENCOUNTER
Medicine Refill Request    Last Office Visit: 3/6/2019  Next Office Visit: 7/29/2019        Current Outpatient Prescriptions:   •  atorvastatin (LIPITOR) 40 mg tablet, Take 1 tablet (40 mg total) by mouth daily., Disp: 90 tablet, Rfl: 3  •  calcium carbonate-vitamin D3 500 mg(1,250mg) -200 unit per tablet, No SIG Entered, Disp: , Rfl:   •  diltiazem XR (DILT-XR) 120 mg 24 hr capsule, Take 1 capsule (120 mg total) by mouth daily., Disp: 90 capsule, Rfl: 3  •  fluticasone (FLONASE) 50 mcg/actuation nasal spray, Administer 1 spray into each nostril daily as needed for rhinitis., Disp: 1 Bottle, Rfl: 3  •  warfarin (COUMADIN) 4 mg tablet, Take 2 by mouth daily (8 mg daily) except T/TH take one (4mg), Disp: 200 tablet, Rfl: 1      BP Readings from Last 3 Encounters:   04/26/19 128/82   04/15/19 134/70   03/06/19 124/76       Recent Lab results:  Lab Results   Component Value Date    CHOL 157 04/04/2019   ,   Lab Results   Component Value Date    HDL 56 04/04/2019   ,   Lab Results   Component Value Date    LDLCALC 90 04/04/2019   ,   Lab Results   Component Value Date    TRIG 56 04/04/2019        Lab Results   Component Value Date    GLUCOSE 84 04/04/2019   ,   Lab Results   Component Value Date    HGBA1C 5.9 (H) 04/04/2019         Lab Results   Component Value Date    CREATININE 0.5 (L) 04/04/2019       Lab Results   Component Value Date    TSH 1.03 04/04/2019

## 2019-07-29 NOTE — ASSESSMENT & PLAN NOTE
Lab Results   Component Value Date    INR 3.3 (H) 07/16/2019    INR 2.5 04/04/2019    INR 2.4 03/07/2019     Coumadin on hold for procedure  Cont monitoring INR  Will resume Coumadin after procedures

## 2019-07-29 NOTE — PROGRESS NOTES
Subjective      Patient ID: Jennifer Aly is a 64 y.o. female.    65 yo female presenting at the clinic for pre-op check. Patient is having left eye cataract removal with lens placement and laser surgery on 8/8/19. Patient is also having her routing colonoscopy done on 7/30/19, currently on liquid diet for the procedure.    Chronic illnesses:    Arthritis  -stable  -chronic aches  - b/l TKR    Gout  -no symptoms    Rectal fistula  -Follow up with GI  -Surgery done x2 in the past  -Colonoscopy on 7/30/19    HLD  Lab Results       Component                Value               Date                       CHOL                     157                 04/04/2019                 CHOL                     144                 03/07/2019                 CHOL                     150                 05/01/2018            Lab Results       Component                Value               Date                       HDL                      56                  04/04/2019                 HDL                      59                  03/07/2019                 HDL                      57                  05/01/2018            Lab Results       Component                Value               Date                       LDLCALC                  90                  04/04/2019                 LDLCALC                  74                  03/07/2019                 LDLCALC                  75                  05/01/2018            Lab Results       Component                Value               Date                       TRIG                     56                  04/04/2019                 TRIG                     53                  03/07/2019                 TRIG                     89                  05/01/2018            No results found for: CHOLHDL  -stable on med  -diet management    KIM  -uses CPAP at night  -stable    Pulmonary emboli  -on Warfarin  -Lab Results       Component                Value               Date                       INR                       3.3 (H)             07/16/2019                 INR                      2.5                 04/04/2019                 INR                      2.4                 03/07/2019                Psoriasis  -stable        The following have been reviewed and updated as appropriate in this visit:  Meds         Past Medical History:   Diagnosis Date   • Arthritis    • DMII (diabetes mellitus, type 2) (CMS/HCC) (Formerly [facility])    • Fistula    • Gout    • HLD (hyperlipidemia)    • HTN (hypertension)    • IFG (impaired fasting glucose)    • Obesity    • Polyarthritis    • Pulmonary emboli (CMS/Formerly [facility]) ([facility]ly [facility])     pulmonary emboli   • Torn rotator cuff        Past Surgical History:   Procedure Laterality Date   • APPENDECTOMY  2008   • CARDIAC CATHETERIZATION  11/2012   • IVC FILTER RETRIEVAL  2012   • IVC FILTER RETRIEVAL  02/2013   • RECTOURETHRAL FISTULA REPAIR  2008, 5/2017   • ROTATOR CUFF REPAIR Left 01/2016   • ALTAF-EN-Y PROCEDURE  02/07/2017   • TOTAL ABDOMINAL HYSTERECTOMY  1987   • TOTAL KNEE ARTHROPLASTY Bilateral 2010   • TOTAL KNEE ARTHROPLASTY Right 2012    revision   • TRIGGER FINGER RELEASE Right 2012    right thumb   • ULNAR NERVE REPAIR  2013       Family History   Problem Relation Age of Onset   • Diabetes Mother    • Lung cancer Mother    • Alzheimer's disease Mother    • Diabetes type II Father    • Heart failure Father        Social History     Social History   • Marital status:      Spouse name: N/A   • Number of children: N/A   • Years of education: N/A     Occupational History   • Not on file.     Social History Main Topics   • Smoking status: Former Smoker     Years: 15.00     Quit date: 1987   • Smokeless tobacco: Never Used   • Alcohol use Yes      Comment: occasional wine   • Drug use: No   • Sexual activity: Not Currently     Other Topics Concern   • Not on file     Social History Narrative   • No narrative on file       Allergies   Allergen Reactions   • Sulfa (Sulfonamide  Antibiotics) Hives       Current Outpatient Prescriptions   Medication Sig Dispense Refill   • albuterol HFA (VENTOLIN HFA) 90 mcg/actuation inhaler      • atorvastatin (LIPITOR) 40 mg tablet Take 1 tablet (40 mg total) by mouth daily. 90 tablet 3   • BESIVANCE 0.6 % drops,suspension      • betamethasone, augmented, (DIPROLENE-AF) 0.05 % cream APPLY 1 APPLICATION TO EXTREMITIES TWO TIMES A DAY FOR 14 DAYS  3   • calcium carbonate-vitamin D3 500 mg(1,250mg) -200 unit per tablet No SIG Entered     • clotrimazole-betamethasone (LOTRISONE) 1-0.05 % cream Apply topically nightly for 7 days. Apply  to opening of the outer ears at bedtime for 2 weeks then use at night once every week or 2. 45 g 3   • DUREZOL 0.05 % drops      • fluticasone (FLONASE) 50 mcg/actuation nasal spray Administer 1 spray into each nostril daily as needed for rhinitis. 1 Bottle 3   • ILEVRO 0.3 % drops,suspension      • ketoconazole (NIZORAL) 2 % cream APPLY 1 APPLICATION TO AFFECTED AREA ON ABDOMEN TWO TIMES A DAY FOR 3 WEEKS  2   • PREPOPIK solution TAKE AS DIRECTED PRIOR TO TEST  0   • warfarin (COUMADIN) 4 mg tablet Take 2 by mouth daily (8 mg daily) except T/TH take one (4mg) 200 tablet 1   • ciprofloxacin (CIPRO) 500 mg tablet Take 1 tablet (500 mg total) by mouth 2 (two) times a day for 7 days. 14 tablet 0   • diltiazem XR (DILT-XR) 120 mg 24 hr capsule Take 1 capsule (120 mg total) by mouth daily. 90 capsule 3     No current facility-administered medications for this visit.        Review of Systems   Constitutional: Negative for activity change, appetite change, fatigue and fever.   HENT: Negative for congestion, sinus pain, sinus pressure, sore throat and trouble swallowing.    Eyes: Negative for pain, discharge, itching and visual disturbance.   Respiratory: Negative for cough, chest tightness and shortness of breath.    Cardiovascular: Negative for chest pain, palpitations and leg swelling.   Gastrointestinal: Negative for abdominal  pain, blood in stool, diarrhea, nausea and vomiting.   Endocrine: Negative for cold intolerance and heat intolerance.   Genitourinary: Negative for dysuria.   Musculoskeletal: Negative for arthralgias, back pain, gait problem and myalgias.   Skin: Negative for color change and rash.   Allergic/Immunologic: Negative for environmental allergies and food allergies.   Neurological: Negative for dizziness and headaches.        Tingling at right 4th and 5th finger   Hematological: Negative for adenopathy.   Psychiatric/Behavioral: Negative for agitation, behavioral problems and confusion.       Objective     Vitals:    07/29/19 1547   BP: 118/82   Pulse: (!) 53   Resp: 15   Temp: 36.7 °C (98.1 °F)   SpO2: 96%       Physical Exam   Constitutional: She is oriented to person, place, and time. She appears well-developed and well-nourished.   HENT:   Head: Normocephalic and atraumatic.   Right Ear: External ear normal.   Left Ear: External ear normal.   Nose: Nose normal.   Mouth/Throat: Oropharynx is clear and moist.   Eyes: Pupils are equal, round, and reactive to light. Conjunctivae and EOM are normal.   Neck: Normal range of motion. Neck supple. No thyromegaly present.   Cardiovascular: Normal rate, regular rhythm, normal heart sounds and intact distal pulses.    No murmur heard.  Pulmonary/Chest: Effort normal and breath sounds normal. No respiratory distress. She has no wheezes. She has no rales.   Abdominal: Soft. Bowel sounds are normal. She exhibits no distension and no mass. There is no tenderness. There is no guarding.   Musculoskeletal: Normal range of motion. She exhibits no edema or tenderness.   Neurological: She is alert and oriented to person, place, and time. She displays normal reflexes. No cranial nerve deficit.   Skin: Skin is warm and dry. Capillary refill takes less than 2 seconds. No rash noted.   Psychiatric: She has a normal mood and affect. Her behavior is normal. Judgment and thought content normal.        Assessment/Plan     Problem List Items Addressed This Visit        Nervous    Age-related nuclear cataract of left eye    Current Assessment & Plan     Pt has cataract removal with lens placement and laser surgery scheduled for 8/8/19         Relevant Medications    BESIVANCE 0.6 % drops,suspension    DUREZOL 0.05 % drops    ILEVRO 0.3 % drops,suspension       Respiratory    Pulmonary emboli (CMS/HCC) (HCC) (Chronic)    Overview     Overview:          Current Assessment & Plan     Lab Results   Component Value Date    INR 3.3 (H) 07/16/2019    INR 2.5 04/04/2019    INR 2.4 03/07/2019     Coumadin on hold for procedure  Cont monitoring INR  Will resume Coumadin after procedures         Obstructive sleep apnea - Primary (Chronic)    Overview     Overview:          Current Assessment & Plan     Stable on CPAP             Genitourinary    History of UTI    Current Assessment & Plan     Meds to neftaly as needed-pt knows her S./S         Relevant Medications    ciprofloxacin (CIPRO) 500 mg tablet       Endocrine/Metabolic    Pure hypercholesterolemia (Chronic)    Overview     Overview:          HLD (hyperlipidemia)    Current Assessment & Plan     Lab Results   Component Value Date    CHOL 157 04/04/2019    CHOL 144 03/07/2019    CHOL 150 05/01/2018     Lab Results   Component Value Date    HDL 56 04/04/2019    HDL 59 03/07/2019    HDL 57 05/01/2018     Lab Results   Component Value Date    LDLCALC 90 04/04/2019    LDLCALC 74 03/07/2019    LDLCALC 75 05/01/2018     Lab Results   Component Value Date    TRIG 56 04/04/2019    TRIG 53 03/07/2019    TRIG 89 05/01/2018     No results found for: CHOLHDL  Diet, exercise  Stable on med         Relevant Orders    ECG 12 LEAD OFFICE PERFORMED       Other    Pre-op evaluation    Current Assessment & Plan     Full Clearance done today  Previous Anaesthesia Complications: NO  Etoh Risk: NO  Pulmonary Risk: NO  Pe/dvt Risk: MILD with Hx PE-will check about Using Coumadin with  minimal to no blood loss surgery   Delirium Risk: NO   Labs/EKG: Obtain EKG today  Stable for IOLI            Relevant Orders    ECG 12 LEAD OFFICE PERFORMED          DIAGNOSIS  1. Obstructive sleep apnea    2. Pre-op evaluation    3. Other chronic pulmonary embolism without acute cor pulmonale (CMS/HCC)    4. Age-related nuclear cataract of left eye    5. Hyperlipidemia, unspecified hyperlipidemia type    6. Pure hypercholesterolemia    7. History of UTI        No exam data present        Demarco Moreau,   7/29/2019

## 2019-07-30 ENCOUNTER — ANESTHESIA (OUTPATIENT)
Dept: ENDOSCOPY | Facility: HOSPITAL | Age: 64
End: 2019-07-30
Payer: MEDICARE

## 2019-07-30 ENCOUNTER — HOSPITAL ENCOUNTER (OUTPATIENT)
Facility: HOSPITAL | Age: 64
Discharge: HOME | End: 2019-07-30
Attending: COLON & RECTAL SURGERY | Admitting: COLON & RECTAL SURGERY
Payer: MEDICARE

## 2019-07-30 ENCOUNTER — ANESTHESIA EVENT (OUTPATIENT)
Dept: ENDOSCOPY | Facility: HOSPITAL | Age: 64
End: 2019-07-30
Payer: MEDICARE

## 2019-07-30 VITALS
HEART RATE: 61 BPM | DIASTOLIC BLOOD PRESSURE: 67 MMHG | TEMPERATURE: 98 F | SYSTOLIC BLOOD PRESSURE: 134 MMHG | RESPIRATION RATE: 20 BRPM | OXYGEN SATURATION: 100 % | HEIGHT: 62 IN | WEIGHT: 195 LBS | BODY MASS INDEX: 35.88 KG/M2

## 2019-07-30 DIAGNOSIS — Z86.0100 HISTORY OF COLONIC POLYPS: ICD-10-CM

## 2019-07-30 PROCEDURE — 0DBP8ZX EXCISION OF RECTUM, VIA NATURAL OR ARTIFICIAL OPENING ENDOSCOPIC, DIAGNOSTIC: ICD-10-PCS | Performed by: COLON & RECTAL SURGERY

## 2019-07-30 PROCEDURE — 88305 TISSUE EXAM BY PATHOLOGIST: CPT | Performed by: COLON & RECTAL SURGERY

## 2019-07-30 PROCEDURE — 37000002 HC ANESTHESIA MAC: Performed by: COLON & RECTAL SURGERY

## 2019-07-30 PROCEDURE — 25000000 HC PHARMACY GENERAL: Performed by: NURSE ANESTHETIST, CERTIFIED REGISTERED

## 2019-07-30 PROCEDURE — 75000020 HC COLONSCOPY SNARE: Performed by: COLON & RECTAL SURGERY

## 2019-07-30 PROCEDURE — 63600000 HC DRUGS/DETAIL CODE: Performed by: NURSE ANESTHETIST, CERTIFIED REGISTERED

## 2019-07-30 PROCEDURE — 25800000 HC PHARMACY IV SOLUTIONS: Performed by: NURSE ANESTHETIST, CERTIFIED REGISTERED

## 2019-07-30 PROCEDURE — 45385 COLONOSCOPY W/LESION REMOVAL: CPT | Performed by: COLON & RECTAL SURGERY

## 2019-07-30 RX ORDER — WARFARIN 4 MG/1
TABLET ORAL
Qty: 200 TABLET | Refills: 1 | Status: SHIPPED | OUTPATIENT
Start: 2019-08-04 | End: 2019-08-06 | Stop reason: SDUPTHER

## 2019-07-30 RX ORDER — PROPOFOL 10 MG/ML
INJECTION, EMULSION INTRAVENOUS CONTINUOUS PRN
Status: DISCONTINUED | OUTPATIENT
Start: 2019-07-30 | End: 2019-07-30 | Stop reason: SURG

## 2019-07-30 RX ORDER — SODIUM CHLORIDE 9 MG/ML
INJECTION, SOLUTION INTRAVENOUS CONTINUOUS
Status: DISCONTINUED | OUTPATIENT
Start: 2019-07-30 | End: 2019-07-30 | Stop reason: HOSPADM

## 2019-07-30 RX ORDER — LIDOCAINE HYDROCHLORIDE 10 MG/ML
INJECTION, SOLUTION EPIDURAL; INFILTRATION; INTRACAUDAL; PERINEURAL AS NEEDED
Status: DISCONTINUED | OUTPATIENT
Start: 2019-07-30 | End: 2019-07-30 | Stop reason: SURG

## 2019-07-30 RX ORDER — SODIUM CHLORIDE 9 MG/ML
INJECTION, SOLUTION INTRAVENOUS CONTINUOUS PRN
Status: DISCONTINUED | OUTPATIENT
Start: 2019-07-30 | End: 2019-07-30 | Stop reason: SURG

## 2019-07-30 RX ORDER — PROPOFOL 200MG/20ML
SYRINGE (ML) INTRAVENOUS AS NEEDED
Status: DISCONTINUED | OUTPATIENT
Start: 2019-07-30 | End: 2019-07-30 | Stop reason: SURG

## 2019-07-30 RX ADMIN — PROPOFOL 140 MCG/KG/MIN: 10 INJECTION, EMULSION INTRAVENOUS at 11:17

## 2019-07-30 RX ADMIN — SODIUM CHLORIDE: 9 INJECTION, SOLUTION INTRAVENOUS at 11:11

## 2019-07-30 RX ADMIN — PROPOFOL 20 MG: 10 INJECTION, EMULSION INTRAVENOUS at 11:20

## 2019-07-30 RX ADMIN — LIDOCAINE HYDROCHLORIDE 5 ML: 10 INJECTION, SOLUTION EPIDURAL; INFILTRATION; INTRACAUDAL; PERINEURAL at 11:17

## 2019-07-30 RX ADMIN — PROPOFOL 80 MG: 10 INJECTION, EMULSION INTRAVENOUS at 11:17

## 2019-07-30 ASSESSMENT — ENCOUNTER SYMPTOMS
DIARRHEA: 0
CONSTIPATION: 1
BLOOD IN STOOL: 0

## 2019-07-30 ASSESSMENT — PAIN SCALES - GENERAL: PAIN_LEVEL: 0

## 2019-07-30 NOTE — OP NOTE
_______________________________________________________________________________  Patient Name: Jennifer Aly             Procedure Date: 7/30/2019 10:57 AM  MRN: [id number]                     Account Number: [id number]  YOB: 1955              Age: 64  Gender: Female                        Note Status: Finalized  Attending MD: ELLY PRESTON MD  _______________________________________________________________________________  Procedure:           Colonoscopy  Indications:         High risk colon cancer surveillance: Personal history of  colonic polyps  Providers:           ELLY PRESTON MD (Doctor)  Referring MD:        JULIA MORGAN, KAMILA  Requesting Provider:  Medicines:           Monitored Anesthesia Care  Complications:       No immediate complications.  _______________________________________________________________________________  Procedure:           After I obtained informed consent, the scope was passed  under direct vision. Throughout the procedure, the  patient's blood pressure, pulse, and oxygen saturations  were monitored continuously. The endoscope was  introduced through the anus and advanced to the cecum,  identified by appendiceal orifice and ileocecal valve.  The colonoscopy was somewhat difficult due to  significant looping. Successful completion of the  procedure was aided by using manual pressure,  straightening and shortening the scope to obtain bowel  loop reduction and using scope torsion. The patient  tolerated the procedure well. The quality of the bowel  preparation was excellent.  Findings:  Many medium-mouthed diverticula were found in the sigmoid colon.  A 5 mm polyp was found in the rectum. The polyp was sessile. The polyp  was removed with a hot snare. Resection and retrieval were complete.  The exam was otherwise without abnormality on direct and retroflexion  views.  Impression:          - Diverticulosis in the sigmoid colon.  - One 5 mm polyp  (adenomatous) in the rectum, removed  with a hot snare. Resected and retrieved.  - The examination was otherwise normal on direct and  retroflexion views.  Recommendation:      - Telephone my office for pathology results in 1 week.  - Repeat colonoscopy in 5 years for surveillance.  Procedure Code(s):   --- Professional ---  00475, Colonoscopy, flexible; with removal of tumor(s),  polyp(s), or other lesion(s) by snare technique  Diagnosis Code(s):   --- Professional ---  Z86.010, Personal history of colonic polyps  D12.8, Benign neoplasm of rectum  K57.30, Diverticulosis of large intestine without  perforation or abscess without bleeding  CPT copyright 2018 [employer]. All rights reserved.  The codes documented in this report are preliminary and upon  review may  be revised to meet current compliance requirements.  Attending Participation:  I personally performed the entire procedure.  Larry Preston JR, MD  __________________  LARRY PRESTON MD  7/30/2019 11:43:07 AM  This report has been signed electronically.  Number of Addenda: 0  Note Initiated On: 7/30/2019 10:57 AM

## 2019-07-30 NOTE — ANESTHESIA POSTPROCEDURE EVALUATION
Patient: Jennifer Aly    Procedure Summary     Date:  07/30/19 Room / Location:  [facility] (D) / [facility]    Anesthesia Start:  1111 Anesthesia Stop:  1146    Procedure:  FLEXIBLE COLONOSCOPY PROXIMAL TO SPLENIC FLEXURE WITH REMOVAL OF TUMOR USING SNARE (N/A Anus) Diagnosis:       History of colonic polyps      (History of colonic polyps [Z86.010])    Provider:  Larry Rock MD Responsible Provider:  Gilberto Mckeon MD    Anesthesia Type:  MAC ASA Status:  3          Anesthesia Type: MAC  PACU Vitals  7/30/2019 1141 - 7/30/2019 1237      7/30/2019 1146 7/30/2019 1150 7/30/2019 1200 7/30/2019 1210    BP: (!)  109/59 114/63 (!)  120/57 134/67    Pulse: (!)  55 (!)  55 (!)  54 61    Resp: - 19 (!)  21 20    SpO2: 100 % 98 % 100 % 100 %            Anesthesia Post Evaluation    Pain score: 0  Pain management: adequate  Patient location during evaluation: PACU  Patient participation: complete - patient participated  Level of consciousness: awake and alert  Cardiovascular status: acceptable  Airway Patency: adequate  Respiratory status: acceptable  Hydration status: acceptable  Anesthetic complications: no

## 2019-07-30 NOTE — OR SURGEON
Pre-Procedure patient identification:  I am the primary operating surgeon/proceduralist and I have identified the patient on 07/30/19 at 11:05 AM Larry Rock MD  Phone Number: [phone]

## 2019-07-30 NOTE — ANESTHESIA PREPROCEDURE EVALUATION
Anesthesia ROS/MED HX      Pulmonary    asthma   Sleep apnea and CPAP Compliant  Cardiovascular   CAD   hypertension  GI/Hepatic   GERD    Control: well controlled  Endo/Other   Diabetes  Body Habitus: Obese      Past Surgical History:   Procedure Laterality Date   • APPENDECTOMY  2008   • CARDIAC CATHETERIZATION  11/2012   • IVC FILTER RETRIEVAL  2012   • IVC FILTER RETRIEVAL  02/2013   • RECTOURETHRAL FISTULA REPAIR  2008, 5/2017   • ROTATOR CUFF REPAIR Left 01/2016   • ALTAF-EN-Y PROCEDURE  02/07/2017   • TOTAL ABDOMINAL HYSTERECTOMY  1987   • TOTAL KNEE ARTHROPLASTY Bilateral 2010   • TOTAL KNEE ARTHROPLASTY Right 2012    revision   • TRIGGER FINGER RELEASE Right 2012    right thumb   • ULNAR NERVE REPAIR  2013       Physical Exam    Airway   Mallampati: II   TM distance: <3 FB   Neck ROM: full  Cardiovascular    Rhythm: regular   Rate: normal  Pulmonary    clear to auscultation        Anesthesia Plan    Plan: MAC    Technique: MAC     Airway: natural airway / supplemental oxygen       patient did not smoke on day of surgery  ASA 3  Blood Products:   Use of Blood Products Discussed: No   Anesthetic plan and risks discussed with: patient  Induction:    intravenous   Parents Present: No  Postop Plan:   Patient Disposition: phase II then home   Pain Management: IV analgesics

## 2019-07-30 NOTE — H&P
General Surgery History and Physical      HPI     Patient is a 64 y.o. female who presents for colonoscopy for a history of colon polyps.  Her last colonoscopy was October 2013 and a tubular adenoma was removed.  A 5-year follow-up was recommended.  She was due for this in October 2018.     Medical History:   Past Medical History:   Diagnosis Date   • Arthritis    • DMII (diabetes mellitus, type 2) (CMS/HCC) (HCC)    • Fistula    • Gout    • HLD (hyperlipidemia)    • HTN (hypertension)    • IFG (impaired fasting glucose)    • Obesity    • Polyarthritis    • Pulmonary emboli (CMS/HCC) (HCC)     pulmonary emboli   • Torn rotator cuff        Surgical History:   Past Surgical History:   Procedure Laterality Date   • APPENDECTOMY  2008   • CARDIAC CATHETERIZATION  11/2012   • IVC FILTER RETRIEVAL  2012   • IVC FILTER RETRIEVAL  02/2013   • RECTOURETHRAL FISTULA REPAIR  2008, 5/2017   • ROTATOR CUFF REPAIR Left 01/2016   • ALTAF-EN-Y PROCEDURE  02/07/2017   • TOTAL ABDOMINAL HYSTERECTOMY  1987   • TOTAL KNEE ARTHROPLASTY Bilateral 2010   • TOTAL KNEE ARTHROPLASTY Right 2012    revision   • TRIGGER FINGER RELEASE Right 2012    right thumb   • ULNAR NERVE REPAIR  2013       Social History:   Social History     Social History Narrative   • No narrative on file       Family History:   Family History   Problem Relation Age of Onset   • Diabetes Mother    • Lung cancer Mother    • Alzheimer's disease Mother    • Diabetes type II Father    • Heart failure Father        Allergies: Sulfa (sulfonamide antibiotics)    Home Medications:  •  warfarin, Take 2 by mouth daily (8 mg daily) except T/TH take one (4mg)  •  albuterol HFA,   •  atorvastatin, Take 1 tablet (40 mg total) by mouth daily.  •  BESIVANCE,   •  betamethasone (augmented), APPLY 1 APPLICATION TO EXTREMITIES TWO TIMES A DAY FOR 14 DAYS  •  calcium carbonate-vitamin D3, No SIG Entered  •  ciprofloxacin, Take 1 tablet (500 mg total) by mouth 2 (two) times a day for 7  days.  •  [] clotrimazole-betamethasone, Apply topically nightly for 7 days. Apply  to opening of the outer ears at bedtime for 2 weeks then use at night once every week or 2.  •  diltiazem XR, Take 1 capsule (120 mg total) by mouth daily.  •  DUREZOL,   •  fluticasone propionate, Administer 1 spray into each nostril daily as needed for rhinitis.  •  ILEVRO,   •  ketoconazole, APPLY 1 APPLICATION TO AFFECTED AREA ON ABDOMEN TWO TIMES A DAY FOR 3 WEEKS  •  PREPOPIK, TAKE AS DIRECTED PRIOR TO TEST    Current Medications:      Review of Systems   Gastrointestinal: Positive for constipation. Negative for blood in stool and diarrhea.   All other systems reviewed and are negative.      Objective     Vital Signs for the last 24 hours:  Temp:  [36.7 °C (98 °F)] 36.7 °C (98 °F)  Heart Rate:  [65] 65  Resp:  [16] 16  BP: (178)/(81) 178/81    Physicial Exam  Lungs: clear to auscultation bilaterally  Heart: regular rate and rhythm, S1, S2 normal, no murmur, click, rub or gallop  Abdomen: soft, non-tender; bowel sounds normal; no masses, no organomegaly      Plan: Proceed with endoscopy as scheduled.

## 2019-07-31 LAB
CASE RPRT: NORMAL
CLINICAL INFO: NORMAL
PATH REPORT.FINAL DX SPEC: NORMAL
PATH REPORT.GROSS SPEC: NORMAL

## 2019-08-06 RX ORDER — WARFARIN 4 MG/1
TABLET ORAL
Qty: 200 TABLET | Refills: 1 | Status: SHIPPED | OUTPATIENT
Start: 2019-08-06 | End: 2019-12-23 | Stop reason: SDUPTHER

## 2019-08-06 NOTE — TELEPHONE ENCOUNTER
Medicine Refill Request    Last Office Visit: 3/6/2019  Next Office Visit: Visit date not found        Current Outpatient Prescriptions:   •  albuterol HFA (VENTOLIN HFA) 90 mcg/actuation inhaler, , Disp: , Rfl:   •  atorvastatin (LIPITOR) 40 mg tablet, Take 1 tablet (40 mg total) by mouth daily., Disp: 90 tablet, Rfl: 3  •  BESIVANCE 0.6 % drops,suspension, , Disp: , Rfl:   •  betamethasone, augmented, (DIPROLENE-AF) 0.05 % cream, APPLY 1 APPLICATION TO EXTREMITIES TWO TIMES A DAY FOR 14 DAYS, Disp: , Rfl: 3  •  calcium carbonate-vitamin D3 500 mg(1,250mg) -200 unit per tablet, No SIG Entered, Disp: , Rfl:   •  diltiazem XR (DILT-XR) 120 mg 24 hr capsule, Take 1 capsule (120 mg total) by mouth daily., Disp: 90 capsule, Rfl: 3  •  DUREZOL 0.05 % drops, , Disp: , Rfl:   •  fluticasone (FLONASE) 50 mcg/actuation nasal spray, Administer 1 spray into each nostril daily as needed for rhinitis., Disp: 1 Bottle, Rfl: 3  •  ILEVRO 0.3 % drops,suspension, , Disp: , Rfl:   •  ketoconazole (NIZORAL) 2 % cream, APPLY 1 APPLICATION TO AFFECTED AREA ON ABDOMEN TWO TIMES A DAY FOR 3 WEEKS, Disp: , Rfl: 2  •  PREPOPIK solution, TAKE AS DIRECTED PRIOR TO TEST, Disp: , Rfl: 0  •  warfarin (COUMADIN) 4 mg tablet, Take 2 by mouth daily (8 mg daily) except T/TH take one (4mg), Disp: 200 tablet, Rfl: 1      BP Readings from Last 3 Encounters:   07/30/19 134/67   07/29/19 118/82   04/26/19 128/82       Recent Lab results:  Lab Results   Component Value Date    CHOL 157 04/04/2019   ,   Lab Results   Component Value Date    HDL 56 04/04/2019   ,   Lab Results   Component Value Date    LDLCALC 90 04/04/2019   ,   Lab Results   Component Value Date    TRIG 56 04/04/2019        Lab Results   Component Value Date    GLUCOSE 84 04/04/2019   ,   Lab Results   Component Value Date    HGBA1C 5.9 (H) 04/04/2019         Lab Results   Component Value Date    CREATININE 0.5 (L) 04/04/2019       Lab Results   Component Value Date    TSH 1.03 04/04/2019

## 2019-08-13 RX ORDER — ATORVASTATIN CALCIUM 40 MG/1
40 TABLET, FILM COATED ORAL DAILY
Qty: 90 TABLET | Refills: 3 | Status: SHIPPED | OUTPATIENT
Start: 2019-08-13 | End: 2019-08-15 | Stop reason: SDUPTHER

## 2019-08-13 NOTE — TELEPHONE ENCOUNTER
Do you have enough medication for the next 5 days?: 4 pills left - 4 days     Did you request this medication through your pharmacy or our patient portal in the last day or two? Yes   Name of medication requested: atorvastatin Lipitor   Medication Strength: 40 mg    Mediation Directions: take 1 a day     Quantity Requested (example 30/90):   90 tablets Number of refills requested: unknown    System Generated Preferred Pharmacy(s)  are as follows.  If more than one pharmacy displays please identify which one should be used for this call    Has the pharmacy information below been confirmed with the patient?         [id number] - [place], [place] - [address] [address] [facility]  [address] [address] [address]  Phone: [phone] Fax: [phone]    [facility] #[id number] - [address] [address]  Phone: [phone] Fax: [phone]          If necessary, provide pharmacy details below. [facility]   Is this pharmacy a mail order pharmacy?:  NO  Pharmacy Name: [facility]  Pharmacy City: [address] [place] [address]  Pharmacy Telephone: [phone]    Additional Comments:   Left pills at the [facility] and [facility] wont refill her Rx     Next Encounter with this provider: 1/27/2020

## 2019-08-15 RX ORDER — ATORVASTATIN CALCIUM 40 MG/1
TABLET, FILM COATED ORAL
Qty: 90 TABLET | Refills: 0 | Status: SHIPPED | OUTPATIENT
Start: 2019-08-15 | End: 2020-01-20

## 2019-08-15 NOTE — TELEPHONE ENCOUNTER
Medicine Refill Request    Last Office Visit: 3/6/2019  Next Office Visit: 1/27/2020        Current Outpatient Prescriptions:   •  albuterol HFA (VENTOLIN HFA) 90 mcg/actuation inhaler, , Disp: , Rfl:   •  atorvastatin (LIPITOR) 40 mg tablet, Take 1 tablet (40 mg total) by mouth daily., Disp: 90 tablet, Rfl: 3  •  BESIVANCE 0.6 % drops,suspension, , Disp: , Rfl:   •  betamethasone, augmented, (DIPROLENE-AF) 0.05 % cream, APPLY 1 APPLICATION TO EXTREMITIES TWO TIMES A DAY FOR 14 DAYS, Disp: , Rfl: 3  •  calcium carbonate-vitamin D3 500 mg(1,250mg) -200 unit per tablet, No SIG Entered, Disp: , Rfl:   •  diltiazem XR (DILT-XR) 120 mg 24 hr capsule, Take 1 capsule (120 mg total) by mouth daily., Disp: 90 capsule, Rfl: 3  •  DUREZOL 0.05 % drops, , Disp: , Rfl:   •  fluticasone (FLONASE) 50 mcg/actuation nasal spray, Administer 1 spray into each nostril daily as needed for rhinitis., Disp: 1 Bottle, Rfl: 3  •  ILEVRO 0.3 % drops,suspension, , Disp: , Rfl:   •  ketoconazole (NIZORAL) 2 % cream, APPLY 1 APPLICATION TO AFFECTED AREA ON ABDOMEN TWO TIMES A DAY FOR 3 WEEKS, Disp: , Rfl: 2  •  PREPOPIK solution, TAKE AS DIRECTED PRIOR TO TEST, Disp: , Rfl: 0  •  warfarin (COUMADIN) 4 mg tablet, Take 2 by mouth daily (8 mg daily) except T/TH take one (4mg), Disp: 200 tablet, Rfl: 1      BP Readings from Last 3 Encounters:   07/30/19 134/67   07/29/19 118/82   04/26/19 128/82       Recent Lab results:  Lab Results   Component Value Date    CHOL 157 04/04/2019   ,   Lab Results   Component Value Date    HDL 56 04/04/2019   ,   Lab Results   Component Value Date    LDLCALC 90 04/04/2019   ,   Lab Results   Component Value Date    TRIG 56 04/04/2019        Lab Results   Component Value Date    GLUCOSE 84 04/04/2019   ,   Lab Results   Component Value Date    HGBA1C 5.9 (H) 04/04/2019         Lab Results   Component Value Date    CREATININE 0.5 (L) 04/04/2019       Lab Results   Component Value Date    TSH 1.03 04/04/2019

## 2019-09-26 RX ORDER — DILTIAZEM HYDROCHLORIDE 120 MG/1
CAPSULE, EXTENDED RELEASE ORAL
Qty: 90 CAPSULE | Refills: 3 | Status: SHIPPED | OUTPATIENT
Start: 2019-09-26 | End: 2020-09-01 | Stop reason: SDUPTHER

## 2019-09-26 NOTE — TELEPHONE ENCOUNTER
Medicine Refill Request    Last Office Visit: 3/6/2019  Next Office Visit: 1/27/2020        Current Outpatient Prescriptions:   •  albuterol HFA (VENTOLIN HFA) 90 mcg/actuation inhaler, , Disp: , Rfl:   •  atorvastatin (LIPITOR) 40 mg tablet, TAKE 1 TABLET BY MOUTH EVERY DAY, Disp: 90 tablet, Rfl: 0  •  BESIVANCE 0.6 % drops,suspension, , Disp: , Rfl:   •  betamethasone, augmented, (DIPROLENE-AF) 0.05 % cream, APPLY 1 APPLICATION TO EXTREMITIES TWO TIMES A DAY FOR 14 DAYS, Disp: , Rfl: 3  •  calcium carbonate-vitamin D3 500 mg(1,250mg) -200 unit per tablet, No SIG Entered, Disp: , Rfl:   •  diltiazem XR (DILT-XR) 120 mg 24 hr capsule, Take 1 capsule (120 mg total) by mouth daily., Disp: 90 capsule, Rfl: 3  •  DUREZOL 0.05 % drops, , Disp: , Rfl:   •  fluticasone (FLONASE) 50 mcg/actuation nasal spray, Administer 1 spray into each nostril daily as needed for rhinitis., Disp: 1 Bottle, Rfl: 3  •  ILEVRO 0.3 % drops,suspension, , Disp: , Rfl:   •  ketoconazole (NIZORAL) 2 % cream, APPLY 1 APPLICATION TO AFFECTED AREA ON ABDOMEN TWO TIMES A DAY FOR 3 WEEKS, Disp: , Rfl: 2  •  PREPOPIK solution, TAKE AS DIRECTED PRIOR TO TEST, Disp: , Rfl: 0  •  warfarin (COUMADIN) 4 mg tablet, Take 2 by mouth daily (8 mg daily) except T/TH take one (4mg), Disp: 200 tablet, Rfl: 1      BP Readings from Last 3 Encounters:   07/30/19 134/67   07/29/19 118/82   04/26/19 128/82       Recent Lab results:  Lab Results   Component Value Date    CHOL 157 04/04/2019   ,   Lab Results   Component Value Date    HDL 56 04/04/2019   ,   Lab Results   Component Value Date    LDLCALC 90 04/04/2019   ,   Lab Results   Component Value Date    TRIG 56 04/04/2019        Lab Results   Component Value Date    GLUCOSE 84 04/04/2019   ,   Lab Results   Component Value Date    HGBA1C 5.9 (H) 04/04/2019         Lab Results   Component Value Date    CREATININE 0.5 (L) 04/04/2019       Lab Results   Component Value Date    TSH 1.03 04/04/2019

## 2019-10-29 ENCOUNTER — OFFICE VISIT (OUTPATIENT)
Dept: OTOLARYNGOLOGY | Facility: CLINIC | Age: 64
End: 2019-10-29
Payer: MEDICARE

## 2019-10-29 VITALS
WEIGHT: 189 LBS | OXYGEN SATURATION: 98 % | BODY MASS INDEX: 34.78 KG/M2 | DIASTOLIC BLOOD PRESSURE: 84 MMHG | HEIGHT: 62 IN | HEART RATE: 55 BPM | SYSTOLIC BLOOD PRESSURE: 132 MMHG

## 2019-10-29 DIAGNOSIS — H61.23 BILATERAL IMPACTED CERUMEN: Primary | ICD-10-CM

## 2019-10-29 DIAGNOSIS — J30.2 SEASONAL ALLERGIC RHINITIS DUE TO FUNGAL SPORES: ICD-10-CM

## 2019-10-29 DIAGNOSIS — R43.8 HYPOSMIA: ICD-10-CM

## 2019-10-29 PROBLEM — Z01.818 PRE-OP EVALUATION: Status: RESOLVED | Noted: 2019-07-29 | Resolved: 2019-10-29

## 2019-10-29 PROBLEM — Z87.440 HISTORY OF UTI: Status: RESOLVED | Noted: 2019-07-29 | Resolved: 2019-10-29

## 2019-10-29 PROBLEM — R07.9 CHEST PAIN: Status: RESOLVED | Noted: 2018-08-23 | Resolved: 2019-10-29

## 2019-10-29 PROBLEM — Z12.39 BREAST CANCER SCREENING: Status: RESOLVED | Noted: 2018-03-14 | Resolved: 2019-10-29

## 2019-10-29 PROBLEM — M54.9 UPPER BACK PAIN: Status: RESOLVED | Noted: 2018-08-23 | Resolved: 2019-10-29

## 2019-10-29 PROBLEM — J22 LOWER RESP. TRACT INFECTION: Status: RESOLVED | Noted: 2018-12-20 | Resolved: 2019-10-29

## 2019-10-29 PROBLEM — R10.11 RIGHT UPPER QUADRANT PAIN: Status: RESOLVED | Noted: 2018-08-23 | Resolved: 2019-10-29

## 2019-10-29 PROBLEM — Z11.1 SCREENING-PULMONARY TB: Status: RESOLVED | Noted: 2018-10-23 | Resolved: 2019-10-29

## 2019-10-29 PROBLEM — Z13.9 ENCOUNTER FOR SCREENING: Status: RESOLVED | Noted: 2018-03-14 | Resolved: 2019-10-29

## 2019-10-29 PROCEDURE — 99213 OFFICE O/P EST LOW 20 MIN: CPT | Mod: 25 | Performed by: OTOLARYNGOLOGY

## 2019-10-29 PROCEDURE — 69210 REMOVE IMPACTED EAR WAX UNI: CPT | Performed by: OTOLARYNGOLOGY

## 2019-10-29 RX ORDER — FLUTICASONE PROPIONATE 50 MCG
1 SPRAY, SUSPENSION (ML) NASAL DAILY PRN
Qty: 1 BOTTLE | Refills: 3 | Status: SHIPPED | OUTPATIENT
Start: 2019-10-29 | End: 2020-12-21 | Stop reason: SDUPTHER

## 2019-10-29 ASSESSMENT — ENCOUNTER SYMPTOMS
SINUS PRESSURE: 0
COUGH: 0
ARTHRALGIAS: 1
HEADACHES: 0
FREQUENCY: 0
MYALGIAS: 0
VOICE CHANGE: 0
WHEEZING: 0
SLEEP DISTURBANCE: 0
POLYPHAGIA: 0
WEAKNESS: 0
DIARRHEA: 0
SHORTNESS OF BREATH: 0
RHINORRHEA: 0
BACK PAIN: 0
DIZZINESS: 0
ADENOPATHY: 0
NERVOUS/ANXIOUS: 0
DYSPHORIC MOOD: 0
VOMITING: 0
CONSTIPATION: 0
NAUSEA: 0
TROUBLE SWALLOWING: 0
FATIGUE: 0
FEVER: 0
PALPITATIONS: 0

## 2019-10-29 NOTE — PATIENT INSTRUCTIONS
1. Dez Med saline sinus rinse, once daily.  2. Flonase, 1 squirt to each nostril daily for 2 months.  3. Lotrisone cream to outer ears at bedtime for 10 nights.  4. Try the olfactory challenges for sense of smell - candles: lemon, brenda, pine, cinnamon, etc. -sniff these 2x per day.  5. Beta Carotene, 1 tablet daily.  6. Alpha Lipoic acid, 500 mg daily.  7. Follow up in 6 months.

## 2019-10-29 NOTE — PROCEDURES
Cerumen removal  Date/Time: 10/29/2019 2:04 PM  Performed by: Tita Man MD  Authorized by: Tita Man MD     Consent:     Consent obtained:  Verbal    Consent given by:  Patient  Post-procedure details:     Patient tolerance of procedure:  Tolerated well, no immediate complications  Comments:      Meticulous cerumen removal was performed bilaterally with curette under binocular magnification. The external auditory canals are patent.  The tympanic membranes are clear, mobile and without retraction or scar.

## 2019-10-29 NOTE — PROGRESS NOTES
[facility]  [address], [address]  [place] [address]  Phone: [phone]  Fax: [phone]      Patient ID: Jennifer Aly                              : 1955    Visit Date: 10/29/2019  Referring Provider: Demarco Moreau DO    Chief Complaint: Cerumen impactions and hyposmia      Jennifer Aly returned to the [place] office today in regard to her history of frequent cerumen formation as well as chronic hyposmia.    Jennifer's ears have been very blocked and somewhat itchy.  She has not been using the Lotrisone cream for her ears nor any Debrox.  She continues with postnasal drainage and hyposmia.  The significant diminution in her sense of smell began more than 4 years ago when she suffered from a severe viral illness.  Ever since, she has been frustrated by the fact that her sense of smell is relatively poor.  Once in a rare a while she will experience a fragrance or a food aroma that is normal.  Jennifer has not had an upper respiratory infection, cough, shortness of breath or wheezing this fall.    Review of Systems   Constitutional: Negative for fatigue and fever.   HENT: Positive for hearing loss and postnasal drip. Negative for congestion, nosebleeds, rhinorrhea, sinus pressure, tinnitus, trouble swallowing and voice change.         Ear fullness.   Eyes: Negative for visual disturbance.   Respiratory: Negative for cough, shortness of breath and wheezing.    Cardiovascular: Negative for chest pain and palpitations.   Gastrointestinal: Negative for constipation, diarrhea, nausea and vomiting.   Endocrine: Negative for polyphagia and polyuria.   Genitourinary: Negative for frequency.   Musculoskeletal: Positive for arthralgias. Negative for back pain, gait problem and myalgias.   Skin: Negative for rash.   Allergic/Immunologic: Negative for environmental allergies.   Neurological: Negative for dizziness, weakness and headaches.   Hematological: Negative for adenopathy.  "  Psychiatric/Behavioral: Negative for dysphoric mood and sleep disturbance. The patient is not nervous/anxious.        Current Medications: Atorvastatin, betamethasone (augmented), calcium carbonate-vitamin d3, diltiazem, Flonase, ketoconazole, and warfarin.    Physical Exam:    Visit Vitals  /84 (BP Location: Left upper arm, Patient Position: Sitting)   Pulse (!) 55   Ht 1.575 m (5' 2\")   Wt 85.7 kg (189 lb)   SpO2 98%   BMI 34.57 kg/m²     General:  Well-developed, well-nourished 64 y.o. who is in no acute distress.  Voice: Normal without hoarseness, breathiness or stridor.  Head/face:  No scars or lesions.  No parotid masses. No presinus tenderness. Seventh cranial nerves intact.  Eyes:  Extraocular movements and gaze alignment normal.  Ears: Auricles normal.  Cerumen removed bilaterally.  Tympanic membranes clear, mobile and without retraction or scar.  Nose:  Dorsum straight.  Septum sinusoidal but without obstruction.  Turbinates purple and edematous but normal in size and orientation.  No pus, polyps or crusts.  Oral Cavity/oropharynx: Normal tongue thrust. Normal gag reflex. No masses, leukoplakia, erythroplakia, ulcers or other abnormalities at the tongue, floor of mouth, buccal mucosa, palate or posterior pharyngeal wall.  Clear postnasal drainage.  Larynx/hypopharynx:  Normal supraglottis.  Vocal folds smooth and white.  Arytenoids edematous and mobile.  Interarytenoid and post-glottic mucosa normal.  No masses at the base of tongue, vallecula or piriform sinuses.  Neck:  No masses, adenopathy, cervical spasm or thyroid enlargement.  Cranial Nerves II through XII: Grossly intact.  Mental status: Awake, alert and oriented ×3.  No anxiety or depression.      Cerumen removal  Date/Time: 10/29/2019 2:04 PM  Performed by: Tita Man MD  Authorized by: Tita Man MD     Consent:     Consent obtained:  Verbal    Consent given by:  Patient  Post-procedure details:     Patient tolerance " of procedure:  Tolerated well, no immediate complications  Comments:      Meticulous cerumen removal was performed bilaterally with curette under binocular magnification. The external auditory canals are patent.  The tympanic membranes are clear, mobile and without retraction or scar.  The skin at the external auditory meati is scaly and red consistent with eczema.      Impression:  1.  Bilateral cerumen impactions, removed with symptom relief.  Mild eczematous changes at the external auditory canals.    2.  Virally induced hyposmia.  The chance for recovery is small but the patient would like to try some strategies to see if she can improve in any way.  3.  Allergic rhinitis.    Recommendations and Plan:  1. Dez Med Saline Sinus Rinse, once daily.  2. Flonase, 1 squirt to each nostril daily for 2 months.  3. Lotrisone cream to outer ears at bedtime for 10 nights.  Thereafter, use once or twice per month.  4. Olfactory challenges to enhance sense of smell - candles: lemon, brenda, pine, cinnamon, etc. -sniff these 2x per day.  The patient and I discussed this technique at  length.  5. Beta Carotene, 1 tablet daily.  6. Alpha Lipoic acid, 500 mg daily.  7. Follow up in 6 months.        Tita Man MD

## 2019-12-23 RX ORDER — WARFARIN 4 MG/1
TABLET ORAL
Qty: 200 TABLET | Refills: 1 | Status: ON HOLD | OUTPATIENT
Start: 2019-12-23 | End: 2020-02-06 | Stop reason: SDUPTHER

## 2019-12-31 ENCOUNTER — OFFICE VISIT (OUTPATIENT)
Dept: PRIMARY CARE | Facility: CLINIC | Age: 64
End: 2019-12-31
Payer: MEDICARE

## 2019-12-31 ENCOUNTER — APPOINTMENT (OUTPATIENT)
Dept: LAB | Facility: CLINIC | Age: 64
End: 2019-12-31
Attending: FAMILY MEDICINE
Payer: MEDICARE

## 2019-12-31 VITALS
SYSTOLIC BLOOD PRESSURE: 130 MMHG | HEART RATE: 58 BPM | OXYGEN SATURATION: 97 % | BODY MASS INDEX: 34.78 KG/M2 | DIASTOLIC BLOOD PRESSURE: 62 MMHG | WEIGHT: 189 LBS | HEIGHT: 62 IN | TEMPERATURE: 98 F

## 2019-12-31 DIAGNOSIS — I48.0 PAF (PAROXYSMAL ATRIAL FIBRILLATION) (CMS/HCC): ICD-10-CM

## 2019-12-31 DIAGNOSIS — I10 ESSENTIAL HYPERTENSION: ICD-10-CM

## 2019-12-31 DIAGNOSIS — M19.90 ARTHRITIS: ICD-10-CM

## 2019-12-31 DIAGNOSIS — I27.82 OTHER CHRONIC PULMONARY EMBOLISM WITHOUT ACUTE COR PULMONALE: Chronic | ICD-10-CM

## 2019-12-31 DIAGNOSIS — E78.5 HYPERLIPIDEMIA, UNSPECIFIED HYPERLIPIDEMIA TYPE: ICD-10-CM

## 2019-12-31 DIAGNOSIS — M54.2 NECK PAIN: ICD-10-CM

## 2019-12-31 DIAGNOSIS — I25.10 CORONARY ARTERY DISEASE INVOLVING NATIVE CORONARY ARTERY OF NATIVE HEART WITHOUT ANGINA PECTORIS: ICD-10-CM

## 2019-12-31 DIAGNOSIS — Z79.01 CHRONIC ANTICOAGULATION: ICD-10-CM

## 2019-12-31 DIAGNOSIS — S16.1XXA NECK STRAIN, INITIAL ENCOUNTER: Primary | ICD-10-CM

## 2019-12-31 LAB
INR PPP: 1.4 INR
PROTHROMBIN TIME: 17.3 SEC (ref 12.2–14.5)

## 2019-12-31 PROCEDURE — 99214 OFFICE O/P EST MOD 30 MIN: CPT | Performed by: FAMILY MEDICINE

## 2019-12-31 PROCEDURE — 36415 COLL VENOUS BLD VENIPUNCTURE: CPT

## 2019-12-31 PROCEDURE — 85610 PROTHROMBIN TIME: CPT

## 2019-12-31 RX ORDER — PREDNISONE 10 MG/1
TABLET ORAL
Qty: 78 TABLET | Refills: 0 | Status: SHIPPED | OUTPATIENT
Start: 2019-12-31 | End: 2020-01-25

## 2019-12-31 RX ORDER — CYCLOBENZAPRINE HCL 5 MG
5 TABLET ORAL 3 TIMES DAILY PRN
Qty: 30 TABLET | Refills: 0 | Status: SHIPPED | OUTPATIENT
Start: 2019-12-31 | End: 2020-02-10 | Stop reason: SDUPTHER

## 2019-12-31 NOTE — PROGRESS NOTES
OFFICE VISIT NOTE ESTABLISHED    GINETTE PAULINO DO        PATIENT NAME:Jennifer Aly  PATIENT : 1955  CHRISTA: 2019    CC:   Chief Complaint   Patient presents with   • Neck Pain     stiff neck 10 days  (on coumadin for PE )         HPI   Jennifer Aly is a 64 y.o. female with afib on ocumadin, hld presenting for sds visit. She is new to me . Pt of dr barnes    #neck stiffness, pain  10 day duration of right neck strain pain   she has tried heat and ice without much relief   she feels the patches menthol are unhelpful   denies associated numbness, tingling, weaknesss of UEs bl, headaches, vomit, nausea  She admits to getting these pains frequently in past    #athritis  - chronic aches per pcp notes    #pulmonary embolism  #HTN  - coumadin 8mg daily except on tues and Thursday she takes 4mg daily.   - diltiazem 120 daily  - has not had inr checks since 6 months ago  - denies bleeding problems    #HLD- lipitor 40 daily        The following have been reviewed and updated as appropriate in this visit: active problem list, medication list, allergies, family history, social history, health maintenance          Past Medical History:   Diagnosis Date   • Arthritis    • DMII (diabetes mellitus, type 2) (CMS/HCC)    • Fistula    • Gout    • HLD (hyperlipidemia)    • HTN (hypertension)    • IFG (impaired fasting glucose)    • Obesity    • Polyarthritis    • Pulmonary emboli (CMS/HCC)     pulmonary emboli   • Torn rotator cuff          Past Surgical History:   Procedure Laterality Date   • APPENDECTOMY     • CARDIAC CATHETERIZATION  2012   • INTRACAPSULAR CATARACT EXTRACTION Bilateral 2019   • IVC FILTER RETRIEVAL     • IVC FILTER RETRIEVAL  2013   • RECTOURETHRAL FISTULA REPAIR  , 2017   • ROTATOR CUFF REPAIR Left 2016   • ALTAF-EN-Y PROCEDURE  2017   • TOTAL ABDOMINAL HYSTERECTOMY     • TOTAL KNEE ARTHROPLASTY Bilateral    • TOTAL KNEE ARTHROPLASTY Right      revision   • TRIGGER FINGER RELEASE Right 2012    right thumb   • ULNAR NERVE REPAIR  2013     Social History     Socioeconomic History   • Marital status:      Spouse name: Not on file   • Number of children: Not on file   • Years of education: Not on file   • Highest education level: Not on file   Occupational History   • Not on file   Social Needs   • Financial resource strain: Not on file   • Food insecurity:     Worry: Not on file     Inability: Not on file   • Transportation needs:     Medical: Not on file     Non-medical: Not on file   Tobacco Use   • Smoking status: Former Smoker     Years: 15.00     Last attempt to quit:      Years since quittin.0   • Smokeless tobacco: Never Used   Substance and Sexual Activity   • Alcohol use: Yes     Comment: occasional wine   • Drug use: No   • Sexual activity: Not Currently   Lifestyle   • Physical activity:     Days per week: Not on file     Minutes per session: Not on file   • Stress: Not on file   Relationships   • Social connections:     Talks on phone: Not on file     Gets together: Not on file     Attends Gnosticist service: Not on file     Active member of club or organization: Not on file     Attends meetings of clubs or organizations: Not on file     Relationship status: Not on file   • Intimate partner violence:     Fear of current or ex partner: Not on file     Emotionally abused: Not on file     Physically abused: Not on file     Forced sexual activity: Not on file   Other Topics Concern   • Not on file   Social History Narrative   • Not on file         Family History   Problem Relation Age of Onset   • Diabetes Biological Mother    • Lung cancer Biological Mother    • Alzheimer's disease Biological Mother    • Diabetes type II Biological Father    • Heart failure Biological Father          Allergies   Allergen Reactions   • Sulfa (Sulfonamide Antibiotics) Hives         Current Outpatient Medications   Medication Sig Dispense Refill   •  atorvastatin (LIPITOR) 40 mg tablet TAKE 1 TABLET BY MOUTH EVERY DAY 90 tablet 0   • betamethasone, augmented, (DIPROLENE-AF) 0.05 % cream APPLY 1 APPLICATION TO EXTREMITIES TWO TIMES A DAY FOR 14 DAYS  3   • calcium carbonate-vitamin D3 500 mg(1,250mg) -200 unit per tablet No SIG Entered     • diltiazem (TIAZAC) 120 mg 24 hr capsule TAKE ONE CAPSULE BY MOUTH EVERY DAY 90 capsule 3   • fluticasone propionate (FLONASE) 50 mcg/actuation nasal spray Administer 1 spray into each nostril daily as needed for rhinitis. 1 Bottle 3   • ketoconazole (NIZORAL) 2 % cream APPLY 1 APPLICATION TO AFFECTED AREA ON ABDOMEN TWO TIMES A DAY FOR 3 WEEKS  2   • warfarin (COUMADIN) 4 mg tablet Take 2 by mouth daily (8 mg daily) except T/TH take one (4mg) 200 tablet 1     No current facility-administered medications for this visit.          ROS  See hpi.   General: Denies fatigue, weight loss or weight gain.    Head: Denies headaches trauma   Eyes: Denies blurry vision, diplopia, decreased vision.  Denies drainage or excessive tearing.  Ears: Denies tinnitis, decreased hearing, ear drainage  Nose: Denies rhinorrhea, epistaxi  Mouth: Denies dry mouth  Neck: Denies lumps, bumps.  Denies dysphagia, odynophagia  +right side neck pain with movement. Restricted cervical neck ROM  Pulmonary:  Denies shortness of breath, difficulty breathing, excessive drooling, change in sputum.   Cardiac: Denies chest pain, flip-flop sensation   GI/Abdomen: Denies vomit, nausea, diarrhea, constipation, hematochezia.  Denies abdominal pain.    Uro/: Denies hematuria, urgency, frequency, burning sensation with urination. Denies discharge.   Skin: Denies lumps, bumps, rash.   MSK:  Denies weakness, numbness, tingling.    Neuro: Denies confusion, vertigo.              VITALS  Vitals:    12/31/19 1124   BP: 130/62   Pulse: (!) 58   Temp: 36.7 °C (98 °F)   SpO2: 97%             Wt Readings from Last 3 Encounters:   12/31/19 85.7 kg (189 lb)   10/29/19 85.7 kg (189  "lb)   07/30/19 88.5 kg (195 lb)       Ht Readings from Last 3 Encounters:   12/31/19 1.575 m (5' 2\")   10/29/19 1.575 m (5' 2\")   07/30/19 1.575 m (5' 2\")       BP Readings from Last 3 Encounters:   12/31/19 130/62   10/29/19 132/84   07/30/19 134/67         PHYSICAL EXAM  General: Appears well, in no distress. Pt is pleasant. Hygiene normal.  Head: NC/AT.   Eyes: Conjunctiva and sclera normal bilaterally. No lid-lag.  PERRL. EOMI.   Ears: No tenderness of external ears bilterally.  Tympanic membrane Intact bilaterally.  No cerumen impaction. No erythema of canals bilaterally.   +cerumen canals bl  Mouth: Oral mucosa pink and moist.No erythema or edema of oral pharynx. No tonsillar exudates or hypertrophy. Uvula midline and without swelling.   Neck: Inspection normal. Trachea midline. supple,   Limited ROM with mild reproduced pain on plapation of SCM, trapezius muscle right neck side. There is no rash. Limited cervical F/E/Sidebending/rotation. No cervical lymphadenopathy.  No enlargement of thyroid.  Cardiac: regular, rate, and rhythm. No murmurs, rubs, or gallops.  Lungs: negative for respiratory distress with normal respiratory effort. Lungs clear to auscultation bilaterally. No wheezes, rales, or rhonchi. No intercostal retractions  Abdomen: +BS. Bowel sounds normal. Abdomen is soft, non-distended. No tenderness. No masses or organomegaly. No guarding.   Skin: warm and dry. No erythema or rash.  Neuro: CNII-XII intact.   Extremities: No peripheral edema or extremity lymphadenopathy  MSK: 5/5 UE and LE b/l. Gait stable and intact. Sitting Balance stable.   Arthritic hands bl   Psych: linear. Normal mood and affect.  No SI/HI. AAOX3.               PERTINENT LABS, IMAGING  Lab Results   Component Value Date    WBC 6.27 04/04/2019    HGB 12.9 04/04/2019    HCT 39.5 04/04/2019    MCV 92.9 04/04/2019     04/04/2019         Chemistry        Component Value Date/Time     04/04/2019 0959    K 4.5 04/04/2019 " 0959     (H) 04/04/2019 0959    CO2 23 04/04/2019 0959    BUN 16 04/04/2019 0959    CREATININE 0.5 (L) 04/04/2019 0959        Component Value Date/Time    CALCIUM 9.8 04/04/2019 0959    CALCIUM 9.8 04/04/2019 0959    ALKPHOS 123 04/04/2019 0959    AST 28 04/04/2019 0959    ALT 33 04/04/2019 0959    BILITOT 0.7 04/04/2019 0959            Lab Results   Component Value Date    CHOL 157 04/04/2019    CHOL 144 03/07/2019    CHOL 150 05/01/2018     Lab Results   Component Value Date    HDL 56 04/04/2019    HDL 59 03/07/2019    HDL 57 05/01/2018     Lab Results   Component Value Date    LDLCALC 90 04/04/2019    LDLCALC 74 03/07/2019    LDLCALC 75 05/01/2018     Lab Results   Component Value Date    TRIG 56 04/04/2019    TRIG 53 03/07/2019    TRIG 89 05/01/2018     No results found for: CHOLHDL    Lab Results   Component Value Date    TSH 1.03 04/04/2019       Lab Results   Component Value Date    HGBA1C 5.9 (H) 04/04/2019       No results found for: HAV, HEPAIGM, HEPBIGM, HEPBCAB, HBEAG, HEPCAB    Lab Results   Component Value Date    MICROALBUR 7.4 04/05/2017       No results found for this or any previous visit (from the past 24 hour(s)).    No results found.            Immunization History   Administered Date(s) Administered   • Influenza Split High Dose Preservative Free IM 10/25/2016, 09/14/2017   • Influenza Vaccine High Dose 65 And Older 09/25/2018   • Influenza, Unspecified 09/25/2018   • PPD Test 10/23/2018   • Pneumococcal Conjugate 13-Valent 10/25/2016, 09/14/2017   • Zoster 10/25/2017   • Zoster Vaccine Recombinant Adjuvanted (Shingrix) 07/08/2019, 10/02/2019         Health Maintenance   Topic Date Due   • Annual Dilated Retinal Exam  06/30/1963   • Diabetic Foot Exam  06/30/1963   • Urine Protein Screening  06/30/1963   • Medicare Annual Wellness Visit  06/30/1971   • DTaP, Tdap, and Td Vaccines (1 - Tdap) 06/30/1972   • DEXA Scan  07/09/2015   • Annual Falls Risk Screening  06/30/2018   • Pneumococcal  (65 years and older) (2 of 2 - PPSV23) 09/14/2018   • Influenza Vaccine (1) 08/01/2019   • Hepatitis C Screening  10/29/2019   • Hemoglobin A1C  04/04/2020   • Mammogram  05/21/2021   • Colonoscopy  10/15/2023   • Zoster Vaccine  Completed   • Meningococcal ACWY  Aged Out   • Varicella Vaccines  Aged Out   • HIB Vaccines  Aged Out   • IPV Vaccines  Aged Out   • HPV Vaccines  Aged Out   • Pneumococcal  Aged Out            Diagnosis Plan   1. Neck pain     2. Coronary artery disease involving native coronary artery of native heart without angina pectoris     3. Essential hypertension     4. PAF (paroxysmal atrial fibrillation) (CMS/[facility])     5. Hyperlipidemia, unspecified hyperlipidemia type     6. Arthritis         Jennifer Aly is a 64 y.o. female with afib on ocumadin, hld presenting for sds visit. She is new to me . Pt of dr barnes    #neck stiffness, pain  #cervical neck strain msk   education and counseling provided   requested trial flexeril.ordered but rec to use sparingly. Try one at bedtime and if no relief then stop.   Ordered prednisone taper as NSAID contraindication. We reviewed that this can affect INR bleeding and to monitor and followup with PCP for INR check.   PT/INR ordered  Trial lidocaine 4% patch monitor effects and trial heat with neck stretches   if not improving, PT rec and return     #athritis  - chronic aches per pcp notes    #pulmonary embolism  #Afib/#HTN  - coumadin  - diltiazem 120 daily  - chronic, stable managed by PCP  - denies afib hx?  - check PT/INR now and with pcp in future    #HLD- lipitor 40 daily  Chronic stable managed by PCP      Return if symptoms worsen or fail to improve, for with PCP.    Rosamaria Zacarias, DO

## 2020-01-02 NOTE — RESULT ENCOUNTER NOTE
Results Reviewed. Abnormal results. Unable to reach patient. Please let her know that  Her inr is not within range . It is subtherapeutic. She  Will need to call her pcp and repeat PT/INR in 1 -2 weeks she is on prednisone now so may change.

## 2020-01-06 ENCOUNTER — TELEPHONE (OUTPATIENT)
Dept: PRIMARY CARE | Facility: CLINIC | Age: 65
End: 2020-01-06

## 2020-01-06 DIAGNOSIS — I48.0 PAF (PAROXYSMAL ATRIAL FIBRILLATION) (CMS/HCC): Primary | ICD-10-CM

## 2020-01-06 NOTE — TELEPHONE ENCOUNTER
----- Message from Rosamaria Zacarias DO sent at 1/2/2020  9:23 AM EST -----  Results Reviewed. Abnormal results. Unable to reach patient. Please let her know that  Her inr is not within range . It is subtherapeutic. She  Will need to call her pcp and repeat PT/INR in 1 -2 weeks she is on prednisone now so may change.

## 2020-01-07 NOTE — TELEPHONE ENCOUNTER
Patient called for results as result of VM left by Anu.  I gave her the results that Dr. Zacarias had in the chart.  She expressed understanding.  She asked that Mannie write a RX for [facility] downstairs for a repeat PT/INR before 1/20/20 and she will get it done on that day.  She is off from her job then.

## 2020-01-07 NOTE — TELEPHONE ENCOUNTER
Also, pt is on a steroid currently, wanted to make a notation for Dr. HOOKS in case something was off again

## 2020-01-13 ENCOUNTER — OFFICE VISIT (OUTPATIENT)
Dept: BARIATRICS | Facility: CLINIC | Age: 65
End: 2020-01-13
Payer: MEDICARE

## 2020-01-13 VITALS
DIASTOLIC BLOOD PRESSURE: 70 MMHG | HEART RATE: 63 BPM | BODY MASS INDEX: 35.28 KG/M2 | TEMPERATURE: 98.1 F | HEIGHT: 62 IN | WEIGHT: 191.7 LBS | SYSTOLIC BLOOD PRESSURE: 118 MMHG | OXYGEN SATURATION: 95 %

## 2020-01-13 DIAGNOSIS — E55.9 VITAMIN D DEFICIENCY: ICD-10-CM

## 2020-01-13 DIAGNOSIS — K91.2 POSTSURGICAL MALABSORPTION: ICD-10-CM

## 2020-01-13 DIAGNOSIS — Z13.21 ENCOUNTER FOR SCREENING FOR NUTRITIONAL DISORDER: ICD-10-CM

## 2020-01-13 DIAGNOSIS — E46 PROTEIN-CALORIE MALNUTRITION, UNSPECIFIED SEVERITY (CMS/HCC): ICD-10-CM

## 2020-01-13 DIAGNOSIS — Z13.21 ENCOUNTER FOR VITAMIN DEFICIENCY SCREENING: ICD-10-CM

## 2020-01-13 DIAGNOSIS — Z13.228 ENCOUNTER FOR SCREENING FOR METABOLIC DISORDER: ICD-10-CM

## 2020-01-13 DIAGNOSIS — Z98.84 STATUS POST BARIATRIC SURGERY: Primary | ICD-10-CM

## 2020-01-13 PROCEDURE — 99212 OFFICE O/P EST SF 10 MIN: CPT | Performed by: NURSE PRACTITIONER

## 2020-01-13 ASSESSMENT — ENCOUNTER SYMPTOMS
NEUROLOGICAL NEGATIVE: 1
PSYCHIATRIC NEGATIVE: 1
GASTROINTESTINAL NEGATIVE: 1
CONSTITUTIONAL NEGATIVE: 1

## 2020-01-13 NOTE — PROGRESS NOTES
Patient ID: Jennifer Aly                              : 1955  MRN: [id number]                                            Visit Date: 2020  Encounter Provider: Renee Ortiz  Referring Provider: No ref. provider found      HPI:  Jennifer Aly is a 64 y.o. female who presents in the office today for their 3 year  follow-up visit.   She is s/p Lap Alexander-en-Y gastric bypass, performed by Dr. Osman on  2017 at [facility].  The patient's postoperative course has not been complicated.  She is tolerating a bariatric diet. Activity is progressing at the recommended level.    She has resolved the following co-morbidities since having bariatric surgery, none.   We have reviewed the patient’s SF-12 form.    Labs reviewed, she was aware to decrease her B12, 2017.     Labs ok, 2017. Her A1c was checked by PCP and was 5.5. She had come off diabetes medications and b/p medications.    Her cholesterol medication was decreased in half.    She saw exercise and had an appt with nutrition next week.   She saw hematology as well and stated everything was ok.    Her labs were OK 2018. She saw exercise and nutrition.  She gained 10 lbs over the holidays. She knew she needed to get back on track.  She was not tracking her food intake, but she was to start.  She reported pain in her knees which was affecting her ability to exercise. She was to try other options.  She was having an occasional glass of wine.    She had lab work 2019. Her A1C was 5.9. Her PTH was slightly elevated with high normal calcium. Her PCP was planning to recheck this in 3-6 months.  She saw the dietician. She was advised to add protein to high fiber CHO.  She saw the exercise physiologist. She was not doing regular exercise.  Her lowest weight was 175 lbs 6 months after surgery. Her goal was 160 lbs.  She was doing protein shakes in the morning for breakfast. She was recommended to have another during the afternoon.   She  had a lot of family stress. One of her brothers , other brother has terminal cancer, her boyfriend has cancer.   I recommended she see Juan Luis Tuttle, the psychologist.   She had back pain which limited her exercise. She felt like she didn't have the time to do water aerobics.  She had not seen anyone for her back pain yet.    She is aware she needs blood work 2020.  She had gained weight because she has been on steroids the last 2 weeks due to neck issues. She will be on an almost 3 week course in total.   She is aware she needs to follow up with dietitian and exercise physiologist.  She has been doing a lower carb/sugar diet.     Past Medical History: She  has a past medical history of Arthritis, DMII (diabetes mellitus, type 2) (CMS/HCC), Fistula, Gout, HLD (hyperlipidemia), HTN (hypertension), IFG (impaired fasting glucose), Obesity, Polyarthritis, Pulmonary emboli (CMS/HCC), and Torn rotator cuff..  Past Surgical History: She  has a past surgical history that includes Rotator cuff repair (Left, 2016); rectourethral fistula repair (, 2017); Total knee arthroplasty (Bilateral, ); Trigger finger release (Right, ); Cardiac catheterization (2012); IVC filter retrieval (); Total knee arthroplasty (Right, ); IVC filter retrieval (2013); Ulnar nerve repair (); Appendectomy (); Total abdominal hysterectomy (); Alexander-en-y procedure (2017); and Intracapsular cataract extraction (Bilateral, 2019)..  Medication: She has a current medication list which includes the following prescription(s): atorvastatin, betamethasone (augmented), calcium carbonate-vitamin d3, cyclobenzaprine, diltiazem, fluticasone propionate, ketoconazole, prednisone, and warfarin..  Allergies: She is allergic to sulfa (sulfonamide antibiotics)..    Nutrition:   Jennifer Durham food preferences include healthy food choices.  She is taking their vitamin supplements  She is taking their protein  "supplements  She is following a proper nutritional diet, high in protein and low in fat, 3 meals a day plus 2 snacks.    Exercise:  Jennifer ACOSTA reports they exercise: walking .  We discussed both her short and long term program goals. We discussed her exercise options.  We discussed the importance of exercise counselor follow-up, achieving a sustained target heart rate, tracking her exercise routine weekly, pursing aerobic exercise at least 30 minutes per day, 5 days per week.    Social History:   We have advised the patient against using tobacco products.  We have additionally advised the Jennifer ACOSTA we do not recommend alcohol consumption following bariatric surgery. The calories from alcohol have no benefit in their daily requirements, and sugars and carbohydrates are high. Patients may experience unexpected responses to alcohol after bariatric surgery and should avoid it if possible.  She has been advised we have psychological services available to discuss concerns regarding alcohol intake an other addictive or impulsive behavior both before and after surgery. If you do consume alcohol, you may experience metabolic and digestive issues..  Social History     Tobacco Use   Smoking Status Former Smoker   • Years: 15.00   • Last attempt to quit:    • Years since quittin.0   Smokeless Tobacco Never Used     Social History     Substance and Sexual Activity   Alcohol Use Yes    Comment: occasional wine       Review of Systems  Review of Systems   Constitutional: Negative.    Gastrointestinal: Negative.    Skin: Negative.    Neurological: Negative.    Psychiatric/Behavioral: Negative.        Vitals:  Her  height is 1.575 m (5' 2\") and weight is 87 kg (191 lb 11.2 oz). Her temperature is 36.7 °C (98.1 °F). Her blood pressure is 118/70 and her pulse is 63. Her oxygen saturation is 95%.   Weight trend:   Wt Readings from Last 5 Encounters:   20 87 kg (191 lb 11.2 oz)   19 85.7 kg (189 lb)   10/29/19 85.7 kg (189 " lb)   07/30/19 88.5 kg (195 lb)   07/29/19 89.4 kg (197 lb)    body mass index is 35.06 kg/m².    Physical Exam:  Physical Exam   Constitutional: She is oriented to person, place, and time. She appears well-developed and well-nourished.   Pulmonary/Chest: Effort normal.   Abdominal: Soft. There is no tenderness.   Musculoskeletal: Normal range of motion.   Neurological: She is alert and oriented to person, place, and time.   Skin: Skin is warm and dry.   Psychiatric: She has a normal mood and affect. Her behavior is normal. Thought content normal.   Vitals reviewed.      Her incisions are well healed and without complications.    Patient Active Problem List   Diagnosis   • Diaphragmatic hernia without obstruction or gangrene   • Morbid obesity due to excess calories (CMS/[facility])   • Obstructive sleep apnea   • Polyosteoarthritis, unspecified   • Pulmonary emboli (CMS/HCC)   • Unspecified asthma, uncomplicated   • Pure hypercholesterolemia   • Acute gout involving toe of left foot   • Toe pain, left   • IFG (impaired fasting glucose)   • Essential hypertension   • Coronary artery disease involving native coronary artery of native heart without angina pectoris   • Iron deficiency anemia secondary to inadequate dietary iron intake   • PAF (paroxysmal atrial fibrillation) (CMS/HCC)   • GERD without esophagitis   • Chronic UTI   • Seasonal allergies   • Bilateral impacted cerumen   • Pulmonary nodule   • History of colonic polyps   • Age-related nuclear cataract of left eye   • HLD (hyperlipidemia)       Assessment/Plan     We have discussed the importance of tracking her food intake and exercise either online, in a mobile application on a smartphone device or on paper.  We have discussed the importance of regular aerobic exercise at least 30 minutes per day, 5 days a week including achieving and measuring their target heart rate.  We discussed avoiding aspirin, NSAID medications, both prescription and over the counter  products, as these medications can cause ulcers and bleeding.  Their use should be only under the direction of the patient's own medical specialist.  The patient has been counseled about this and will comply.  We discussed the importance of taking her bariatric protein and vitamin supplements regularly and the importance of regular follow-up blood work, regular attendance at the support group meetings and regular follow-up with the dietician and exercise counselors to help maintain their success with weight loss and resolution of co-morbidites.  We again discussed the importance of following a healthy diet, reducing alcohol and calorie intake, the risks of transfer of addictions and the need to continue with all of their healthy habits even after they have reached their goal just to maintain their weight loss and prevent weight regain and recurrence of co-morbidities.  We discussed the difference between dieting and exercise and alone and that after bariatric surgery is that the gut hormone physiology and food intake and absorption are changed to reduce hunger and allow regaining of self control that would otherwise be difficult to achieve on their own.    Jennifer ACOSTA is somewhat compliant with a healthy lifestyle, diet and level of exercise needed to maintain their health and weight loss.  She   has been instructed to take  g  of protein, all vitamins as listed by our program (myself, nutritionist and nurses), and up to 64 oz of fluid daily.  They have been instructed on a nutritious and healthy diet and the importance of daily regular exercise.  We have discussed together that the patient will comply with all future aspects of our program including diet, exercise, follow-up, future testing and all recommendations I make to them now and in all upcoming and future appointments.  They understand it is vitally important to comply with this and may compromise their health in the future if they do not do as requested.    Jennifer ACOSTA will need dietician and exercise physiologist follow-up in 1 year .       Orders Placed This Encounter   Procedures   • CBC and differential     Standing Status:   Future     Standing Expiration Date:   1/13/2021   • Comprehensive metabolic panel     Standing Status:   Future     Standing Expiration Date:   1/13/2021   • Ferritin     Standing Status:   Future     Standing Expiration Date:   1/13/2021   • Folate     Standing Status:   Future     Standing Expiration Date:   1/13/2021   • Iron and TIBC     Standing Status:   Future     Standing Expiration Date:   1/13/2021   • Lipid panel     Standing Status:   Future     Standing Expiration Date:   1/13/2021   • Prealbumin     Standing Status:   Future     Standing Expiration Date:   1/13/2021   • PTH, intact     Standing Status:   Future     Standing Expiration Date:   1/13/2021   • TSH 3rd generation     Standing Status:   Future     Standing Expiration Date:   1/13/2021   • Vitamin A     Standing Status:   Future     Standing Expiration Date:   1/13/2021   • Vitamin B1, whole blood ( thiamine)     Standing Status:   Future     Standing Expiration Date:   1/13/2021   • Vitamin B12     Standing Status:   Future     Standing Expiration Date:   1/13/2021   • Vitamin D, 25- hydroxy     Standing Status:   Future     Standing Expiration Date:   1/13/2021   • Hemoglobin A1c     Standing Status:   Future     Standing Expiration Date:   1/13/2021   • Ambulatory referral to Nutrition Services     Standing Status:   Future     Standing Expiration Date:   7/13/2020     Referral Priority:   Routine     Referral Type:   Consultation     Referral Reason:   Specialty Services Required     Requested Specialty:   Nutrition     Number of Visits Requested:   1   • Ambulatory Referral to Cardiac Rehab (Exercise)     Scheduling Instructions:      **EMERGENCY ORDERS**      -  Use ACLS protocols in case of emergency      -  Activate and follow Code Blue guidelines      -   Nitroglycerin GR 1/150 SL.PRN-chest pain      - Follow approved emergency treatment protocols      - Call Emergency Response Team, or 911 if ambulatory, if patient is unstable      - Implement diabetic protocol for monitoring safe blood sugar levels     Order Specific Question:   Enter patient into the Outpatient Cardiac Rehab Service     Answer:   Yes     Order Specific Question:   Nurse will complete initial interview, medical record review and cardiovascular assessment     Answer:   Yes     Order Specific Question:   Perform 6 minute walk test as part of pre and post cardiac rehab to evaluate functional status     Answer:   Yes     Return in about 1 year (around 1/13/2021).    I have spent 10 minutes with this patient and > 50% of the time spent counseling  the patient. (10:17 - 10:29)    CHON Nagy 1/13/2020

## 2020-01-13 NOTE — LETTER
2020     Demarco Moreau DO  [address]  [facility] [address]    Patient: Jennifer Aly  YOB: 1955  Date of Visit: 2020      Dear Dr. Moreau:    Thank you for referring Jennifer Aly to me for evaluation. Below are my notes for this consultation.    If you have questions, please do not hesitate to call me. I look forward to following your patient along with you.         Sincerely,        CHON Nagy        CC: No Recipients  Renee Ortiz CRNP  2020 10:28 AM  Sign at close encounter  Patient ID: Jennifer Aly                              : 1955  MRN: [id number]                                            Visit Date: 2020  Encounter Provider: Renee Ortiz  Referring Provider: No ref. provider found      HPI:  Jennifer Aly is a 64 y.o. female who presents in the office today for their 3 year  follow-up visit.   She is s/p Lap Alexander-en-Y gastric bypass, performed by Dr. Osman on  2017 at [facility].  The patient's postoperative course has not been complicated.  She is tolerating a bariatric diet. Activity is progressing at the recommended level.    She has resolved the following co-morbidities since having bariatric surgery, none.   We have reviewed the patient’s SF-12 form.    Labs reviewed, she was aware to decrease her B12, 2017.     Labs ok, 2017. Her A1c was checked by PCP and was 5.5. She had come off diabetes medications and b/p medications.    Her cholesterol medication was decreased in half.    She saw exercise and had an appt with nutrition next week.   She saw hematology as well and stated everything was ok.    Her labs were OK 2018. She saw exercise and nutrition.  She gained 10 lbs over the holidays. She knew she needed to get back on track.  She was not tracking her food intake, but she was to start.  She reported pain in her knees which was affecting her ability to exercise. She was to  try other options.  She was having an occasional glass of wine.    She had lab work 2019. Her A1C was 5.9. Her PTH was slightly elevated with high normal calcium. Her PCP was planning to recheck this in 3-6 months.  She saw the dietician. She was advised to add protein to high fiber CHO.  She saw the exercise physiologist. She was not doing regular exercise.  Her lowest weight was 175 lbs 6 months after surgery. Her goal was 160 lbs.  She was doing protein shakes in the morning for breakfast. She was recommended to have another during the afternoon.   She had a lot of family stress. One of her brothers , other brother has terminal cancer, her boyfriend has cancer.   I recommended she see Juan Luis Tuttle, the psychologist.   She had back pain which limited her exercise. She felt like she didn't have the time to do water aerobics.  She had not seen anyone for her back pain yet.    She is aware she needs blood work 2020.  She had gained weight because she has been on steroids the last 2 weeks due to neck issues. She will be on an almost 3 week course in total.   She is aware she needs to follow up with dietitian and exercise physiologist.  She has been doing a lower carb/sugar diet.     Past Medical History: She  has a past medical history of Arthritis, DMII (diabetes mellitus, type 2) (CMS/[facility]), Fistula, Gout, HLD (hyperlipidemia), HTN (hypertension), IFG (impaired fasting glucose), Obesity, Polyarthritis, Pulmonary emboli (CMS/[facility]), and Torn rotator cuff..  Past Surgical History: She  has a past surgical history that includes Rotator cuff repair (Left, 2016); rectourethral fistula repair (, 2017); Total knee arthroplasty (Bilateral, ); Trigger finger release (Right, ); Cardiac catheterization (2012); IVC filter retrieval (); Total knee arthroplasty (Right, ); IVC filter retrieval (2013); Ulnar nerve repair (); Appendectomy (); Total abdominal hysterectomy ();  Alexander-en-y procedure (2017); and Intracapsular cataract extraction (Bilateral, 2019)..  Medication: She has a current medication list which includes the following prescription(s): atorvastatin, betamethasone (augmented), calcium carbonate-vitamin d3, cyclobenzaprine, diltiazem, fluticasone propionate, ketoconazole, prednisone, and warfarin..  Allergies: She is allergic to sulfa (sulfonamide antibiotics)..    Nutrition:   Jennifer ACOSTA's food preferences include healthy food choices.  She is taking their vitamin supplements  She is taking their protein supplements  She is following a proper nutritional diet, high in protein and low in fat, 3 meals a day plus 2 snacks.    Exercise:  Jennifer ACOSTA reports they exercise: walking .  We discussed both her short and long term program goals. We discussed her exercise options.  We discussed the importance of exercise counselor follow-up, achieving a sustained target heart rate, tracking her exercise routine weekly, pursing aerobic exercise at least 30 minutes per day, 5 days per week.    Social History:   We have advised the patient against using tobacco products.  We have additionally advised the Jennifer ACOSTA we do not recommend alcohol consumption following bariatric surgery. The calories from alcohol have no benefit in their daily requirements, and sugars and carbohydrates are high. Patients may experience unexpected responses to alcohol after bariatric surgery and should avoid it if possible.  She has been advised we have psychological services available to discuss concerns regarding alcohol intake an other addictive or impulsive behavior both before and after surgery. If you do consume alcohol, you may experience metabolic and digestive issues..  Social History     Tobacco Use   Smoking Status Former Smoker   • Years: 15.00   • Last attempt to quit:    • Years since quittin.0   Smokeless Tobacco Never Used     Social History     Substance and Sexual Activity   Alcohol Use  "Yes    Comment: occasional wine       Review of Systems  Review of Systems   Constitutional: Negative.    Gastrointestinal: Negative.    Skin: Negative.    Neurological: Negative.    Psychiatric/Behavioral: Negative.        Vitals:  Her  height is 1.575 m (5' 2\") and weight is 87 kg (191 lb 11.2 oz). Her temperature is 36.7 °C (98.1 °F). Her blood pressure is 118/70 and her pulse is 63. Her oxygen saturation is 95%.   Weight trend:   Wt Readings from Last 5 Encounters:   01/13/20 87 kg (191 lb 11.2 oz)   12/31/19 85.7 kg (189 lb)   10/29/19 85.7 kg (189 lb)   07/30/19 88.5 kg (195 lb)   07/29/19 89.4 kg (197 lb)    body mass index is 35.06 kg/m².    Physical Exam:  Physical Exam   Constitutional: She is oriented to person, place, and time. She appears well-developed and well-nourished.   Pulmonary/Chest: Effort normal.   Abdominal: Soft. There is no tenderness.   Musculoskeletal: Normal range of motion.   Neurological: She is alert and oriented to person, place, and time.   Skin: Skin is warm and dry.   Psychiatric: She has a normal mood and affect. Her behavior is normal. Thought content normal.   Vitals reviewed.      Her incisions are well healed and without complications.    Patient Active Problem List   Diagnosis   • Diaphragmatic hernia without obstruction or gangrene   • Morbid obesity due to excess calories (CMS/[facility])   • Obstructive sleep apnea   • Polyosteoarthritis, unspecified   • Pulmonary emboli (CMS/[facility])   • Unspecified asthma, uncomplicated   • Pure hypercholesterolemia   • Acute gout involving toe of left foot   • Toe pain, left   • IFG (impaired fasting glucose)   • Essential hypertension   • Coronary artery disease involving native coronary artery of native heart without angina pectoris   • Iron deficiency anemia secondary to inadequate dietary iron intake   • PAF (paroxysmal atrial fibrillation) (CMS/HCC)   • GERD without esophagitis   • Chronic UTI   • Seasonal allergies   • Bilateral impacted " cerumen   • Pulmonary nodule   • History of colonic polyps   • Age-related nuclear cataract of left eye   • HLD (hyperlipidemia)       Assessment/Plan     We have discussed the importance of tracking her food intake and exercise either online, in a mobile application on a smartphone device or on paper.  We have discussed the importance of regular aerobic exercise at least 30 minutes per day, 5 days a week including achieving and measuring their target heart rate.  We discussed avoiding aspirin, NSAID medications, both prescription and over the counter products, as these medications can cause ulcers and bleeding.  Their use should be only under the direction of the patient's own medical specialist.  The patient has been counseled about this and will comply.  We discussed the importance of taking her bariatric protein and vitamin supplements regularly and the importance of regular follow-up blood work, regular attendance at the support group meetings and regular follow-up with the dietician and exercise counselors to help maintain their success with weight loss and resolution of co-morbidites.  We again discussed the importance of following a healthy diet, reducing alcohol and calorie intake, the risks of transfer of addictions and the need to continue with all of their healthy habits even after they have reached their goal just to maintain their weight loss and prevent weight regain and recurrence of co-morbidities.  We discussed the difference between dieting and exercise and alone and that after bariatric surgery is that the gut hormone physiology and food intake and absorption are changed to reduce hunger and allow regaining of self control that would otherwise be difficult to achieve on their own.    Jennifer ACOSTA is somewhat compliant with a healthy lifestyle, diet and level of exercise needed to maintain their health and weight loss.  She   has been instructed to take  g  of protein, all vitamins as listed by  our program (myself, nutritionist and nurses), and up to 64 oz of fluid daily.  They have been instructed on a nutritious and healthy diet and the importance of daily regular exercise.  We have discussed together that the patient will comply with all future aspects of our program including diet, exercise, follow-up, future testing and all recommendations I make to them now and in all upcoming and future appointments.  They understand it is vitally important to comply with this and may compromise their health in the future if they do not do as requested.   Jennifer ACOSTA will need dietician and exercise physiologist follow-up in 1 year .       Orders Placed This Encounter   Procedures   • CBC and differential     Standing Status:   Future     Standing Expiration Date:   1/13/2021   • Comprehensive metabolic panel     Standing Status:   Future     Standing Expiration Date:   1/13/2021   • Ferritin     Standing Status:   Future     Standing Expiration Date:   1/13/2021   • Folate     Standing Status:   Future     Standing Expiration Date:   1/13/2021   • Iron and TIBC     Standing Status:   Future     Standing Expiration Date:   1/13/2021   • Lipid panel     Standing Status:   Future     Standing Expiration Date:   1/13/2021   • Prealbumin     Standing Status:   Future     Standing Expiration Date:   1/13/2021   • PTH, intact     Standing Status:   Future     Standing Expiration Date:   1/13/2021   • TSH 3rd generation     Standing Status:   Future     Standing Expiration Date:   1/13/2021   • Vitamin A     Standing Status:   Future     Standing Expiration Date:   1/13/2021   • Vitamin B1, whole blood ( thiamine)     Standing Status:   Future     Standing Expiration Date:   1/13/2021   • Vitamin B12     Standing Status:   Future     Standing Expiration Date:   1/13/2021   • Vitamin D, 25- hydroxy     Standing Status:   Future     Standing Expiration Date:   1/13/2021   • Hemoglobin A1c     Standing Status:   Future      Standing Expiration Date:   1/13/2021   • Ambulatory referral to Nutrition Services     Standing Status:   Future     Standing Expiration Date:   7/13/2020     Referral Priority:   Routine     Referral Type:   Consultation     Referral Reason:   Specialty Services Required     Requested Specialty:   Nutrition     Number of Visits Requested:   1   • Ambulatory Referral to Cardiac Rehab (Exercise)     Scheduling Instructions:      **EMERGENCY ORDERS**      -  Use ACLS protocols in case of emergency      -  Activate and follow Code Blue guidelines      -  Nitroglycerin GR 1/150 SL.PRN-chest pain      - Follow approved emergency treatment protocols      - Call Emergency Response Team, or 911 if ambulatory, if patient is unstable      - Implement diabetic protocol for monitoring safe blood sugar levels     Order Specific Question:   Enter patient into the Outpatient Cardiac Rehab Service     Answer:   Yes     Order Specific Question:   Nurse will complete initial interview, medical record review and cardiovascular assessment     Answer:   Yes     Order Specific Question:   Perform 6 minute walk test as part of pre and post cardiac rehab to evaluate functional status     Answer:   Yes     Return in about 1 year (around 1/13/2021).    I have spent 10 minutes with this patient and > 50% of the time spent counseling  the patient. (10:17 - 10:29)    CHON Nagy 1/13/2020

## 2020-01-20 ENCOUNTER — APPOINTMENT (OUTPATIENT)
Dept: LAB | Facility: CLINIC | Age: 65
End: 2020-01-20
Attending: NURSE PRACTITIONER
Payer: MEDICARE

## 2020-01-20 DIAGNOSIS — E46 PROTEIN-CALORIE MALNUTRITION, UNSPECIFIED SEVERITY (CMS/HCC): ICD-10-CM

## 2020-01-20 DIAGNOSIS — E55.9 VITAMIN D DEFICIENCY: ICD-10-CM

## 2020-01-20 DIAGNOSIS — Z13.21 ENCOUNTER FOR SCREENING FOR NUTRITIONAL DISORDER: ICD-10-CM

## 2020-01-20 DIAGNOSIS — Z98.84 STATUS POST BARIATRIC SURGERY: ICD-10-CM

## 2020-01-20 DIAGNOSIS — I27.82 CHRONIC SADDLE PULMONARY EMBOLISM WITHOUT ACUTE COR PULMONALE (CMS/HCC): ICD-10-CM

## 2020-01-20 DIAGNOSIS — I27.82 OTHER CHRONIC PULMONARY EMBOLISM WITHOUT ACUTE COR PULMONALE: Chronic | ICD-10-CM

## 2020-01-20 DIAGNOSIS — Z13.21 ENCOUNTER FOR VITAMIN DEFICIENCY SCREENING: ICD-10-CM

## 2020-01-20 DIAGNOSIS — E11.618 TYPE 2 DIABETES MELLITUS WITH OTHER DIABETIC ARTHROPATHY, WITHOUT LONG-TERM CURRENT USE OF INSULIN: ICD-10-CM

## 2020-01-20 DIAGNOSIS — I10 ESSENTIAL HYPERTENSION: ICD-10-CM

## 2020-01-20 DIAGNOSIS — Z13.228 ENCOUNTER FOR SCREENING FOR METABOLIC DISORDER: ICD-10-CM

## 2020-01-20 DIAGNOSIS — K91.2 POSTSURGICAL MALABSORPTION: ICD-10-CM

## 2020-01-20 DIAGNOSIS — I26.92 CHRONIC SADDLE PULMONARY EMBOLISM WITHOUT ACUTE COR PULMONALE (CMS/HCC): ICD-10-CM

## 2020-01-20 LAB
25(OH)D3 SERPL-MCNC: 25 NG/ML (ref 30–100)
ALBUMIN SERPL-MCNC: 3.6 G/DL (ref 3.4–5)
ALP SERPL-CCNC: 88 IU/L (ref 35–126)
ALT SERPL-CCNC: 37 IU/L (ref 11–54)
ANION GAP SERPL CALC-SCNC: 7 MEQ/L (ref 3–15)
AST SERPL-CCNC: 26 IU/L (ref 15–41)
BASOPHILS # BLD: 0.05 K/UL (ref 0.01–0.1)
BASOPHILS NFR BLD: 0.8 %
BILIRUB SERPL-MCNC: 0.9 MG/DL (ref 0.3–1.2)
BUN SERPL-MCNC: 15 MG/DL (ref 8–20)
CALCIUM SERPL-MCNC: 9.1 MG/DL (ref 8.9–10.3)
CALCIUM SERPL-MCNC: 9.4 MG/DL (ref 8.9–10.3)
CHLORIDE SERPL-SCNC: 109 MEQ/L (ref 98–109)
CHOLEST SERPL-MCNC: 175 MG/DL
CO2 SERPL-SCNC: 25 MEQ/L (ref 22–32)
CREAT SERPL-MCNC: 0.6 MG/DL
DIFFERENTIAL METHOD BLD: NORMAL
EOSINOPHIL # BLD: 0.14 K/UL (ref 0.04–0.36)
EOSINOPHIL NFR BLD: 2.1 %
ERYTHROCYTE [DISTWIDTH] IN BLOOD BY AUTOMATED COUNT: 15.1 % (ref 11.7–14.4)
EST. AVERAGE GLUCOSE BLD GHB EST-MCNC: 134 MG/DL
FERRITIN SERPL-MCNC: 42 NG/ML (ref 11–250)
FOLATE SERPL-MCNC: >20 NG/ML
GFR SERPL CREATININE-BSD FRML MDRD: >60 ML/MIN/1.73M*2
GLUCOSE SERPL-MCNC: 103 MG/DL (ref 70–99)
HBA1C MFR BLD HPLC: 6.3 %
HCT VFR BLDCO AUTO: 38.8 %
HDLC SERPL-MCNC: 69 MG/DL
HDLC SERPL: 2.5 {RATIO}
HGB BLD-MCNC: 12.4 G/DL (ref 11.8–15.7)
IMM GRANULOCYTES # BLD AUTO: 0.03 K/UL (ref 0–0.08)
IMM GRANULOCYTES NFR BLD AUTO: 0.5 %
INR PPP: 2.8 INR
LDLC SERPL CALC-MCNC: 90 MG/DL
LYMPHOCYTES # BLD: 1.4 K/UL (ref 1.2–3.5)
LYMPHOCYTES NFR BLD: 21 %
MCH RBC QN AUTO: 30.5 PG (ref 28–33.2)
MCHC RBC AUTO-ENTMCNC: 32 G/DL (ref 32.2–35.5)
MCV RBC AUTO: 95.6 FL (ref 83–98)
MONOCYTES # BLD: 0.51 K/UL (ref 0.28–0.8)
MONOCYTES NFR BLD: 7.7 %
NEUTROPHILS # BLD: 4.53 K/UL (ref 1.7–7)
NEUTS SEG NFR BLD: 67.9 %
NONHDLC SERPL-MCNC: 106 MG/DL
NRBC BLD-RTO: 0 %
PDW BLD AUTO: 10.7 FL (ref 9.4–12.3)
PLATELET # BLD AUTO: 218 K/UL
POTASSIUM SERPL-SCNC: 4.4 MEQ/L (ref 3.6–5.1)
PREALB SERPL-MCNC: 24.6 MG/DL (ref 18–38)
PROT SERPL-MCNC: 5.9 G/DL (ref 6–8.2)
PROTHROMBIN TIME: 29.2 SEC (ref 12.2–14.5)
PTH-INTACT SERPL-MCNC: 73.8 PG/ML (ref 12–88)
RBC # BLD AUTO: 4.06 M/UL (ref 3.93–5.22)
SODIUM SERPL-SCNC: 141 MEQ/L (ref 136–144)
TRIGL SERPL-MCNC: 81 MG/DL (ref 30–149)
TSH SERPL DL<=0.05 MIU/L-ACNC: 1.67 MIU/L (ref 0.34–5.6)
VIT B12 SERPL-MCNC: 513 PG/ML (ref 180–914)
WBC # BLD AUTO: 6.66 K/UL

## 2020-01-20 PROCEDURE — 85610 PROTHROMBIN TIME: CPT

## 2020-01-20 PROCEDURE — 82746 ASSAY OF FOLIC ACID SERUM: CPT

## 2020-01-20 PROCEDURE — 36415 COLL VENOUS BLD VENIPUNCTURE: CPT

## 2020-01-20 PROCEDURE — 80053 COMPREHEN METABOLIC PANEL: CPT

## 2020-01-20 PROCEDURE — 83036 HEMOGLOBIN GLYCOSYLATED A1C: CPT

## 2020-01-20 PROCEDURE — 82728 ASSAY OF FERRITIN: CPT

## 2020-01-20 PROCEDURE — 82607 VITAMIN B-12: CPT

## 2020-01-20 PROCEDURE — 80061 LIPID PANEL: CPT

## 2020-01-20 PROCEDURE — 84590 ASSAY OF VITAMIN A: CPT

## 2020-01-20 PROCEDURE — 83970 ASSAY OF PARATHORMONE: CPT

## 2020-01-20 PROCEDURE — 85027 COMPLETE CBC AUTOMATED: CPT

## 2020-01-20 PROCEDURE — 84443 ASSAY THYROID STIM HORMONE: CPT

## 2020-01-20 PROCEDURE — 83540 ASSAY OF IRON: CPT

## 2020-01-20 PROCEDURE — 84134 ASSAY OF PREALBUMIN: CPT

## 2020-01-20 PROCEDURE — 82306 VITAMIN D 25 HYDROXY: CPT

## 2020-01-20 PROCEDURE — 84425 ASSAY OF VITAMIN B-1: CPT

## 2020-01-20 RX ORDER — ATORVASTATIN CALCIUM 40 MG/1
TABLET, FILM COATED ORAL
Qty: 90 TABLET | Refills: 0 | Status: SHIPPED | OUTPATIENT
Start: 2020-01-20 | End: 2020-04-17

## 2020-01-21 LAB
IRON SATN MFR SERPL: 38 % (ref 15–45)
IRON SERPL-MCNC: 130 UG/DL (ref 35–150)
TIBC SERPL-MCNC: 344 UG/DL (ref 270–460)
UIBC SERPL-MCNC: 214 UG/DL (ref 180–360)

## 2020-01-23 LAB — VIT A SERPL-MCNC: 40 MCG/DL (ref 38–98)

## 2020-01-25 ENCOUNTER — HOSPITAL ENCOUNTER (INPATIENT)
Facility: HOSPITAL | Age: 65
LOS: 12 days | Discharge: HOME | DRG: 378 | End: 2020-02-06
Attending: EMERGENCY MEDICINE | Admitting: HOSPITALIST
Payer: MEDICARE

## 2020-01-25 DIAGNOSIS — K92.2 GASTROINTESTINAL HEMORRHAGE, UNSPECIFIED GASTROINTESTINAL HEMORRHAGE TYPE: ICD-10-CM

## 2020-01-25 DIAGNOSIS — K92.1 MELENA: ICD-10-CM

## 2020-01-25 DIAGNOSIS — K92.2 LOWER GI BLEED: Primary | ICD-10-CM

## 2020-01-25 DIAGNOSIS — D64.9 ANEMIA, UNSPECIFIED TYPE: ICD-10-CM

## 2020-01-25 PROBLEM — I48.0 PAROXYSMAL ATRIAL FIBRILLATION (CMS/HCC): Status: ACTIVE | Noted: 2018-04-30

## 2020-01-25 PROBLEM — Z86.711 HISTORY OF PULMONARY EMBOLUS (PE): Status: ACTIVE | Noted: 2020-01-25

## 2020-01-25 LAB
ABO + RH BLD: NORMAL
ALBUMIN SERPL-MCNC: 3.1 G/DL (ref 3.4–5)
ALP SERPL-CCNC: 62 IU/L (ref 35–126)
ALT SERPL-CCNC: 29 IU/L (ref 11–54)
ANION GAP SERPL CALC-SCNC: 5 MEQ/L (ref 3–15)
APTT PPP: 40 SEC (ref 23–35)
AST SERPL-CCNC: 24 IU/L (ref 15–41)
BASOPHILS # BLD: 0 K/UL (ref 0.01–0.1)
BASOPHILS NFR BLD: 0 %
BILIRUB DIRECT SERPL-MCNC: 0.1 MG/DL
BILIRUB SERPL-MCNC: 0.3 MG/DL (ref 0.3–1.2)
BLD GP AB SCN SERPL QL: NEGATIVE
BUN SERPL-MCNC: 26 MG/DL (ref 8–20)
CALCIUM SERPL-MCNC: 8.5 MG/DL (ref 8.9–10.3)
CHLORIDE SERPL-SCNC: 112 MEQ/L (ref 98–109)
CO2 SERPL-SCNC: 22 MEQ/L (ref 22–32)
CREAT SERPL-MCNC: 0.5 MG/DL
D AG BLD QL: POSITIVE
DIFFERENTIAL METHOD BLD: ABNORMAL
EOSINOPHIL # BLD: 0.15 K/UL (ref 0.04–0.36)
EOSINOPHIL NFR BLD: 2 %
ERYTHROCYTE [DISTWIDTH] IN BLOOD BY AUTOMATED COUNT: 14.9 % (ref 11.7–14.4)
GFR SERPL CREATININE-BSD FRML MDRD: >60 ML/MIN/1.73M*2
GLUCOSE SERPL-MCNC: 136 MG/DL (ref 70–99)
HCT VFR BLDCO AUTO: 20.7 %
HGB BLD-MCNC: 6.8 G/DL (ref 11.8–15.7)
HYPOCHROMIA BLD QL SMEAR: ABNORMAL
INR PPP: 3.8 INR
LABORATORY COMMENT REPORT: NORMAL
LACTATE SERPL-SCNC: 0.9 MMOL/L (ref 0.4–2)
LIPASE SERPL-CCNC: 25 U/L (ref 20–51)
LYMPHOCYTES # BLD: 1.16 K/UL (ref 1.2–3.5)
LYMPHOCYTES NFR BLD: 16 %
MCH RBC QN AUTO: 31.5 PG (ref 28–33.2)
MCHC RBC AUTO-ENTMCNC: 32.9 G/DL (ref 32.2–35.5)
MCV RBC AUTO: 95.8 FL (ref 83–98)
MONOCYTES # BLD: 0.29 K/UL (ref 0.28–0.8)
MONOCYTES NFR BLD: 4 %
NEUTS BAND # BLD: 5.66 K/UL (ref 1.7–7)
NEUTS SEG NFR BLD: 78 %
PDW BLD AUTO: 10.3 FL (ref 9.4–12.3)
PLAT MORPH BLD: NORMAL
PLATELET # BLD AUTO: 177 K/UL
PLATELET # BLD EST: ABNORMAL 10*3/UL
POLYCHROMASIA BLD QL SMEAR: ABNORMAL
POTASSIUM SERPL-SCNC: 3.9 MEQ/L (ref 3.6–5.1)
PROT SERPL-MCNC: 5.4 G/DL (ref 6–8.2)
PROTHROMBIN TIME: 36.6 SEC (ref 12.2–14.5)
RBC # BLD AUTO: 2.16 M/UL (ref 3.93–5.22)
SODIUM SERPL-SCNC: 139 MEQ/L (ref 136–144)
TROPONIN I SERPL-MCNC: <0.03 NG/ML
WBC # BLD AUTO: 7.25 K/UL

## 2020-01-25 PROCEDURE — 63600000 HC DRUGS/DETAIL CODE: Performed by: EMERGENCY MEDICINE

## 2020-01-25 PROCEDURE — 63700000 HC SELF-ADMINISTRABLE DRUG: Performed by: STUDENT IN AN ORGANIZED HEALTH CARE EDUCATION/TRAINING PROGRAM

## 2020-01-25 PROCEDURE — 93005 ELECTROCARDIOGRAM TRACING: CPT

## 2020-01-25 PROCEDURE — 25800000 HC PHARMACY IV SOLUTIONS: Performed by: STUDENT IN AN ORGANIZED HEALTH CARE EDUCATION/TRAINING PROGRAM

## 2020-01-25 PROCEDURE — 86901 BLOOD TYPING SEROLOGIC RH(D): CPT

## 2020-01-25 PROCEDURE — 99223 1ST HOSP IP/OBS HIGH 75: CPT | Performed by: HOSPITALIST

## 2020-01-25 PROCEDURE — 85730 THROMBOPLASTIN TIME PARTIAL: CPT | Performed by: EMERGENCY MEDICINE

## 2020-01-25 PROCEDURE — 96374 THER/PROPH/DIAG INJ IV PUSH: CPT

## 2020-01-25 PROCEDURE — P9059 PLASMA, FRZ BETWEEN 8-24HOUR: HCPCS

## 2020-01-25 PROCEDURE — 85610 PROTHROMBIN TIME: CPT | Performed by: EMERGENCY MEDICINE

## 2020-01-25 PROCEDURE — 36415 COLL VENOUS BLD VENIPUNCTURE: CPT | Performed by: EMERGENCY MEDICINE

## 2020-01-25 PROCEDURE — P9016 RBC LEUKOCYTES REDUCED: HCPCS

## 2020-01-25 PROCEDURE — 82248 BILIRUBIN DIRECT: CPT | Performed by: EMERGENCY MEDICINE

## 2020-01-25 PROCEDURE — 36430 TRANSFUSION BLD/BLD COMPNT: CPT

## 2020-01-25 PROCEDURE — 84484 ASSAY OF TROPONIN QUANT: CPT | Performed by: EMERGENCY MEDICINE

## 2020-01-25 PROCEDURE — 25800000 HC PHARMACY IV SOLUTIONS: Performed by: EMERGENCY MEDICINE

## 2020-01-25 PROCEDURE — 25000000 HC PHARMACY GENERAL: Performed by: STUDENT IN AN ORGANIZED HEALTH CARE EDUCATION/TRAINING PROGRAM

## 2020-01-25 PROCEDURE — 21400000 HC ROOM AND CARE CCU/INTERMEDIATE

## 2020-01-25 PROCEDURE — 25000000 HC PHARMACY GENERAL: Performed by: EMERGENCY MEDICINE

## 2020-01-25 PROCEDURE — 86920 COMPATIBILITY TEST SPIN: CPT

## 2020-01-25 PROCEDURE — 93005 ELECTROCARDIOGRAM TRACING: CPT | Performed by: EMERGENCY MEDICINE

## 2020-01-25 PROCEDURE — 85025 COMPLETE CBC W/AUTO DIFF WBC: CPT | Performed by: EMERGENCY MEDICINE

## 2020-01-25 PROCEDURE — 83605 ASSAY OF LACTIC ACID: CPT | Performed by: EMERGENCY MEDICINE

## 2020-01-25 PROCEDURE — 83690 ASSAY OF LIPASE: CPT | Performed by: EMERGENCY MEDICINE

## 2020-01-25 PROCEDURE — 99285 EMERGENCY DEPT VISIT HI MDM: CPT | Mod: 25

## 2020-01-25 RX ORDER — IBUPROFEN 200 MG
16-32 TABLET ORAL AS NEEDED
Status: DISCONTINUED | OUTPATIENT
Start: 2020-01-25 | End: 2020-02-06 | Stop reason: HOSPADM

## 2020-01-25 RX ORDER — DEXTROSE 40 %
15-30 GEL (GRAM) ORAL AS NEEDED
Status: DISCONTINUED | OUTPATIENT
Start: 2020-01-25 | End: 2020-02-06 | Stop reason: HOSPADM

## 2020-01-25 RX ORDER — SODIUM CHLORIDE 9 MG/ML
5 INJECTION, SOLUTION INTRAVENOUS AS NEEDED
Status: ACTIVE | OUTPATIENT
Start: 2020-01-25 | End: 2020-01-26

## 2020-01-25 RX ORDER — ATORVASTATIN CALCIUM 40 MG/1
40 TABLET, FILM COATED ORAL
Status: DISCONTINUED | OUTPATIENT
Start: 2020-01-25 | End: 2020-02-06 | Stop reason: HOSPADM

## 2020-01-25 RX ORDER — ONDANSETRON HYDROCHLORIDE 2 MG/ML
4 INJECTION, SOLUTION INTRAVENOUS ONCE
Status: COMPLETED | OUTPATIENT
Start: 2020-01-25 | End: 2020-01-25

## 2020-01-25 RX ORDER — DEXTROSE 50 % IN WATER (D50W) INTRAVENOUS SYRINGE
25 AS NEEDED
Status: DISCONTINUED | OUTPATIENT
Start: 2020-01-25 | End: 2020-02-06 | Stop reason: HOSPADM

## 2020-01-25 RX ORDER — CLOBETASOL PROPIONATE 0.5 MG/G
CREAM TOPICAL
COMMUNITY
Start: 2020-01-15

## 2020-01-25 RX ORDER — ACETAMINOPHEN 325 MG/1
650 TABLET ORAL ONCE AS NEEDED
Status: COMPLETED | OUTPATIENT
Start: 2020-01-25 | End: 2020-01-25

## 2020-01-25 RX ORDER — PANTOPRAZOLE SODIUM 40 MG/10ML
40 INJECTION, POWDER, LYOPHILIZED, FOR SOLUTION INTRAVENOUS ONCE
Status: COMPLETED | OUTPATIENT
Start: 2020-01-25 | End: 2020-01-25

## 2020-01-25 RX ADMIN — ACETAMINOPHEN 650 MG: 325 TABLET, FILM COATED ORAL at 22:45

## 2020-01-25 RX ADMIN — SODIUM CHLORIDE 8 MG/HR: 0.9 INJECTION, SOLUTION INTRAVENOUS at 19:51

## 2020-01-25 RX ADMIN — ONDANSETRON HYDROCHLORIDE 4 MG: 2 SOLUTION INTRAMUSCULAR; INTRAVENOUS at 16:30

## 2020-01-25 RX ADMIN — ATORVASTATIN CALCIUM 40 MG: 40 TABLET, FILM COATED ORAL at 22:45

## 2020-01-25 RX ADMIN — PHYTONADIONE 2 MG: 10 INJECTION, EMULSION INTRAMUSCULAR; INTRAVENOUS; SUBCUTANEOUS at 19:49

## 2020-01-25 RX ADMIN — SODIUM CHLORIDE 1000 ML: 900 INJECTION, SOLUTION INTRAVENOUS at 15:49

## 2020-01-25 RX ADMIN — PANTOPRAZOLE SODIUM 40 MG: 40 INJECTION, POWDER, FOR SOLUTION INTRAVENOUS at 16:30

## 2020-01-25 ASSESSMENT — COGNITIVE AND FUNCTIONAL STATUS - GENERAL
MOVING TO AND FROM BED TO CHAIR: 4 - NONE
CLIMB 3 TO 5 STEPS WITH RAILING: 4 - NONE
DRESSING REGULAR LOWER BODY CLOTHING: 4 - NONE
WALKING IN HOSPITAL ROOM: 4 - NONE
EATING MEALS: 4 - NONE
TOILETING: 4 - NONE
DRESSING REGULAR UPPER BODY CLOTHING: 4 - NONE
HELP NEEDED FOR PERSONAL GROOMING: 4 - NONE
HELP NEEDED FOR BATHING: 4 - NONE
STANDING UP FROM CHAIR USING ARMS: 4 - NONE

## 2020-01-25 ASSESSMENT — ENCOUNTER SYMPTOMS
NAUSEA: 1
DIZZINESS: 1
BLOOD IN STOOL: 1
VOMITING: 0
CARDIOVASCULAR NEGATIVE: 1
SYNCOPE: 0
RESPIRATORY NEGATIVE: 1

## 2020-01-25 NOTE — ASSESSMENT & PLAN NOTE
Concern for UGI bleed at anastomoses site given bariatric surgery hx.  -S/p EGD on 1/27 which revealed no source of bleeding  -S/p video capsule study on 1/28 with no evidence of active bleeding    - Regular diet   - PPI dialy  - Restarted anticoagulation without signs of bleeding

## 2020-01-25 NOTE — ED PROVIDER NOTES
HPI     Chief Complaint   Patient presents with   • Syncope       64-year-old is been progressively getting weaker over the last couple of days.  A little bit short of breath when she walks and then she started getting very lightheaded and feel like getting she is going to pass out.  She noticed some black tarry stool over the last couple of days.  She was not sure whether was Pepto-Bismol or not.  She is concerned she is having some GI bleeding.  She said that it looked a little bit red last time she moves her bowels.  No abdominal pain.  She is had multiple abdominal surgeries and rectal surgeries as well.  Of note she is on Coumadin for multiple pulmonary emboli      Dizziness   Quality:  Lightheadedness  Severity:  Moderate  Onset quality:  Gradual  Duration:  4 days  Timing:  Constant  Progression:  Worsening  Context: physical activity    Relieved by:  Nothing  Worsened by:  Nothing  Associated symptoms: blood in stool and nausea    Associated symptoms: no syncope and no vomiting         Patient History     Past Medical History:   Diagnosis Date   • Arthritis    • DMII (diabetes mellitus, type 2) (CMS/HCC)    • Fistula    • Gout    • HLD (hyperlipidemia)    • HTN (hypertension)    • IFG (impaired fasting glucose)    • Obesity    • Polyarthritis    • Pulmonary emboli (CMS/HCC)     pulmonary emboli   • Torn rotator cuff        Past Surgical History:   Procedure Laterality Date   • APPENDECTOMY  2008   • CARDIAC CATHETERIZATION  11/2012   • INTRACAPSULAR CATARACT EXTRACTION Bilateral 08/2019   • IVC FILTER RETRIEVAL  2012   • IVC FILTER RETRIEVAL  02/2013   • RECTOURETHRAL FISTULA REPAIR  2008, 5/2017   • ROTATOR CUFF REPAIR Left 01/2016   • ALTAF-EN-Y PROCEDURE  02/07/2017   • TOTAL ABDOMINAL HYSTERECTOMY  1987   • TOTAL KNEE ARTHROPLASTY Bilateral 2010   • TOTAL KNEE ARTHROPLASTY Right 2012    revision   • TRIGGER FINGER RELEASE Right 2012    right thumb   • ULNAR NERVE REPAIR  2013       Family History  "  Problem Relation Age of Onset   • Diabetes Biological Mother    • Lung cancer Biological Mother    • Alzheimer's disease Biological Mother    • Diabetes type II Biological Father    • Heart failure Biological Father        Social History     Tobacco Use   • Smoking status: Former Smoker     Years: 15.00     Last attempt to quit:      Years since quittin.0   • Smokeless tobacco: Never Used   Substance Use Topics   • Alcohol use: Yes     Comment: occasional wine   • Drug use: No       Systems Reviewed from Nursing Triage:          Review of Systems     Review of Systems   Respiratory: Negative.    Cardiovascular: Negative.  Negative for syncope.   Gastrointestinal: Positive for blood in stool and nausea. Negative for vomiting.   Genitourinary: Negative.  Negative for vaginal bleeding, vaginal discharge and vaginal pain.   Neurological: Positive for dizziness.   All other systems reviewed and are negative.       Physical Exam     ED Triage Vitals [20 1429]   Temp Heart Rate Resp BP SpO2   37.1 °C (98.8 °F) 97 16 (!) 155/68 100 %      Temp Source Heart Rate Source Patient Position BP Location FiO2 (%) (Set)   Oral -- Sitting Left upper arm --                     Patient Vitals for the past 24 hrs:   BP Temp Temp src Pulse Resp SpO2 Height Weight   20 1429 (!) 155/68 37.1 °C (98.8 °F) Oral 97 16 100 % 1.575 m (5' 2\") 86.2 kg (190 lb)                                       Physical Exam   Constitutional: She is oriented to person, place, and time. She appears well-developed and well-nourished.   HENT:   Head: Normocephalic and atraumatic.   Eyes: Pupils are equal, round, and reactive to light. EOM are normal.   Neck: Normal range of motion. Neck supple.   Cardiovascular: Normal rate, regular rhythm and normal heart sounds.   Pulmonary/Chest: Effort normal.   Abdominal: Soft. Bowel sounds are normal. She exhibits no distension. There is no tenderness. There is no guarding.   Genitourinary: Rectal exam " shows guaiac positive stool.   Musculoskeletal: Normal range of motion.   Neurological: She is alert and oriented to person, place, and time.   Skin: Skin is warm and dry.   Nursing note and vitals reviewed.           Critical Care  Performed by: Jose Weinberg DO  Authorized by: Jose Weinberg DO     Critical care provider statement:     Critical care time (minutes):  45    Critical care time was exclusive of:  Separately billable procedures and treating other patients    Critical care was necessary to treat or prevent imminent or life-threatening deterioration of the following conditions:  Circulatory failure    Critical care was time spent personally by me on the following activities:  Ordering and performing treatments and interventions, ordering and review of laboratory studies, ordering and review of radiographic studies, re-evaluation of patient's condition, obtaining history from patient or surrogate, examination of patient, discussions with primary provider and development of treatment plan with patient or surrogate        ED Course & MDM     Labs Reviewed   BASIC METABOLIC PANEL   CBC AND DIFF   TROPONIN I   PROTIME-INR   TYPE AND SCREEN   PTT   LACTATE, VENOUS   HEPATIC FUNCTION PANEL   LIPASE       ECG 12 lead                     MDM         ED Course as of Jan 25 1643   Sat Jan 25, 2020   1632 Hg is 6.8. Will tranfuse and admit    [KD]   1640 with medicine will get GI involved.  I will transfuse her.  Patient consented.    [KD]      ED Course User Index  [KD] Jose Weinberg DO

## 2020-01-25 NOTE — ED PROVIDER NOTES
HPI     Chief Complaint   Patient presents with   • Syncope       64-year-old is been progressively getting weaker over the last couple of days.  A little bit short of breath when she walks and then she started getting very lightheaded and feel like getting she is going to pass out.  She noticed some black tarry stool over the last couple of days.  She was not sure whether was Pepto-Bismol or not.  She is concerned she is having some GI bleeding.  She said that it looked a little bit red last time she moves her bowels.  No abdominal pain.  She is had multiple abdominal surgeries and rectal surgeries as well.  Of note she is on Coumadin for multiple pulmonary emboli      Dizziness   Quality:  Lightheadedness  Severity:  Moderate  Onset quality:  Gradual  Duration:  4 days  Timing:  Constant  Progression:  Worsening  Context: physical activity    Relieved by:  Nothing  Worsened by:  Nothing  Associated symptoms: blood in stool and nausea    Associated symptoms: no syncope and no vomiting         Patient History     Past Medical History:   Diagnosis Date   • Arthritis    • DMII (diabetes mellitus, type 2) (CMS/HCC)    • Fistula    • Gout    • HLD (hyperlipidemia)    • HTN (hypertension)    • IFG (impaired fasting glucose)    • Obesity    • Polyarthritis    • Pulmonary emboli (CMS/HCC)     pulmonary emboli   • Torn rotator cuff        Past Surgical History:   Procedure Laterality Date   • APPENDECTOMY  2008   • CARDIAC CATHETERIZATION  11/2012   • INTRACAPSULAR CATARACT EXTRACTION Bilateral 08/2019   • IVC FILTER RETRIEVAL  2012   • IVC FILTER RETRIEVAL  02/2013   • RECTOURETHRAL FISTULA REPAIR  2008, 5/2017   • ROTATOR CUFF REPAIR Left 01/2016   • ALTAF-EN-Y PROCEDURE  02/07/2017   • TOTAL ABDOMINAL HYSTERECTOMY  1987   • TOTAL KNEE ARTHROPLASTY Bilateral 2010   • TOTAL KNEE ARTHROPLASTY Right 2012    revision   • TRIGGER FINGER RELEASE Right 2012    right thumb   • ULNAR NERVE REPAIR  2013       Family History    Problem Relation Age of Onset   • Diabetes Biological Mother    • Lung cancer Biological Mother    • Alzheimer's disease Biological Mother    • Diabetes type II Biological Father    • Heart failure Biological Father        Social History     Tobacco Use   • Smoking status: Former Smoker     Years: 15.00     Last attempt to quit:      Years since quittin.0   • Smokeless tobacco: Never Used   Substance Use Topics   • Alcohol use: Yes     Comment: occasional wine   • Drug use: No       Systems Reviewed from Nursing Triage:  Meds          Review of Systems     Review of Systems   Respiratory: Negative.    Cardiovascular: Negative.  Negative for syncope.   Gastrointestinal: Positive for blood in stool and nausea. Negative for vomiting.   Genitourinary: Negative.  Negative for vaginal bleeding, vaginal discharge and vaginal pain.   Neurological: Positive for dizziness.   All other systems reviewed and are negative.       Physical Exam     ED Triage Vitals   Temp Heart Rate Resp BP SpO2   20 1429 20 1429 20 1429 20 1429 20 142   37.1 °C (98.8 °F) 97 16 (!) 155/68 100 %      Temp Source Heart Rate Source Patient Position BP Location FiO2 (%) (Set)   20 1429 20 1829 20 1429 20 142 --   Oral Monitor Sitting Left upper arm                      Patient Vitals for the past 24 hrs:   BP Temp Temp src Pulse Resp SpO2 Height Weight   20 2114 (!) 98/52 36.7 °C (98.1 °F) Oral 81 18 98 % -- --   20 (!) 104/59 36.8 °C (98.3 °F) Oral 82 18 97 % -- --   20 107/66 36.7 °C (98 °F) Oral 78 18 100 % -- --   20 (!) 101/55 36.9 °C (98.4 °F) Oral 78 16 98 % -- --   20 (!) 109/56 36.8 °C (98.2 °F) Oral 76 16 -- -- --   20 (!) 105/52 36.7 °C (98 °F) Oral 80 15 100 % -- --   20 113/60 36.9 °C (98.4 °F) Oral 83 15 -- -- --   20 1825 109/66 36.7 °C (98.1 °F) Oral 79 17 100 % -- --   20 1816 114/72  "36.7 °C (98 °F) Oral 84 13 99 % -- --   01/25/20 1804 111/65 36.7 °C (98.1 °F) Oral 82 16 100 % -- --   01/25/20 1645 (!) 157/88 -- -- 72 17 100 % -- --   01/25/20 1538 108/69 -- -- 85 16 97 % -- --   01/25/20 1429 (!) 155/68 37.1 °C (98.8 °F) Oral 97 16 100 % 1.575 m (5' 2\") 86.2 kg (190 lb)                                       Physical Exam   Constitutional: She is oriented to person, place, and time. She appears well-developed and well-nourished.   HENT:   Head: Normocephalic and atraumatic.   Eyes: Pupils are equal, round, and reactive to light. EOM are normal.   Neck: Normal range of motion. Neck supple.   Cardiovascular: Normal rate, regular rhythm and normal heart sounds.   Pulmonary/Chest: Effort normal.   Abdominal: Soft. Bowel sounds are normal. She exhibits no distension. There is no tenderness. There is no guarding.   Genitourinary: Rectal exam shows guaiac positive stool.   Musculoskeletal: Normal range of motion.   Neurological: She is alert and oriented to person, place, and time.   Skin: Skin is warm and dry.   Nursing note and vitals reviewed.           Procedures    ED Course & MDM     Labs Reviewed   BASIC METABOLIC PANEL - Abnormal       Result Value    Sodium 139      Potassium 3.9      Chloride 112 (*)     CO2 22      BUN 26 (*)     Creatinine 0.5 (*)     Glucose 136 (*)     Calcium 8.5 (*)     eGFR >60.0      Anion Gap 5     CBC AND DIFF - Abnormal    WBC 7.25      RBC 2.16 (*)     Hemoglobin 6.8 (*)     Hematocrit 20.7 (*)     MCV 95.8      MCH 31.5      MCHC 32.9      RDW 14.9 (*)     Platelets 177      MPV 10.3      Differential Type Manu      Neutrophils 78      Lymphocytes 16      Monocytes 4      Eosinophils 2      Basophils 0      Neutrophils, Absolute 5.66      Lymphocytes, Absolute 1.16 (*)     Monocytes, Absolute 0.29      Eosinophils, Absolute 0.15      Basophils, Absolute 0.00 (*)     PLT Morphology Normal      Platelet Estimate Adequate (150,000-400,000)      Hypochromia 1+      " Polychromasia Occasional     PROTIME-INR - Abnormal    PT 36.6 (*)     INR 3.8 (*)    PTT - Abnormal    PTT 40 (*)    HEPATIC FUNCTION PANEL - Abnormal    Albumin 3.1 (*)     Bilirubin, Total 0.3      Bilirubin, Direct 0.1      Alkaline Phosphatase 62      AST (SGOT) 24      ALT (SGPT) 29      Total Protein 5.4 (*)    TROPONIN I - Normal    Troponin I <0.03     LACTATE, VENOUS - Normal    Lactate 0.9     LIPASE - Normal    Lipase 25     TYPE AND SCREEN    Antibody Screen Negative      ABO O      Rh Factor Positive      History Check Previous type on file     PATH REVIEW   CBC   PREPARE RBC    Product Code B2492Q58      Unit ID [id number]      Unit ABO O      Unit RH Positive      Crossmatch Compatible      Product Status IS      Unit Expiration Date/Time [id number]      ISBT Code 5100      Product Code A2620Q12      Unit ID [id number]      Unit ABO O      Unit RH Positive      Crossmatch Compatible      Product Status XM      Unit Expiration Date/Time [id number]      ISBT Code 5100     PREPARE FROZEN PLASMA    Product Code X5873P91      Unit ID [id number]      Unit ABO B      Unit RH Positive      Product Status IS      Unit Expiration Date/Time [id number]      ISBT Code 7300         ECG 12 lead                     MDM         ED Course as of Jan 25 2124   Sat Jan 25, 2020   1632 Hg is 6.8. Will tranfuse and admit    [KD]   1640 with medicine will get GI involved.  I will transfuse her.  Patient consented.    [KD]   1718 Start FFP as well    [KD]   1718 discussed case with GI, they are aware.  Patient will go to stepdown.    [KD]   1719 This patient had some resection of some rectal polyps and has known diverticulosis.  I will communicate this to medicine.    [KD]      ED Course User Index  [KD] Jose Weinberg, DO         Clinical Impressions as of Jan 25 2124   Lower GI bleed        Jose Weinberg DO  01/25/20 2125

## 2020-01-25 NOTE — ASSESSMENT & PLAN NOTE
Patient with hx of unprovoked PE, on warfarin. INR therapeutic on presentation.    - Patient should take warfarin 8mg tonight and then restart her home regimen of warfarin 8mg every day except Tues./Thurs when takes 4mg.   - INR 2.1 yesterday and 2.2 today  - INR daily; goal 2-3  - Called PCP office and spoke to María and discussed hospitalization and they have scheduled an appointment for the patient on 2/10/20 at 1:45 PM and will have INR check at that time. PCP to follow INRs       30-Jan 31-Jan 1-Feb 2-Feb 3-Feb 4-Feb 5-Feb 6-Feb   INR 1 1.1 1 1 1 1.4 2.1 2.2   Warfarin Dose 4 4 6 8 12.5 12.5 10

## 2020-01-25 NOTE — H&P
Internal Medicine  History & Physical        CHIEF COMPLAINT   syncope     HISTORY OF PRESENT ILLNESS      This is a 64 y.o. female with a past medical history of bilateral PE on warfarin, asthma, DM2, HTN, CAD, HLD, ricco-en-Y gastric bypass (2017), colon polyps (f/w Dr. Rock), who presents with syncope.     History goes back to 3 days PTA when patient noted onset of dark black stool, which she hadn't had before. She reported multiple episodes per day but was otherwise asymptomatic until the day prior to presentation when she noted being slightly light-headed. Then on the day of presentation, she had an episode of syncope as she got out of bed which prompted her to come to the ED. On the day of presentation, patient noted red blood mixed with her dark stools.    Patient has a history of colonic polyps and her last c-scope was in August 2019. She also reports prior surgeries with Dr. Rock for colonic fistulas. Her last EGD was 3years PTA prior to her bypass surgery.    Patient denied any heartburn, NSAID use, dyspepsia, unintended weight loss, prior hx of GI bleed. She is on A/C for a hx of PE that she says she was told was unprovoked. She had an IVC filter at some point but this was retrieved in 2017.    In ED:    ED Triage Vitals   Temp Heart Rate Resp BP SpO2   01/25/20 1429 01/25/20 1429 01/25/20 1429 01/25/20 1429 01/25/20 1429   37.1 °C (98.8 °F) 97 16 (!) 155/68 100 %      Temp Source Heart Rate Source Patient Position BP Location FiO2 (%) (Set)   01/25/20 1429 01/25/20 1829 01/25/20 1429 01/25/20 1429 --   Oral Monitor Sitting Left upper arm      Labs with Hgb 6.8. Started on PPI Gtt after discussion with GI fellow. Ordered for 2u PRBC and 1u FFP.      PAST MEDICAL AND SURGICAL HISTORY      PMHx:  Past Medical History:   Diagnosis Date   • Arthritis    • DMII (diabetes mellitus, type 2) (CMS/HCC)    • Fistula    • Gout    • HLD (hyperlipidemia)    • HTN (hypertension)    • IFG (impaired fasting  glucose)    • Obesity    • Polyarthritis    • Pulmonary emboli (CMS/HCC)     pulmonary emboli   • Torn rotator cuff        PSHx:  Past Surgical History:   Procedure Laterality Date   • APPENDECTOMY  2008   • CARDIAC CATHETERIZATION  11/2012   • INTRACAPSULAR CATARACT EXTRACTION Bilateral 08/2019   • IVC FILTER RETRIEVAL  2012   • IVC FILTER RETRIEVAL  02/2013   • RECTOURETHRAL FISTULA REPAIR  2008, 5/2017   • ROTATOR CUFF REPAIR Left 01/2016   • ALTAF-EN-Y PROCEDURE  02/07/2017   • TOTAL ABDOMINAL HYSTERECTOMY  1987   • TOTAL KNEE ARTHROPLASTY Bilateral 2010   • TOTAL KNEE ARTHROPLASTY Right 2012    revision   • TRIGGER FINGER RELEASE Right 2012    right thumb   • ULNAR NERVE REPAIR  2013       PCP:   Demarco Moreau, DO    MEDICATIONS      Prior to Admission medications    Medication Sig Start Date End Date Taking? Authorizing Provider   atorvastatin (LIPITOR) 40 mg tablet TAKE 1 TABLET BY MOUTH EVERY DAY 1/20/20   Demarco Moreau, DO   calcium carbonate-vitamin D3 500 mg(1,250mg) -200 unit per tablet No SIG Entered 3/18/16   Baldemar Hanks MD   clobetasol (TEMOVATE) 0.05 % cream  1/15/20   ProviderBaldemar MD   cyclobenzaprine (FLEXERIL) 5 mg tablet Take 1 tablet (5 mg total) by mouth 3 (three) times a day as needed for muscle spasms. 12/31/19   Rosamaria Zacarias, DO   diltiazem (TIAZAC) 120 mg 24 hr capsule TAKE ONE CAPSULE BY MOUTH EVERY DAY 9/26/19   Demarco Moreau, DO   fluticasone propionate (FLONASE) 50 mcg/actuation nasal spray Administer 1 spray into each nostril daily as needed for rhinitis. 10/29/19   Tita Man MD   ketoconazole (NIZORAL) 2 % cream APPLY 1 APPLICATION TO AFFECTED AREA ON ABDOMEN TWO TIMES A DAY FOR 3 WEEKS 5/23/19   ProviderBaldemar MD   warfarin (COUMADIN) 4 mg tablet Take 2 by mouth daily (8 mg daily) except T/TH take one (4mg) 12/23/19   Demarco Moreau, DO   betamethasone, augmented, (DIPROLENE-AF) 0.05 % cream APPLY 1 APPLICATION TO EXTREMITIES TWO  TIMES A DAY FOR 14 DAYS 19  Provider, MD Baldemar   predniSONE (DELTASONE) 10 mg tablet pack Take 6tabs dailyX4 days, 2tcgwqxynuN7 days, 3plrgfwddiH6qmj, 7ndlkfmavdS9xfuk, 0rayboudqkW4xeky, 0esvibkrtL0 days then stop 19  Rosamaria Zacarias DO Home medications were personally reviewed.    ALLERGIES      Sulfa (sulfonamide antibiotics)    FAMILY HISTORY      Family History   Problem Relation Age of Onset   • Diabetes Biological Mother    • Lung cancer Biological Mother    • Alzheimer's disease Biological Mother    • Diabetes type II Biological Father    • Heart failure Biological Father        SOCIAL HISTORY      Social History     Socioeconomic History   • Marital status:      Spouse name: Not on file   • Number of children: Not on file   • Years of education: Not on file   • Highest education level: Not on file   Occupational History   • Not on file   Social Needs   • Financial resource strain: Not on file   • Food insecurity:     Worry: Not on file     Inability: Not on file   • Transportation needs:     Medical: Not on file     Non-medical: Not on file   Tobacco Use   • Smoking status: Former Smoker     Years: 15.00     Last attempt to quit:      Years since quittin.0   • Smokeless tobacco: Never Used   Substance and Sexual Activity   • Alcohol use: Yes     Comment: occasional wine   • Drug use: No   • Sexual activity: Not Currently   Lifestyle   • Physical activity:     Days per week: Not on file     Minutes per session: Not on file   • Stress: Not on file   Relationships   • Social connections:     Talks on phone: Not on file     Gets together: Not on file     Attends Congregation service: Not on file     Active member of club or organization: Not on file     Attends meetings of clubs or organizations: Not on file     Relationship status: Not on file   • Intimate partner violence:     Fear of current or ex partner: Not on file     Emotionally abused: Not on file      "Physically abused: Not on file     Forced sexual activity: Not on file   Other Topics Concern   • Not on file   Social History Narrative   • Not on file       REVIEW OF SYSTEMS      Review of Systems    PHYSICAL EXAMINATION      Temp:  [36.7 °C (98 °F)-37.1 °C (98.8 °F)] 36.9 °C (98.4 °F)  Heart Rate:  [72-97] 83  Resp:  [13-17] 15  BP: (108-157)/(60-88) 113/60  Body mass index is 34.75 kg/m².    Physical Exam  PHYSICAL EXAM  Vitals: Visit Vitals  /60 (BP Location: Left upper arm, Patient Position: Lying)   Pulse 83   Temp 36.9 °C (98.4 °F) (Oral)   Resp 15   Ht 1.575 m (5' 2\")   Wt 86.2 kg (190 lb)   SpO2 100%   BMI 34.75 kg/m²      General: NAD, well-appearing   HEENT: Oropharyx clear and without exudates  No JVD  No cervical lymphadenopathy   Eyes: Conjunctiva clear  EOMI   Cardiac: RRR  No murmurs, rubs, or gallops   Pulmonary: Clear to auscultation bilaterally   Abdomen: Soft, non-tender, +BS  No rebound, no guarding   MSK: No joint deformity   Extremities: No peripheral edema   Neuro: AAO x 3, non-focal  CN II-XII intact   Psych: Appropriate, cooperative   Skin: No rashes     LABS / IMAGING / EKG        Labs  BMP:  Results from last 7 days   Lab Units 01/25/20  1545 01/20/20  0908   SODIUM mEQ/L 139 141   POTASSIUM mEQ/L 3.9 4.4   CHLORIDE mEQ/L 112* 109   CO2 mEQ/L 22 25   BUN mg/dL 26* 15   CREATININE mg/dL 0.5* 0.6   GLUCOSE mg/dL 136* 103*   CALCIUM mg/dL 8.5* 9.4  9.1             LFT:  Lab Results   Component Value Date    ALT 29 01/25/2020    AST 24 01/25/2020    ALKPHOS 62 01/25/2020    BILITOT 0.3 01/25/2020       CBC:  Results from last 7 days   Lab Units 01/25/20  1545 01/20/20  0908   WBC K/uL 7.25 6.66   HEMOGLOBIN g/dL 6.8* 12.4   HEMATOCRIT % 20.7* 38.8   PLATELETS K/uL 177 218   DIFF TYPE  Manu Auto   NRBC %  --  0.0   IMM GRANULOCYTES %  --  0.5   NEUTROPHILS %  --  67.9   NEUTROS PCT MAN % 78  --    LYMPHOCYTES %  --  21.0   LYMPHO PCT MAN % 16  --    MONOCYTES %  --  7.7   MONO PCT MAN " % 4  --    EOSINOPHILS %  --  2.1   EOSINO PCT MAN % 2  --    BASOPHILS %  --  0.8   BASOS PCT MAN % 0  --    IMM GRANUCOCYTES ABS K/uL  --  0.03   SEGS ABS MAN K/uL 5.66  --    LYMPHO ABS MAN K/uL 1.16*  --    MONO ABS AUTO K/uL  --  0.51   MONO ABS MAN K/uL 0.29  --    EOS ABS AUTO K/uL  --  0.14   EOS ABS MAN K/uL 0.15  --    BASO ABS AUTO K/uL  --  0.05   BASOS ABS MAN K/uL 0.00*  --            Imaging  I have independently reviewed the pertinent imaging from the last 24 hrs.    ECG/Telemetry  I have independently reviewed the ECG. No significant findings.    ASSESSMENT AND PLAN           * GI bleed  Assessment & Plan  Patient with hx of bariatric surgery but no prior hx of GI bleed presenting for 3 days of melena followed by [facility] and symptomatic acute anemia. Examines hemodynamically stable. Heme+ dark stool on rectal by ED physician. Hgb on admission 6.8. Typed and screen and ordered for 2 units of PRBC. Also ordered for 1u FFP and 2mg oral vitamin K.     Concern for UGI bleed at anastomoses site given bariatric surgery hx.     - GI bleed order set in place  - Ordered for 2 IV  - PPI gtt   - Trend CBC q8h  - Monitor daily INR   - Keep NPO for now, will adjust diet as per GI recs  - Known to Dr. Rock will consult   - Monitor hemodynamics closely     History of pulmonary embolus (PE)  Assessment & Plan  Patient with hx of unprovoked PE, on warfarin. INR therapeutic on presentation.    - Hold warfarin for now given GI bleed     Anemia  Assessment & Plan  Acute anemia noted in s/o GI bleed.    - Management as per GI bleed     HLD (hyperlipidemia)  Assessment & Plan  - C/w statin    Paroxysmal atrial fibrillation (CMS/HCC)  Assessment & Plan  - Hold Dilt for now for rate control  - Hold warfarin    Essential hypertension  Assessment & Plan  - Hold Dilt    Obstructive sleep apnea  Assessment & Plan  - Hold CPAP for now        VTE Assessment: Padua    Code Status: Full Code  Estimated discharge date: 1/28/2020      ATTENDING DOCUMENTATION  ALSO SEE ATTENDING ATTESTATION SECTION OF NOTE

## 2020-01-26 PROBLEM — K92.1 MELENA: Status: ACTIVE | Noted: 2020-01-26

## 2020-01-26 LAB
ANION GAP SERPL CALC-SCNC: 1 MEQ/L (ref 3–15)
ATRIAL RATE: 100
BUN SERPL-MCNC: 20 MG/DL (ref 8–20)
CALCIUM SERPL-MCNC: 7.8 MG/DL (ref 8.9–10.3)
CHLORIDE SERPL-SCNC: 114 MEQ/L (ref 98–109)
CO2 SERPL-SCNC: 24 MEQ/L (ref 22–32)
CREAT SERPL-MCNC: 0.4 MG/DL
ERYTHROCYTE [DISTWIDTH] IN BLOOD BY AUTOMATED COUNT: 15.2 % (ref 11.7–14.4)
ERYTHROCYTE [DISTWIDTH] IN BLOOD BY AUTOMATED COUNT: 15.3 % (ref 11.7–14.4)
ERYTHROCYTE [DISTWIDTH] IN BLOOD BY AUTOMATED COUNT: 15.7 % (ref 11.7–14.4)
GFR SERPL CREATININE-BSD FRML MDRD: >60 ML/MIN/1.73M*2
GLUCOSE SERPL-MCNC: 96 MG/DL (ref 70–99)
HCT VFR BLDCO AUTO: 21.7 %
HCT VFR BLDCO AUTO: 25.4 %
HCT VFR BLDCO AUTO: 26.3 %
HGB BLD-MCNC: 7.1 G/DL (ref 11.8–15.7)
HGB BLD-MCNC: 8.3 G/DL (ref 11.8–15.7)
HGB BLD-MCNC: 8.8 G/DL (ref 11.8–15.7)
INR PPP: 1.8 INR
MAGNESIUM SERPL-MCNC: 1.8 MG/DL (ref 1.8–2.5)
MCH RBC QN AUTO: 30.2 PG (ref 28–33.2)
MCH RBC QN AUTO: 30.6 PG (ref 28–33.2)
MCH RBC QN AUTO: 31.1 PG (ref 28–33.2)
MCHC RBC AUTO-ENTMCNC: 32.7 G/DL (ref 32.2–35.5)
MCHC RBC AUTO-ENTMCNC: 32.7 G/DL (ref 32.2–35.5)
MCHC RBC AUTO-ENTMCNC: 33.5 G/DL (ref 32.2–35.5)
MCV RBC AUTO: 92.4 FL (ref 83–98)
MCV RBC AUTO: 92.9 FL (ref 83–98)
MCV RBC AUTO: 93.5 FL (ref 83–98)
P AXIS: 34
PATH REV BLD -IMP: NORMAL
PDW BLD AUTO: 10.3 FL (ref 9.4–12.3)
PDW BLD AUTO: 10.3 FL (ref 9.4–12.3)
PDW BLD AUTO: 9.7 FL (ref 9.4–12.3)
PLATELET # BLD AUTO: 130 K/UL
PLATELET # BLD AUTO: 132 K/UL
PLATELET # BLD AUTO: 137 K/UL
POTASSIUM SERPL-SCNC: 3.9 MEQ/L (ref 3.6–5.1)
PR INTERVAL: 150
PROTHROMBIN TIME: 20.4 SEC (ref 12.2–14.5)
QRS DURATION: 78
QT INTERVAL: 378
QTC CALCULATION(BAZETT): 487
R AXIS: -9
RBC # BLD AUTO: 2.32 M/UL (ref 3.93–5.22)
RBC # BLD AUTO: 2.75 M/UL (ref 3.93–5.22)
RBC # BLD AUTO: 2.83 M/UL (ref 3.93–5.22)
SODIUM SERPL-SCNC: 139 MEQ/L (ref 136–144)
T WAVE AXIS: 61
VENTRICULAR RATE: 100
VIT B1 BLD-SCNC: 163 NMOL/L (ref 78–185)
WBC # BLD AUTO: 5.98 K/UL
WBC # BLD AUTO: 7.05 K/UL
WBC # BLD AUTO: 7.54 K/UL

## 2020-01-26 PROCEDURE — 21400000 HC ROOM AND CARE CCU/INTERMEDIATE

## 2020-01-26 PROCEDURE — 83735 ASSAY OF MAGNESIUM: CPT | Performed by: STUDENT IN AN ORGANIZED HEALTH CARE EDUCATION/TRAINING PROGRAM

## 2020-01-26 PROCEDURE — 63700000 HC SELF-ADMINISTRABLE DRUG: Performed by: STUDENT IN AN ORGANIZED HEALTH CARE EDUCATION/TRAINING PROGRAM

## 2020-01-26 PROCEDURE — 80048 BASIC METABOLIC PNL TOTAL CA: CPT | Performed by: STUDENT IN AN ORGANIZED HEALTH CARE EDUCATION/TRAINING PROGRAM

## 2020-01-26 PROCEDURE — 85027 COMPLETE CBC AUTOMATED: CPT | Performed by: STUDENT IN AN ORGANIZED HEALTH CARE EDUCATION/TRAINING PROGRAM

## 2020-01-26 PROCEDURE — 36415 COLL VENOUS BLD VENIPUNCTURE: CPT | Performed by: STUDENT IN AN ORGANIZED HEALTH CARE EDUCATION/TRAINING PROGRAM

## 2020-01-26 PROCEDURE — 93010 ELECTROCARDIOGRAM REPORT: CPT | Performed by: INTERNAL MEDICINE

## 2020-01-26 PROCEDURE — 85610 PROTHROMBIN TIME: CPT | Performed by: STUDENT IN AN ORGANIZED HEALTH CARE EDUCATION/TRAINING PROGRAM

## 2020-01-26 PROCEDURE — 25800000 HC PHARMACY IV SOLUTIONS: Performed by: STUDENT IN AN ORGANIZED HEALTH CARE EDUCATION/TRAINING PROGRAM

## 2020-01-26 PROCEDURE — P9016 RBC LEUKOCYTES REDUCED: HCPCS

## 2020-01-26 PROCEDURE — 36430 TRANSFUSION BLD/BLD COMPNT: CPT

## 2020-01-26 PROCEDURE — 99233 SBSQ HOSP IP/OBS HIGH 50: CPT | Performed by: INTERNAL MEDICINE

## 2020-01-26 PROCEDURE — 25000000 HC PHARMACY GENERAL: Performed by: STUDENT IN AN ORGANIZED HEALTH CARE EDUCATION/TRAINING PROGRAM

## 2020-01-26 PROCEDURE — 63600000 HC DRUGS/DETAIL CODE: Performed by: STUDENT IN AN ORGANIZED HEALTH CARE EDUCATION/TRAINING PROGRAM

## 2020-01-26 RX ORDER — ACETAMINOPHEN 325 MG/1
650 TABLET ORAL EVERY 6 HOURS PRN
Status: DISCONTINUED | OUTPATIENT
Start: 2020-01-26 | End: 2020-02-06 | Stop reason: HOSPADM

## 2020-01-26 RX ORDER — SODIUM CHLORIDE 9 MG/ML
5 INJECTION, SOLUTION INTRAVENOUS AS NEEDED
Status: ACTIVE | OUTPATIENT
Start: 2020-01-26 | End: 2020-01-27

## 2020-01-26 RX ORDER — DEXTROSE 50 % IN WATER (D50W) INTRAVENOUS SYRINGE
25 AS NEEDED
Status: CANCELLED | OUTPATIENT
Start: 2020-01-26

## 2020-01-26 RX ORDER — DEXTROSE 40 %
15-30 GEL (GRAM) ORAL AS NEEDED
Status: CANCELLED | OUTPATIENT
Start: 2020-01-26

## 2020-01-26 RX ORDER — POTASSIUM CHLORIDE 14.9 MG/ML
20 INJECTION INTRAVENOUS ONCE
Status: COMPLETED | OUTPATIENT
Start: 2020-01-26 | End: 2020-01-26

## 2020-01-26 RX ORDER — IBUPROFEN 200 MG
16-32 TABLET ORAL AS NEEDED
Status: CANCELLED | OUTPATIENT
Start: 2020-01-26

## 2020-01-26 RX ORDER — ONDANSETRON HYDROCHLORIDE 2 MG/ML
4 INJECTION, SOLUTION INTRAVENOUS EVERY 6 HOURS PRN
Status: DISCONTINUED | OUTPATIENT
Start: 2020-01-26 | End: 2020-02-06 | Stop reason: HOSPADM

## 2020-01-26 RX ADMIN — ACETAMINOPHEN 650 MG: 325 TABLET, FILM COATED ORAL at 21:38

## 2020-01-26 RX ADMIN — SODIUM CHLORIDE 8 MG/HR: 0.9 INJECTION, SOLUTION INTRAVENOUS at 05:26

## 2020-01-26 RX ADMIN — ONDANSETRON 4 MG: 2 INJECTION INTRAMUSCULAR; INTRAVENOUS at 17:33

## 2020-01-26 RX ADMIN — POTASSIUM CHLORIDE 20 MEQ: 200 INJECTION, SOLUTION INTRAVENOUS at 14:08

## 2020-01-26 RX ADMIN — ATORVASTATIN CALCIUM 40 MG: 40 TABLET, FILM COATED ORAL at 21:34

## 2020-01-26 RX ADMIN — ONDANSETRON 4 MG: 2 INJECTION INTRAMUSCULAR; INTRAVENOUS at 11:22

## 2020-01-26 RX ADMIN — ACETAMINOPHEN 650 MG: 325 TABLET, FILM COATED ORAL at 11:22

## 2020-01-26 RX ADMIN — MAGNESIUM SULFATE 2 G: 2 INJECTION INTRAVENOUS at 11:23

## 2020-01-26 ASSESSMENT — ENCOUNTER SYMPTOMS
ABDOMINAL PAIN: 0
FEVER: 0
FATIGUE: 0
NAUSEA: 1
VOMITING: 0
APPETITE CHANGE: 0
ACTIVITY CHANGE: 0

## 2020-01-26 NOTE — NURSING NOTE
Bedside handoff completed; VSS; pt resting comfortably in bed; protonix gtt running as per order; assessment completed; call light within reach

## 2020-01-26 NOTE — PLAN OF CARE
Problem: Adult Inpatient Plan of Care  Goal: Plan of Care Review  Outcome: Progressing  Flowsheets (Taken 1/26/2020 0457)  Progress: improving  Plan of Care Reviewed With: patient  Outcome Summary: Third unit of PRBCs and protonix drip infusing. VSS. Pt. has not had bloody BM since arrving to unit.

## 2020-01-26 NOTE — CONSULTS
"     Gastroenterology  Consultation Note       REASON FOR CONSULT   Melena/BRBPR, syncopal episode    Consulting Physician: Dr. Michel   HISTORY OF PRESENT ILLNESS      This is a 64 y.o. female with a past medical history of unprovoked PE/DVT on warfarin, HTN, Afib, lap Alexander-en-y in 2017, h/o anal fissure that progressed to a fistula s/p fistulotomy with Dr. Rock 5/2017 who presented yesterday for dark stool x 3 days. Tells me she was otherwise in her USOH but noticed her stools in the morning and at night (her usual times for BMS) were black and tarry.  She denies vomiting, abdominal pain, but did have some nausea. Day of admission felt lightheaded upon getting up to use bathroom and had episode of syncope. That day she also saw a tinge of blood in the toilet bowel around her dark stool. She denies NSAID use, and drinks \"a couple glasses of wine\"/week. She has never had GIB before.    Colonoscopy with Dr. Rock 7/30/19 for personal h/o polyps: to cecum, excellent prep  Many medium mouthed diverticula in the sigmoid. 5mm polyp in the rectum removed with hot snare.  (TA)  Intraop EGD 2/2017: normal exam     ED: 101/56, HR 76  BUN/Cr 26/0.5, 6.8 from 12.4 on Jan. 20, MCV 95.8, INR 3.8  Overnight given 2 uprbcs, started on ppi gtt, 1u FFP, hgb 7.1 and so 2 additional units are being given.    no BMS since admission and overall feels OK.  PAST MEDICAL AND SURGICAL HISTORY      PMHx:  Past Medical History:   Diagnosis Date   • Arthritis    • DMII (diabetes mellitus, type 2) (CMS/HCC)    • Fistula    • Gout    • HLD (hyperlipidemia)    • HTN (hypertension)    • IFG (impaired fasting glucose)    • Obesity    • Polyarthritis    • Pulmonary emboli (CMS/HCC)     pulmonary emboli   • Torn rotator cuff        PSHx:  Past Surgical History:   Procedure Laterality Date   • APPENDECTOMY  2008   • CARDIAC CATHETERIZATION  11/2012   • INTRACAPSULAR CATARACT EXTRACTION Bilateral 08/2019   • IVC FILTER RETRIEVAL  2012   • IVC FILTER " RETRIEVAL  02/2013   • RECTOURETHRAL FISTULA REPAIR  2008, 5/2017   • ROTATOR CUFF REPAIR Left 01/2016   • ALTAF-EN-Y PROCEDURE  02/07/2017   • TOTAL ABDOMINAL HYSTERECTOMY  1987   • TOTAL KNEE ARTHROPLASTY Bilateral 2010   • TOTAL KNEE ARTHROPLASTY Right 2012    revision   • TRIGGER FINGER RELEASE Right 2012    right thumb   • ULNAR NERVE REPAIR  2013       PCP:   Demarco Moreau, DO    MEDICATIONS      Prior to Admission medications    Medication Sig Start Date End Date Taking? Authorizing Provider   atorvastatin (LIPITOR) 40 mg tablet TAKE 1 TABLET BY MOUTH EVERY DAY 1/20/20   Demarco Moreau, DO   calcium carbonate-vitamin D3 500 mg(1,250mg) -200 unit per tablet Take 2 tablets by mouth 2 (two) times a day with meals.   3/18/16   Baldemar Hanks MD   clobetasol (TEMOVATE) 0.05 % cream  1/15/20   ProviderBaldemar MD   cyclobenzaprine (FLEXERIL) 5 mg tablet Take 1 tablet (5 mg total) by mouth 3 (three) times a day as needed for muscle spasms. 12/31/19   Rosamaria Zacarias,    diltiazem (TIAZAC) 120 mg 24 hr capsule TAKE ONE CAPSULE BY MOUTH EVERY DAY 9/26/19   Demarco Moreau,    fluticasone propionate (FLONASE) 50 mcg/actuation nasal spray Administer 1 spray into each nostril daily as needed for rhinitis. 10/29/19   Tita Man MD   warfarin (COUMADIN) 4 mg tablet Take 2 by mouth daily (8 mg daily) except T/TH take one (4mg) 12/23/19   Demarco Moreau, DO       Home medications were personally reviewed.    ALLERGIES      Sulfa (sulfonamide antibiotics)    FAMILY HISTORY      Family History   Problem Relation Age of Onset   • Diabetes Biological Mother    • Lung cancer Biological Mother    • Alzheimer's disease Biological Mother    • Diabetes type II Biological Father    • Heart failure Biological Father        SOCIAL HISTORY      Social History     Socioeconomic History   • Marital status:      Spouse name: Not on file   • Number of children: Not on file   • Years of education:  Not on file   • Highest education level: Not on file   Occupational History   • Not on file   Social Needs   • Financial resource strain: Not on file   • Food insecurity:     Worry: Not on file     Inability: Not on file   • Transportation needs:     Medical: Not on file     Non-medical: Not on file   Tobacco Use   • Smoking status: Former Smoker     Years: 15.00     Last attempt to quit:      Years since quittin.0   • Smokeless tobacco: Never Used   Substance and Sexual Activity   • Alcohol use: Yes     Comment: occasional wine   • Drug use: No   • Sexual activity: Not Currently   Lifestyle   • Physical activity:     Days per week: Not on file     Minutes per session: Not on file   • Stress: Not on file   Relationships   • Social connections:     Talks on phone: Not on file     Gets together: Not on file     Attends Zoroastrian service: Not on file     Active member of club or organization: Not on file     Attends meetings of clubs or organizations: Not on file     Relationship status: Not on file   • Intimate partner violence:     Fear of current or ex partner: Not on file     Emotionally abused: Not on file     Physically abused: Not on file     Forced sexual activity: Not on file   Other Topics Concern   • Not on file   Social History Narrative   • Not on file       REVIEW OF SYSTEMS      Review of Systems   Constitutional: Negative for activity change, appetite change, fatigue and fever.   Gastrointestinal: Positive for nausea. Negative for abdominal pain and vomiting.        Dark black stool       PHYSICAL EXAMINATION      Temp:  [36.1 °C (97 °F)-37.1 °C (98.8 °F)] 36.4 °C (97.6 °F)  Heart Rate:  [58-97] 59  Resp:  [13-18] 14  BP: ()/(52-88) 114/57  Body mass index is 36.29 kg/m².    Physical Exam   Constitutional: She is oriented to person, place, and time. She appears well-developed and well-nourished. No distress.   Abdominal: Soft. Bowel sounds are normal. She exhibits no distension. There is  no tenderness.   Genitourinary:   Genitourinary Comments: Good rectal tone  No stool in rectal vault   Neurological: She is alert and oriented to person, place, and time.       LABS / IMAGING / EKG        Labs  Results from last 7 days   Lab Units 01/25/20  1545 01/20/20  0908   SODIUM mEQ/L 139 141   POTASSIUM mEQ/L 3.9 4.4   CHLORIDE mEQ/L 112* 109   CO2 mEQ/L 22 25   BUN mg/dL 26* 15   CREATININE mg/dL 0.5* 0.6   CALCIUM mg/dL 8.5* 9.4  9.1   ALBUMIN g/dL 3.1* 3.6   BILIRUBIN TOTAL mg/dL 0.3 0.9   ALK PHOS IU/L 62 88   ALT IU/L 29 37   AST IU/L 24 26   GLUCOSE mg/dL 136* 103*     Results from last 7 days   Lab Units 01/25/20  1544 01/20/20  0920   INR INR 3.8* 2.8           Results from last 7 days   Lab Units 01/26/20  0146 01/25/20  1545 01/20/20  0908   WBC K/uL 7.05 7.25 6.66   HEMOGLOBIN g/dL 7.1* 6.8* 12.4   HEMATOCRIT % 21.7* 20.7* 38.8   PLATELETS K/uL 130* 177 218         Imaging  ECG 12 lead             ASSESSMENT AND PLAN      Melena  Assessment & Plan  Admitted overnight with c/f 3 days of melena at home with syncope and drastic drop in hgb. INR supratherapeautic on admission. BUN elevated. Suspected UGIB that may have resolved or is oozing. Source could be anastomotic ulcer given h/o ricco-en-y, PUD, AVM. She is currently hemodynamically stable without BM since admission.    - maintain NPO today  - Plan for EGD tomorrow  - if hemodynamic instability or other hematemesis, active melena or BRBPR please call GI  - please ensure hgb >8.0 and INR ~1.5 with early morning labs for EGD tomorrow  - If heparin gtt is needed please turn off at midnight and avoid supratherapeautic INRs  - c/w PPI gtt  - avoid NSAIDs         VTE Assessment: Padua    Code Status: Full Code  Estimated discharge date: 1/28/2020

## 2020-01-26 NOTE — PROGRESS NOTES
"     Internal Medicine  Daily Progress Note       SUBJECTIVE   This is a 64 y.o. year-old female admitted on 2020 with Lower GI bleed [K92.2].    Interval History: Patient had a blood-tinged BM as I was walking into the room to see her this morning. She was ambulating back from the bathroom and apart from slight fatigue, she notes she felt better. Has received 3u of PRBC and 1uFFP so far this admit. Discussed plan with GI.      OBJECTIVE      Vital signs in last 24 hours:  Temp:  [36.1 °C (97 °F)-37.1 °C (98.8 °F)] 36.4 °C (97.6 °F)  Heart Rate:  [58-97] 66  Resp:  [13-18] 16  BP: ()/(52-88) 104/59  Temp (24hrs), Av.5 °C (97.7 °F), Min:36.1 °C (97 °F), Max:37.1 °C (98.8 °F)    Intake/Output     Intake Evening 20 - 20 Night 20 - 20 0659 Day 20 - 20 1459 Output Evening 20 - 20 Night 20 - 20 0659 Day 20 - 20 1459    I.V. 129 61.5 -- Urine 350 500 --    Blood 635 650 --                    IV Piggyback -- -- --                    Total 764 711.5 -- Total 350 500 --    Last 3 shifts --  Intake: 1475.5       Output: 850       Net: 625.5          PHYSICAL EXAMINATION      PHYSICAL EXAM  Vitals: Visit Vitals  BP (!) 104/59 (BP Location: Right upper arm, Patient Position: Lying)   Pulse 66   Temp 36.4 °C (97.6 °F) (Temporal)   Resp 16   Ht 1.575 m (5' 2\")   Wt 90 kg (198 lb 6.4 oz)   SpO2 98%   BMI 36.29 kg/m²      General: NAD, well-appearing pale    HEENT: Oropharyx clear and without exudates  No JVD  No cervical lymphadenopathy   Eyes: Conjunctival pallor  EOMI   Cardiac: RRR  No murmurs, rubs, or gallops   Pulmonary: Clear to auscultation bilaterally   Abdomen: Soft, non-tender, +BS  No rebound, no guarding   MSK: No joint deformity   Extremities: No peripheral edema   Neuro: AAO x 3, non-focal  CN II-XII intact   Psych: Appropriate, cooperative   Skin: No rashes            LINES, CATHETERS, " DRAINS, AIRWAYS, AND WOUNDS   Lines, Drains, Airways, Wounds:  Peripheral IV 01/25/20 Left Antecubital (Active)   Number of days: 1       Peripheral IV 01/25/20 Right Antecubital (Active)   Number of days: 1       Comments:      LABS / IMAGING / TELE      Labs  BMP:  Results from last 7 days   Lab Units 01/25/20  1545 01/20/20  0908   SODIUM mEQ/L 139 141   POTASSIUM mEQ/L 3.9 4.4   CHLORIDE mEQ/L 112* 109   CO2 mEQ/L 22 25   BUN mg/dL 26* 15   CREATININE mg/dL 0.5* 0.6   GLUCOSE mg/dL 136* 103*   CALCIUM mg/dL 8.5* 9.4  9.1             LFT:  Lab Results   Component Value Date    ALT 29 01/25/2020    AST 24 01/25/2020    ALKPHOS 62 01/25/2020    BILITOT 0.3 01/25/2020       CBC:  Results from last 7 days   Lab Units 01/26/20  0146 01/25/20  1545 01/20/20  0908   WBC K/uL 7.05 7.25 6.66   HEMOGLOBIN g/dL 7.1* 6.8* 12.4   HEMATOCRIT % 21.7* 20.7* 38.8   PLATELETS K/uL 130* 177 218   DIFF TYPE   --  Manu Auto   NRBC %  --   --  0.0   IMM GRANULOCYTES %  --   --  0.5   NEUTROPHILS %  --   --  67.9   NEUTROS PCT MAN %  --  78  --    LYMPHOCYTES %  --   --  21.0   LYMPHO PCT MAN %  --  16  --    MONOCYTES %  --   --  7.7   MONO PCT MAN %  --  4  --    EOSINOPHILS %  --   --  2.1   EOSINO PCT MAN %  --  2  --    BASOPHILS %  --   --  0.8   BASOS PCT MAN %  --  0  --    IMM GRANUCOCYTES ABS K/uL  --   --  0.03   SEGS ABS MAN K/uL  --  5.66  --    LYMPHO ABS MAN K/uL  --  1.16*  --    MONO ABS AUTO K/uL  --   --  0.51   MONO ABS MAN K/uL  --  0.29  --    EOS ABS AUTO K/uL  --   --  0.14   EOS ABS MAN K/uL  --  0.15  --    BASO ABS AUTO K/uL  --   --  0.05   BASOS ABS MAN K/uL  --  0.00*  --            Imaging  Imaging reviewed.     ECG/Telemetry  Telemetry reviewed.     ASSESSMENT AND PLAN      * GI bleed  Assessment & Plan  Patient with hx of bariatric surgery but no prior hx of GI bleed presenting for 3 days of melena followed by BRBPR and symptomatic acute anemia. Examines hemodynamically stable. Heme+ dark stool on  rectal by ED physician. Hgb on admission 6.8. Typed and screen and ordered for 2 units of PRBC. Also ordered for 1u FFP and 2mg oral vitamin K.     Concern for UGI bleed at anastomoses site given bariatric surgery hx.     - GI bleed order set in place  - Ordered for 2 IV  - PPI gtt   - Trend CBC q8h  - Monitor daily INR   - Keep NPO for now, will adjust diet as per GI recs  - Known to Dr. Rock will consult   - Monitor hemodynamics closely     History of pulmonary embolus (PE)  Assessment & Plan  Patient with hx of unprovoked PE, on warfarin. INR therapeutic on presentation.    - Hold warfarin for now given GI bleed     Anemia  Assessment & Plan  Acute anemia noted in s/o GI bleed.    - Management as per GI bleed     HLD (hyperlipidemia)  Assessment & Plan  - C/w statin    Paroxysmal atrial fibrillation (CMS/HCC)  Assessment & Plan  - Hold Dilt for now for rate control  - Hold warfarin    Essential hypertension  Assessment & Plan  - Hold Dilt    Obstructive sleep apnea  Assessment & Plan  - Hold CPAP for now          VTE Assessment: Padua    Code Status: Full Code  Estimated discharge date: 1/28/2020     ATTENDING DOCUMENTATION  ALSO SEE ATTENDING ATTESTATION SECTION OF NOTE

## 2020-01-26 NOTE — ASSESSMENT & PLAN NOTE
67 y/o F on warfari for PE/DVT, ricco-en-y 2017, admitted with 3 days of melanic stools.  Hemoglobin 6.8 on admission, from 12.4 on 1/20/2020.  INR 3.8.  Patient received 3 units packed red blood cells with current hemoglobin 8.4.    Patient underwent upper endoscopy 1/27/2020 for presumed gastrointestinal hemorrhage.  Z line regular at 35 cm distal to the incisors.  Gastric pouch healthy-appearing, with no mucosal abnormalities or ulcerations appreciated.  Healthy-appearing GJ anastomosis, without visible sutures or staples, and no ulcerations appreciate. Efferent limb traversed without any abnormalities appreciated or blood appreciated.    Patient underwent upper endoscopy 1/28/2020 for presumed gastrointestinal hemorrhage.  Z line regular at 35 cm distal to the incisors.  Gastric pouch healthy-appearing, with no mucosal abnormalities or ulcerations appreciated.  Healthy-appearing GJ anastomosis, without visible sutures or staples, and no ulcerations appreciate. Efferent limb traversed without any abnormalities appreciated or blood appreciated. No capsule appreciated in stomach, blind end of GJ, or traversed GJ, implying successful passage down jejunal limb.        Plan:  -Please place on clear liquids today, pending review of VCE.  - Further recommendations to follow.    Discussed with patient.

## 2020-01-27 ENCOUNTER — ANESTHESIA EVENT (INPATIENT)
Dept: ENDOSCOPY | Facility: HOSPITAL | Age: 65
DRG: 378 | End: 2020-01-27
Payer: MEDICARE

## 2020-01-27 ENCOUNTER — ANESTHESIA (INPATIENT)
Dept: ENDOSCOPY | Facility: HOSPITAL | Age: 65
DRG: 378 | End: 2020-01-27
Payer: MEDICARE

## 2020-01-27 PROBLEM — K92.2 GASTROINTESTINAL HEMORRHAGE: Status: ACTIVE | Noted: 2020-01-25

## 2020-01-27 LAB
ANION GAP SERPL CALC-SCNC: 8 MEQ/L (ref 3–15)
BUN SERPL-MCNC: 11 MG/DL (ref 8–20)
CALCIUM SERPL-MCNC: 8.5 MG/DL (ref 8.9–10.3)
CHLORIDE SERPL-SCNC: 109 MEQ/L (ref 98–109)
CO2 SERPL-SCNC: 22 MEQ/L (ref 22–32)
CREAT SERPL-MCNC: 0.5 MG/DL
CROSSMATCH: NORMAL
CROSSMATCH: NORMAL
ERYTHROCYTE [DISTWIDTH] IN BLOOD BY AUTOMATED COUNT: 15.9 % (ref 11.7–14.4)
GFR SERPL CREATININE-BSD FRML MDRD: >60 ML/MIN/1.73M*2
GLUCOSE SERPL-MCNC: 81 MG/DL (ref 70–99)
HCT VFR BLDCO AUTO: 24.6 %
HCT VFR BLDCO AUTO: 25.6 %
HCT VFR BLDCO AUTO: 26.8 %
HGB BLD-MCNC: 8.1 G/DL (ref 11.8–15.7)
HGB BLD-MCNC: 8.4 G/DL (ref 11.8–15.7)
HGB BLD-MCNC: 8.7 G/DL (ref 11.8–15.7)
INR PPP: 1.2 INR
ISBT CODE: 5100
ISBT CODE: 5100
ISBT CODE: 7300
MAGNESIUM SERPL-MCNC: 1.8 MG/DL (ref 1.8–2.5)
MCH RBC QN AUTO: 30.7 PG (ref 28–33.2)
MCH RBC QN AUTO: 30.7 PG (ref 28–33.2)
MCH RBC QN AUTO: 31.1 PG (ref 28–33.2)
MCHC RBC AUTO-ENTMCNC: 32.5 G/DL (ref 32.2–35.5)
MCHC RBC AUTO-ENTMCNC: 32.8 G/DL (ref 32.2–35.5)
MCHC RBC AUTO-ENTMCNC: 32.9 G/DL (ref 32.2–35.5)
MCV RBC AUTO: 93.2 FL (ref 83–98)
MCV RBC AUTO: 94.7 FL (ref 83–98)
MCV RBC AUTO: 94.8 FL (ref 83–98)
PDW BLD AUTO: 10.1 FL (ref 9.4–12.3)
PDW BLD AUTO: 10.1 FL (ref 9.4–12.3)
PDW BLD AUTO: 9.9 FL (ref 9.4–12.3)
PLATELET # BLD AUTO: 136 K/UL
PLATELET # BLD AUTO: 148 K/UL
PLATELET # BLD AUTO: 168 K/UL
POTASSIUM SERPL-SCNC: 3.9 MEQ/L (ref 3.6–5.1)
PRODUCT CODE: NORMAL
PRODUCT STATUS: NORMAL
PROTHROMBIN TIME: 15 SEC (ref 12.2–14.5)
RBC # BLD AUTO: 2.64 M/UL (ref 3.93–5.22)
RBC # BLD AUTO: 2.7 M/UL (ref 3.93–5.22)
RBC # BLD AUTO: 2.83 M/UL (ref 3.93–5.22)
SODIUM SERPL-SCNC: 139 MEQ/L (ref 136–144)
SPECIMEN EXP DATE BLD: NORMAL
UNIT ABO: NORMAL
UNIT ID: NORMAL
UNIT RH: POSITIVE
WBC # BLD AUTO: 6.91 K/UL
WBC # BLD AUTO: 6.99 K/UL
WBC # BLD AUTO: 8.53 K/UL

## 2020-01-27 PROCEDURE — 63600000 HC DRUGS/DETAIL CODE: Performed by: NURSE ANESTHETIST, CERTIFIED REGISTERED

## 2020-01-27 PROCEDURE — 25000000 HC PHARMACY GENERAL: Performed by: NURSE ANESTHETIST, CERTIFIED REGISTERED

## 2020-01-27 PROCEDURE — 25800000 HC PHARMACY IV SOLUTIONS: Performed by: NURSE ANESTHETIST, CERTIFIED REGISTERED

## 2020-01-27 PROCEDURE — 37000002 HC ANESTHESIA MAC: Performed by: INTERNAL MEDICINE

## 2020-01-27 PROCEDURE — 85610 PROTHROMBIN TIME: CPT | Performed by: STUDENT IN AN ORGANIZED HEALTH CARE EDUCATION/TRAINING PROGRAM

## 2020-01-27 PROCEDURE — 63700000 HC SELF-ADMINISTRABLE DRUG: Performed by: STUDENT IN AN ORGANIZED HEALTH CARE EDUCATION/TRAINING PROGRAM

## 2020-01-27 PROCEDURE — 99223 1ST HOSP IP/OBS HIGH 75: CPT | Performed by: INTERNAL MEDICINE

## 2020-01-27 PROCEDURE — 85027 COMPLETE CBC AUTOMATED: CPT | Performed by: STUDENT IN AN ORGANIZED HEALTH CARE EDUCATION/TRAINING PROGRAM

## 2020-01-27 PROCEDURE — 25800000 HC PHARMACY IV SOLUTIONS: Performed by: STUDENT IN AN ORGANIZED HEALTH CARE EDUCATION/TRAINING PROGRAM

## 2020-01-27 PROCEDURE — 37000006 HC ANESTHESIA LOCAL: Performed by: INTERNAL MEDICINE

## 2020-01-27 PROCEDURE — 75000081 HC EGD W WO BRUSH WASH: Performed by: INTERNAL MEDICINE

## 2020-01-27 PROCEDURE — 80048 BASIC METABOLIC PNL TOTAL CA: CPT | Performed by: STUDENT IN AN ORGANIZED HEALTH CARE EDUCATION/TRAINING PROGRAM

## 2020-01-27 PROCEDURE — 25000000 HC PHARMACY GENERAL: Performed by: STUDENT IN AN ORGANIZED HEALTH CARE EDUCATION/TRAINING PROGRAM

## 2020-01-27 PROCEDURE — 36415 COLL VENOUS BLD VENIPUNCTURE: CPT | Performed by: STUDENT IN AN ORGANIZED HEALTH CARE EDUCATION/TRAINING PROGRAM

## 2020-01-27 PROCEDURE — 63600000 HC DRUGS/DETAIL CODE: Performed by: STUDENT IN AN ORGANIZED HEALTH CARE EDUCATION/TRAINING PROGRAM

## 2020-01-27 PROCEDURE — 21400000 HC ROOM AND CARE CCU/INTERMEDIATE

## 2020-01-27 PROCEDURE — 83735 ASSAY OF MAGNESIUM: CPT | Performed by: STUDENT IN AN ORGANIZED HEALTH CARE EDUCATION/TRAINING PROGRAM

## 2020-01-27 PROCEDURE — 0DJ08ZZ INSPECTION OF UPPER INTESTINAL TRACT, VIA NATURAL OR ARTIFICIAL OPENING ENDOSCOPIC: ICD-10-PCS | Performed by: INTERNAL MEDICINE

## 2020-01-27 RX ORDER — SODIUM CHLORIDE 9 MG/ML
INJECTION, SOLUTION INTRAVENOUS CONTINUOUS PRN
Status: DISCONTINUED | OUTPATIENT
Start: 2020-01-27 | End: 2020-01-27 | Stop reason: SURG

## 2020-01-27 RX ORDER — POTASSIUM CHLORIDE 14.9 MG/ML
20 INJECTION INTRAVENOUS ONCE
Status: COMPLETED | OUTPATIENT
Start: 2020-01-27 | End: 2020-01-27

## 2020-01-27 RX ORDER — DEXAMETHASONE SODIUM PHOSPHATE 4 MG/ML
INJECTION, SOLUTION INTRA-ARTICULAR; INTRALESIONAL; INTRAMUSCULAR; INTRAVENOUS; SOFT TISSUE AS NEEDED
Status: DISCONTINUED | OUTPATIENT
Start: 2020-01-27 | End: 2020-01-27 | Stop reason: SURG

## 2020-01-27 RX ORDER — LIDOCAINE HYDROCHLORIDE 10 MG/ML
INJECTION, SOLUTION EPIDURAL; INFILTRATION; INTRACAUDAL; PERINEURAL AS NEEDED
Status: DISCONTINUED | OUTPATIENT
Start: 2020-01-27 | End: 2020-01-27 | Stop reason: SURG

## 2020-01-27 RX ORDER — ONDANSETRON HYDROCHLORIDE 2 MG/ML
INJECTION, SOLUTION INTRAVENOUS AS NEEDED
Status: DISCONTINUED | OUTPATIENT
Start: 2020-01-27 | End: 2020-01-27 | Stop reason: SURG

## 2020-01-27 RX ORDER — PROPOFOL 10 MG/ML
INJECTION, EMULSION INTRAVENOUS CONTINUOUS PRN
Status: DISCONTINUED | OUTPATIENT
Start: 2020-01-27 | End: 2020-01-27 | Stop reason: SURG

## 2020-01-27 RX ADMIN — SODIUM CHLORIDE 8 MG/HR: 0.9 INJECTION, SOLUTION INTRAVENOUS at 05:03

## 2020-01-27 RX ADMIN — MAGNESIUM SULFATE 2 G: 2 INJECTION INTRAVENOUS at 16:27

## 2020-01-27 RX ADMIN — POTASSIUM CHLORIDE 20 MEQ: 200 INJECTION, SOLUTION INTRAVENOUS at 12:05

## 2020-01-27 RX ADMIN — SODIUM CHLORIDE: 9 INJECTION, SOLUTION INTRAVENOUS at 14:22

## 2020-01-27 RX ADMIN — LIDOCAINE HYDROCHLORIDE 5 ML: 10 INJECTION, SOLUTION EPIDURAL; INFILTRATION; INTRACAUDAL; PERINEURAL at 14:28

## 2020-01-27 RX ADMIN — ONDANSETRON HYDROCHLORIDE 4 MG: 2 SOLUTION INTRAMUSCULAR; INTRAVENOUS at 14:30

## 2020-01-27 RX ADMIN — PROPOFOL 150 MCG/KG/MIN: 10 INJECTION, EMULSION INTRAVENOUS at 14:28

## 2020-01-27 RX ADMIN — ATORVASTATIN CALCIUM 40 MG: 40 TABLET, FILM COATED ORAL at 22:45

## 2020-01-27 RX ADMIN — DEXAMETHASONE SODIUM PHOSPHATE 4 MG: 4 INJECTION, SOLUTION INTRAMUSCULAR; INTRAVENOUS at 14:30

## 2020-01-27 RX ADMIN — ACETAMINOPHEN 650 MG: 325 TABLET, FILM COATED ORAL at 05:07

## 2020-01-27 RX ADMIN — ACETAMINOPHEN 650 MG: 325 TABLET, FILM COATED ORAL at 12:02

## 2020-01-27 ASSESSMENT — ENCOUNTER SYMPTOMS: DYSRHYTHMIAS: 1

## 2020-01-27 NOTE — ANESTHESIOLOGIST PRE-PROCEDURE ATTESTATION
Pre-Procedure Patient Identification:  I am the Primary Anesthesiologist and have identified the patient on 01/27/20 at 2:04 PM.   I have confirmed the following procedure(s) ENDOSCOPY, ESOPHAGUS will be performed by the following surgeon/proceduralist Cullen Castillo MD.

## 2020-01-27 NOTE — PATIENT CARE CONFERENCE
Care Progression Rounds Note  Date: 1/27/2020  Time: 11:04 AM     Patient Name: Jennifer Aly     Medical Record Number: [id number]   YOB: 1955  Sex: Female      Room/Bed: 1029    Admitting Diagnosis: Lower GI bleed [K92.2]   Admit Date/Time: 1/25/2020  3:03 PM    Primary Diagnosis: GI bleed  Principal Problem: GI bleed    GMLOS: 3  Anticipated Discharge Date: 1/28/2020    AM-PAC  Mobility Score: 24    Discharge Planning:       Barriers to Discharge:  Barriers to Discharge: Test pending  Comment: Upper GI today    Participants:  physician, , social work/services, nursing, occupational therapy

## 2020-01-27 NOTE — PLAN OF CARE
Problem: Adult Inpatient Plan of Care  Goal: Plan of Care Review  Outcome: Progressing  Flowsheets (Taken 1/27/2020 0402)  Progress: improving  Plan of Care Reviewed With: patient  Outcome Summary: AAOx3; VSS; on room air no c/o pain will continued to monitor.

## 2020-01-27 NOTE — PROCEDURES
Patient underwent upper endoscopy for presumed gastrointestinal hemorrhage.  Z line regular at 35 cm distal to the incisors.  Gastric pouch healthy-appearing, with no mucosal abnormalities or ulcerations appreciated.  Healthy-appearing GJ anastomosis, without visible sutures or staples, and no ulcerations appreciate. Efferent limb traversed without any abnormalities appreciated or blood appreciated.    Impression: No obvious bleeding source identified, concern for bleeding in more distal small bowel    Plan:  -OK for diet today, please make n.p.o. midnight.  -Gastroenterology to place video capsule endoscopy on 1/28/2020  -Further recommendations to follow this.    Christofer Diaz DO  Gastroenterology Fellow, PGY5  [id number]

## 2020-01-27 NOTE — ANESTHESIA PREPROCEDURE EVALUATION
Relevant Problems   CARDIOVASCULAR   (+) Coronary artery disease involving native coronary artery of native heart without angina pectoris   (+) Essential hypertension   (+) PAF (paroxysmal atrial fibrillation) (CMS/HCC)   (+) Paroxysmal atrial fibrillation (CMS/HCC)      GASTROINTESTINAL   (+) GERD without esophagitis   (+) GI bleed   (+) Gastrointestinal hemorrhage      HEMATOLOGY   (+) Anemia   (+) Iron deficiency anemia secondary to inadequate dietary iron intake      MUSCULOSKELETAL   (+) Acute gout involving toe of left foot   (+) Polyosteoarthritis, unspecified      NEUROLOGY   (+) History of colonic polyps   (+) History of pulmonary embolus (PE)      RESPIRATORY SYSTEM   (+) Obstructive sleep apnea   (+) Pulmonary emboli (CMS/HCC)   (+) Unspecified asthma, uncomplicated      Other   (+) Chronic UTI     Anesthesia ROS/MED HX    Anesthesia History - neg  Pulmonary    asthma   Sleep apnea  Cardiovascular   CAD   hypertension  Dysrhythmias (pAF)  GI/Hepatic   GERD  Endo/Other   Diabetes  ROS/MED HX Comments:    Cardiology: Currently holding diltiazem and warfarin   Hematological: 1/27/2020  HHPlt 25.6/8.4/148       Past Surgical History:   Procedure Laterality Date   • APPENDECTOMY  2008   • CARDIAC CATHETERIZATION  11/2012   • INTRACAPSULAR CATARACT EXTRACTION Bilateral 08/2019   • IVC FILTER RETRIEVAL  2012   • IVC FILTER RETRIEVAL  02/2013   • RECTOURETHRAL FISTULA REPAIR  2008, 5/2017   • ROTATOR CUFF REPAIR Left 01/2016   • ALTAF-EN-Y PROCEDURE  02/07/2017   • TOTAL ABDOMINAL HYSTERECTOMY  1987   • TOTAL KNEE ARTHROPLASTY Bilateral 2010   • TOTAL KNEE ARTHROPLASTY Right 2012    revision   • TRIGGER FINGER RELEASE Right 2012    right thumb   • ULNAR NERVE REPAIR  2013       Physical Exam    Airway   Mallampati: II   TM distance: >3 FB   Neck ROM: full  Cardiovascular - normal   Rhythm: regular   Rate: normalPulmonary - normal   clear to auscultation  Dental - normal        Anesthesia Plan    Plan: MAC     Technique: MAC     Lines and Monitors: PIV     Airway: natural airway / supplemental oxygen     Discussed plan with: attending  ASA 3  Blood Products:     Use of Blood Products Discussed: Yes     Consented to blood products  Anesthetic plan and risks discussed with: patient  Induction:    intravenous   Postop Plan:   Patient Disposition: phase II then home   Pain Management: IV analgesics

## 2020-01-27 NOTE — PLAN OF CARE
Problem: Adult Inpatient Plan of Care  Goal: Plan of Care Review  Outcome: Progressing  Flowsheets (Taken 1/27/2020 1642)  Progress: improving  Plan of Care Reviewed With: patient     Problem: Adult Inpatient Plan of Care  Goal: Readiness for Transition of Care  Outcome: Progressing     Problem: Adult Inpatient Plan of Care  Goal: Readiness for Transition of Care  Intervention: Mutually Develop Transition Plan  Flowsheets  Taken 1/27/2020 1642 by Tammi Shelton RN  Anticipated Discharge Disposition: home without services  Equipment Needed After Discharge: none  Discharge Coordination/Progress: Per update from CPR, ANDREW 1/28 pending progress. I met with patient this afternoon to complete dcp assessment.  Patient confirmed she lives with her significant other in a multi level home.  Patient was independent prior to admission.  Patient PCP and pharmacy verified.  Patient declines dc needs or concerns.  Anticiapte transition to home without services.  Care coordination to follow for transition of care needs until dc is complete.  Anticipated Changes Related to Illness: none  Readmission Within the Last 30 Days: no previous admission in last 30 days  Patient/Family Anticipated Services at Transition: none  Patient/Family Anticipates Transition to: home;home with family  Transportation Anticipated: family or friend will provide  Concerns to be Addressed: no discharge needs identified;denies needs/concerns at this time  Offered/Gave Vendor List: no  Taken 1/25/2020 2140 by Anna Agarwal, RN  Equipment Currently Used at Home: CPAP

## 2020-01-27 NOTE — PROGRESS NOTES
Fr. Jarrell provided Sacrament of Anointing of the Sick to Christian patient. - Charted byMarti Lowry,[facility] Intern, [phone], [phone]

## 2020-01-27 NOTE — OP NOTE
_______________________________________________________________________________  Patient Name: Jennifer Aly             Procedure Date: 1/27/2020 2:25 PM  MRN: [id number]                     Account Number: [id number]  YOB: 1955              Age: 64  Gender: Female                        Note Status: Finalized  Attending MD: JAMIL SAGE MD~ALONA  _______________________________________________________________________________  Procedure:           Upper GI endoscopy  Indications:         Suspected upper gastrointestinal bleeding  Providers:           YARI ABDULLAHI (Doctor), JOSIAS WALDROP (Fellow)  Referring MD:  Requesting Provider:  Medicines:           Propofol per Anesthesia  Complications:       No immediate complications. Estimated blood loss:  Minimal.  _______________________________________________________________________________  Procedure:           Pre-Anesthesia Assessment:  - Prior to the procedure, a History and Physical was  performed, and patient medications, allergies and  sensitivities were reviewed. The patient's tolerance of  previous anesthesia was reviewed.  - The risks and benefits of the procedure and the  sedation options and risks were discussed with the  patient. All questions were answered and informed  consent was obtained.  After obtaining informed consent, the endoscope was  passed under direct vision. Throughout the procedure,  the patient's blood pressure, pulse, and oxygen  saturations were monitored continuously. The was  introduced through the mouth, and advanced to the  efferent jejunal loop. The upper GI endoscopy was  accomplished without difficulty. The patient tolerated  the procedure well.  Findings:  The Z-line was regular and was found 35 cm from the incisors.  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 The gastrojejunal  anastomosis was characterized by healthy appearing mucosa and the  presence of no stomal  ulceration.  There is no endoscopic evidence of bleeding, inflammation or ulceration  in the entire examined stomach.  Impression:          - Z-line regular, 35 cm from the incisors.  - Alexander-en-Y gastrojejunostomy with gastrojejunal  anastomosis characterized by no stomal ulceration and  healthy appearing mucosa.  - There was no evidence of bleeding appreciated.  Recommendation:      - [facility].  - Resume regular diet today.  - NPO midnight.  - To visualize the small bowel, perform video capsule  endoscopy tomorrow.  - The findings and recommendations were discussed with  the patient and their primary physician.  Procedure Code(s):   --- Professional ---  96189, Esophagogastroduodenoscopy, flexible, transoral;  diagnostic, including collection of specimen(s) by  brushing or washing, when performed (separate procedure)  Diagnosis Code(s):   --- Professional ---  Z98.0, Intestinal bypass and anastomosis status  CPT copyright 2018 [employer]. All rights reserved.  The codes documented in this report are preliminary and upon  review may  be revised to meet current compliance requirements.  Attending Participation:  I was present and participated during the entire procedure, including  non-henriquez portions.  Cullen Castillo MD  ________________________  CULLEN CASTILLO MD~ALONA  1/27/2020 2:47:38 PM  This report has been signed electronically.  Number of Addenda: 0  Note Initiated On: 1/27/2020 2:25 PM

## 2020-01-27 NOTE — NURSING NOTE
Pt left the floor for Endo lab via stretcher accompanied by transport; telebox dc'ed and CMR notified

## 2020-01-27 NOTE — PROGRESS NOTES
"     Internal Medicine  Daily Progress Note       SUBJECTIVE   This is a 64 y.o. year-old female admitted on 2020 with Lower GI bleed [K92.2].    Interval History: Patient feels well this morning. Tired but ready for EGD. No events overnight.    OBJECTIVE      Vital signs in last 24 hours:  Temp:  [36.4 °C (97.5 °F)-37.3 °C (99.1 °F)] 36.7 °C (98.1 °F)  Heart Rate:  [54-68] 57  Resp:  [16-18] 16  BP: (107-128)/(55-63) 107/58  Temp (24hrs), Av.8 °C (98.3 °F), Min:36.4 °C (97.5 °F), Max:37.3 °C (99.1 °F)    Intake/Output     Intake Evening 20 1500 - 20 2259 Night 20 2300 - 20 0659 Day 20 07 - 20 1459 Output Evening 20 1500 - 20 2259 Night 20 2300 - 20 0659 Day 20 07 - 20 1459    I.V. 129 61.5 -- Urine 350 500 --    Blood 635 650 --                    IV Piggyback -- -- --                    Total 764 711.5 -- Total 350 500 --    Last 3 shifts --  Intake: 1475.5       Output: 850       Net: 625.5          PHYSICAL EXAMINATION      PHYSICAL EXAM  Vitals: Visit Vitals  BP (!) 107/58 (BP Location: Right upper arm, Patient Position: Lying)   Pulse (!) 57   Temp 36.7 °C (98.1 °F) (Oral)   Resp 16   Ht 1.575 m (5' 2\")   Wt 89.8 kg (197 lb 14.4 oz)   SpO2 96%   BMI 36.20 kg/m²      General: NAD, well-appearing pale    HEENT: Oropharyx clear and without exudates  No JVD  No cervical lymphadenopathy   Eyes: Conjunctival pallor  EOMI   Cardiac: RRR  No murmurs, rubs, or gallops   Pulmonary: Clear to auscultation bilaterally   Abdomen: Soft, non-tender, +BS  No rebound, no guarding   MSK: No joint deformity   Extremities: No peripheral edema   Neuro: AAO x 3, non-focal  CN II-XII intact   Psych: Appropriate, cooperative   Skin: No rashes      LINES, CATHETERS, DRAINS, AIRWAYS, AND WOUNDS   Lines, Drains, Airways, Wounds:  Peripheral IV 20 Left Antecubital (Active)   Number of days: 1       Peripheral IV 20 Right Antecubital (Active) "   Number of days: 1       Comments:      LABS / IMAGING / TELE      Labs  BMP:  Results from last 7 days   Lab Units 01/26/20  0751 01/25/20  1545   SODIUM mEQ/L 139 139   POTASSIUM mEQ/L 3.9 3.9   CHLORIDE mEQ/L 114* 112*   CO2 mEQ/L 24 22   BUN mg/dL 20 26*   CREATININE mg/dL 0.4* 0.5*   GLUCOSE mg/dL 96 136*   CALCIUM mg/dL 7.8* 8.5*       Results from last 7 days   Lab Units 01/26/20  0751   MAGNESIUM mg/dL 1.8       LFT:  Lab Results   Component Value Date    ALT 29 01/25/2020    AST 24 01/25/2020    ALKPHOS 62 01/25/2020    BILITOT 0.3 01/25/2020       CBC:  Results from last 7 days   Lab Units 01/26/20  2344 01/26/20  1610 01/26/20  0750  01/25/20  1545   WBC K/uL 6.91 7.54 5.98   < > 7.25   HEMOGLOBIN g/dL 8.1* 8.8* 8.3*   < > 6.8*   HEMATOCRIT % 24.6* 26.3* 25.4*   < > 20.7*   PLATELETS K/uL 136* 137* 132*   < > 177   DIFF TYPE   --   --   --   --  Manu   NEUTROS PCT MAN %  --   --   --   --  78   LYMPHO PCT MAN %  --   --   --   --  16   MONO PCT MAN %  --   --   --   --  4   EOSINO PCT MAN %  --   --   --   --  2   BASOS PCT MAN %  --   --   --   --  0   SEGS ABS MAN K/uL  --   --   --   --  5.66   LYMPHO ABS MAN K/uL  --   --   --   --  1.16*   MONO ABS MAN K/uL  --   --   --   --  0.29   EOS ABS MAN K/uL  --   --   --   --  0.15   BASOS ABS MAN K/uL  --   --   --   --  0.00*    < > = values in this interval not displayed.           Imaging  Imaging reviewed.     ECG/Telemetry  Telemetry reviewed.     ASSESSMENT AND PLAN      * GI bleed  Assessment & Plan  Patient with hx of bariatric surgery but no prior hx of GI bleed presenting for 3 days of melena followed by [facility] and symptomatic acute anemia. Examines hemodynamically stable. Heme+ dark stool on rectal by ED physician. Hgb on admission 6.8. Typed and screen and ordered for 2 units of PRBC. Also ordered for 1u FFP and 2mg oral vitamin K.     Concern for UGI bleed at anastomoses site given bariatric surgery hx.     - GI bleed order set in place  -  Keep 2 PIV in place   - PPI gtt   - Trend CBC q8h  - Monitor daily INR   - Keep NPO for now, will adjust diet as per GI recs  - Known to Dr. Rock, will inform patient ishere   - Monitor hemodynamics closely   - Due for EGD     History of pulmonary embolus (PE)  Assessment & Plan  Patient with hx of unprovoked PE, on warfarin. INR therapeutic on presentation.    - Hold warfarin for now given GI bleed     Anemia  Assessment & Plan  Acute anemia noted in s/o GI bleed.    - Management as per GI bleed     HLD (hyperlipidemia)  Assessment & Plan  - C/w statin    Paroxysmal atrial fibrillation (CMS/HCC)  Assessment & Plan  - Hold Dilt for now for rate control  - Hold warfarin    Essential hypertension  Assessment & Plan  - Hold Dilt    Obstructive sleep apnea  Assessment & Plan  - Hold CPAP for now        VTE Assessment: Padua    Code Status: Full Code  Estimated discharge date: 1/28/2020     ATTENDING DOCUMENTATION  ALSO SEE ATTENDING ATTESTATION SECTION OF NOTE

## 2020-01-27 NOTE — ANESTHESIA POSTPROCEDURE EVALUATION
Patient: Jennifer Aly    Procedure Summary     Date:  01/27/20 Room / Location:  [facility] (A) / [facility]    Anesthesia Start:  1422 Anesthesia Stop:  1444    Procedure:  DIAGNOSTIC UPPER GASTROINTESTINAL ENDOSCOPY WITH COLLECTION OF SPECIMEN BY WASHING (N/A Esophagus) Diagnosis:       Melena      Anemia, unspecified type      Gastrointestinal hemorrhage, unspecified gastrointestinal hemorrhage type      (Melena [K92.1])      (Anemia, unspecified type [D64.9])      (Gastrointestinal hemorrhage, unspecified gastrointestinal hemorrhage type [K92.2])    Provider:  Cullen Castillo MD Responsible Provider:  Isaac Mcadams MD    Anesthesia Type:  MAC ASA Status:  3          Anesthesia Type: MAC  PACU Vitals  1/27/2020 1438 - 1/27/2020 1538      1/27/2020  1443 1/27/2020  1445 1/27/2020  1500 1/27/2020  1515    BP:  --  (!) 83/45  (!) 92/51  (!) 85/50    Pulse:  76  76  71  75    Resp:  16  (!) 21  18  13    SpO2:  --  96 %  97 %  98 %              1/27/2020  1530             BP:  (!) 99/56       Pulse:  74       Resp:  16       SpO2:  98 %               Anesthesia Post Evaluation    Pain management: adequate  Patient location during evaluation: PACU  Patient participation: complete - patient participated  Level of consciousness: awake and alert  Cardiovascular status: acceptable  Airway Patency: adequate  Respiratory status: acceptable  Hydration status: acceptable  Anesthetic complications: no

## 2020-01-28 ENCOUNTER — ANESTHESIA EVENT (INPATIENT)
Dept: ENDOSCOPY | Facility: HOSPITAL | Age: 65
DRG: 378 | End: 2020-01-28
Payer: MEDICARE

## 2020-01-28 ENCOUNTER — ANESTHESIA (INPATIENT)
Dept: ENDOSCOPY | Facility: HOSPITAL | Age: 65
DRG: 378 | End: 2020-01-28
Payer: MEDICARE

## 2020-01-28 LAB
ANION GAP SERPL CALC-SCNC: 9 MEQ/L (ref 3–15)
BUN SERPL-MCNC: 9 MG/DL (ref 8–20)
CALCIUM SERPL-MCNC: 9.1 MG/DL (ref 8.9–10.3)
CHLORIDE SERPL-SCNC: 107 MEQ/L (ref 98–109)
CO2 SERPL-SCNC: 24 MEQ/L (ref 22–32)
CREAT SERPL-MCNC: 0.5 MG/DL
ERYTHROCYTE [DISTWIDTH] IN BLOOD BY AUTOMATED COUNT: 15.9 % (ref 11.7–14.4)
ERYTHROCYTE [DISTWIDTH] IN BLOOD BY AUTOMATED COUNT: 16.6 % (ref 11.7–14.4)
GFR SERPL CREATININE-BSD FRML MDRD: >60 ML/MIN/1.73M*2
GLUCOSE SERPL-MCNC: 118 MG/DL (ref 70–99)
HCT VFR BLDCO AUTO: 25.1 %
HCT VFR BLDCO AUTO: 28.8 %
HGB BLD-MCNC: 8.1 G/DL (ref 11.8–15.7)
HGB BLD-MCNC: 9.5 G/DL (ref 11.8–15.7)
INR PPP: 1.1 INR
MAGNESIUM SERPL-MCNC: 2 MG/DL (ref 1.8–2.5)
MCH RBC QN AUTO: 30.9 PG (ref 28–33.2)
MCH RBC QN AUTO: 31.3 PG (ref 28–33.2)
MCHC RBC AUTO-ENTMCNC: 32.3 G/DL (ref 32.2–35.5)
MCHC RBC AUTO-ENTMCNC: 33 G/DL (ref 32.2–35.5)
MCV RBC AUTO: 94.7 FL (ref 83–98)
MCV RBC AUTO: 95.8 FL (ref 83–98)
PDW BLD AUTO: 10 FL (ref 9.4–12.3)
PDW BLD AUTO: 9.7 FL (ref 9.4–12.3)
PLATELET # BLD AUTO: 187 K/UL
PLATELET # BLD AUTO: 209 K/UL
POTASSIUM SERPL-SCNC: 4.2 MEQ/L (ref 3.6–5.1)
PROTHROMBIN TIME: 13.6 SEC (ref 12.2–14.5)
RBC # BLD AUTO: 2.62 M/UL (ref 3.93–5.22)
RBC # BLD AUTO: 3.04 M/UL (ref 3.93–5.22)
SODIUM SERPL-SCNC: 140 MEQ/L (ref 136–144)
WBC # BLD AUTO: 10.1 K/UL
WBC # BLD AUTO: 7.47 K/UL

## 2020-01-28 PROCEDURE — 0DJ07ZZ INSPECTION OF UPPER INTESTINAL TRACT, VIA NATURAL OR ARTIFICIAL OPENING: ICD-10-PCS | Performed by: INTERNAL MEDICINE

## 2020-01-28 PROCEDURE — 25800000 HC PHARMACY IV SOLUTIONS: Performed by: STUDENT IN AN ORGANIZED HEALTH CARE EDUCATION/TRAINING PROGRAM

## 2020-01-28 PROCEDURE — 63700000 HC SELF-ADMINISTRABLE DRUG: Performed by: STUDENT IN AN ORGANIZED HEALTH CARE EDUCATION/TRAINING PROGRAM

## 2020-01-28 PROCEDURE — 25800000 HC PHARMACY IV SOLUTIONS: Performed by: NURSE ANESTHETIST, CERTIFIED REGISTERED

## 2020-01-28 PROCEDURE — 83735 ASSAY OF MAGNESIUM: CPT | Performed by: STUDENT IN AN ORGANIZED HEALTH CARE EDUCATION/TRAINING PROGRAM

## 2020-01-28 PROCEDURE — 25000000 HC PHARMACY GENERAL: Performed by: NURSE ANESTHETIST, CERTIFIED REGISTERED

## 2020-01-28 PROCEDURE — 63600000 HC DRUGS/DETAIL CODE: Mod: JW | Performed by: NURSE ANESTHETIST, CERTIFIED REGISTERED

## 2020-01-28 PROCEDURE — 80048 BASIC METABOLIC PNL TOTAL CA: CPT | Performed by: STUDENT IN AN ORGANIZED HEALTH CARE EDUCATION/TRAINING PROGRAM

## 2020-01-28 PROCEDURE — 37000002 HC ANESTHESIA MAC: Performed by: INTERNAL MEDICINE

## 2020-01-28 PROCEDURE — 85610 PROTHROMBIN TIME: CPT | Performed by: STUDENT IN AN ORGANIZED HEALTH CARE EDUCATION/TRAINING PROGRAM

## 2020-01-28 PROCEDURE — 85027 COMPLETE CBC AUTOMATED: CPT | Performed by: STUDENT IN AN ORGANIZED HEALTH CARE EDUCATION/TRAINING PROGRAM

## 2020-01-28 PROCEDURE — 99223 1ST HOSP IP/OBS HIGH 75: CPT | Performed by: INTERNAL MEDICINE

## 2020-01-28 PROCEDURE — 36415 COLL VENOUS BLD VENIPUNCTURE: CPT | Performed by: STUDENT IN AN ORGANIZED HEALTH CARE EDUCATION/TRAINING PROGRAM

## 2020-01-28 PROCEDURE — 75000081 HC EGD W WO BRUSH WASH: Performed by: INTERNAL MEDICINE

## 2020-01-28 PROCEDURE — 25000000 HC PHARMACY GENERAL: Performed by: STUDENT IN AN ORGANIZED HEALTH CARE EDUCATION/TRAINING PROGRAM

## 2020-01-28 PROCEDURE — 21400000 HC ROOM AND CARE CCU/INTERMEDIATE

## 2020-01-28 RX ORDER — ONDANSETRON HYDROCHLORIDE 2 MG/ML
INJECTION, SOLUTION INTRAVENOUS AS NEEDED
Status: DISCONTINUED | OUTPATIENT
Start: 2020-01-28 | End: 2020-01-28 | Stop reason: SURG

## 2020-01-28 RX ORDER — LIDOCAINE HYDROCHLORIDE 10 MG/ML
INJECTION, SOLUTION EPIDURAL; INFILTRATION; INTRACAUDAL; PERINEURAL AS NEEDED
Status: DISCONTINUED | OUTPATIENT
Start: 2020-01-28 | End: 2020-01-28 | Stop reason: SURG

## 2020-01-28 RX ORDER — PANTOPRAZOLE SODIUM 40 MG/10ML
40 INJECTION, POWDER, LYOPHILIZED, FOR SOLUTION INTRAVENOUS EVERY 12 HOURS
Status: DISCONTINUED | OUTPATIENT
Start: 2020-01-28 | End: 2020-01-30

## 2020-01-28 RX ORDER — SODIUM CHLORIDE 9 MG/ML
INJECTION, SOLUTION INTRAVENOUS CONTINUOUS PRN
Status: DISCONTINUED | OUTPATIENT
Start: 2020-01-28 | End: 2020-01-28 | Stop reason: SURG

## 2020-01-28 RX ORDER — PROPOFOL 10 MG/ML
INJECTION, EMULSION INTRAVENOUS CONTINUOUS PRN
Status: DISCONTINUED | OUTPATIENT
Start: 2020-01-28 | End: 2020-01-28 | Stop reason: SURG

## 2020-01-28 RX ORDER — PROPOFOL 200MG/20ML
SYRINGE (ML) INTRAVENOUS AS NEEDED
Status: DISCONTINUED | OUTPATIENT
Start: 2020-01-28 | End: 2020-01-28 | Stop reason: SURG

## 2020-01-28 RX ADMIN — LIDOCAINE HYDROCHLORIDE 5 ML: 10 INJECTION, SOLUTION EPIDURAL; INFILTRATION; INTRACAUDAL; PERINEURAL at 08:45

## 2020-01-28 RX ADMIN — PROPOFOL 20 MG: 10 INJECTION, EMULSION INTRAVENOUS at 08:47

## 2020-01-28 RX ADMIN — SODIUM CHLORIDE: 9 INJECTION, SOLUTION INTRAVENOUS at 08:35

## 2020-01-28 RX ADMIN — PANTOPRAZOLE SODIUM 40 MG: 40 INJECTION, POWDER, LYOPHILIZED, FOR SOLUTION INTRAVENOUS at 20:12

## 2020-01-28 RX ADMIN — PROPOFOL 50 MG: 10 INJECTION, EMULSION INTRAVENOUS at 08:45

## 2020-01-28 RX ADMIN — ONDANSETRON HYDROCHLORIDE 4 MG: 2 SOLUTION INTRAMUSCULAR; INTRAVENOUS at 08:53

## 2020-01-28 RX ADMIN — ATORVASTATIN CALCIUM 40 MG: 40 TABLET, FILM COATED ORAL at 20:12

## 2020-01-28 RX ADMIN — SODIUM CHLORIDE 8 MG/HR: 0.9 INJECTION, SOLUTION INTRAVENOUS at 05:14

## 2020-01-28 RX ADMIN — PROPOFOL 20 MG: 10 INJECTION, EMULSION INTRAVENOUS at 08:46

## 2020-01-28 RX ADMIN — PROPOFOL 150 MCG/KG/MIN: 10 INJECTION, EMULSION INTRAVENOUS at 08:45

## 2020-01-28 ASSESSMENT — ENCOUNTER SYMPTOMS: DYSRHYTHMIAS: 1

## 2020-01-28 NOTE — PATIENT CARE CONFERENCE
Care Progression Rounds Note  Date: 1/28/2020  Time: 11:20 AM     Patient Name: Jennifer Aly     Medical Record Number: [id number]   YOB: 1955  Sex: Female      Room/Bed: 1029    Admitting Diagnosis: Lower GI bleed [K92.2]   Admit Date/Time: 1/25/2020  3:03 PM    Primary Diagnosis: GI bleed  Principal Problem: GI bleed    GMLOS: 3  Anticipated Discharge Date: 1/29/2020    AM-PAC  Mobility Score: 24    Discharge Planning:  Anticipated Discharge Disposition: home without services    Barriers to Discharge:  Barriers to Discharge: Medical issues not resolved  Comment: capsule study today     Participants:  nursing, , social work/services

## 2020-01-28 NOTE — PROCEDURES
Patient underwent upper endoscopy for presumed gastrointestinal hemorrhage.  Z line regular at 35 cm distal to the incisors.  Gastric pouch healthy-appearing, with no mucosal abnormalities or ulcerations appreciated.  Healthy-appearing GJ anastomosis, without visible sutures or staples, and no ulcerations appreciate. Efferent limb traversed without any abnormalities appreciated or blood appreciated. No capsule appreciated in stomach, blind end of GJ, or traversed GJ, implying successful passage down jejunal limb.       Plan:  -OK for diet today.  -May remove capsule equipment at 8pm and place in room for GI pickup and download in AM.    Discussed with patient and team.    Christofer Diaz DO  Gastroenterology Fellow, PGY5  [id number]

## 2020-01-28 NOTE — NURSING NOTE
Bedside handoff completed; VSS; pt resting comfortably in bed; protonix gtt at 8 mg/hr; family at bedside; assessment completed; call light within reach

## 2020-01-28 NOTE — OP NOTE
_______________________________________________________________________________  Patient Name: Jennifer Aly             Procedure Date: 1/28/2020 8:09 AM  MRN: [id number]                     Account Number: [id number]  YOB: 1955              Age: 64  Gender: Female                        Note Status: Finalized  Attending MD: JAMIL SAGE MD~ALONA  _______________________________________________________________________________  Procedure:           Upper GI endoscopy  Indications:         Video Capsule Endoscopy Placement  Providers:           JAMIL SAGE MD~ALONA (Doctor), JOSIAS WALDROP (Fellow)  Referring MD:        KAMILA THOMAS  Requesting Provider:  Medicines:           Propofol per Anesthesia  Complications:       No immediate complications. Estimated blood loss: None.  _______________________________________________________________________________  Procedure:           Pre-Anesthesia Assessment:  - Prior to the procedure, a History and Physical was  performed, and patient medications, allergies and  sensitivities were reviewed. The patient's tolerance of  previous anesthesia was reviewed.  - The risks and benefits of the procedure and the  sedation options and risks were discussed with the  patient. All questions were answered and informed  consent was obtained.  After obtaining informed consent, the endoscope was  passed under direct vision. Throughout the procedure,  the patient's blood pressure, pulse, and oxygen  saturations were monitored continuously. The was  introduced through the mouth, and advanced to the  efferent jejunal loop. The upper GI endoscopy was  accomplished without difficulty. The patient tolerated  the procedure well.  Findings:  The Z-line was regular and was found 35 cm from the incisors.  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 The gastrojejunal  anastomosis was characterized by healthy appearing mucosa. This  was  traversed. The pouch-to-jejunum limb was characterized by healthy  appearing mucosa and the presence of no stomal ulceration. The  duodenum-to-jejunum limb was not examined as it could not be reached.  The blind end of the anastomosis was carefully examined without sign of  VCE. The stomach was carefully examined without sign of VCE. The  efferent jejunal limb was traversed as far as possible, and VCE appears  to have successfully passed into the small intestine.  Impression:          - Z-line regular, 35 cm from the incisors.  - Alexander-en-Y gastrojejunostomy with gastrojejunal  anastomosis characterized by healthy appearing mucosa.  The blind end of the anastomosis was carefully examined  without sign of VCE. The stomach was carefully examined  without sign of VCE. The efferent jejunal limb was  traversed as far as possible, and VCE appears to have  successfully passed into the small intestine.  - No specimens collected.  Recommendation:      - [facility]  - Resume regular diet.  - Management of VCE equipment as discussed with team.  - Further recommendations to follow review of video  capsule endoscopy.  - The findings and recommendations were discussed with  the patient and their primary physician.  Procedure Code(s):   --- Professional ---  30250, Esophagogastroduodenoscopy, flexible, transoral;  diagnostic, including collection of specimen(s) by  brushing or washing, when performed (separate procedure)  Diagnosis Code(s):   --- Professional ---  Z98.0, Intestinal bypass and anastomosis status  CPT copyright 2018 [employer]. All rights reserved.  The codes documented in this report are preliminary and upon  review may  be revised to meet current compliance requirements.  Attending Participation:  I was present and participated during the entire procedure, including  non-henriquez portions.  Cullen Castillo MD  ________________________  CULLEN CASTILLO,  MD~ALONA  1/28/2020 10:59:53 AM  This report has been signed electronically.  Number of Addenda: 0  Note Initiated On: 1/28/2020 8:09 AM

## 2020-01-28 NOTE — PROGRESS NOTES
"     Internal Medicine  Daily Progress Note       SUBJECTIVE   This is a 64 y.o. year-old female admitted on 2020 with Lower GI bleed [K92.2].    Interval History: Patient feels significantly better this morning. She feels like she has some color back and has more energy. Discussed plan of the day with her.    OBJECTIVE      Vital signs in last 24 hours:  Temp:  [36.1 °C (97 °F)-36.6 °C (97.9 °F)] 36.3 °C (97.4 °F)  Heart Rate:  [55-82] 74  Resp:  [11-21] 17  BP: ()/(45-68) 104/57  Temp (24hrs), Av.3 °C (97.3 °F), Min:36.1 °C (97 °F), Max:36.6 °C (97.9 °F)    Intake/Output     Intake Evening 20 1500 - 20 2259 Night 20 230 - 20 0659 Day 20 - 20 1459 Output Evening 20 - 209 Night 200 - 20 0659 Day 20 07 - 20 1459    I.V. 129 61.5 -- Urine 350 500 --    Blood 635 650 --                    IV Piggyback -- -- --                    Total 764 711.5 -- Total 350 500 --    Last 3 shifts --  Intake: 1475.5       Output: 850       Net: 625.5          PHYSICAL EXAMINATION      PHYSICAL EXAM  Vitals: Visit Vitals  BP (!) 104/57 (BP Location: Right upper arm, Patient Position: Lying)   Pulse 74   Temp 36.3 °C (97.4 °F) (Temporal)   Resp 17   Ht 1.575 m (5' 2\")   Wt 89.8 kg (198 lb)   SpO2 98%   BMI 36.21 kg/m²      General: NAD, well-appearing    HEENT: Oropharyx clear and without exudates  No JVD  No cervical lymphadenopathy   Eyes: Conjunctival pallor  EOMI   Cardiac: RRR  No murmurs, rubs, or gallops   Pulmonary: Clear to auscultation bilaterally   Abdomen: Soft, non-tender, +BS  No rebound, no guarding   MSK: No joint deformity   Extremities: No peripheral edema   Neuro: AAO x 3, non-focal  CN II-XII intact   Psych: Appropriate, cooperative   Skin: No rashes      LINES, CATHETERS, DRAINS, AIRWAYS, AND WOUNDS   Lines, Drains, Airways, Wounds:  Peripheral IV 20 Left Antecubital (Active)   Number of days: 1     "   Peripheral IV 01/25/20 Right Antecubital (Active)   Number of days: 1       Comments:      LABS / IMAGING / TELE      Labs  BMP:  Results from last 7 days   Lab Units 01/28/20  0711 01/27/20  0931 01/26/20  0751   SODIUM mEQ/L 140 139 139   POTASSIUM mEQ/L 4.2 3.9 3.9   CHLORIDE mEQ/L 107 109 114*   CO2 mEQ/L 24 22 24   BUN mg/dL 9 11 20   CREATININE mg/dL 0.5* 0.5* 0.4*   GLUCOSE mg/dL 118* 81 96   CALCIUM mg/dL 9.1 8.5* 7.8*       Results from last 7 days   Lab Units 01/28/20  0711   MAGNESIUM mg/dL 2.0       LFT:  Lab Results   Component Value Date    ALT 29 01/25/2020    AST 24 01/25/2020    ALKPHOS 62 01/25/2020    BILITOT 0.3 01/25/2020       CBC:  Results from last 7 days   Lab Units 01/28/20  0711 01/27/20  2104 01/27/20  0931  01/25/20  1545   WBC K/uL 10.10 8.53 6.99   < > 7.25   HEMOGLOBIN g/dL 9.5* 8.7* 8.4*   < > 6.8*   HEMATOCRIT % 28.8* 26.8* 25.6*   < > 20.7*   PLATELETS K/uL 209 168 148*   < > 177   DIFF TYPE   --   --   --   --  Manu   NEUTROS PCT MAN %  --   --   --   --  78   LYMPHO PCT MAN %  --   --   --   --  16   MONO PCT MAN %  --   --   --   --  4   EOSINO PCT MAN %  --   --   --   --  2   BASOS PCT MAN %  --   --   --   --  0   SEGS ABS MAN K/uL  --   --   --   --  5.66   LYMPHO ABS MAN K/uL  --   --   --   --  1.16*   MONO ABS MAN K/uL  --   --   --   --  0.29   EOS ABS MAN K/uL  --   --   --   --  0.15   BASOS ABS MAN K/uL  --   --   --   --  0.00*    < > = values in this interval not displayed.           Imaging  Imaging reviewed.     ECG/Telemetry  Telemetry reviewed.     ASSESSMENT AND PLAN      * GI bleed  Assessment & Plan  Patient with hx of bariatric surgery but no prior hx of GI bleed presenting for 3 days of melena followed by [facility] and symptomatic acute anemia. Examines hemodynamically stable. Heme+ dark stool on rectal by ED physician. Hgb on admission 6.8. Typed and screen and ordered for 2 units of PRBC. Also ordered for 1u FFP and 2mg oral vitamin K.     Concern for UGI  bleed at anastomoses site given bariatric surgery hx. S/p EGD w/o evidence of bleeding.     - Follow up on VCE  - GI bleed order set in place  - Keep 2 PIV in place   - PPI gtt   - Trend CBC q12h  - Monitor daily INR   - Known to Dr. Rock, informed patient ishere   - Monitor hemodynamics closely     History of pulmonary embolus (PE)  Assessment & Plan  Patient with hx of unprovoked PE, on warfarin. INR therapeutic on presentation.    - Hold warfarin for now given GI bleed     Anemia  Assessment & Plan  Acute anemia noted in s/o GI bleed.    - Management as per GI bleed     HLD (hyperlipidemia)  Assessment & Plan  - C/w statin    Paroxysmal atrial fibrillation (CMS/HCC)  Assessment & Plan  - Hold Dilt for now for rate control  - Hold warfarin    Essential hypertension  Assessment & Plan  - Hold Dilt    Obstructive sleep apnea  Assessment & Plan  - Hold CPAP for now        VTE Assessment: Padua    Code Status: Full Code  Estimated discharge date: 1/29/2020     ATTENDING DOCUMENTATION  ALSO SEE ATTENDING ATTESTATION SECTION OF NOTE

## 2020-01-28 NOTE — ANESTHESIA POSTPROCEDURE EVALUATION
Patient: Jennifer Aly    Procedure Summary     Date:  01/28/20 Room / Location:  [facility] (A[facility]    Anesthesia Start:  0835 Anesthesia Stop:  0904    Procedure:  DIAGNOSTIC UPPER GASTROINTESTINAL ENDOSCOPY WITH COLLECTION OF SPECIMEN BY WASHING (N/A Mouth) Diagnosis:       Melena      (Melena [K92.1])    Provider:  Cullen Castillo MD Responsible Provider:  Kimberlyn Osei MD    Anesthesia Type:  MAC ASA Status:  3          Anesthesia Type: MAC  PACU Vitals  1/28/2020 0858 - 1/28/2020 0938      1/28/2020  0902 1/28/2020  0905 1/28/2020  0914 1/28/2020  0922    BP:  (!) 92/46  (!) 101/53  (!) 107/56  (!) 114/55    Temp:  --  --  --  36.1 °C (97 °F)    Pulse:  60  62  64  72    Resp:  --  20  20  19    SpO2:  98 %  98 %  98 %  --            Anesthesia Post Evaluation    Pain management: adequate  Patient location during evaluation: PACU  Patient participation: complete - patient participated  Level of consciousness: awake and alert  Cardiovascular status: acceptable  Airway Patency: adequate  Respiratory status: acceptable  Hydration status: acceptable  Anesthetic complications: no

## 2020-01-28 NOTE — ANESTHESIA PREPROCEDURE EVALUATION
Relevant Problems   CARDIOVASCULAR   (+) Coronary artery disease involving native coronary artery of native heart without angina pectoris   (+) Essential hypertension   (+) PAF (paroxysmal atrial fibrillation) (CMS/HCC)   (+) Paroxysmal atrial fibrillation (CMS/HCC)      GASTROINTESTINAL   (+) GERD without esophagitis   (+) GI bleed   (+) Gastrointestinal hemorrhage      HEMATOLOGY   (+) Anemia   (+) Iron deficiency anemia secondary to inadequate dietary iron intake      MUSCULOSKELETAL   (+) Acute gout involving toe of left foot   (+) Polyosteoarthritis, unspecified      NEUROLOGY   (+) History of colonic polyps   (+) History of pulmonary embolus (PE)      RESPIRATORY SYSTEM   (+) Obstructive sleep apnea   (+) Pulmonary emboli (CMS/HCC)   (+) Unspecified asthma, uncomplicated      Other   (+) Chronic UTI     Anesthesia ROS/MED HX    Anesthesia History    Previous anesthetics (EGD 20 under MAC w/o issues)  No history of anesthetic complications  Pulmonary    asthma   Sleep apnea   PE (multiple years ago, no recent symptoms)  Cardiovascular   CAD   hypertension  Dysrhythmias (pAF) and atrial fibrillation (paroxysmal)   ECG reviewed  Hematological    anemia (Hgb 8.7, s/p transfusions)  GI/Hepatic   GI Bleeding   GERD    Control: well controlled  Renal Disease- neg  Endo/Other   Diabetes (resolved s/p gastric bypass)  ROS/MED HX Comments:    Neurology/Psychology: R hand numbness in ulnar distribution   Cardiology: Currently holding diltiazem and warfarin   EC2020 EKG:  Normal sinus rhythm  Normal ECG  When compared with ECG of 2017 12:54,  Vent. rate has increased BY  35 BPM   GI/Hepatic/Renal: H/o Alexander-en-Y procedure   Musculoskeletal: Polyarthritis    H/o gout       Physical Exam    Airway   Mallampati: II   TM distance: >3 FB   Neck ROM: full  Cardiovascular - normal   Rhythm: regular   Rate: normalPulmonary - normal  Dental - normal        Anesthesia Plan    Plan: MAC    Technique: MAC     Lines  and Monitors: PIV     Airway: natural airway / supplemental oxygen     Discussed plan with: attending  ASA 3  Blood Products:     Use of Blood Products Discussed: Yes     Consented to blood products  Anesthetic plan and risks discussed with: patient  Induction:    intravenous   Postop Plan:   Patient Disposition: phase II then home   Pain Management: IV analgesics  Comments:    Plan: Consented for both MAC, and GA back-up. Patient understands that as part of MAC, it is possible to have experience of transient awareness under anesthesia. Discussed associated risks including, but not limited to, sore throat, nausea/vomiting, oral injury, unexpected drug reactions, big swings in blood pressure, pulmonary complications, heart attack, stroke, and death. Patient advised of risks, benefits, and alternatives, and all questions/concerns answered.      Patient Active Problem List   Diagnosis   • Diaphragmatic hernia without obstruction or gangrene   • Morbid obesity due to excess calories (CMS/HCC)   • Obstructive sleep apnea   • Polyosteoarthritis, unspecified   • Pulmonary emboli (CMS/HCC)   • Unspecified asthma, uncomplicated   • Pure hypercholesterolemia   • Acute gout involving toe of left foot   • Toe pain, left   • IFG (impaired fasting glucose)   • Essential hypertension   • Paroxysmal atrial fibrillation (CMS/HCC)   • Coronary artery disease involving native coronary artery of native heart without angina pectoris   • Iron deficiency anemia secondary to inadequate dietary iron intake   • PAF (paroxysmal atrial fibrillation) (CMS/HCC)   • GERD without esophagitis   • Chronic UTI   • Seasonal allergies   • Bilateral impacted cerumen   • Pulmonary nodule   • History of colonic polyps   • Age-related nuclear cataract of left eye   • HLD (hyperlipidemia)   • GI bleed   • Anemia   • History of pulmonary embolus (PE)   • Melena   • Gastrointestinal hemorrhage        Past Medical History:   Diagnosis Date   • Arthritis    •  DMII (diabetes mellitus, type 2) (CMS/HCC)    • Fistula    • Gout    • HLD (hyperlipidemia)    • HTN (hypertension)    • IFG (impaired fasting glucose)    • Obesity    • Polyarthritis    • Pulmonary emboli (CMS/HCC)     pulmonary emboli   • Torn rotator cuff        Past Surgical History:   Procedure Laterality Date   • APPENDECTOMY  2008   • CARDIAC CATHETERIZATION  11/2012   • INTRACAPSULAR CATARACT EXTRACTION Bilateral 08/2019   • IVC FILTER RETRIEVAL  2012   • IVC FILTER RETRIEVAL  02/2013   • RECTOURETHRAL FISTULA REPAIR  2008, 5/2017   • ROTATOR CUFF REPAIR Left 01/2016   • ALTAF-EN-Y PROCEDURE  02/07/2017   • TOTAL ABDOMINAL HYSTERECTOMY  1987   • TOTAL KNEE ARTHROPLASTY Bilateral 2010   • TOTAL KNEE ARTHROPLASTY Right 2012    revision   • TRIGGER FINGER RELEASE Right 2012    right thumb   • ULNAR NERVE REPAIR  2013       Allergies   Allergen Reactions   • Sulfa (Sulfonamide Antibiotics) Hives       Current Facility-Administered Medications   Medication Dose Route Frequency   • acetaminophen  650 mg oral q6h PRN   • atorvastatin  40 mg oral Daily (6p)   • glucose  16-32 g of dextrose oral PRN    Or   • dextrose  15-30 g of dextrose oral PRN    Or   • glucagon  1 mg intramuscular PRN    Or   • dextrose in water  25 mL intravenous PRN   • ondansetron  4 mg intravenous q6h PRN   • pantoprozole (PROTONIX) infusion  8 mg/hr intravenous Continuous       Prior to Admission medications    Medication Sig Start Date End Date Taking? Authorizing Provider   atorvastatin (LIPITOR) 40 mg tablet TAKE 1 TABLET BY MOUTH EVERY DAY 1/20/20   Demarco Moreau DO   calcium carbonate-vitamin D3 500 mg(1,250mg) -200 unit per tablet Take 2 tablets by mouth 2 (two) times a day with meals.   3/18/16   ProviderBaldemar MD   clobetasol (TEMOVATE) 0.05 % cream  1/15/20   ProviderBaldemar MD   cyclobenzaprine (FLEXERIL) 5 mg tablet Take 1 tablet (5 mg total) by mouth 3 (three) times a day as needed for muscle spasms. 12/31/19    Rosamaria Zacarias,    diltiazem (TIAZAC) 120 mg 24 hr capsule TAKE ONE CAPSULE BY MOUTH EVERY DAY 9/26/19   Demarco Moreau DO   fluticasone propionate (FLONASE) 50 mcg/actuation nasal spray Administer 1 spray into each nostril daily as needed for rhinitis. 10/29/19   Tita Man MD   warfarin (COUMADIN) 4 mg tablet Take 2 by mouth daily (8 mg daily) except T/TH take one (4mg) 12/23/19   Demarco Moreau DO       Vitals:    01/27/20 2246 01/28/20 0257 01/28/20 0500 01/28/20 0739   BP: 115/68  (!) 112/56 (!) 117/59   BP Location: Right upper arm  Right upper arm    Patient Position: Lying  Lying    Pulse: 72 (!) 57 (!) 55 (!) 58   Resp: 18  16 16   Temp: 36.3 °C (97.3 °F)  36.3 °C (97.3 °F)    TempSrc: Temporal  Temporal    SpO2:    99%   Weight:   89.8 kg (198 lb)    Height:           CBC Results       01/27/20 01/27/20 01/26/20                    2104 0931 2344         WBC 8.53 6.99 6.91         RBC 2.83 2.70 2.64         HGB 8.7 8.4 8.1         HCT 26.8 25.6 24.6         MCV 94.7 94.8 93.2         MCH 30.7 31.1 30.7         MCHC 32.5 32.8 32.9          148 136                       BMP Results       01/27/20 01/26/20 01/25/20                    0931 0751 1545          139 139         K 3.9 3.9 3.9         Cl 109 114 112         CO2 22 24 22         Glucose 81 96 136         BUN 11 20 26         Creatinine 0.5 0.4 0.5         Calcium 8.5 7.8 8.5         Anion Gap 8 1 5         EGFR >60.0 >60.0 >60.0         Comment for K at 1545 on 01/25/20:    Results obtained on plasma. Plasma Potassium values may be up to 0.4 mEQ/L less than serum values. The differences may be greater for patients with high platelet or white cell counts.          Results from last 7 days   Lab Units 01/27/20  0931  01/25/20  1544   INR INR 1.2   < > 3.8*   PTT sec  --   --  40*    < > = values in this interval not displayed.       The patient does not have an active anticoagulation episode.    No results found for:  HCGPREGUR, PREGSERUM, HCG, HCGQUANT    ECG 12 lead   Final Result

## 2020-01-28 NOTE — ANESTHESIOLOGIST PRE-PROCEDURE ATTESTATION
Pre-Procedure Patient Identification:  I am the Primary Anesthesiologist and have identified the patient on 01/28/20 at 7:58 AM.   I have confirmed the following procedure(s) UPPER GASTROINTESTINAL ENDOSCOPY WITH CONTROL OF BLEEDING will be performed by the following surgeon/proceduralist Cullen Castillo MD.

## 2020-01-28 NOTE — NURSING NOTE
Pt is back from EGD/study, denies nausea, dizziness/light-headedness and also denies any pain. Pt ambulated in room w/ supervision and is now resting in the bed, phone and call bell placed within reach. All needs met. Pt is low fall risk per rn clinical judgement. Will CTM

## 2020-01-29 LAB
ANION GAP SERPL CALC-SCNC: 9 MEQ/L (ref 3–15)
BUN SERPL-MCNC: 8 MG/DL (ref 8–20)
CALCIUM SERPL-MCNC: 8.8 MG/DL (ref 8.9–10.3)
CHLORIDE SERPL-SCNC: 108 MEQ/L (ref 98–109)
CO2 SERPL-SCNC: 25 MEQ/L (ref 22–32)
CREAT SERPL-MCNC: 0.6 MG/DL
CROSSMATCH: NORMAL
CROSSMATCH: NORMAL
ERYTHROCYTE [DISTWIDTH] IN BLOOD BY AUTOMATED COUNT: 16.7 % (ref 11.7–14.4)
ERYTHROCYTE [DISTWIDTH] IN BLOOD BY AUTOMATED COUNT: 16.7 % (ref 11.7–14.4)
GFR SERPL CREATININE-BSD FRML MDRD: >60 ML/MIN/1.73M*2
GLUCOSE SERPL-MCNC: 89 MG/DL (ref 70–99)
HCT VFR BLDCO AUTO: 27 %
HCT VFR BLDCO AUTO: 27.6 %
HGB BLD-MCNC: 8.6 G/DL (ref 11.8–15.7)
HGB BLD-MCNC: 8.8 G/DL (ref 11.8–15.7)
INR PPP: 1 INR
ISBT CODE: 5100
ISBT CODE: 5100
MAGNESIUM SERPL-MCNC: 1.8 MG/DL (ref 1.8–2.5)
MCH RBC QN AUTO: 30.9 PG (ref 28–33.2)
MCH RBC QN AUTO: 31.1 PG (ref 28–33.2)
MCHC RBC AUTO-ENTMCNC: 31.9 G/DL (ref 32.2–35.5)
MCHC RBC AUTO-ENTMCNC: 31.9 G/DL (ref 32.2–35.5)
MCV RBC AUTO: 97.1 FL (ref 83–98)
MCV RBC AUTO: 97.5 FL (ref 83–98)
PDW BLD AUTO: 10 FL (ref 9.4–12.3)
PDW BLD AUTO: 10.3 FL (ref 9.4–12.3)
PLATELET # BLD AUTO: 195 K/UL
PLATELET # BLD AUTO: 202 K/UL
POTASSIUM SERPL-SCNC: 4.3 MEQ/L (ref 3.6–5.1)
PRODUCT CODE: NORMAL
PRODUCT CODE: NORMAL
PRODUCT STATUS: NORMAL
PRODUCT STATUS: NORMAL
PROTHROMBIN TIME: 13 SEC (ref 12.2–14.5)
RBC # BLD AUTO: 2.78 M/UL (ref 3.93–5.22)
RBC # BLD AUTO: 2.83 M/UL (ref 3.93–5.22)
SODIUM SERPL-SCNC: 142 MEQ/L (ref 136–144)
SPECIMEN EXP DATE BLD: NORMAL
SPECIMEN EXP DATE BLD: NORMAL
UNIT ABO: NORMAL
UNIT ABO: NORMAL
UNIT ID: NORMAL
UNIT ID: NORMAL
UNIT RH: POSITIVE
UNIT RH: POSITIVE
WBC # BLD AUTO: 7.11 K/UL
WBC # BLD AUTO: 7.29 K/UL

## 2020-01-29 PROCEDURE — 21400000 HC ROOM AND CARE CCU/INTERMEDIATE

## 2020-01-29 PROCEDURE — 85610 PROTHROMBIN TIME: CPT | Performed by: STUDENT IN AN ORGANIZED HEALTH CARE EDUCATION/TRAINING PROGRAM

## 2020-01-29 PROCEDURE — 80048 BASIC METABOLIC PNL TOTAL CA: CPT | Performed by: STUDENT IN AN ORGANIZED HEALTH CARE EDUCATION/TRAINING PROGRAM

## 2020-01-29 PROCEDURE — 36415 COLL VENOUS BLD VENIPUNCTURE: CPT | Performed by: STUDENT IN AN ORGANIZED HEALTH CARE EDUCATION/TRAINING PROGRAM

## 2020-01-29 PROCEDURE — 25000000 HC PHARMACY GENERAL: Performed by: STUDENT IN AN ORGANIZED HEALTH CARE EDUCATION/TRAINING PROGRAM

## 2020-01-29 PROCEDURE — 99223 1ST HOSP IP/OBS HIGH 75: CPT | Performed by: INTERNAL MEDICINE

## 2020-01-29 PROCEDURE — 63700000 HC SELF-ADMINISTRABLE DRUG: Performed by: STUDENT IN AN ORGANIZED HEALTH CARE EDUCATION/TRAINING PROGRAM

## 2020-01-29 PROCEDURE — 85027 COMPLETE CBC AUTOMATED: CPT | Performed by: STUDENT IN AN ORGANIZED HEALTH CARE EDUCATION/TRAINING PROGRAM

## 2020-01-29 PROCEDURE — 83735 ASSAY OF MAGNESIUM: CPT | Performed by: STUDENT IN AN ORGANIZED HEALTH CARE EDUCATION/TRAINING PROGRAM

## 2020-01-29 RX ORDER — POLYETHYLENE GLYCOL 3350 17 G/17G
119 POWDER, FOR SOLUTION ORAL ONCE
Status: COMPLETED | OUTPATIENT
Start: 2020-01-29 | End: 2020-01-29

## 2020-01-29 RX ADMIN — ATORVASTATIN CALCIUM 40 MG: 40 TABLET, FILM COATED ORAL at 20:12

## 2020-01-29 RX ADMIN — PANTOPRAZOLE SODIUM 40 MG: 40 INJECTION, POWDER, LYOPHILIZED, FOR SOLUTION INTRAVENOUS at 08:51

## 2020-01-29 RX ADMIN — PANTOPRAZOLE SODIUM 40 MG: 40 INJECTION, POWDER, LYOPHILIZED, FOR SOLUTION INTRAVENOUS at 20:12

## 2020-01-29 RX ADMIN — POLYETHYLENE GLYCOL 3350 119 G: 17 POWDER, FOR SOLUTION ORAL at 12:50

## 2020-01-29 NOTE — NURSING NOTE
IVT at bedside to place 2nd IV access. Site of previously removed L AC peripheral line is pink, swollen and tender; Ice packs applied to site for relief per pt request. LUE also elevated on pillow as well.

## 2020-01-29 NOTE — PROGRESS NOTES
Gastroenterology  Daily Progress Note       SUBJECTIVE   This is a 64 y.o. year-old female admitted on 1/25/2020 with Lower GI bleed [K92.2].    Interval History: Patient underwent repeat endoscopy with placement of video capsule endoscopy yesterday.  Transmitter retrieved this morning, pending review.  Patient reports one dark stool yesterday.    Hemoglobin 8.6 this morning, stable from prior, with exception of read of 9.5 yesterday morning.  Patient has received a total of 3 units packed red blood cells.  INR currently 1.0     OBJECTIVE      Vital signs in last 24 hours:  Temp:  [36 °C (96.8 °F)-37 °C (98.6 °F)] 36 °C (96.8 °F)  Heart Rate:  [60-98] 83  Resp:  [16-19] 17  BP: (100-121)/(55-64) 121/58    Intake/Output Summary (Last 24 hours) at 1/29/2020 0914  Last data filed at 1/29/2020 0900  Gross per 24 hour   Intake 1020 ml   Output 2800 ml   Net -1780 ml       PHYSICAL EXAMINATION      Physical Exam   Constitutional: She appears well-developed and well-nourished.   HENT:   Head: Normocephalic and atraumatic.   Eyes: Pupils are equal, round, and reactive to light. EOM are normal.   Cardiovascular: Normal rate and regular rhythm.   Pulmonary/Chest: Effort normal and breath sounds normal.   Abdominal: Soft. Bowel sounds are normal. She exhibits no distension. There is no tenderness.   Musculoskeletal: Normal range of motion. She exhibits no edema or deformity.        LABS / IMAGING / TELE      Labs  Reviewed.  Results from last 7 days   Lab Units 01/29/20  0658 01/28/20  0711 01/27/20  0931  01/25/20  1545   SODIUM mEQ/L 142 140 139   < > 139   POTASSIUM mEQ/L 4.3 4.2 3.9   < > 3.9   CHLORIDE mEQ/L 108 107 109   < > 112*   CO2 mEQ/L 25 24 22   < > 22   BUN mg/dL 8 9 11   < > 26*   CREATININE mg/dL 0.6 0.5* 0.5*   < > 0.5*   CALCIUM mg/dL 8.8* 9.1 8.5*   < > 8.5*   ALBUMIN g/dL  --   --   --   --  3.1*   BILIRUBIN TOTAL mg/dL  --   --   --   --  0.3   ALK PHOS IU/L  --   --   --   --  62   ALT IU/L  --   --    --   --  29   AST IU/L  --   --   --   --  24   GLUCOSE mg/dL 89 118* 81   < > 136*    < > = values in this interval not displayed.     Results from last 7 days   Lab Units 01/29/20  0658   MAGNESIUM mg/dL 1.8     Results from last 7 days   Lab Units 01/29/20  0658 01/28/20  2123 01/28/20  0711  01/25/20  1545   WBC K/uL 7.11 7.47 10.10   < > 7.25   HEMOGLOBIN g/dL 8.6* 8.1* 9.5*   < > 6.8*   HEMATOCRIT % 27.0* 25.1* 28.8*   < > 20.7*   PLATELETS K/uL 195 187 209   < > 177   DIFF TYPE   --   --   --   --  Manu   NEUTROS PCT MAN %  --   --   --   --  78   LYMPHO PCT MAN %  --   --   --   --  16   MONO PCT MAN %  --   --   --   --  4   EOSINO PCT MAN %  --   --   --   --  2   BASOS PCT MAN %  --   --   --   --  0   SEGS ABS MAN K/uL  --   --   --   --  5.66   LYMPHO ABS MAN K/uL  --   --   --   --  1.16*   MONO ABS MAN K/uL  --   --   --   --  0.29   EOS ABS MAN K/uL  --   --   --   --  0.15   BASOS ABS MAN K/uL  --   --   --   --  0.00*    < > = values in this interval not displayed.         Imaging  Reviewed.    ASSESSMENT AND PLAN      Melena  Assessment & Plan  67 y/o F on warfari for PE/DVT, ricco-en-y 2017, admitted with 3 days of melanic stools.  Hemoglobin 6.8 on admission, from 12.4 on 1/20/2020.  INR 3.8.  Patient received 3 units packed red blood cells with current hemoglobin 8.4.    Patient underwent upper endoscopy for presumed gastrointestinal hemorrhage.  Z line regular at 35 cm distal to the incisors.  Gastric pouch healthy-appearing, with no mucosal abnormalities or ulcerations appreciated.  Healthy-appearing GJ anastomosis, without visible sutures or staples, and no ulcerations appreciate. Efferent limb traversed without any abnormalities appreciated or blood appreciated.    Plan:  -OK for diet today, please make n.p.o. midnight.  -Gastroenterology to place video capsule endoscopy on 1/28/2020  -Further recommendations to follow this.      Christofer Diaz  Gastroenterology Fellow, PGY5  Pager [phone]   Consult Pager [phone]     VTE Assessment: Padua    Code Status: Full Code  Estimated discharge date: 1/29/2020

## 2020-01-29 NOTE — PROGRESS NOTES
"     Internal Medicine  Daily Progress Note       SUBJECTIVE   This is a 64 y.o. year-old female admitted on 2020 with Lower GI bleed [K92.2].    Interval History: Patient feels better this morning. Discussed plan of the day with her.    OBJECTIVE      Vital signs in last 24 hours:  Temp:  [36 °C (96.8 °F)-37 °C (98.6 °F)] 36 °C (96.8 °F)  Heart Rate:  [60-98] 83  Resp:  [16-20] 17  BP: ()/(46-64) 121/58  Temp (24hrs), Av.4 °C (97.5 °F), Min:36 °C (96.8 °F), Max:37 °C (98.6 °F)    Intake/Output     Intake Evening 20 1500 - 209 Night 20 2300 - 20 0659 Day 20 0700 - 20 1459 Output Evening 20 - 209 Night 20 - 20 0659 Day 20 0700 - 20 1459    I.V. 129 61.5 -- Urine 350 500 --    Blood 635 650 --                    IV Piggyback -- -- --                    Total 764 711.5 -- Total 350 500 --    Last 3 shifts --  Intake: 1475.5       Output: 850       Net: 625.5          PHYSICAL EXAMINATION      PHYSICAL EXAM  Vitals: Visit Vitals  BP (!) 121/58 (BP Location: Right upper arm, Patient Position: Lying)   Pulse 83   Temp (!) 36 °C (96.8 °F) (Temporal)   Resp 17   Ht 1.575 m (5' 2\")   Wt 90.9 kg (200 lb 4.8 oz)   SpO2 99%   BMI 36.64 kg/m²      General: NAD, well-appearing    HEENT: Oropharyx clear and without exudates  No JVD  No cervical lymphadenopathy   Eyes: Conjunctival pallor  EOMI   Cardiac: RRR  No murmurs, rubs, or gallops   Pulmonary: Clear to auscultation bilaterally   Abdomen: Soft, non-tender, +BS  No rebound, no guarding   MSK: No joint deformity   Extremities: No peripheral edema   Neuro: AAO x 3, non-focal  CN II-XII intact   Psych: Appropriate, cooperative   Skin: No rashes      LINES, CATHETERS, DRAINS, AIRWAYS, AND WOUNDS   Lines, Drains, Airways, Wounds:  Peripheral IV 20 Left Antecubital (Active)   Number of days: 1       Peripheral IV 20 Right Antecubital (Active)   Number of days: 1 "       Comments:      LABS / IMAGING / TELE      Labs  BMP:  Results from last 7 days   Lab Units 01/29/20  0658 01/28/20  0711 01/27/20  0931   SODIUM mEQ/L 142 140 139   POTASSIUM mEQ/L 4.3 4.2 3.9   CHLORIDE mEQ/L 108 107 109   CO2 mEQ/L 25 24 22   BUN mg/dL 8 9 11   CREATININE mg/dL 0.6 0.5* 0.5*   GLUCOSE mg/dL 89 118* 81   CALCIUM mg/dL 8.8* 9.1 8.5*       Results from last 7 days   Lab Units 01/29/20  0658   MAGNESIUM mg/dL 1.8       LFT:  Lab Results   Component Value Date    ALT 29 01/25/2020    AST 24 01/25/2020    ALKPHOS 62 01/25/2020    BILITOT 0.3 01/25/2020       CBC:  Results from last 7 days   Lab Units 01/29/20  0658 01/28/20  2123 01/28/20  0711  01/25/20  1545   WBC K/uL 7.11 7.47 10.10   < > 7.25   HEMOGLOBIN g/dL 8.6* 8.1* 9.5*   < > 6.8*   HEMATOCRIT % 27.0* 25.1* 28.8*   < > 20.7*   PLATELETS K/uL 195 187 209   < > 177   DIFF TYPE   --   --   --   --  Manu   NEUTROS PCT MAN %  --   --   --   --  78   LYMPHO PCT MAN %  --   --   --   --  16   MONO PCT MAN %  --   --   --   --  4   EOSINO PCT MAN %  --   --   --   --  2   BASOS PCT MAN %  --   --   --   --  0   SEGS ABS MAN K/uL  --   --   --   --  5.66   LYMPHO ABS MAN K/uL  --   --   --   --  1.16*   MONO ABS MAN K/uL  --   --   --   --  0.29   EOS ABS MAN K/uL  --   --   --   --  0.15   BASOS ABS MAN K/uL  --   --   --   --  0.00*    < > = values in this interval not displayed.           Imaging  Imaging reviewed.     ECG/Telemetry  Telemetry reviewed.     ASSESSMENT AND PLAN      * GI bleed  Assessment & Plan  Patient with hx of bariatric surgery but no prior hx of GI bleed presenting for 3 days of melena followed by [facility] and symptomatic acute anemia. Examines hemodynamically stable. Heme+ dark stool on rectal by ED physician. Hgb on admission 6.8. Typed and screen and ordered for 2 units of PRBC. Also ordered for 1u FFP and 2mg oral vitamin K.     Concern for UGI bleed at anastomoses site given bariatric surgery hx. S/p EGD w/o evidence of  bleeding.     - Follow up on VCE  - GI bleed order set in place  - Keep 2 PIV in place   - PPI IV BID  - Trend CBC q12h  - Monitor daily INR   - Known to Dr. Rock, informed patient is here   - GI following, recs appreciated   - Monitor hemodynamics closely     History of pulmonary embolus (PE)  Assessment & Plan  Patient with hx of unprovoked PE, on warfarin. INR therapeutic on presentation.    - Hold warfarin for now given GI bleed     Anemia  Assessment & Plan  Acute anemia noted in s/o GI bleed.    - Management as per GI bleed     HLD (hyperlipidemia)  Assessment & Plan  - C/w statin    Paroxysmal atrial fibrillation (CMS/HCC)  Assessment & Plan  - Hold Dilt for now for rate control  - Hold warfarin    Essential hypertension  Assessment & Plan  - Hold Dilt    Obstructive sleep apnea  Assessment & Plan  - Hold CPAP for now        VTE Assessment: Padua    Code Status: Full Code  Estimated discharge date: 1/29/2020     ATTENDING DOCUMENTATION  ALSO SEE ATTENDING ATTESTATION SECTION OF NOTE

## 2020-01-29 NOTE — PLAN OF CARE
Problem: Adult Inpatient Plan of Care  Goal: Plan of Care Review  Outcome: Progressing  Flowsheets (Taken 1/29/2020 1812)  Outcome Summary: VSS. Pt denies pain. Pt tolerating clear liquids and aida-lax bowel prep.

## 2020-01-29 NOTE — PLAN OF CARE
Problem: Adult Inpatient Plan of Care  Goal: Plan of Care Review  Outcome: Progressing  Flowsheets (Taken 1/29/2020 1411)  Progress: improving  Plan of Care Reviewed With: patient     Per update from team rounds anticipate transition to home without services tomorrow.  Patient is ambulating independently in the room.  GI/Capsule study in progress.  Plan to adv diet and observe, hgb stable.  Care coordination to follow for transition of care needs until dc is complete.

## 2020-01-29 NOTE — PROGRESS NOTES
Brief Nutrition Note    Recommendations    Recommend advance diet to goal of regular as medically indicated.   With hx of GBS, recommend daily MVI, calcium with Vitamin D, vitamin B 12, and iron.     Nutrition Charting Type: Nutrition Brief Assessment    Clinical Course: Patient is a 64 y.o. female who was admitted on 1/25/2020 with a diagnosis of Lower GI bleed [K92.2].     Past Medical History:   Diagnosis Date   • Arthritis    • DMII (diabetes mellitus, type 2) (CMS/HCC)    • Fistula    • Gout    • HLD (hyperlipidemia)    • HTN (hypertension)    • IFG (impaired fasting glucose)    • Obesity    • Polyarthritis    • Pulmonary emboli (CMS/HCC)     pulmonary emboli   • Torn rotator cuff      Past Surgical History:   Procedure Laterality Date   • APPENDECTOMY  2008   • CARDIAC CATHETERIZATION  11/2012   • INTRACAPSULAR CATARACT EXTRACTION Bilateral 08/2019   • IVC FILTER RETRIEVAL  2012   • IVC FILTER RETRIEVAL  02/2013   • RECTOURETHRAL FISTULA REPAIR  2008, 5/2017   • ROTATOR CUFF REPAIR Left 01/2016   • ALTAF-EN-Y PROCEDURE  02/07/2017   • TOTAL ABDOMINAL HYSTERECTOMY  1987   • TOTAL KNEE ARTHROPLASTY Bilateral 2010   • TOTAL KNEE ARTHROPLASTY Right 2012    revision   • TRIGGER FINGER RELEASE Right 2012    right thumb   • ULNAR NERVE REPAIR  2013       Reason for Assessment  Reason For Assessment: other (see comments), identified at risk by screening criteria(RD screening- npo/clears x 5 )     [facility] Nutrition Screen Tool  Has patient lost weight without trying?: 0-->No  If yes,how much weight has been lost?: 0-->Patient has not lost weight  Has patient been eating poorly due to decreased appetite?: 0-->No  [facility] Nutrition Screen Score: 0     Nutrition/Diet History  Intake (%): 100%    Physical Findings  Last Bowel Movement: 01/26/20     [id number] Physical Appearance  Last Bowel Movement: 01/26/20     Nutrition Order  Nutrition Order Review: does not meet nutritional requirements  Nutrition Order Comments: clear liquids, no  "red      Anthropometrics  Height: 157.5 cm (5' 2\")       Current Weight  Weight Method: Bed scale  Weight: 90.9 kg (200 lb 4.8 oz)     Ideal Body Weight (IBW)  Ideal Body Weight (IBW) (kg): 50.43  % Ideal Body Weight: 178.45     Body Mass Index (BMI)  BMI (Calculated): 36.6  BMI (kg/m2): 36.36     Labs/Procedures/Meds  Lab Results Reviewed: reviewed, pertinent   BMP Results       01/29/20 01/28/20 01/27/20                    0658 0711 0931          140 139         K 4.3 4.2 3.9         Cl 108 107 109         CO2 25 24 22         Glucose 89 118 81         BUN 8 9 11         Creatinine 0.6 0.5 0.5         Calcium 8.8 9.1 8.5          Medications  Pertinent Medications Reviewed: reviewed, pertinent   Scheduled Meds:  • atorvastatin  40 mg oral Daily (6p)   • pantoprazole  40 mg intravenous q12h MARINO       Skin: intact    Clinical comments:  t admit with GI bleed. EGD showed no source of active bleed, s/p capsule endoscopy with results pending. Pt reports appetite good prior to onset of symtpoms 3 days pta, no wt loss. Hx of ricco en y gbs in 2017, has regained wt since low of 175lbs. Reports eatng small meals, drinks a [rotein shake daily. Reports compliance with vitamins.     Pt currently tolerating clears. Recommend advance diet to goal of regular as medically indicated. With hx of GBS, recommend daily MVI, calcium with Vitamin D, vitamin B 12, and iron.     Monitor: PO intake, plan of care  Goals: meet 75% of needs via oral intake       Recommendations: See above       Date: 01/29/20  Signature: Cathy Anderson RD    "

## 2020-01-29 NOTE — PATIENT CARE CONFERENCE
Care Progression Rounds Note  Date: 1/29/2020  Time: 11:59 AM     Patient Name: Jennifer Aly     Medical Record Number: [id number]   YOB: 1955  Sex: Female      Room/Bed: 1029    Admitting Diagnosis: Lower GI bleed [K92.2]   Admit Date/Time: 1/25/2020  3:03 PM    Primary Diagnosis: GI bleed  Principal Problem: GI bleed    GMLOS: 3.0  Anticipated Discharge Date: 1/30/2020    AM-PAC  Mobility Score: 24    Discharge Planning:  Anticipated Discharge Disposition: home without services    Barriers to Discharge:  Barriers to Discharge: Medical issues not resolved  Comment: pt back on clear liquid diet    Participants:  social work/services, , nursing

## 2020-01-30 LAB
ANION GAP SERPL CALC-SCNC: 8 MEQ/L (ref 3–15)
APTT PPP: 54 SEC (ref 23–35)
BUN SERPL-MCNC: <5 MG/DL (ref 8–20)
CALCIUM SERPL-MCNC: 8.9 MG/DL (ref 8.9–10.3)
CHLORIDE SERPL-SCNC: 108 MEQ/L (ref 98–109)
CO2 SERPL-SCNC: 25 MEQ/L (ref 22–32)
CREAT SERPL-MCNC: 0.6 MG/DL
ERYTHROCYTE [DISTWIDTH] IN BLOOD BY AUTOMATED COUNT: 16.1 % (ref 11.7–14.4)
GFR SERPL CREATININE-BSD FRML MDRD: >60 ML/MIN/1.73M*2
GLUCOSE SERPL-MCNC: 92 MG/DL (ref 70–99)
HCT VFR BLDCO AUTO: 27.9 %
HGB BLD-MCNC: 8.8 G/DL (ref 11.8–15.7)
INR PPP: 1 INR
MAGNESIUM SERPL-MCNC: 1.9 MG/DL (ref 1.8–2.5)
MCH RBC QN AUTO: 30.7 PG (ref 28–33.2)
MCHC RBC AUTO-ENTMCNC: 31.5 G/DL (ref 32.2–35.5)
MCV RBC AUTO: 97.2 FL (ref 83–98)
PDW BLD AUTO: 9.9 FL (ref 9.4–12.3)
PLATELET # BLD AUTO: 206 K/UL
POTASSIUM SERPL-SCNC: 4.3 MEQ/L (ref 3.6–5.1)
PROTHROMBIN TIME: 13.2 SEC (ref 12.2–14.5)
RBC # BLD AUTO: 2.87 M/UL (ref 3.93–5.22)
SODIUM SERPL-SCNC: 141 MEQ/L (ref 136–144)
WBC # BLD AUTO: 6.09 K/UL

## 2020-01-30 PROCEDURE — 85610 PROTHROMBIN TIME: CPT | Performed by: STUDENT IN AN ORGANIZED HEALTH CARE EDUCATION/TRAINING PROGRAM

## 2020-01-30 PROCEDURE — 25000000 HC PHARMACY GENERAL: Performed by: STUDENT IN AN ORGANIZED HEALTH CARE EDUCATION/TRAINING PROGRAM

## 2020-01-30 PROCEDURE — 63700000 HC SELF-ADMINISTRABLE DRUG: Performed by: STUDENT IN AN ORGANIZED HEALTH CARE EDUCATION/TRAINING PROGRAM

## 2020-01-30 PROCEDURE — 83735 ASSAY OF MAGNESIUM: CPT | Performed by: STUDENT IN AN ORGANIZED HEALTH CARE EDUCATION/TRAINING PROGRAM

## 2020-01-30 PROCEDURE — 85730 THROMBOPLASTIN TIME PARTIAL: CPT | Performed by: STUDENT IN AN ORGANIZED HEALTH CARE EDUCATION/TRAINING PROGRAM

## 2020-01-30 PROCEDURE — 21400000 HC ROOM AND CARE CCU/INTERMEDIATE

## 2020-01-30 PROCEDURE — 80048 BASIC METABOLIC PNL TOTAL CA: CPT | Performed by: STUDENT IN AN ORGANIZED HEALTH CARE EDUCATION/TRAINING PROGRAM

## 2020-01-30 PROCEDURE — 85027 COMPLETE CBC AUTOMATED: CPT | Performed by: STUDENT IN AN ORGANIZED HEALTH CARE EDUCATION/TRAINING PROGRAM

## 2020-01-30 PROCEDURE — 63600000 HC DRUGS/DETAIL CODE: Performed by: STUDENT IN AN ORGANIZED HEALTH CARE EDUCATION/TRAINING PROGRAM

## 2020-01-30 PROCEDURE — 99233 SBSQ HOSP IP/OBS HIGH 50: CPT | Performed by: INTERNAL MEDICINE

## 2020-01-30 PROCEDURE — 36415 COLL VENOUS BLD VENIPUNCTURE: CPT | Performed by: STUDENT IN AN ORGANIZED HEALTH CARE EDUCATION/TRAINING PROGRAM

## 2020-01-30 RX ORDER — PANTOPRAZOLE SODIUM 40 MG/1
40 TABLET, DELAYED RELEASE ORAL
Status: DISCONTINUED | OUTPATIENT
Start: 2020-01-30 | End: 2020-02-06 | Stop reason: HOSPADM

## 2020-01-30 RX ORDER — DILTIAZEM HYDROCHLORIDE 120 MG/1
120 CAPSULE, COATED, EXTENDED RELEASE ORAL DAILY
Status: DISCONTINUED | OUTPATIENT
Start: 2020-01-30 | End: 2020-01-31

## 2020-01-30 RX ORDER — WARFARIN 2 MG/1
4 TABLET ORAL ONCE
Status: COMPLETED | OUTPATIENT
Start: 2020-01-30 | End: 2020-01-30

## 2020-01-30 RX ORDER — HEPARIN SODIUM 10000 [USP'U]/100ML
0-4000 INJECTION, SOLUTION INTRAVENOUS
Status: DISPENSED | OUTPATIENT
Start: 2020-01-30 | End: 2020-02-04

## 2020-01-30 RX ORDER — FLUTICASONE PROPIONATE 50 MCG
1 SPRAY, SUSPENSION (ML) NASAL DAILY PRN
Status: DISCONTINUED | OUTPATIENT
Start: 2020-01-30 | End: 2020-02-06 | Stop reason: HOSPADM

## 2020-01-30 RX ADMIN — PANTOPRAZOLE SODIUM 40 MG: 40 INJECTION, POWDER, LYOPHILIZED, FOR SOLUTION INTRAVENOUS at 08:05

## 2020-01-30 RX ADMIN — HEPARIN SODIUM 1200 UNITS/HR: 10000 INJECTION, SOLUTION INTRAVENOUS at 11:11

## 2020-01-30 RX ADMIN — PANTOPRAZOLE SODIUM 40 MG: 40 TABLET, DELAYED RELEASE ORAL at 18:22

## 2020-01-30 RX ADMIN — DILTIAZEM HYDROCHLORIDE 120 MG: 120 CAPSULE, COATED, EXTENDED RELEASE ORAL at 11:08

## 2020-01-30 RX ADMIN — WARFARIN SODIUM 4 MG: 2 TABLET ORAL at 18:22

## 2020-01-30 RX ADMIN — ATORVASTATIN CALCIUM 40 MG: 40 TABLET, FILM COATED ORAL at 21:20

## 2020-01-30 NOTE — PROGRESS NOTES
Warfarin Dosing by Pharmacy Consult Initiated    Jennifer Aly is a 64 y.o. female who has been consulted for warfarin dosing for Pulmonary embolism  prescribed by Jose Villa MD.    Reviewed relevant clinical data including weight, previous warfarin doses, CBC and PT/INR levels:  PT   Date/Time Value Ref Range Status   01/30/2020 07:43 AM 13.2 12.2 - 14.5 sec Final   01/29/2020 06:58 AM 13.0 12.2 - 14.5 sec Final   01/28/2020 07:11 AM 13.6 12.2 - 14.5 sec Final     INR   Date/Time Value Ref Range Status   01/30/2020 07:43 AM 1.0   INR Final     Comment:       INR has no defined significance when PT is within Reference Range.   01/29/2020 06:58 AM 1.0   INR Final     Comment:       INR has no defined significance when PT is within Reference Range.   01/28/2020 07:11 AM 1.1   INR Final     Comment:       INR has no defined significance when PT is within Reference Range.       CBC Results         01/30/20 01/29/20 01/29/20                    0743 2123 0658         WBC 6.09 7.29 7.11         RBC 2.87 2.83 2.78         HGB 8.8 8.8 8.6         HCT 27.9 27.6 27.0         MCV 97.2 97.5 97.1         MCH 30.7 31.1 30.9         MCHC 31.5 31.9 31.9          202 195                              Warfarin Administrations (last 96 hours)       None              Assessment/Plan  The patient is ordered warfarin dosing by pharmacy.    The dose will be based on:         Wt Readings from Last 1 Encounters:   01/30/20 90.8 kg (200 lb 3.9 oz)       Patient does satisfy criteria for increased warfarin sensitivity.    Patient is receiving concurrent parenteral anticoagulation for bridging purposes.    Will initiate warfarin dose of 4 mg PO x1.    Target INR goal of 2 - 3 per warfarin dosing per pharmacy order.  Pharmacy will continue to follow the patient's warfarin dosing daily.    Olaf Perez, RanulfoD

## 2020-01-30 NOTE — PATIENT CARE CONFERENCE
Care Progression Rounds Note  Date: 1/30/2020  Time: 10:56 AM     Patient Name: Jennifer Aly     Medical Record Number: [id number]   YOB: 1955  Sex: Female      Room/Bed: 1029    Admitting Diagnosis: Lower GI bleed [K92.2]   Admit Date/Time: 1/25/2020  3:03 PM    Primary Diagnosis: GI bleed  Principal Problem: GI bleed    GMLOS: 3.0  Anticipated Discharge Date: 1/31/2020    AM-PAC  Mobility Score: 24    Discharge Planning:  Anticipated Discharge Disposition: home without services    Barriers to Discharge:  Barriers to Discharge: Medical issues not resolved  Comment: no active bleed Q12 cbc     Participants:  nursing

## 2020-01-30 NOTE — PROGRESS NOTES
"     Internal Medicine  Daily Progress Note       SUBJECTIVE   This is a 64 y.o. year-old female admitted on 2020 with Lower GI bleed [K92.2].    Interval History: Patient feels better. Tolerated the bowel prep well. Feels well this morning. Says she was cleared out.  Wants to eat regular food.    OBJECTIVE      Vital signs in last 24 hours:  Temp:  [35.9 °C (96.7 °F)-36.4 °C (97.6 °F)] 36.3 °C (97.4 °F)  Heart Rate:  [58-89] 58  Resp:  [16-17] 16  BP: (115-140)/(58-68) 115/64  Temp (24hrs), Av.3 °C (97.3 °F), Min:35.9 °C (96.7 °F), Max:36.4 °C (97.6 °F)    Intake/Output     Intake Evening 20 1500 - 20 2259 Night 20 - 20 0659 Day 20 - 20 1459 Output Evening 20 1500 - 20 2259 Night 20 - 20 0659 Day 20 - 20 1459    I.V. 129 61.5 -- Urine 350 500 --    Blood 635 650 --                    IV Piggyback -- -- --                    Total 764 711.5 -- Total 350 500 --    Last 3 shifts --  Intake: 1475.5       Output: 850       Net: 625.5          PHYSICAL EXAMINATION      PHYSICAL EXAM  Vitals: Visit Vitals  /64   Pulse (!) 58   Temp 36.3 °C (97.4 °F) (Temporal)   Resp 16   Ht 1.575 m (5' 2\")   Wt 90.8 kg (200 lb 3.9 oz)   SpO2 98%   BMI 36.63 kg/m²      General: NAD, well-appearing    HEENT: Oropharyx clear and without exudates  No JVD  No cervical lymphadenopathy   Eyes: Conjunctival pallor  EOMI   Cardiac: RRR  No murmurs, rubs, or gallops   Pulmonary: Clear to auscultation bilaterally   Abdomen: Soft, non-tender, +BS  No rebound, no guarding   MSK: No joint deformity   Extremities: No peripheral edema   Neuro: AAO x 3, non-focal  CN II-XII intact   Psych: Appropriate, cooperative   Skin: No rashes      LINES, CATHETERS, DRAINS, AIRWAYS, AND WOUNDS   Lines, Drains, Airways, Wounds:  Peripheral IV 20 Left Antecubital (Active)   Number of days: 1       Peripheral IV 20 Right Antecubital (Active)   Number " of days: 1       Comments:      LABS / IMAGING / TELE      Labs  BMP:  Results from last 7 days   Lab Units 01/29/20  0658 01/28/20  0711 01/27/20  0931   SODIUM mEQ/L 142 140 139   POTASSIUM mEQ/L 4.3 4.2 3.9   CHLORIDE mEQ/L 108 107 109   CO2 mEQ/L 25 24 22   BUN mg/dL 8 9 11   CREATININE mg/dL 0.6 0.5* 0.5*   GLUCOSE mg/dL 89 118* 81   CALCIUM mg/dL 8.8* 9.1 8.5*       Results from last 7 days   Lab Units 01/29/20  0658   MAGNESIUM mg/dL 1.8       LFT:  Lab Results   Component Value Date    ALT 29 01/25/2020    AST 24 01/25/2020    ALKPHOS 62 01/25/2020    BILITOT 0.3 01/25/2020       CBC:  Results from last 7 days   Lab Units 01/30/20  0743 01/29/20  2123 01/29/20  0658  01/25/20  1545   WBC K/uL 6.09 7.29 7.11   < > 7.25   HEMOGLOBIN g/dL 8.8* 8.8* 8.6*   < > 6.8*   HEMATOCRIT % 27.9* 27.6* 27.0*   < > 20.7*   PLATELETS K/uL 206 202 195   < > 177   DIFF TYPE   --   --   --   --  Manu   NEUTROS PCT MAN %  --   --   --   --  78   LYMPHO PCT MAN %  --   --   --   --  16   MONO PCT MAN %  --   --   --   --  4   EOSINO PCT MAN %  --   --   --   --  2   BASOS PCT MAN %  --   --   --   --  0   SEGS ABS MAN K/uL  --   --   --   --  5.66   LYMPHO ABS MAN K/uL  --   --   --   --  1.16*   MONO ABS MAN K/uL  --   --   --   --  0.29   EOS ABS MAN K/uL  --   --   --   --  0.15   BASOS ABS MAN K/uL  --   --   --   --  0.00*    < > = values in this interval not displayed.           Imaging  Imaging reviewed.     ECG/Telemetry  Telemetry reviewed.     ASSESSMENT AND PLAN      * GI bleed  Assessment & Plan  Patient with hx of bariatric surgery but no prior hx of GI bleed presenting for 3 days of melena followed by BRB[facility] and symptomatic acute anemia. Examines hemodynamically stable. Heme+ dark stool on rectal by ED physician. Hgb on admission 6.8. Typed and screen and ordered for 2 units of PRBC. Also ordered for 1u FFP and 2mg oral vitamin K.     Concern for UGI bleed at anastomoses site given bariatric surgery hx. S/p EGD w/o  evidence of bleeding. VCE with old blood in colon. S/p 1/2 prep to clear old blood out.   - Regular diet   - Can restart A/C as per GI, will discuss with heme-onc about possible DOAC options  - PPI IV BID  - Trend CBC q12h  - Monitor daily INR   - Known to Dr. Rock, informed patient is here   - GI following, recs appreciated   - Monitor hemodynamics closely     History of pulmonary embolus (PE)  Assessment & Plan  Patient with hx of unprovoked PE, on warfarin. INR therapeutic on presentation.    - Can resume A/C as per GI     Anemia  Assessment & Plan  Acute anemia noted in s/o GI bleed.    - Management as per GI bleed     HLD (hyperlipidemia)  Assessment & Plan  - C/w statin    Paroxysmal atrial fibrillation (CMS/HCC)  Assessment & Plan  - Resume home Dilt   - Will resume A/C     Essential hypertension  Assessment & Plan  - Resume Dilt     Obstructive sleep apnea  Assessment & Plan  - Hold CPAP for now        VTE Assessment: Padua    Code Status: Full Code  Estimated discharge date: 1/30/2020     ATTENDING DOCUMENTATION  ALSO SEE ATTENDING ATTESTATION SECTION OF NOTE

## 2020-01-31 LAB
ANION GAP SERPL CALC-SCNC: 10 MEQ/L (ref 3–15)
APTT PPP: 110 SEC (ref 23–35)
APTT PPP: 136 SEC (ref 23–35)
APTT PPP: 69 SEC (ref 23–35)
APTT PPP: 86 SEC (ref 23–35)
BUN SERPL-MCNC: 11 MG/DL (ref 8–20)
CALCIUM SERPL-MCNC: 8.8 MG/DL (ref 8.9–10.3)
CHLORIDE SERPL-SCNC: 105 MEQ/L (ref 98–109)
CO2 SERPL-SCNC: 24 MEQ/L (ref 22–32)
CREAT SERPL-MCNC: 0.5 MG/DL
ERYTHROCYTE [DISTWIDTH] IN BLOOD BY AUTOMATED COUNT: 15.7 % (ref 11.7–14.4)
ERYTHROCYTE [DISTWIDTH] IN BLOOD BY AUTOMATED COUNT: 15.7 % (ref 11.7–14.4)
GFR SERPL CREATININE-BSD FRML MDRD: >60 ML/MIN/1.73M*2
GLUCOSE SERPL-MCNC: 119 MG/DL (ref 70–99)
HCT VFR BLDCO AUTO: 25.4 %
HCT VFR BLDCO AUTO: 28.9 %
HGB BLD-MCNC: 8.1 G/DL (ref 11.8–15.7)
HGB BLD-MCNC: 9.1 G/DL (ref 11.8–15.7)
INR PPP: 1.1 INR
MAGNESIUM SERPL-MCNC: 1.9 MG/DL (ref 1.8–2.5)
MCH RBC QN AUTO: 30.6 PG (ref 28–33.2)
MCH RBC QN AUTO: 30.6 PG (ref 28–33.2)
MCHC RBC AUTO-ENTMCNC: 31.5 G/DL (ref 32.2–35.5)
MCHC RBC AUTO-ENTMCNC: 31.9 G/DL (ref 32.2–35.5)
MCV RBC AUTO: 95.8 FL (ref 83–98)
MCV RBC AUTO: 97.3 FL (ref 83–98)
PDW BLD AUTO: 9.6 FL (ref 9.4–12.3)
PDW BLD AUTO: 9.6 FL (ref 9.4–12.3)
PLATELET # BLD AUTO: 220 K/UL
PLATELET # BLD AUTO: 245 K/UL
POTASSIUM SERPL-SCNC: 4 MEQ/L (ref 3.6–5.1)
PROTHROMBIN TIME: 13.7 SEC (ref 12.2–14.5)
RBC # BLD AUTO: 2.65 M/UL (ref 3.93–5.22)
RBC # BLD AUTO: 2.97 M/UL (ref 3.93–5.22)
SODIUM SERPL-SCNC: 139 MEQ/L (ref 136–144)
WBC # BLD AUTO: 6.11 K/UL
WBC # BLD AUTO: 6.48 K/UL

## 2020-01-31 PROCEDURE — 36415 COLL VENOUS BLD VENIPUNCTURE: CPT | Performed by: STUDENT IN AN ORGANIZED HEALTH CARE EDUCATION/TRAINING PROGRAM

## 2020-01-31 PROCEDURE — 83735 ASSAY OF MAGNESIUM: CPT | Performed by: STUDENT IN AN ORGANIZED HEALTH CARE EDUCATION/TRAINING PROGRAM

## 2020-01-31 PROCEDURE — 85027 COMPLETE CBC AUTOMATED: CPT | Performed by: STUDENT IN AN ORGANIZED HEALTH CARE EDUCATION/TRAINING PROGRAM

## 2020-01-31 PROCEDURE — 63600000 HC DRUGS/DETAIL CODE: Performed by: STUDENT IN AN ORGANIZED HEALTH CARE EDUCATION/TRAINING PROGRAM

## 2020-01-31 PROCEDURE — 85730 THROMBOPLASTIN TIME PARTIAL: CPT | Performed by: STUDENT IN AN ORGANIZED HEALTH CARE EDUCATION/TRAINING PROGRAM

## 2020-01-31 PROCEDURE — 85610 PROTHROMBIN TIME: CPT | Performed by: STUDENT IN AN ORGANIZED HEALTH CARE EDUCATION/TRAINING PROGRAM

## 2020-01-31 PROCEDURE — 21400000 HC ROOM AND CARE CCU/INTERMEDIATE

## 2020-01-31 PROCEDURE — 63700000 HC SELF-ADMINISTRABLE DRUG: Performed by: STUDENT IN AN ORGANIZED HEALTH CARE EDUCATION/TRAINING PROGRAM

## 2020-01-31 PROCEDURE — 80048 BASIC METABOLIC PNL TOTAL CA: CPT | Performed by: STUDENT IN AN ORGANIZED HEALTH CARE EDUCATION/TRAINING PROGRAM

## 2020-01-31 PROCEDURE — 99233 SBSQ HOSP IP/OBS HIGH 50: CPT | Performed by: INTERNAL MEDICINE

## 2020-01-31 RX ORDER — WARFARIN 2 MG/1
4 TABLET ORAL ONCE
Status: COMPLETED | OUTPATIENT
Start: 2020-01-31 | End: 2020-01-31

## 2020-01-31 RX ADMIN — PANTOPRAZOLE SODIUM 40 MG: 40 TABLET, DELAYED RELEASE ORAL at 15:36

## 2020-01-31 RX ADMIN — ATORVASTATIN CALCIUM 40 MG: 40 TABLET, FILM COATED ORAL at 22:27

## 2020-01-31 RX ADMIN — WARFARIN SODIUM 4 MG: 2 TABLET ORAL at 18:07

## 2020-01-31 RX ADMIN — HEPARIN SODIUM 1150 UNITS/HR: 10000 INJECTION, SOLUTION INTRAVENOUS at 19:55

## 2020-01-31 RX ADMIN — PANTOPRAZOLE SODIUM 40 MG: 40 TABLET, DELAYED RELEASE ORAL at 06:55

## 2020-01-31 RX ADMIN — HEPARIN SODIUM 1300 UNITS/HR: 10000 INJECTION, SOLUTION INTRAVENOUS at 02:32

## 2020-01-31 NOTE — PLAN OF CARE
Problem: Adult Inpatient Plan of Care  Goal: Plan of Care Review  Outcome: Progressing  Flowsheets (Taken 1/31/2020 0452)  Progress: improving  Plan of Care Reviewed With: patient  Outcome Summary: VSS. Hypotensive at times. but pt remains asymptomatic and no obvious signs of bleeding were noted. Pt denies pain. Heparin gtt. Tolerating diet and ambulation.

## 2020-01-31 NOTE — PATIENT CARE CONFERENCE
Care Progression Rounds Note  Date: 1/31/2020  Time: 12:41 PM     Patient Name: Jennifer Aly     Medical Record Number: [id number]   YOB: 1955  Sex: Female      Room/Bed: 1029    Admitting Diagnosis: Lower GI bleed [K92.2]   Admit Date/Time: 1/25/2020  3:03 PM    Primary Diagnosis: GI bleed  Principal Problem: GI bleed    GMLOS: 3.0  Anticipated Discharge Date: 2/1/2020    AM-PAC  Mobility Score: 24    Discharge Planning:  Anticipated Discharge Disposition: home without services    Barriers to Discharge:  Barriers to Discharge: None  Comment: hep gtt    Participants:  , nursing, occupational therapy, physical therapy, social work/services

## 2020-01-31 NOTE — PROGRESS NOTES
Warfarin Dosing by Pharmacy Consult Follow up    Jennifer Aly is a 64 y.o. female who has been consulted for warfarin dosing for Pulmonary embolism.    Reviewed relevant clinical data including weight, previous warfarin doses, CBC and PT/INR levels:  PT   Date/Time Value Ref Range Status   01/31/2020 07:51 AM 13.7 12.2 - 14.5 sec Final   01/30/2020 07:43 AM 13.2 12.2 - 14.5 sec Final   01/29/2020 06:58 AM 13.0 12.2 - 14.5 sec Final     INR   Date/Time Value Ref Range Status   01/31/2020 07:51 AM 1.1   INR Final     Comment:       INR has no defined significance when PT is within Reference Range.   01/30/2020 07:43 AM 1.0   INR Final     Comment:       INR has no defined significance when PT is within Reference Range.   01/29/2020 06:58 AM 1.0   INR Final     Comment:       INR has no defined significance when PT is within Reference Range.       CBC Results         01/31/20 01/31/20 01/30/20                    0751 0105 0743         WBC 6.48 6.11 6.09         RBC 2.97 2.65 2.87         HGB 9.1 8.1 8.8         HCT 28.9 25.4 27.9         MCV 97.3 95.8 97.2         MCH 30.6 30.6 30.7         MCHC 31.5 31.9 31.5          220 206                              Warfarin Administrations (last 96 hours)       Date/Time Action Medication Dose    01/30/20 1822 Given    warfarin (COUMADIN) tablet 4 mg 4 mg              Assessment/Plan  The patient is ordered warfarin dosing by pharmacy.      Warfarin INR level 1.1 after dose of 4 mg PO x1.  INR goal: 2 - 3    Will continue warfarin 4 mg PO x1.    Next INR:  2/1/20 @ with AM labs    Patient is receiving concurrent parenteral anticoagulation for bridging purposes.    Pharmacy will continue to follow the patient's warfarin dosing daily during this course of therapy.    Olaf Perez, RanulfoD

## 2020-01-31 NOTE — PROGRESS NOTES
"     Internal Medicine  Daily Progress Note       SUBJECTIVE   This is a 64 y.o. year-old female admitted on 2020 with Lower GI bleed [K92.2].    Interval History: Patient feels well. Tolerating her diet. No further bleeding.     OBJECTIVE      Vital signs in last 24 hours:  Temp:  [36.1 °C (97 °F)-36.9 °C (98.4 °F)] 36.9 °C (98.4 °F)  Heart Rate:  [56-85] 72  Resp:  [16] 16  BP: ()/(53-65) 112/63  Temp (24hrs), Av.3 °C (97.4 °F), Min:36.1 °C (97 °F), Max:36.9 °C (98.4 °F)    Intake/Output     Intake Evening 20 - 209 Night 200 - 20 0659 Day 20 - 20 1459 Output Evening 20 - 209 Night 20 - 20 0659 Day 20 07 - 20 1459    I.V. 129 61.5 -- Urine 350 500 --    Blood 635 650 --                    IV Piggyback -- -- --                    Total 764 711.5 -- Total 350 500 --    Last 3 shifts --  Intake: 1475.5       Output: 850       Net: 625.5          PHYSICAL EXAMINATION      PHYSICAL EXAM  Vitals: Visit Vitals  /63 (BP Location: Right upper arm, Patient Position: Lying)   Pulse 72   Temp 36.9 °C (98.4 °F) (Oral)   Resp 16   Ht 1.575 m (5' 2\")   Wt 90.8 kg (200 lb 3.9 oz)   SpO2 98%   BMI 36.63 kg/m²      General: NAD, well-appearing    HEENT: Oropharyx clear and without exudates  No JVD  No cervical lymphadenopathy   Eyes: Conjunctival pallor  EOMI   Cardiac: RRR  No murmurs, rubs, or gallops   Pulmonary: Clear to auscultation bilaterally   Abdomen: Soft, non-tender, +BS  No rebound, no guarding   MSK: No joint deformity   Extremities: No peripheral edema   Neuro: AAO x 3, non-focal  CN II-XII intact   Psych: Appropriate, cooperative   Skin: No rashes      LINES, CATHETERS, DRAINS, AIRWAYS, AND WOUNDS   Lines, Drains, Airways, Wounds:  Peripheral IV 20 Left Antecubital (Active)   Number of days: 1       Peripheral IV 20 Right Antecubital (Active)   Number of days: 1       Comments:    "   LABS / IMAGING / TELE      Labs  BMP:  Results from last 7 days   Lab Units 01/31/20  0751 01/30/20  0743 01/29/20  0658   SODIUM mEQ/L 139 141 142   POTASSIUM mEQ/L 4.0 4.3 4.3   CHLORIDE mEQ/L 105 108 108   CO2 mEQ/L 24 25 25   BUN mg/dL 11 <5* 8   CREATININE mg/dL 0.5* 0.6 0.6   GLUCOSE mg/dL 119* 92 89   CALCIUM mg/dL 8.8* 8.9 8.8*       Results from last 7 days   Lab Units 01/31/20  0751   MAGNESIUM mg/dL 1.9       LFT:  Lab Results   Component Value Date    ALT 29 01/25/2020    AST 24 01/25/2020    ALKPHOS 62 01/25/2020    BILITOT 0.3 01/25/2020       CBC:  Results from last 7 days   Lab Units 01/31/20  0751 01/31/20  0105 01/30/20  0743  01/25/20  1545   WBC K/uL 6.48 6.11 6.09   < > 7.25   HEMOGLOBIN g/dL 9.1* 8.1* 8.8*   < > 6.8*   HEMATOCRIT % 28.9* 25.4* 27.9*   < > 20.7*   PLATELETS K/uL 245 220 206   < > 177   DIFF TYPE   --   --   --   --  Manu   NEUTROS PCT MAN %  --   --   --   --  78   LYMPHO PCT MAN %  --   --   --   --  16   MONO PCT MAN %  --   --   --   --  4   EOSINO PCT MAN %  --   --   --   --  2   BASOS PCT MAN %  --   --   --   --  0   SEGS ABS MAN K/uL  --   --   --   --  5.66   LYMPHO ABS MAN K/uL  --   --   --   --  1.16*   MONO ABS MAN K/uL  --   --   --   --  0.29   EOS ABS MAN K/uL  --   --   --   --  0.15   BASOS ABS MAN K/uL  --   --   --   --  0.00*    < > = values in this interval not displayed.           Imaging  Imaging reviewed.     ECG/Telemetry  Telemetry reviewed.     ASSESSMENT AND PLAN      * GI bleed  Assessment & Plan  Patient with hx of bariatric surgery but no prior hx of GI bleed presenting for 3 days of melena followed by [facility] and symptomatic acute anemia. Examines hemodynamically stable. Heme+ dark stool on rectal by ED physician. Hgb on admission 6.8. Typed and screen and ordered for 2 units of PRBC. Also ordered for 1u FFP and 2mg oral vitamin K.     Concern for UGI bleed at anastomoses site given bariatric surgery hx. S/p EGD w/o evidence of bleeding. VCE with  old blood in colon. S/p 1/2 prep to clear old blood out.     - Regular diet   - Restarted A/C, bridge to warfarin with heparin  - PPI PO BID  - Trend CBC daily  - Monitor daily INR   - Known to Dr. Rock, informed patient is here   - GI following, recs appreciated   - Monitor hemodynamics closely     History of pulmonary embolus (PE)  Assessment & Plan  Patient with hx of unprovoked PE, on warfarin. INR therapeutic on presentation.    - Resume A/C     Anemia  Assessment & Plan  Acute anemia noted in s/o GI bleed.    - Management as per GI bleed     HLD (hyperlipidemia)  Assessment & Plan  - C/w statin    Paroxysmal atrial fibrillation (CMS/HCC)  Assessment & Plan  - Resumed A/C     Essential hypertension  Assessment & Plan  - Hold Dilt     Obstructive sleep apnea  Assessment & Plan  - Hold CPAP for now        VTE Assessment: Padua    Code Status: Full Code  Estimated discharge date: 2/1/2020     ATTENDING DOCUMENTATION  ALSO SEE ATTENDING ATTESTATION SECTION OF NOTE

## 2020-01-31 NOTE — PLAN OF CARE
Problem: Adult Inpatient Plan of Care  Goal: Plan of Care Review  Outcome: Progressing  Flowsheets  Taken 1/31/2020 1992 by Eunice Cole, RN  Progress: improving  Taken 1/31/2020 1850 by Shania Lopez, RN  Plan of Care Reviewed With: patient  Outcome Summary: VSS. Pt OOB & ambulating in halls. Heparin gtt running while pt bridges to coumadin. DC to home anticipated.

## 2020-01-31 NOTE — PLAN OF CARE
Problem: Adult Inpatient Plan of Care  Goal: Plan of Care Review  Flowsheets (Taken 1/31/2020 4951)  Plan of Care Reviewed With: other (see comments) (primary team/cpr rounds)  Outcome Summary: Per update from CPR rounds plan is to bridge back to coumadin, patient remains on heparin gtt.  Anticipate dc to home without services.

## 2020-02-01 LAB
ANION GAP SERPL CALC-SCNC: 5 MEQ/L (ref 3–15)
APTT PPP: 67 SEC (ref 23–35)
APTT PPP: 92 SEC (ref 23–35)
BUN SERPL-MCNC: 13 MG/DL (ref 8–20)
CALCIUM SERPL-MCNC: 9.1 MG/DL (ref 8.9–10.3)
CHLORIDE SERPL-SCNC: 109 MEQ/L (ref 98–109)
CO2 SERPL-SCNC: 26 MEQ/L (ref 22–32)
CREAT SERPL-MCNC: 0.5 MG/DL
ERYTHROCYTE [DISTWIDTH] IN BLOOD BY AUTOMATED COUNT: 15.5 % (ref 11.7–14.4)
GFR SERPL CREATININE-BSD FRML MDRD: >60 ML/MIN/1.73M*2
GLUCOSE SERPL-MCNC: 96 MG/DL (ref 70–99)
HCT VFR BLDCO AUTO: 28.3 %
HGB BLD-MCNC: 8.9 G/DL (ref 11.8–15.7)
INR PPP: 1 INR
MAGNESIUM SERPL-MCNC: 1.9 MG/DL (ref 1.8–2.5)
MCH RBC QN AUTO: 30.5 PG (ref 28–33.2)
MCHC RBC AUTO-ENTMCNC: 31.4 G/DL (ref 32.2–35.5)
MCV RBC AUTO: 96.9 FL (ref 83–98)
PDW BLD AUTO: 9.8 FL (ref 9.4–12.3)
PLATELET # BLD AUTO: 289 K/UL
POTASSIUM SERPL-SCNC: 4.3 MEQ/L (ref 3.6–5.1)
PROTHROMBIN TIME: 13 SEC (ref 12.2–14.5)
RBC # BLD AUTO: 2.92 M/UL (ref 3.93–5.22)
SODIUM SERPL-SCNC: 140 MEQ/L (ref 136–144)
WBC # BLD AUTO: 5.96 K/UL

## 2020-02-01 PROCEDURE — 21400000 HC ROOM AND CARE CCU/INTERMEDIATE

## 2020-02-01 PROCEDURE — 63700000 HC SELF-ADMINISTRABLE DRUG: Performed by: STUDENT IN AN ORGANIZED HEALTH CARE EDUCATION/TRAINING PROGRAM

## 2020-02-01 PROCEDURE — 63600000 HC DRUGS/DETAIL CODE: Performed by: STUDENT IN AN ORGANIZED HEALTH CARE EDUCATION/TRAINING PROGRAM

## 2020-02-01 PROCEDURE — 80048 BASIC METABOLIC PNL TOTAL CA: CPT | Performed by: STUDENT IN AN ORGANIZED HEALTH CARE EDUCATION/TRAINING PROGRAM

## 2020-02-01 PROCEDURE — 36415 COLL VENOUS BLD VENIPUNCTURE: CPT | Performed by: STUDENT IN AN ORGANIZED HEALTH CARE EDUCATION/TRAINING PROGRAM

## 2020-02-01 PROCEDURE — 83735 ASSAY OF MAGNESIUM: CPT | Performed by: STUDENT IN AN ORGANIZED HEALTH CARE EDUCATION/TRAINING PROGRAM

## 2020-02-01 PROCEDURE — 85730 THROMBOPLASTIN TIME PARTIAL: CPT | Performed by: INTERNAL MEDICINE

## 2020-02-01 PROCEDURE — 85027 COMPLETE CBC AUTOMATED: CPT | Performed by: STUDENT IN AN ORGANIZED HEALTH CARE EDUCATION/TRAINING PROGRAM

## 2020-02-01 PROCEDURE — 99232 SBSQ HOSP IP/OBS MODERATE 35: CPT | Performed by: INTERNAL MEDICINE

## 2020-02-01 PROCEDURE — 85610 PROTHROMBIN TIME: CPT | Performed by: STUDENT IN AN ORGANIZED HEALTH CARE EDUCATION/TRAINING PROGRAM

## 2020-02-01 RX ORDER — WARFARIN 3 MG/1
6 TABLET ORAL ONCE
Status: COMPLETED | OUTPATIENT
Start: 2020-02-01 | End: 2020-02-01

## 2020-02-01 RX ADMIN — PANTOPRAZOLE SODIUM 40 MG: 40 TABLET, DELAYED RELEASE ORAL at 15:33

## 2020-02-01 RX ADMIN — WARFARIN SODIUM 6 MG: 3 TABLET ORAL at 17:04

## 2020-02-01 RX ADMIN — ATORVASTATIN CALCIUM 40 MG: 40 TABLET, FILM COATED ORAL at 22:46

## 2020-02-01 RX ADMIN — HEPARIN SODIUM 1300 UNITS/HR: 10000 INJECTION, SOLUTION INTRAVENOUS at 17:05

## 2020-02-01 RX ADMIN — PANTOPRAZOLE SODIUM 40 MG: 40 TABLET, DELAYED RELEASE ORAL at 09:08

## 2020-02-01 RX ADMIN — HEPARIN SODIUM 1300 UNITS/HR: 10000 INJECTION, SOLUTION INTRAVENOUS at 11:12

## 2020-02-01 NOTE — PLAN OF CARE
Problem: Adult Inpatient Plan of Care  Goal: Plan of Care Review  Outcome: Progressing  Flowsheets (Taken 1/31/2020 2134)  Progress: improving  Plan of Care Reviewed With: patient  Outcome Summary: heparin gtt per order, d/c home soon     Problem: Adult Inpatient Plan of Care  Goal: Patient-Specific Goal (Individualization)  Flowsheets (Taken 1/31/2020 2134)  Patient-Specific Goals (Include Timeframe): d/c soon  Individualized Care Needs: heparin gtt  Anxieties, Fears or Concerns: none     Problem: Fall Injury Risk  Goal: Absence of Fall and Fall-Related Injury  Outcome: Progressing

## 2020-02-01 NOTE — PROGRESS NOTES
"     Internal Medicine  Daily Progress Note       SUBJECTIVE   This is a 64 y.o. year-old female admitted on 2020 with Lower GI bleed [K92.2].    Interval History: NAEON. This AM pt seen lying in bed comfortable, no complaints. Denies abd pain/n/v/d, blood per rectum, any other bleeding bruising, CP/SOB, LE swelling. Reports she has not had BM in a few days however this is normal for her. Tolerating PO without issue. Explained plan of day.       OBJECTIVE      Vital signs in last 24 hours:  Temp:  [36.2 °C (97.1 °F)-37.1 °C (98.8 °F)] 36.8 °C (98.2 °F)  Heart Rate:  [53-91] 53  Resp:  [16] 16  BP: (103-117)/(56-65) 103/56  Temp (24hrs), Av.6 °C (97.9 °F), Min:36.2 °C (97.1 °F), Max:37.1 °C (98.8 °F)    Intake/Output     Intake Evening 20 - 209 Night 20 230 - 20 0659 Day 20 07 - 20 1459 Output Evening 20 - 20 Night 20 - 20 0659 Day 20 - 20 1459    I.V. 129 61.5 -- Urine 350 500 --    Blood 635 650 --                    IV Piggyback -- -- --                    Total 764 711.5 -- Total 350 500 --    Last 3 shifts --  Intake: 1475.5       Output: 850       Net: 625.5          PHYSICAL EXAMINATION      PHYSICAL EXAM  Vitals: Visit Vitals  BP (!) 103/56 (BP Location: Right upper arm, Patient Position: Lying)   Pulse (!) 53   Temp 36.8 °C (98.2 °F) (Oral)   Resp 16   Ht 1.575 m (5' 2\")   Wt 90.8 kg (200 lb 3.9 oz)   SpO2 98%   BMI 36.63 kg/m²      General: NAD, well-appearing    HEENT: Oropharyx clear and without exudates  No JVD  No cervical lymphadenopathy   Eyes: Conjunctival pallor  EOMI   Cardiac: RRR  No murmurs, rubs, or gallops   Pulmonary: Clear to auscultation bilaterally   Abdomen: Soft, non-tender, +BS  No rebound, no guarding   MSK: No joint deformity   Extremities: No peripheral edema   Neuro: AAO x 3, non-focal  CN II-XII intact   Psych: Appropriate, cooperative   Skin: No rashes      LINES, " CATHETERS, DRAINS, AIRWAYS, AND WOUNDS   Lines, Drains, Airways, Wounds:  Peripheral IV 01/25/20 Left Antecubital (Active)   Number of days: 1       Peripheral IV 01/25/20 Right Antecubital (Active)   Number of days: 1       Comments:      LABS / IMAGING / TELE      Labs  BMP:  Results from last 7 days   Lab Units 01/31/20  0751 01/30/20  0743 01/29/20  0658   SODIUM mEQ/L 139 141 142   POTASSIUM mEQ/L 4.0 4.3 4.3   CHLORIDE mEQ/L 105 108 108   CO2 mEQ/L 24 25 25   BUN mg/dL 11 <5* 8   CREATININE mg/dL 0.5* 0.6 0.6   GLUCOSE mg/dL 119* 92 89   CALCIUM mg/dL 8.8* 8.9 8.8*       Results from last 7 days   Lab Units 01/31/20  0751   MAGNESIUM mg/dL 1.9       LFT:  Lab Results   Component Value Date    ALT 29 01/25/2020    AST 24 01/25/2020    ALKPHOS 62 01/25/2020    BILITOT 0.3 01/25/2020       CBC:  Results from last 7 days   Lab Units 01/31/20  0751 01/31/20  0105 01/30/20  0743  01/25/20  1545   WBC K/uL 6.48 6.11 6.09   < > 7.25   HEMOGLOBIN g/dL 9.1* 8.1* 8.8*   < > 6.8*   HEMATOCRIT % 28.9* 25.4* 27.9*   < > 20.7*   PLATELETS K/uL 245 220 206   < > 177   DIFF TYPE   --   --   --   --  Manu   NEUTROS PCT MAN %  --   --   --   --  78   LYMPHO PCT MAN %  --   --   --   --  16   MONO PCT MAN %  --   --   --   --  4   EOSINO PCT MAN %  --   --   --   --  2   BASOS PCT MAN %  --   --   --   --  0   SEGS ABS MAN K/uL  --   --   --   --  5.66   LYMPHO ABS MAN K/uL  --   --   --   --  1.16*   MONO ABS MAN K/uL  --   --   --   --  0.29   EOS ABS MAN K/uL  --   --   --   --  0.15   BASOS ABS MAN K/uL  --   --   --   --  0.00*    < > = values in this interval not displayed.           Imaging  Imaging reviewed.     ECG/Telemetry  Telemetry reviewed.     ASSESSMENT AND PLAN      * GI bleed  Assessment & Plan  Patient with hx of bariatric surgery but no prior hx of GI bleed presenting for 3 days of melena followed by [facility] and symptomatic acute anemia. Examines hemodynamically stable. Heme+ dark stool on rectal by ED  physician. Hgb on admission 6.8. Typed and screen and ordered for 2 units of PRBC. Also ordered for 1u FFP and 2mg oral vitamin K.     Concern for UGI bleed at anastomoses site given bariatric surgery hx. S/p EGD w/o evidence of bleeding. VCE with old blood in colon. S/p 1/2 prep to clear old blood out.     - Regular diet   - Restarted A/C, bridge to warfarin with heparin  - PPI PO BID  - Trend CBC daily  - Monitor daily INR   - Known to Dr. Rock, informed patient is here   - GI following, recs appreciated   - Monitor hemodynamics closely     History of pulmonary embolus (PE)  Assessment & Plan  Patient with hx of unprovoked PE, on warfarin. INR therapeutic on presentation.    - Resume A/C     Anemia  Assessment & Plan  Acute anemia noted in s/o GI bleed.    - Management as per GI bleed     HLD (hyperlipidemia)  Assessment & Plan  - C/w statin    Paroxysmal atrial fibrillation (CMS/HCC)  Assessment & Plan  - Resumed A/C     Essential hypertension  Assessment & Plan  - Hold Dilt     Obstructive sleep apnea  Assessment & Plan  - Hold CPAP for now        VTE Assessment: Padua    Code Status: Full Code  Estimated discharge date: 2/1/2020     ATTENDING DOCUMENTATION  ALSO SEE ATTENDING ATTESTATION SECTION OF NOTE

## 2020-02-01 NOTE — PROGRESS NOTES
Warfarin Dosing by Pharmacy Consult Follow up    Jennifer Aly is a 64 y.o. female who has been consulted for warfarin dosing for Pulmonary embolism.    Reviewed relevant clinical data including weight, previous warfarin doses, CBC and PT/INR levels:  PT   Date/Time Value Ref Range Status   02/01/2020 05:58 AM 13.0 12.2 - 14.5 sec Final   01/31/2020 07:51 AM 13.7 12.2 - 14.5 sec Final   01/30/2020 07:43 AM 13.2 12.2 - 14.5 sec Final     INR   Date/Time Value Ref Range Status   02/01/2020 05:58 AM 1.0   INR Final     Comment:       INR has no defined significance when PT is within Reference Range.   01/31/2020 07:51 AM 1.1   INR Final     Comment:       INR has no defined significance when PT is within Reference Range.   01/30/2020 07:43 AM 1.0   INR Final     Comment:       INR has no defined significance when PT is within Reference Range.       CBC Results         02/01/20 01/31/20 01/31/20                    0558 0751 0105         WBC 5.96 6.48 6.11         RBC 2.92 2.97 2.65         HGB 8.9 9.1 8.1         HCT 28.3 28.9 25.4         MCV 96.9 97.3 95.8         MCH 30.5 30.6 30.6         MCHC 31.4 31.5 31.9          245 220                              Warfarin Administrations (last 96 hours)       Date/Time Action Medication Dose    01/31/20 1807 Given    warfarin (COUMADIN) tablet 4 mg 4 mg    01/30/20 1822 Given    warfarin (COUMADIN) tablet 4 mg 4 mg              Assessment/Plan  The patient is ordered warfarin dosing by pharmacy.          Warfarin INR level 1.0 after dose of 4 mg PO x1.  INR goal: 2 - 3    Will adjust dose to warfarin 6 mg PO x1.    Next INR:  02/02/2020 @ 06:00    Patient is receiving concurrent parenteral anticoagulation for bridging purposes.    Pharmacy will continue to follow the patient's warfarin dosing daily during this course of therapy.    Please call INR levels to the pharmacy.  Jeanne Aranda [facility]

## 2020-02-02 LAB
ANION GAP SERPL CALC-SCNC: 7 MEQ/L (ref 3–15)
APTT PPP: 103 SEC (ref 23–35)
APTT PPP: 69 SEC (ref 23–35)
APTT PPP: 80 SEC (ref 23–35)
BUN SERPL-MCNC: 11 MG/DL (ref 8–20)
CALCIUM SERPL-MCNC: 9.1 MG/DL (ref 8.9–10.3)
CHLORIDE SERPL-SCNC: 110 MEQ/L (ref 98–109)
CO2 SERPL-SCNC: 24 MEQ/L (ref 22–32)
CREAT SERPL-MCNC: 0.5 MG/DL
ERYTHROCYTE [DISTWIDTH] IN BLOOD BY AUTOMATED COUNT: 15.2 % (ref 11.7–14.4)
GFR SERPL CREATININE-BSD FRML MDRD: >60 ML/MIN/1.73M*2
GLUCOSE SERPL-MCNC: 101 MG/DL (ref 70–99)
HCT VFR BLDCO AUTO: 27.9 %
HGB BLD-MCNC: 8.7 G/DL (ref 11.8–15.7)
INR PPP: 1 INR
MAGNESIUM SERPL-MCNC: 1.9 MG/DL (ref 1.8–2.5)
MCH RBC QN AUTO: 30.2 PG (ref 28–33.2)
MCHC RBC AUTO-ENTMCNC: 31.2 G/DL (ref 32.2–35.5)
MCV RBC AUTO: 96.9 FL (ref 83–98)
PDW BLD AUTO: 9.3 FL (ref 9.4–12.3)
PLATELET # BLD AUTO: 281 K/UL
POTASSIUM SERPL-SCNC: 4.6 MEQ/L (ref 3.6–5.1)
PROTHROMBIN TIME: 12.9 SEC (ref 12.2–14.5)
RBC # BLD AUTO: 2.88 M/UL (ref 3.93–5.22)
SODIUM SERPL-SCNC: 141 MEQ/L (ref 136–144)
WBC # BLD AUTO: 5.94 K/UL

## 2020-02-02 PROCEDURE — 99232 SBSQ HOSP IP/OBS MODERATE 35: CPT | Performed by: INTERNAL MEDICINE

## 2020-02-02 PROCEDURE — 63600000 HC DRUGS/DETAIL CODE: Performed by: STUDENT IN AN ORGANIZED HEALTH CARE EDUCATION/TRAINING PROGRAM

## 2020-02-02 PROCEDURE — 83735 ASSAY OF MAGNESIUM: CPT | Performed by: STUDENT IN AN ORGANIZED HEALTH CARE EDUCATION/TRAINING PROGRAM

## 2020-02-02 PROCEDURE — 85027 COMPLETE CBC AUTOMATED: CPT | Performed by: STUDENT IN AN ORGANIZED HEALTH CARE EDUCATION/TRAINING PROGRAM

## 2020-02-02 PROCEDURE — 85610 PROTHROMBIN TIME: CPT | Performed by: STUDENT IN AN ORGANIZED HEALTH CARE EDUCATION/TRAINING PROGRAM

## 2020-02-02 PROCEDURE — 85730 THROMBOPLASTIN TIME PARTIAL: CPT | Performed by: INTERNAL MEDICINE

## 2020-02-02 PROCEDURE — 80048 BASIC METABOLIC PNL TOTAL CA: CPT | Performed by: STUDENT IN AN ORGANIZED HEALTH CARE EDUCATION/TRAINING PROGRAM

## 2020-02-02 PROCEDURE — 63700000 HC SELF-ADMINISTRABLE DRUG: Performed by: STUDENT IN AN ORGANIZED HEALTH CARE EDUCATION/TRAINING PROGRAM

## 2020-02-02 PROCEDURE — 21400000 HC ROOM AND CARE CCU/INTERMEDIATE

## 2020-02-02 PROCEDURE — 36415 COLL VENOUS BLD VENIPUNCTURE: CPT | Performed by: STUDENT IN AN ORGANIZED HEALTH CARE EDUCATION/TRAINING PROGRAM

## 2020-02-02 RX ADMIN — ATORVASTATIN CALCIUM 40 MG: 40 TABLET, FILM COATED ORAL at 22:44

## 2020-02-02 RX ADMIN — HEPARIN SODIUM 1250 UNITS/HR: 10000 INJECTION, SOLUTION INTRAVENOUS at 07:30

## 2020-02-02 RX ADMIN — WARFARIN SODIUM 8 MG: 3 TABLET ORAL at 17:27

## 2020-02-02 RX ADMIN — PANTOPRAZOLE SODIUM 40 MG: 40 TABLET, DELAYED RELEASE ORAL at 17:27

## 2020-02-02 RX ADMIN — PANTOPRAZOLE SODIUM 40 MG: 40 TABLET, DELAYED RELEASE ORAL at 08:23

## 2020-02-02 RX ADMIN — HEPARIN SODIUM 1250 UNITS/HR: 10000 INJECTION, SOLUTION INTRAVENOUS at 13:32

## 2020-02-02 NOTE — PLAN OF CARE
Problem: Adult Inpatient Plan of Care  Goal: Plan of Care Review  Outcome: Progressing  Flowsheets (Taken 2/2/2020 1036)  Progress: improving  Plan of Care Reviewed With: patient  Outcome Summary: plan of care progression reviewed with the patient. INR is still sup-therapeutic. Continue Heparine bridge. PAtient verbalizes understanding.

## 2020-02-02 NOTE — PROGRESS NOTES
"     Internal Medicine  Daily Progress Note       SUBJECTIVE   This is a 64 y.o. year-old female admitted on 2020 with Lower GI bleed [K92.2].    Interval History: Feels well. No further bloody BM. Awaiting INR to become therapeutic.       OBJECTIVE      Vital signs in last 24 hours:  Temp:  [36.2 °C (97.2 °F)-36.9 °C (98.5 °F)] 36.5 °C (97.7 °F)  Heart Rate:  [50-83] 56  Resp:  [16-18] 17  BP: (103-133)/(55-76) 108/63  Temp (24hrs), Av.6 °C (97.9 °F), Min:36.2 °C (97.2 °F), Max:36.9 °C (98.5 °F)    Intake/Output     Intake Evening 20 1500 - 20 2259 Night 20 2300 - 20 0659 Day 20 - 20 1459 Output Evening 20 1500 - 20 2259 Night 20 2300 - 20 0659 Day 20 07 - 20 1459    I.V. 129 61.5 -- Urine 350 500 --    Blood 635 650 --                    IV Piggyback -- -- --                    Total 764 711.5 -- Total 350 500 --    Last 3 shifts --  Intake: 1475.5       Output: 850       Net: 625.5          PHYSICAL EXAMINATION      PHYSICAL EXAM  Vitals: Visit Vitals  /63 (BP Location: Right upper arm, Patient Position: Sitting)   Pulse (!) 56   Temp 36.5 °C (97.7 °F) (Oral)   Resp 17   Ht 1.575 m (5' 2\")   Wt 90.8 kg (200 lb 3.9 oz)   SpO2 99%   BMI 36.63 kg/m²      General: NAD, well-appearing    HEENT: Oropharyx clear and without exudates  No JVD  No cervical lymphadenopathy   Eyes: Conjunctival pallor  EOMI   Cardiac: RRR  No murmurs, rubs, or gallops   Pulmonary: Clear to auscultation bilaterally   Abdomen: Soft, non-tender, +BS  No rebound, no guarding   MSK: No joint deformity   Extremities: No peripheral edema   Neuro: AAO x 3, non-focal  CN II-XII intact   Psych: Appropriate, cooperative   Skin: No rashes      LINES, CATHETERS, DRAINS, AIRWAYS, AND WOUNDS   Lines, Drains, Airways, Wounds:  Peripheral IV 20 Left Antecubital (Active)   Number of days: 1       Peripheral IV 20 Right Antecubital (Active)   Number of " days: 1       Comments:      LABS / IMAGING / TELE      Labs  BMP:  Results from last 7 days   Lab Units 02/02/20  0557 02/01/20  0558 01/31/20  0751   SODIUM mEQ/L 141 140 139   POTASSIUM mEQ/L 4.6 4.3 4.0   CHLORIDE mEQ/L 110* 109 105   CO2 mEQ/L 24 26 24   BUN mg/dL 11 13 11   CREATININE mg/dL 0.5* 0.5* 0.5*   GLUCOSE mg/dL 101* 96 119*   CALCIUM mg/dL 9.1 9.1 8.8*       Results from last 7 days   Lab Units 02/02/20  0557   MAGNESIUM mg/dL 1.9       LFT:  Lab Results   Component Value Date    ALT 29 01/25/2020    AST 24 01/25/2020    ALKPHOS 62 01/25/2020    BILITOT 0.3 01/25/2020       CBC:  Results from last 7 days   Lab Units 02/02/20  0556 02/01/20  0558 01/31/20  0751   WBC K/uL 5.94 5.96 6.48   HEMOGLOBIN g/dL 8.7* 8.9* 9.1*   HEMATOCRIT % 27.9* 28.3* 28.9*   PLATELETS K/uL 281 289 245           Imaging  Imaging reviewed.     ECG/Telemetry  Telemetry reviewed.     ASSESSMENT AND PLAN      * GI bleed  Assessment & Plan  Patient with hx of bariatric surgery but no prior hx of GI bleed presenting for 3 days of melena followed by BRBPR and symptomatic acute anemia. Examines hemodynamically stable. Heme+ dark stool on rectal by ED physician. Hgb on admission 6.8. Typed and screen and ordered for 2 units of PRBC. Also ordered for 1u FFP and 2mg oral vitamin K.     Concern for UGI bleed at anastomoses site given bariatric surgery hx. S/p EGD w/o evidence of bleeding. VCE with old blood in colon. S/p 1/2 prep to clear old blood out.     - Regular diet   - Restarted A/C, bridge to warfarin with heparin  - PPI PO BID  - Trend CBC daily  - Monitor daily INR   - Known to Dr. Rock, informed patient is here   - GI following, recs appreciated   - Monitor hemodynamics closely     History of pulmonary embolus (PE)  Assessment & Plan  Patient with hx of unprovoked PE, on warfarin. INR therapeutic on presentation.    - Resume A/C     Anemia  Assessment & Plan  Acute anemia noted in s/o GI bleed.    - Management as per GI  bleed     HLD (hyperlipidemia)  Assessment & Plan  - C/w statin    Paroxysmal atrial fibrillation (CMS/HCC)  Assessment & Plan  - Resumed A/C     Essential hypertension  Assessment & Plan  - Hold Dilt     Obstructive sleep apnea  Assessment & Plan  - Hold CPAP for now        VTE Assessment: Padua    Code Status: Full Code  Estimated discharge date: 2/1/2020     ATTENDING DOCUMENTATION  ALSO SEE ATTENDING ATTESTATION SECTION OF NOTE

## 2020-02-02 NOTE — PROGRESS NOTES
Warfarin Dosing by Pharmacy Consult Discontinued    Warfarin Dosing by Pharmacy Protocol was discontinued.  Pharmacy will sign off and no longer dose warfarin for this patient.    Jeanne Aranda, [facility]

## 2020-02-02 NOTE — PLAN OF CARE
Problem: Adult Inpatient Plan of Care  Goal: Patient-Specific Goal (Individualization)  Flowsheets (Taken 1/31/2020 2134 by Jemima Lennon, RN)  Patient-Specific Goals (Include Timeframe): d/c soon  Individualized Care Needs: heparin gtt  Anxieties, Fears or Concerns: none     Problem: Adjustment to Illness (Gastrointestinal Bleeding)  Goal: Optimal Coping with Acute Illness  Outcome: Progressing     Problem: Adult Inpatient Plan of Care  Goal: Plan of Care Review  Outcome: Progressing  Flowsheets (Taken 2/2/2020 6827)  Progress: improving  Plan of Care Reviewed With: patient  Outcome Summary: ptt therapeutic awaiting inr therapeutic for discharge

## 2020-02-03 LAB
ANION GAP SERPL CALC-SCNC: 9 MEQ/L (ref 3–15)
APTT PPP: 76 SEC (ref 23–35)
BUN SERPL-MCNC: 11 MG/DL (ref 8–20)
CALCIUM SERPL-MCNC: 8.9 MG/DL (ref 8.9–10.3)
CHLORIDE SERPL-SCNC: 108 MEQ/L (ref 98–109)
CO2 SERPL-SCNC: 22 MEQ/L (ref 22–32)
CREAT SERPL-MCNC: 0.5 MG/DL
ERYTHROCYTE [DISTWIDTH] IN BLOOD BY AUTOMATED COUNT: 15.4 % (ref 11.7–14.4)
GFR SERPL CREATININE-BSD FRML MDRD: >60 ML/MIN/1.73M*2
GLUCOSE SERPL-MCNC: 96 MG/DL (ref 70–99)
HCT VFR BLDCO AUTO: 30.3 %
HGB BLD-MCNC: 9.2 G/DL (ref 11.8–15.7)
INR PPP: 1.1 INR
MAGNESIUM SERPL-MCNC: 1.9 MG/DL (ref 1.8–2.5)
MCH RBC QN AUTO: 29.6 PG (ref 28–33.2)
MCHC RBC AUTO-ENTMCNC: 30.4 G/DL (ref 32.2–35.5)
MCV RBC AUTO: 97.4 FL (ref 83–98)
PDW BLD AUTO: 9.7 FL (ref 9.4–12.3)
PLATELET # BLD AUTO: 316 K/UL
POTASSIUM SERPL-SCNC: 4.2 MEQ/L (ref 3.6–5.1)
PROTHROMBIN TIME: 14.4 SEC (ref 12.2–14.5)
RBC # BLD AUTO: 3.11 M/UL (ref 3.93–5.22)
SODIUM SERPL-SCNC: 139 MEQ/L (ref 136–144)
WBC # BLD AUTO: 5.68 K/UL

## 2020-02-03 PROCEDURE — 36415 COLL VENOUS BLD VENIPUNCTURE: CPT | Performed by: STUDENT IN AN ORGANIZED HEALTH CARE EDUCATION/TRAINING PROGRAM

## 2020-02-03 PROCEDURE — 63700000 HC SELF-ADMINISTRABLE DRUG: Performed by: STUDENT IN AN ORGANIZED HEALTH CARE EDUCATION/TRAINING PROGRAM

## 2020-02-03 PROCEDURE — 21400000 HC ROOM AND CARE CCU/INTERMEDIATE

## 2020-02-03 PROCEDURE — 63700000 HC SELF-ADMINISTRABLE DRUG: Performed by: INTERNAL MEDICINE

## 2020-02-03 PROCEDURE — 85027 COMPLETE CBC AUTOMATED: CPT | Performed by: STUDENT IN AN ORGANIZED HEALTH CARE EDUCATION/TRAINING PROGRAM

## 2020-02-03 PROCEDURE — 80048 BASIC METABOLIC PNL TOTAL CA: CPT | Performed by: STUDENT IN AN ORGANIZED HEALTH CARE EDUCATION/TRAINING PROGRAM

## 2020-02-03 PROCEDURE — 85730 THROMBOPLASTIN TIME PARTIAL: CPT | Performed by: INTERNAL MEDICINE

## 2020-02-03 PROCEDURE — 85610 PROTHROMBIN TIME: CPT | Performed by: STUDENT IN AN ORGANIZED HEALTH CARE EDUCATION/TRAINING PROGRAM

## 2020-02-03 PROCEDURE — 99233 SBSQ HOSP IP/OBS HIGH 50: CPT | Performed by: INTERNAL MEDICINE

## 2020-02-03 PROCEDURE — 63600000 HC DRUGS/DETAIL CODE: Performed by: STUDENT IN AN ORGANIZED HEALTH CARE EDUCATION/TRAINING PROGRAM

## 2020-02-03 PROCEDURE — 83735 ASSAY OF MAGNESIUM: CPT | Performed by: STUDENT IN AN ORGANIZED HEALTH CARE EDUCATION/TRAINING PROGRAM

## 2020-02-03 RX ADMIN — ATORVASTATIN CALCIUM 40 MG: 40 TABLET, FILM COATED ORAL at 22:09

## 2020-02-03 RX ADMIN — PANTOPRAZOLE SODIUM 40 MG: 40 TABLET, DELAYED RELEASE ORAL at 17:25

## 2020-02-03 RX ADMIN — HEPARIN SODIUM 1250 UNITS/HR: 10000 INJECTION, SOLUTION INTRAVENOUS at 12:25

## 2020-02-03 RX ADMIN — PANTOPRAZOLE SODIUM 40 MG: 40 TABLET, DELAYED RELEASE ORAL at 07:33

## 2020-02-03 RX ADMIN — WARFARIN SODIUM 12.5 MG: 2.5 TABLET ORAL at 18:25

## 2020-02-03 NOTE — PLAN OF CARE
Problem: Adjustment to Illness (Gastrointestinal Bleeding)  Goal: Optimal Coping with Acute Illness  Outcome: Progressing     Problem: Adjustment to Illness (Gastrointestinal Bleeding)  Goal: Optimal Coping with Acute Illness  Outcome: Progressing     Problem: Adult Inpatient Plan of Care  Goal: Plan of Care Review  Outcome: Progressing  Flowsheets  Taken 2/2/2020 1036 by Nayeli Faith, RN  Progress: improving  Taken 2/3/2020 0509 by Tarsha Camacho RN  Plan of Care Reviewed With: patient  Outcome Summary: heparin gtt maintained after therapeutic value awaiting inr therapeutic for discharge

## 2020-02-03 NOTE — PLAN OF CARE
Problem: Adult Inpatient Plan of Care  Goal: Plan of Care Review  Outcome: Progressing     Problem: Adult Inpatient Plan of Care  Goal: Readiness for Transition of Care  Intervention: Mutually Develop Transition Plan  Flowsheets  Taken 1/27/2020 1642 by Tammi Shelton RN  Anticipated Discharge Disposition: home without services  Equipment Needed After Discharge: none  Discharge Coordination/Progress: Per update from CPR, ANDREW 1/28 pending progress. I met with patient this afternoon to complete dcp assessment.  Patient confirmed she lives with her significant other in a multi level home.  Patient was independent prior to admission.  Patient PCP and pharmacy verified.  Patient declines dc needs or concerns.  Anticiapte transition to home without services.  Care coordination to follow for transition of care needs until dc is complete.  Anticipated Changes Related to Illness: none  Readmission Within the Last 30 Days: no previous admission in last 30 days  Patient/Family Anticipated Services at Transition: none  Patient/Family Anticipates Transition to: home;home with family  Transportation Anticipated: family or friend will provide  Concerns to be Addressed: no discharge needs identified;denies needs/concerns at this time  Offered/Gave Vendor List: no  Taken 1/25/2020 2144 by Anna Agarwal, RN  Equipment Currently Used at Home: CPAP     Plan dc home when coumadin level is therapeutic. No dc needs identified.

## 2020-02-03 NOTE — PLAN OF CARE
Problem: Adult Inpatient Plan of Care  Goal: Plan of Care Review  Outcome: Progressing  Flowsheets  Taken 2/2/2020 1036 by Nayeli Faith RN  Progress: improving  Taken 2/3/2020 0700 by Dary Guevara RN  Plan of Care Reviewed With: patient  Taken 2/3/2020 1430 by Dary Guevara RN  Outcome Summary: D/C Pending till INR therapeutic, Cont. Heparin to Warfarin bridge. No further bleeding noted.

## 2020-02-03 NOTE — PROGRESS NOTES
Hospital Medicine Service -  Daily Progress Note       SUBJECTIVE   Interval History: NO blood in stool today. Feeling well.      OBJECTIVE      Vital signs in last 24 hours:  Temp:  [36.4 °C (97.5 °F)-37 °C (98.6 °F)] 36.8 °C (98.3 °F)  Heart Rate:  [55-76] 73  Resp:  [16-18] 16  BP: ()/(58-70) 98/58    Intake/Output Summary (Last 24 hours) at 2/3/2020 1156  Last data filed at 2/2/2020 1800  Gross per 24 hour   Intake 560 ml   Output --   Net 560 ml       PHYSICAL EXAMINATION      Physical Exam   Constitutional: She is oriented to person, place, and time. She appears well-developed and well-nourished.   HENT:   Head: Normocephalic and atraumatic.   Eyes: Pupils are equal, round, and reactive to light.   Neck: No JVD present.   Cardiovascular: Normal rate, regular rhythm and normal heart sounds. Exam reveals no friction rub.   No murmur heard.  Pulmonary/Chest: Effort normal and breath sounds normal. No stridor. No respiratory distress. She has no wheezes.   Abdominal: Soft. Bowel sounds are normal. She exhibits no distension. There is no tenderness. There is no guarding.   Musculoskeletal: She exhibits no edema.   Neurological: She is alert and oriented to person, place, and time.   Skin: Skin is warm and dry.   Psychiatric: She has a normal mood and affect.      LINES, CATHETERS, DRAINS, AIRWAYS, AND WOUNDS   Lines, Drains, Airways, Wounds:  Peripheral IV 02/02/20 Left;Lateral Forearm (Active)   Number of days: 1       Comments:      LABS / IMAGING / TELE      Labs  Results from last 7 days   Lab Units 02/03/20  0618 02/02/20  0556 02/01/20  0558   WBC K/uL 5.68 5.94 5.96   HEMOGLOBIN g/dL 9.2* 8.7* 8.9*   HEMATOCRIT % 30.3* 27.9* 28.3*   PLATELETS K/uL 316 281 289     Results from last 7 days   Lab Units 02/03/20  0618 02/02/20  0557 02/01/20  0558   INR INR 1.1 1.0 1.0         Imaging  I have independently reviewed the pertinent imaging from the last 24 hrs.    ECG/Telemetry  I have independently  reviewed the telemetry. Significant findings include NSR.    ASSESSMENT AND PLAN      * GI bleed  Assessment & Plan  Patient with hx of bariatric surgery but no prior hx of GI bleed presenting for 3 days of melena followed by BRBPR and symptomatic acute anemia. Examines hemodynamically stable. Heme+ dark stool on rectal by ED physician. Hgb on admission 6.8. Typed and screen and ordered for 2 units of PRBC. Also ordered for 1u FFP and 2mg oral vitamin K.     Concern for UGI bleed at anastomoses site given bariatric surgery hx.  -S/p EGD on 1/27 which revealed no source of bleeding  -S/p video capsule study on 1/28 with no evidence of active bleeding    - Regular diet   - Restarted A/C, bridge to warfarin with heparin  - PPI PO BID  - Known to Dr. Rock, informed patient is here   - GI following, recs appreciated     Will monitor for signs of bleeding, if acute bleed will obtain STAT CTA     History of pulmonary embolus (PE)  Assessment & Plan  Patient with hx of unprovoked PE, on warfarin. INR therapeutic on presentation.    - heparin to warfarin bridge (at home takes warfarin 8mg every day except Tues./Thurs when takes 4mg)  - INR 1.1; will give coumadin 12.5mg tonight  - INR daily; goal 2-3    Anemia  Assessment & Plan  Acute anemia noted in s/o GI bleed.    - no further episodes of bleeding; Hgb stable    HLD (hyperlipidemia)  Assessment & Plan  - C/w statin    Paroxysmal atrial fibrillation (CMS/HCC)  Assessment & Plan  - Resumed A/C     Essential hypertension  Assessment & Plan  - Hold Dilt     Obstructive sleep apnea  Assessment & Plan  - Hold CPAP for now          VTE Assessment: Padua    VTE Prophylaxis Plan: Heparin drip  Code Status: Full Code  Estimated Discharge Date: 2/1/2020  Disposition Planning: Pending INR being therapeutic     Massiel Michel MD  2/3/2020

## 2020-02-04 ENCOUNTER — TELEPHONE (OUTPATIENT)
Dept: PRIMARY CARE | Facility: CLINIC | Age: 65
End: 2020-02-04

## 2020-02-04 LAB
APTT PPP: 114 SEC (ref 23–35)
APTT PPP: 47 SEC (ref 23–35)
ERYTHROCYTE [DISTWIDTH] IN BLOOD BY AUTOMATED COUNT: 15.3 % (ref 11.7–14.4)
HCT VFR BLDCO AUTO: 29 %
HGB BLD-MCNC: 9.1 G/DL (ref 11.8–15.7)
INR PPP: 1.4 INR
MCH RBC QN AUTO: 29.8 PG (ref 28–33.2)
MCHC RBC AUTO-ENTMCNC: 31.4 G/DL (ref 32.2–35.5)
MCV RBC AUTO: 95.1 FL (ref 83–98)
PDW BLD AUTO: 9.5 FL (ref 9.4–12.3)
PLATELET # BLD AUTO: 316 K/UL
PROTHROMBIN TIME: 16.7 SEC (ref 12.2–14.5)
RBC # BLD AUTO: 3.05 M/UL (ref 3.93–5.22)
WBC # BLD AUTO: 6.6 K/UL

## 2020-02-04 PROCEDURE — 12000000 HC ROOM AND CARE MED/SURG

## 2020-02-04 PROCEDURE — 63700000 HC SELF-ADMINISTRABLE DRUG: Performed by: INTERNAL MEDICINE

## 2020-02-04 PROCEDURE — 63600000 HC DRUGS/DETAIL CODE: Performed by: STUDENT IN AN ORGANIZED HEALTH CARE EDUCATION/TRAINING PROGRAM

## 2020-02-04 PROCEDURE — 63700000 HC SELF-ADMINISTRABLE DRUG: Performed by: STUDENT IN AN ORGANIZED HEALTH CARE EDUCATION/TRAINING PROGRAM

## 2020-02-04 PROCEDURE — 36415 COLL VENOUS BLD VENIPUNCTURE: CPT | Performed by: STUDENT IN AN ORGANIZED HEALTH CARE EDUCATION/TRAINING PROGRAM

## 2020-02-04 PROCEDURE — 63600000 HC DRUGS/DETAIL CODE: Performed by: INTERNAL MEDICINE

## 2020-02-04 PROCEDURE — 85610 PROTHROMBIN TIME: CPT | Performed by: STUDENT IN AN ORGANIZED HEALTH CARE EDUCATION/TRAINING PROGRAM

## 2020-02-04 PROCEDURE — 85730 THROMBOPLASTIN TIME PARTIAL: CPT | Performed by: INTERNAL MEDICINE

## 2020-02-04 PROCEDURE — 85027 COMPLETE CBC AUTOMATED: CPT | Performed by: STUDENT IN AN ORGANIZED HEALTH CARE EDUCATION/TRAINING PROGRAM

## 2020-02-04 PROCEDURE — 99232 SBSQ HOSP IP/OBS MODERATE 35: CPT | Performed by: INTERNAL MEDICINE

## 2020-02-04 RX ORDER — ENOXAPARIN SODIUM 100 MG/ML
1 INJECTION SUBCUTANEOUS
Status: DISCONTINUED | OUTPATIENT
Start: 2020-02-04 | End: 2020-02-06

## 2020-02-04 RX ADMIN — ENOXAPARIN SODIUM 90 MG: 100 INJECTION SUBCUTANEOUS at 20:37

## 2020-02-04 RX ADMIN — WARFARIN SODIUM 12.5 MG: 10 TABLET ORAL at 18:00

## 2020-02-04 RX ADMIN — PANTOPRAZOLE SODIUM 40 MG: 40 TABLET, DELAYED RELEASE ORAL at 16:43

## 2020-02-04 RX ADMIN — HEPARIN SODIUM 1400 UNITS/HR: 10000 INJECTION, SOLUTION INTRAVENOUS at 10:55

## 2020-02-04 RX ADMIN — ATORVASTATIN CALCIUM 40 MG: 40 TABLET, FILM COATED ORAL at 21:15

## 2020-02-04 RX ADMIN — PANTOPRAZOLE SODIUM 40 MG: 40 TABLET, DELAYED RELEASE ORAL at 08:05

## 2020-02-04 NOTE — PROGRESS NOTES
Brief Nutrition Note    Recommendations   1. Continue Regular, no red liquids  2. BG goal 140-180mg/dL    Nutrition Charting Type: Nutrition Brief Assessment    Clinical Course: Patient is a 64 y.o. female who was admitted on 1/25/2020 with a diagnosis of Lower GI bleed [K92.2].     Past Medical History:   Diagnosis Date   • Arthritis    • DMII (diabetes mellitus, type 2) (CMS/HCC)    • Fistula    • Gout    • HLD (hyperlipidemia)    • HTN (hypertension)    • IFG (impaired fasting glucose)    • Obesity    • Polyarthritis    • Pulmonary emboli (CMS/HCC)     pulmonary emboli   • Torn rotator cuff      Past Surgical History:   Procedure Laterality Date   • APPENDECTOMY  2008   • CARDIAC CATHETERIZATION  11/2012   • INTRACAPSULAR CATARACT EXTRACTION Bilateral 08/2019   • IVC FILTER RETRIEVAL  2012   • IVC FILTER RETRIEVAL  02/2013   • RECTOURETHRAL FISTULA REPAIR  2008, 5/2017   • ROTATOR CUFF REPAIR Left 01/2016   • ALTAF-EN-Y PROCEDURE  02/07/2017   • TOTAL ABDOMINAL HYSTERECTOMY  1987   • TOTAL KNEE ARTHROPLASTY Bilateral 2010   • TOTAL KNEE ARTHROPLASTY Right 2012    revision   • TRIGGER FINGER RELEASE Right 2012    right thumb   • ULNAR NERVE REPAIR  2013     Reason for Assessment  Reason For Assessment: other (see comments), identified at risk by screening criteria(RD screening- npo/clears x 5 )  Patient Already Seen On: 01/29/20     [facility] Nutrition Screen Tool  Has patient lost weight without trying?: 0-->No  If yes,how much weight has been lost?: 0-->Patient has not lost weight  Has patient been eating poorly due to decreased appetite?: 0-->No  [facility] Nutrition Screen Score: 0     Nutrition/Diet History  Appetite Prior to Admission: Bgtusvmwr-%  Intake (%): 100%  Supplemental Drinks/Foods/Additives: MVI, Vit D, Calcium    Physical Findings  Last Bowel Movement: 02/02/20  Skin: edema(1+ Generalized)     Nutrition Order  Nutrition Order Review: meets nutritional requirements  Nutrition Order Comments: Regular, No  "red liquids     Anthropometrics  Height: 157.5 cm (5' 2\")     Current Weight  Weight Method: Bed scale  Weight: 90.8 kg (200 lb 3.9 oz)     Ideal Body Weight (IBW)  Ideal Body Weight (IBW) (kg): 50.43  % Ideal Body Weight: 178.45     Body Mass Index (BMI)  BMI (Calculated): 36.6  BMI (kg/m2): 36.36     Labs/Procedures/Meds  Lab Results Reviewed: reviewed, pertinent   BMP Results       02/03/20 02/02/20 02/01/20                    0618 0557 0558          141 140         K 4.2 4.6 4.3         Cl 108 110 109         CO2 22 24 26         Glucose 96 101 96         BUN 11 11 13         Creatinine 0.5 0.5 0.5         Calcium 8.9 9.1 9.1         Anion Gap 9 7 5         EGFR >60.0 >60.0 >60.0                     Medications  Pertinent Medications Reviewed: reviewed, pertinent:  - Lipitor, Protonix, Coumadin    Skin: 1+ Generalized Edema    Clinical comments: Pt seen as F/u. Pt reports good appetite, has been eating 100% of her meals. Hx GBS 2017- verified that she takes MVI, Vit D, Calcium at home. Encouraged her to take Vit B12 as well. Pt reports eating small meals and abides by post GBS dietary recs. No nutritional compromise.     Goals: >75% needs by PO  Monitor: PO intake, BG    Recommendations: See above       Date: 02/04/20  Signature: Les Chan  "

## 2020-02-04 NOTE — PLAN OF CARE
Problem: Adult Inpatient Plan of Care  Goal: Plan of Care Review  Flowsheets  Taken 2/3/2020 2646 by Lainey Green, RN  Progress: improving  Plan of Care Reviewed With: patient  Taken 2/3/2020 1430 by Dary Guevara, RN  Outcome Summary: D/C Pending till INR therapeutic, Cont. Heparin to Warfarin bridge  Goal: Patient-Specific Goal (Individualization)  Flowsheets (Taken 2/3/2020 2356)  Patient-Specific Goals (Include Timeframe): pt will remain free from any falls; d/c home once INR at therapeutic level  Individualized Care Needs: heparin gtt  Anxieties, Fears or Concerns: none

## 2020-02-04 NOTE — NURSING NOTE
Patient transferred to [address] [place] [address] 289 Heparin drip infusion at 1400u/hr. Per MD order, drip is to run till 6pm then stopped and patient with be started on Lovenox to Warfarin bridge till INR 2 or above. Receiving Nurse updated with Plan above.

## 2020-02-04 NOTE — PLAN OF CARE
Problem: Adult Inpatient Plan of Care  Goal: Plan of Care Review  Outcome: Progressing  Flowsheets  Taken 2/3/2020 2356 by Lainey Green RN  Progress: improving  Taken 2/4/2020 0700 by Dary Guevara RN  Plan of Care Reviewed With: patient and she verbalized understanding of plan of care  Taken 2/3/2020 1430 by Dary Guevara RN  Outcome Summary: D/C Pending till INR therapeutic, Cont. Heparin to Warfarin bridge

## 2020-02-04 NOTE — PROGRESS NOTES
Hospital Medicine Service -  Daily Progress Note       SUBJECTIVE   Interval History: Patient frustrated with length of stay in the hospital. Otherwise feeling fine and no blood in stool. We discussed and she is also comfortable changing heparin to Lovenox which she has received before     OBJECTIVE      Vital signs in last 24 hours:  Temp:  [36.6 °C (97.8 °F)-36.9 °C (98.4 °F)] 36.8 °C (98.2 °F)  Heart Rate:  [58-84] 68  Resp:  [16-18] 16  BP: (109-137)/(57-73) 111/57  No intake or output data in the 24 hours ending 02/04/20 1344    PHYSICAL EXAMINATION      Physical Exam   Constitutional: She is oriented to person, place, and time. She appears well-developed and well-nourished.   HENT:   Head: Normocephalic and atraumatic.   Eyes: Pupils are equal, round, and reactive to light.   Neck: No JVD present.   Cardiovascular: Normal rate, regular rhythm and normal heart sounds. Exam reveals no friction rub.   No murmur heard.  Pulmonary/Chest: Effort normal and breath sounds normal. No stridor. No respiratory distress. She has no wheezes.   Abdominal: Soft. Bowel sounds are normal. She exhibits no distension. There is no tenderness. There is no guarding.   Musculoskeletal: She exhibits no edema.   Neurological: She is alert and oriented to person, place, and time.   Skin: Skin is warm and dry.   Psychiatric: She has a normal mood and affect.    LINES, CATHETERS, DRAINS, AIRWAYS, AND WOUNDS   Lines, Drains, Airways, Wounds:  Peripheral IV 02/02/20 Left;Lateral Forearm (Active)   Number of days: 2       Comments:      LABS / IMAGING / TELE      Labs  Results from last 7 days   Lab Units 02/04/20  0621 02/03/20  0618 02/02/20  0556   WBC K/uL 6.60 5.68 5.94   HEMOGLOBIN g/dL 9.1* 9.2* 8.7*   HEMATOCRIT % 29.0* 30.3* 27.9*   PLATELETS K/uL 316 316 281     Results from last 7 days   Lab Units 02/04/20  0621   INR INR 1.4       Imaging  I have independently reviewed the pertinent imaging from the last 24  hrs.    ECG/Telemetry  I have independently reviewed the telemetry. No events for the last 24 hours.    ASSESSMENT AND PLAN      * GI bleed  Assessment & Plan  Patient with hx of bariatric surgery but no prior hx of GI bleed presenting for 3 days of melena followed by BRBPR and symptomatic acute anemia. Examines hemodynamically stable. Heme+ dark stool on rectal by ED physician. Hgb on admission 6.8. Typed and screen and ordered for 2 units of PRBC. Also ordered for 1u FFP and 2mg oral vitamin K.     Concern for UGI bleed at anastomoses site given bariatric surgery hx.  -S/p EGD on 1/27 which revealed no source of bleeding  -S/p video capsule study on 1/28 with no evidence of active bleeding    - Regular diet   - Restarted A/C, bridge to warfarin with heparin but will change to Lovenox tonight as no signs of bleeding; will need to monitor PTT closely and monitor for signs of bleeding  - PPI PO BID  - Known to Dr. Rock, informed patient is here   - GI following, recs appreciated     Will monitor for signs of bleeding, if acute bleed will obtain STAT CTA     History of pulmonary embolus (PE)  Assessment & Plan  Patient with hx of unprovoked PE, on warfarin. INR therapeutic on presentation.    - heparin to warfarin bridge (at home takes warfarin 8mg every day except Tues./Thurs when takes 4mg). Will change heparin to Lovenox as no signs of bleeding.   - INR 1.4; will give coumadin 12.5mg tonight  - INR daily; goal 2-3    Anemia  Assessment & Plan  Acute anemia noted in s/o GI bleed.    - no further episodes of bleeding; Hgb stable    HLD (hyperlipidemia)  Assessment & Plan  - C/w statin    Paroxysmal atrial fibrillation (CMS/HCC)  Assessment & Plan  - Resumed A/C     Essential hypertension  Assessment & Plan  - Hold Dilt     Obstructive sleep apnea  Assessment & Plan  - Hold CPAP for now          VTE Assessment: Padua    VTE Prophylaxis Plan: Lovenox  Code Status: Full Code  Estimated Discharge Date:  2020  Disposition Plannin-2 days once INR is therapuetic     Massiel Michel MD  2020

## 2020-02-04 NOTE — TELEPHONE ENCOUNTER
Dr. Michel from [facility] called to asked if  would feel comfortable completing an INR check during this patient's appointment on Thursday 2/6. The doctor states the patient has been in the hospital for 11 days and would like to be released; however, the doctor only feels comfortable releasing the patient if she can confirm that the INR check will be complete again on Thursday as a follow.     Doctor Michel can be reached on her cell phone at [phone].

## 2020-02-04 NOTE — PLAN OF CARE
Problem: Adult Inpatient Plan of Care  Goal: Plan of Care Review  Flowsheets (Taken 2/4/2020 8738)  Progress: improving  Plan of Care Reviewed With: patient  Outcome Summary: Discharge pending INR being therapeutic. Today's INR is 1.4. No needs anticipated.

## 2020-02-04 NOTE — NURSING NOTE
Pt assessed.  Pt AAOx3.  HR 84.  Pt offers no complaints at this time.  Heparin gtt infusing at 1250 units/hr.  Bed in lowest position, bed alarm on, call bell within reach.  Will continue to monitor.

## 2020-02-04 NOTE — TELEPHONE ENCOUNTER
Yes, let his office know we can do that, does he want the results? Or are we to check it-get the Low down/Diagnosis,  thx

## 2020-02-05 ENCOUNTER — TELEPHONE (OUTPATIENT)
Dept: PRIMARY CARE | Facility: CLINIC | Age: 65
End: 2020-02-05

## 2020-02-05 DIAGNOSIS — I27.82 OTHER CHRONIC PULMONARY EMBOLISM WITHOUT ACUTE COR PULMONALE: Primary | Chronic | ICD-10-CM

## 2020-02-05 DIAGNOSIS — I26.99 ACUTE PULMONARY EMBOLISM, UNSPECIFIED PULMONARY EMBOLISM TYPE, UNSPECIFIED WHETHER ACUTE COR PULMONALE PRESENT (CMS/HCC): ICD-10-CM

## 2020-02-05 LAB
ERYTHROCYTE [DISTWIDTH] IN BLOOD BY AUTOMATED COUNT: 15.5 % (ref 11.7–14.4)
HCT VFR BLDCO AUTO: 30 %
HGB BLD-MCNC: 9.4 G/DL (ref 11.8–15.7)
INR PPP: 1.9 INR
INR PPP: 2.1 INR
MCH RBC QN AUTO: 29.7 PG (ref 28–33.2)
MCHC RBC AUTO-ENTMCNC: 31.3 G/DL (ref 32.2–35.5)
MCV RBC AUTO: 94.9 FL (ref 83–98)
PDW BLD AUTO: 9.7 FL (ref 9.4–12.3)
PLATELET # BLD AUTO: 351 K/UL
PROTHROMBIN TIME: 21.4 SEC (ref 12.2–14.5)
PROTHROMBIN TIME: 23.4 SEC (ref 12.2–14.5)
RBC # BLD AUTO: 3.16 M/UL (ref 3.93–5.22)
WBC # BLD AUTO: 6.07 K/UL

## 2020-02-05 PROCEDURE — 63700000 HC SELF-ADMINISTRABLE DRUG: Performed by: STUDENT IN AN ORGANIZED HEALTH CARE EDUCATION/TRAINING PROGRAM

## 2020-02-05 PROCEDURE — 85610 PROTHROMBIN TIME: CPT | Performed by: STUDENT IN AN ORGANIZED HEALTH CARE EDUCATION/TRAINING PROGRAM

## 2020-02-05 PROCEDURE — 63700000 HC SELF-ADMINISTRABLE DRUG: Performed by: INTERNAL MEDICINE

## 2020-02-05 PROCEDURE — 36415 COLL VENOUS BLD VENIPUNCTURE: CPT | Performed by: STUDENT IN AN ORGANIZED HEALTH CARE EDUCATION/TRAINING PROGRAM

## 2020-02-05 PROCEDURE — 63600000 HC DRUGS/DETAIL CODE: Performed by: INTERNAL MEDICINE

## 2020-02-05 PROCEDURE — 85027 COMPLETE CBC AUTOMATED: CPT | Performed by: STUDENT IN AN ORGANIZED HEALTH CARE EDUCATION/TRAINING PROGRAM

## 2020-02-05 PROCEDURE — 12000000 HC ROOM AND CARE MED/SURG

## 2020-02-05 PROCEDURE — 99232 SBSQ HOSP IP/OBS MODERATE 35: CPT | Performed by: INTERNAL MEDICINE

## 2020-02-05 PROCEDURE — 85610 PROTHROMBIN TIME: CPT | Performed by: INTERNAL MEDICINE

## 2020-02-05 RX ORDER — WARFARIN 10 MG/1
10 TABLET ORAL ONCE
Status: COMPLETED | OUTPATIENT
Start: 2020-02-05 | End: 2020-02-05

## 2020-02-05 RX ORDER — WARFARIN 10 MG/1
10 TABLET ORAL ONCE
Status: DISCONTINUED | OUTPATIENT
Start: 2020-02-05 | End: 2020-02-05

## 2020-02-05 RX ADMIN — ATORVASTATIN CALCIUM 40 MG: 40 TABLET, FILM COATED ORAL at 20:40

## 2020-02-05 RX ADMIN — PANTOPRAZOLE SODIUM 40 MG: 40 TABLET, DELAYED RELEASE ORAL at 16:22

## 2020-02-05 RX ADMIN — ACETAMINOPHEN 650 MG: 325 TABLET, FILM COATED ORAL at 20:40

## 2020-02-05 RX ADMIN — ENOXAPARIN SODIUM 90 MG: 100 INJECTION SUBCUTANEOUS at 20:39

## 2020-02-05 RX ADMIN — PANTOPRAZOLE SODIUM 40 MG: 40 TABLET, DELAYED RELEASE ORAL at 08:08

## 2020-02-05 RX ADMIN — ENOXAPARIN SODIUM 90 MG: 100 INJECTION SUBCUTANEOUS at 08:09

## 2020-02-05 RX ADMIN — WARFARIN SODIUM 10 MG: 10 TABLET ORAL at 19:01

## 2020-02-05 NOTE — PROGRESS NOTES
Hospital Medicine Service -  Daily Progress Note       SUBJECTIVE   Interval History: Patient feels well. She is a little frustrated that her INR is not therapeutic yet     OBJECTIVE      Vital signs in last 24 hours:  Temp:  [36.4 °C (97.5 °F)-37 °C (98.6 °F)] 36.4 °C (97.5 °F)  Heart Rate:  [51-87] 51  Resp:  [16-18] 18  BP: (104-112)/(62-71) 104/62  No intake or output data in the 24 hours ending 02/05/20 1508    PHYSICAL EXAMINATION      Physical Exam   Constitutional: She is oriented to person, place, and time. She appears well-developed and well-nourished.   HENT:   Head: Normocephalic and atraumatic.   Eyes: Pupils are equal, round, and reactive to light.   Neck: No JVD present.   Cardiovascular: Normal rate, regular rhythm and normal heart sounds. Exam reveals no friction rub.   No murmur heard.  Pulmonary/Chest: Effort normal and breath sounds normal. No stridor. No respiratory distress. She has no wheezes.   Abdominal: Soft. Bowel sounds are normal. She exhibits no distension. There is no tenderness. There is no guarding.   Musculoskeletal: She exhibits no edema.   Neurological: She is alert and oriented to person, place, and time.   Skin: Skin is warm and dry.   Psychiatric: She has a normal mood and affect.      LINES, CATHETERS, DRAINS, AIRWAYS, AND WOUNDS   Lines, Drains, Airways, Wounds:  Peripheral IV 02/02/20 Left;Lateral Forearm (Active)   Number of days: 3       Comments:      LABS / IMAGING / TELE      Labs  Results from last 7 days   Lab Units 02/05/20  0820 02/04/20  0621 02/03/20  0618   INR INR 1.9 1.4 1.1     Results from last 7 days   Lab Units 02/05/20  0820 02/04/20  0621 02/03/20  0618   WBC K/uL 6.07 6.60 5.68   HEMOGLOBIN g/dL 9.4* 9.1* 9.2*   HEMATOCRIT % 30.0* 29.0* 30.3*   PLATELETS K/uL 351 316 316       Imaging  I have independently reviewed the pertinent imaging from the last 24 hrs.    ECG/Telemetry  Patient is not on telemetry.    ASSESSMENT AND PLAN      * GI  bleed  Assessment & Plan  Patient with hx of bariatric surgery but no prior hx of GI bleed presenting for 3 days of melena followed by BRBPR and symptomatic acute anemia. Examines hemodynamically stable. Heme+ dark stool on rectal by ED physician. Hgb on admission 6.8. Typed and screen and ordered for 2 units of PRBC. Also ordered for 1u FFP and 2mg oral vitamin K.     Concern for UGI bleed at anastomoses site given bariatric surgery hx.  -S/p EGD on 1/27 which revealed no source of bleeding  -S/p video capsule study on 1/28 with no evidence of active bleeding    - Regular diet   - Restarted A/C, bridge to warfarin with Lovenox tonight as no signs of bleeding  - PPI PO BID  - Known to Dr. Rock, informed patient is here   - GI following, recs appreciated     Will monitor for signs of bleeding, if acute bleed will obtain STAT CTA     History of pulmonary embolus (PE)  Assessment & Plan  Patient with hx of unprovoked PE, on warfarin. INR therapeutic on presentation.    - Lovenox to warfarin bridge (at home takes warfarin 8mg every day except Tues./Thurs when takes 4mg).   - INR 1.9; will give coumadin 8mg tonight  - INR daily; goal 2-3  - Called PCP office and spoke to María who will coordinate an MRI check for Monday if patient can be discharged tomorrow     Anemia  Assessment & Plan  Acute anemia noted in s/o GI bleed.    - no further episodes of bleeding; Hgb stable    HLD (hyperlipidemia)  Assessment & Plan  - C/w statin    Paroxysmal atrial fibrillation (CMS/HCC)  Assessment & Plan  - Resumed A/C     Essential hypertension  Assessment & Plan  - Hold Dilt     Obstructive sleep apnea  Assessment & Plan  - Hold CPAP for now          VTE Assessment: Padua    VTE Prophylaxis Plan: Lovenox to warfarin  Code Status: Full Code  Estimated Discharge Date: 2/6/2020  Disposition Planning: Hopefully home tomorrow     Massiel Michel MD  2/5/2020

## 2020-02-05 NOTE — TELEPHONE ENCOUNTER
Mannie, this patient called the call center today looking for a hospital follow up with Dr Moreau for next week. Dr Moreau has scattered sds and office visits. Where can I add the patient? Please let me know and I can call the patient back. Thanks.

## 2020-02-05 NOTE — PLAN OF CARE
Problem: Adult Inpatient Plan of Care  Goal: Plan of Care Review  Outcome: Progressing  Flowsheets (Taken 2/5/2020 0513)  Progress: improving  Plan of Care Reviewed With: patient  Outcome Summary: Pt denies complaints this shift, no bleeding noted.Lovenox admin per order. Safety maintained, monitoring continues.  Goal: Absence of Hospital-Acquired Illness or Injury  Outcome: Progressing  Goal: Optimal Comfort and Wellbeing  Outcome: Progressing     Problem: Fall Injury Risk  Goal: Absence of Fall and Fall-Related Injury  Outcome: Progressing     Problem: Bleeding (Gastrointestinal Bleeding)  Goal: Hemostasis  Outcome: Progressing

## 2020-02-05 NOTE — TELEPHONE ENCOUNTER
OK, can you put in a Slip for Weekly INR's for now, get her med dosage and in the near future it would go to once monthly of course

## 2020-02-05 NOTE — PATIENT CARE CONFERENCE
Care Progression Rounds Note  Date: 2/5/2020  Time: 12:16 PM     Patient Name: Jennifer Aly     Medical Record Number: [id number]   YOB: 1955  Sex: Female      Room/Bed: 0289    Admitting Diagnosis: Lower GI bleed [K92.2]   Admit Date/Time: 1/25/2020  3:03 PM    Primary Diagnosis: GI bleed  Principal Problem: GI bleed    GMLOS: 3.0  Anticipated Discharge Date: 2/6/2020    AM-PAC  Mobility Score: 24    Discharge Planning:  Anticipated Discharge Disposition: home without services    Barriers to Discharge:  Barriers to Discharge: Medical issues not resolved  Comment: hep gtt    Participants:  physician, , social work/services, nursing, pharmacy, physical therapy

## 2020-02-05 NOTE — TELEPHONE ENCOUNTER
Mannie, I called the patient to schedule her Hospital Follow up. The date and time you gave me is not good for her. 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 Needs another date and time and would prefer something this week or beginning of next week. I did let her know Dr Moreau is pretty booked up.    Also she is asking for an ongoing script so she can have INR tests completed at the lab at least once a week. Please give her a callback at [phone]. Thanks.

## 2020-02-05 NOTE — TELEPHONE ENCOUNTER
pt called to schedule a HFU with Dr Barnes. She is at [facility] being discharged tomorrow 2/6. Per Dr epifanio sanders  she wants pt to follow up with dr barnes by monday 2/10 or tuesday 2/11 at the latest as she needs to return to work on wed 2/12  FYI she is flexible and can come at any time

## 2020-02-05 NOTE — TELEPHONE ENCOUNTER
Spoke with hospitalist again and would like pt to have a INR drawn on Monday to re-check her levels. Future tcm to be scheduled

## 2020-02-05 NOTE — PLAN OF CARE
Problem: Adult Inpatient Plan of Care  Goal: Plan of Care Review  2/5/2020 1207 by Kecia Kelley RN  Flowsheets (Taken 2/5/2020 1207)  Progress: improving  Plan of Care Reviewed With: patient  Outcome Summary: Pt. with minimal complaints. Ambulatory. VSS. No signs of bleeding.

## 2020-02-06 VITALS
BODY MASS INDEX: 36.85 KG/M2 | TEMPERATURE: 98 F | WEIGHT: 200.24 LBS | OXYGEN SATURATION: 97 % | HEART RATE: 64 BPM | SYSTOLIC BLOOD PRESSURE: 111 MMHG | HEIGHT: 62 IN | DIASTOLIC BLOOD PRESSURE: 60 MMHG | RESPIRATION RATE: 16 BRPM

## 2020-02-06 LAB
ERYTHROCYTE [DISTWIDTH] IN BLOOD BY AUTOMATED COUNT: 15.5 % (ref 11.7–14.4)
HCT VFR BLDCO AUTO: 28.4 %
HGB BLD-MCNC: 8.8 G/DL (ref 11.8–15.7)
INR PPP: 2.2 INR
MCH RBC QN AUTO: 29.1 PG (ref 28–33.2)
MCHC RBC AUTO-ENTMCNC: 31 G/DL (ref 32.2–35.5)
MCV RBC AUTO: 94 FL (ref 83–98)
PDW BLD AUTO: 9.7 FL (ref 9.4–12.3)
PLATELET # BLD AUTO: 333 K/UL
PROTHROMBIN TIME: 23.8 SEC (ref 12.2–14.5)
RBC # BLD AUTO: 3.02 M/UL (ref 3.93–5.22)
WBC # BLD AUTO: 6.45 K/UL

## 2020-02-06 PROCEDURE — 85610 PROTHROMBIN TIME: CPT | Performed by: STUDENT IN AN ORGANIZED HEALTH CARE EDUCATION/TRAINING PROGRAM

## 2020-02-06 PROCEDURE — 63600000 HC DRUGS/DETAIL CODE: Performed by: INTERNAL MEDICINE

## 2020-02-06 PROCEDURE — 99239 HOSP IP/OBS DSCHRG MGMT >30: CPT | Performed by: INTERNAL MEDICINE

## 2020-02-06 PROCEDURE — 36415 COLL VENOUS BLD VENIPUNCTURE: CPT | Performed by: STUDENT IN AN ORGANIZED HEALTH CARE EDUCATION/TRAINING PROGRAM

## 2020-02-06 PROCEDURE — 85027 COMPLETE CBC AUTOMATED: CPT | Performed by: STUDENT IN AN ORGANIZED HEALTH CARE EDUCATION/TRAINING PROGRAM

## 2020-02-06 PROCEDURE — 63700000 HC SELF-ADMINISTRABLE DRUG: Performed by: STUDENT IN AN ORGANIZED HEALTH CARE EDUCATION/TRAINING PROGRAM

## 2020-02-06 RX ORDER — WARFARIN 4 MG/1
TABLET ORAL
Qty: 200 TABLET | Refills: 1
Start: 2020-02-06 | End: 2020-02-10 | Stop reason: SDUPTHER

## 2020-02-06 RX ORDER — WARFARIN 4 MG/1
TABLET ORAL
Qty: 2 TABLET
Start: 2020-02-06 | End: 2020-02-10 | Stop reason: SDUPTHER

## 2020-02-06 RX ORDER — PANTOPRAZOLE SODIUM 40 MG/1
40 TABLET, DELAYED RELEASE ORAL DAILY
Qty: 30 TABLET | Refills: 0 | Status: SHIPPED | OUTPATIENT
Start: 2020-02-06 | End: 2020-02-10 | Stop reason: SDUPTHER

## 2020-02-06 RX ADMIN — ENOXAPARIN SODIUM 90 MG: 100 INJECTION SUBCUTANEOUS at 07:45

## 2020-02-06 RX ADMIN — PANTOPRAZOLE SODIUM 40 MG: 40 TABLET, DELAYED RELEASE ORAL at 06:49

## 2020-02-06 RX ADMIN — ACETAMINOPHEN 650 MG: 325 TABLET, FILM COATED ORAL at 12:56

## 2020-02-06 RX ADMIN — ACETAMINOPHEN 650 MG: 325 TABLET, FILM COATED ORAL at 06:48

## 2020-02-06 NOTE — PROGRESS NOTES
Per info during medical rounds today, patient medically cleared for d/c today with no needs. SW will remain available for emotional support and disposition planning. Sadia Vogt, MSW [id number]

## 2020-02-06 NOTE — PLAN OF CARE
Problem: Adult Inpatient Plan of Care  Goal: Plan of Care Review  Outcome: Progressing  Flowsheets (Taken 2/6/2020 1598)  Progress: improving  Plan of Care Reviewed With: patient  Outcome Summary: Pt c/o back pain, medicated with PRN tylenol. Independent in room. VSS. Resting overnight on sleep protocol.

## 2020-02-06 NOTE — DISCHARGE INSTRUCTIONS
You came to the hospital for bleeding from your gastrointestinal tract on Jan 25th. You were seen by Gastroenterology and had an Endoscopy as well a capsule study that did not show any signs of active bleeding.     As there were no signs of bleeding and you have a history of pulmonary embolism, you were transitioned here back to warfarin with heparin and then Lovenox.     30-Jan 31-Jan 1-Feb 2-Feb 3-Feb 4-Feb 5-Feb 6-Feb   INR 1 1.1 1 1 1 1.4 2.1 2.2   Warfarin Dose 4 4 6 8 12.5 12.5 10      Please go back to taking your scheduled warfarin doses after tonight. Please take warfarin 8 mg tonight. (Your normal scheduled warfarin was 8mg every night except Tuesday and Thursday when you take 4mg a night)    Please follow up with Dr. Moreau on Feb 10th 2020 at 1:45PM. At this time, you will have your INR checked.

## 2020-02-06 NOTE — DISCHARGE SUMMARY
Hospital Medicine Service -  Inpatient Discharge Summary        BRIEF OVERVIEW   Admitting Provider: Haylie Bangura MD  Attending Provider: Massiel Michel MD Attending phys phone: [phone]    PCP: Demarco Moreau [phone]    Admission Date: 1/25/2020  Discharge Date: 2/6/2020     DISCHARGE DIAGNOSES      Primary Discharge Diagnosis  GI bleed    Secondary Discharge Diagnoses  Active Hospital Problems    Diagnosis Date Noted   • GI bleed 01/25/2020     Priority: High   • History of pulmonary embolus (PE) 01/25/2020     Priority: Medium   • Anemia 01/25/2020     Priority: Low   • HLD (hyperlipidemia) 07/29/2019   • Paroxysmal atrial fibrillation (CMS/HCC) 04/30/2018   • Essential hypertension 04/30/2018   • Obstructive sleep apnea 10/20/2016      Resolved Hospital Problems   No resolved problems to display.       Problem List on Day of Discharge  * GI bleed  Assessment & Plan  Concern for UGI bleed at anastomoses site given bariatric surgery hx.  -S/p EGD on 1/27 which revealed no source of bleeding  -S/p video capsule study on 1/28 with no evidence of active bleeding    - Regular diet   - PPI dialy  - Restarted anticoagulation without signs of bleeding    History of pulmonary embolus (PE)  Assessment & Plan  Patient with hx of unprovoked PE, on warfarin. INR therapeutic on presentation.    - Patient should take warfarin 8mg tonight and then restart her home regimen of warfarin 8mg every day except Tues./Thurs when takes 4mg.   - INR 2.1 yesterday and 2.2 today  - INR daily; goal 2-3  - Called PCP office and spoke to María and discussed hospitalization and they have scheduled an appointment for the patient on 2/10/20 at 1:45 PM and will have INR check at that time. PCP to follow INRs       30-Jan 31-Jan 1-Feb 2-Feb 3-Feb 4-Feb 5-Feb 6-Feb   INR 1 1.1 1 1 1 1.4 2.1 2.2   Warfarin Dose 4 4 6 8 12.5 12.5 10        Anemia  Assessment & Plan  Acute anemia noted in s/o GI bleed.    - no further episodes  of bleeding; Hgb stable    HLD (hyperlipidemia)  Assessment & Plan  - C/w statin    Paroxysmal atrial fibrillation (CMS/HCC)  Assessment & Plan  - Resumed A/C     Essential hypertension  Assessment & Plan  - restart Dilt     Obstructive sleep apnea  Assessment & Plan  - CPAP    SUMMARY OF HOSPITALIZATION      Presenting Problem/History of Present Illness  GI Bleed    This is a 64 y.o. year-old female admitted on 1/25/2020 with GI Bleed and anemia of 6.6      Hospital Course    Patient with history of bariatric surgery but no prior hx of GI bleed presenting for 3 days of melena followed by BRBPR and symptomatic acute anemia. Heme+ dark stool on rectal by ED physician. Hgb on admission 6.8. She was given 2 units PRBC and 1 unit FFP and 2mg oral vitamin K due to being on coumadin.    Patient had both an endoscopy and capsule study by Gastroenterology without active signs of bleeding. Patient hemoglobin remained stable. She was bridged back to warfarin without any signs of bleeding. INR at discharge is  2.2. She should take coumadin 8mg tonight and then return to her normal scheduled warfarin regimen. I spoke to PCP office twice and María from the office has coordinated the patient to have an INR check on Monday as well as office visit.     Total time spent is 36 minutes coordinating care for discharge and counseling patient/family      Exam on Day of Discharge  Physical Exam   Constitutional: She is oriented to person, place, and time. She appears well-developed and well-nourished.   HENT:   Head: Normocephalic and atraumatic.   Eyes: Pupils are equal, round, and reactive to light.   Neck: No JVD present.   Cardiovascular: Normal rate, regular rhythm and normal heart sounds. Exam reveals no friction rub.   No murmur heard.  Pulmonary/Chest: Effort normal and breath sounds normal. No stridor. No respiratory distress. She has no wheezes.   Abdominal: Soft. Bowel sounds are normal. She exhibits no distension. There is no  tenderness. There is no guarding.   Musculoskeletal: She exhibits no edema.   Neurological: She is alert and oriented to person, place, and time.   Skin: Skin is warm and dry.   Psychiatric: She has a normal mood and affect.       Consults During Admission  IP CONSULT TO GASTROENTEROLOGY    DISCHARGE MEDICATIONS     PENDING ORDERS (720h ago, onward)         Ordered     NPO Diet  Diet effective midnight,   Discontinued:  01/28/20 0000,   Status:  Canceled      Signed and Held     Verify informed consent  Once,   Discontinued:  01/28/20 0000,   Status:  Canceled      Signed and Held     Vital signs per policy  Per unit protocol,   Discontinued:  01/28/20 0000,   Status:  Canceled      Signed and Held     Notify physician (specify):Temperature greater than: 38.5; Systolic blood pressure greater than: 140; Systolic blood pressure less than: 90; Diastolic blood pressure greater than: 90; Diastolic blood pressure less than: 60; Heart rate greater than...  Until discontinued,   Discontinued:  01/28/20 0000,   Status:  Canceled      Signed and Held     Weigh patient  Once,   Discontinued:  01/28/20 0000,   Status:  Canceled      Signed and Held     Obtain height  Once,   Discontinued:  01/28/20 0000,   Status:  Canceled      Signed and Held     glucose chewable tablet 16-32 g of dextrose  (Hypoglycemia Treatment Protocol and Hyperglycemia Validation Protocol)  As needed,   Discontinued:  --,   Status:  Canceled      Signed and Held     dextrose 40 % oral gel 15-30 g of dextrose  (Hypoglycemia Treatment Protocol and Hyperglycemia Validation Protocol)  As needed,   Discontinued:  --,   Status:  Canceled      Signed and Held     glucagon (GLUCAGEN) injection 1 mg  (Hypoglycemia Treatment Protocol and Hyperglycemia Validation Protocol)  As needed,   Discontinued:  --,   Status:  Canceled      Signed and Held     dextrose in water injection 12.5 g  (Hypoglycemia Treatment Protocol and Hyperglycemia Validation Protocol)  As  needed,   Discontinued:  --,   Status:  Canceled      Signed and Held     Hyperglycemia Validation Protocol  (Hypoglycemia Treatment Protocol and Hyperglycemia Validation Protocol)  Once,   Discontinued:  01/28/20 0000,   Status:  Canceled     Comments:  Initiate hyperglycemia policies for patients with signs and symptoms of hyperglycemia.  If a POC blood glucose test is greater than or equal to 450 mg/dL, a second POC test must be performed immediately. If the second POC test is greater than or equal to 450 mg/dL, or the result differs by 100mg/dL, order and draw a stat serum glucose and notify physician.    Signed and Held     Insert peripheral IV  (Insert and Maintain IV)  Once,   Discontinued:  01/28/20 0000,   Status:  Canceled      Signed and Held     Maintain IV access  (Insert and Maintain IV)  Until discontinued,   Discontinued:  01/28/20 0000,   Status:  Canceled      Signed and Held     Saline lock IV  (Insert and Maintain IV)  Once,   Discontinued:  01/28/20 0000,   Status:  Canceled      Signed and Held     Saline Flush Peripheral IV  (Insert and Maintain IV)  Per unit protocol,   Discontinued:  01/28/20 0000,   Status:  Canceled      Signed and Held               Medication List      START taking these medications    pantoprazole 40 mg EC tablet  Commonly known as:  PROTONIX  Take 1 tablet (40 mg total) by mouth daily Indications: stress ulcer prevention.  Dose:  40 mg        CHANGE how you take these medications    * warfarin 4 mg tablet  Commonly known as:  COUMADIN  Take 2 by mouth daily (8 mg daily) except T/TH take one (4mg). Start this regimen on 2/7  What changed:  additional instructions     * warfarin 4 mg tablet  Commonly known as:  COUMADIN  Please take warfarin 8mg tonight on 2/6/20.  What changed:  You were already taking a medication with the same name, and this prescription was added. Make sure you understand how and when to take each.         * This list has 2 medication(s) that are the  same as other medications prescribed for you. Read the directions carefully, and ask your doctor or other care provider to review them with you.            CONTINUE taking these medications    atorvastatin 40 mg tablet  Commonly known as:  LIPITOR  TAKE 1 TABLET BY MOUTH EVERY DAY     calcium carbonate-vitamin D3 500 mg(1,250mg) -200 unit per tablet  Take 2 tablets by mouth 2 (two) times a day with meals.  Dose:  2 tablet     clobetasol 0.05 % cream  Commonly known as:  TEMOVATE     cyclobenzaprine 5 mg tablet  Commonly known as:  FLEXERIL  Take 1 tablet (5 mg total) by mouth 3 (three) times a day as needed for muscle spasms.  Dose:  5 mg     diltiazem 120 mg 24 hr capsule  Commonly known as:  TIAZAC  TAKE ONE CAPSULE BY MOUTH EVERY DAY     fluticasone propionate 50 mcg/actuation nasal spray  Commonly known as:  FLONASE  Administer 1 spray into each nostril daily as needed for rhinitis.  Dose:  1 spray               PROCEDURES / LABS / IMAGING      Operative Procedures  None    Other Procedures  Endoscopy  On 1/27/20- Z line regular at 35 cm distal to the incisors.  Gastric pouch healthy-appearing, with no mucosal abnormalities or ulcerations appreciated.  Healthy-appearing GJ anastomosis, without visible sutures or staples, and no ulcerations appreciate. Efferent limb traversed without any abnormalities appreciated or blood appreciated.    Capsule study on 1/28/20 - No evidence of bleeding.     Pertinent Labs  Results from last 7 days   Lab Units 02/06/20  0726 02/05/20  0820 02/04/20  0621   WBC K/uL 6.45 6.07 6.60   HEMOGLOBIN g/dL 8.8* 9.4* 9.1*   HEMATOCRIT % 28.4* 30.0* 29.0*   PLATELETS K/uL 333 351 316     Results from last 7 days   Lab Units 02/06/20  0726 02/05/20  1613 02/05/20  0820   INR INR 2.2 2.1 1.9         Pertinent Imaging  No results found.    OUTPATIENT  FOLLOW-UP / REFERRALS / PENDING TESTS        Outpatient Follow-Up Appointments            In 4 days Demarco Moreau,  [facility]  Primary Care in [place]    In 2 months Tita Man MD [facility] NS[facility]    In 11 months Yimi Osman MD [facility] Bariatric Surgery in [place]          Referrals  No orders of the defined types were placed in this encounter.      Test Results Pending at Discharge  Unresulted Labs (From admission, onward)     Start     Ordered    02/01/20 0600  CBC  Daily     Start Status     02/06/20 0600 Collected (02/06/20 0726) Order ID: [id number]   02/07/20 0600 Scheduled    02/08/20 0600 Scheduled    02/09/20 0600 Scheduled    02/10/20 0600 Scheduled        01/31/20 0915    01/26/20 0600  Protime-INR  Daily     Start Status     02/06/20 0600 Collected (02/06/20 0726) Order ID: [id number]   02/07/20 0600 Scheduled    02/08/20 0600 Scheduled    02/09/20 0600 Scheduled    02/10/20 0600 Scheduled        01/25/20 1816                Important Issues to Address in Follow-Up  F/u with PCP on Monday 2/10/20    DISCHARGE DISPOSITION      Disposition: Home     Code Status At Discharge: Full Code    Physician Order for Life-Sustaining Treatment Document Status      No documents found

## 2020-02-06 NOTE — PATIENT CARE CONFERENCE
Care Progression Rounds Note  Date: 2/6/2020  Time: 12:04 PM     Patient Name: Jennifer Aly     Medical Record Number: [id number]   YOB: 1955  Sex: Female      Room/Bed: 0289    Admitting Diagnosis: Lower GI bleed [K92.2]   Admit Date/Time: 1/25/2020  3:03 PM    Primary Diagnosis: GI bleed  Principal Problem: GI bleed    GMLOS: 3.0  Anticipated Discharge Date: 2/6/2020    AM-PAC  Mobility Score: 24    Discharge Planning:  Anticipated Discharge Disposition: home without services    Barriers to Discharge:  Barriers to Discharge: None  Comment: hep gtt    Participants:  physician, , social work/services, nursing, pharmacy, physical therapy

## 2020-02-06 NOTE — PLAN OF CARE
Pt discussed @ the Care Progression Rounds today. Discussed D/C plan home with pt. D/C plan home today; no skilled needs. [facility] letter reviewed with pt.

## 2020-02-07 ENCOUNTER — PATIENT OUTREACH (OUTPATIENT)
Dept: CASE MANAGEMENT | Facility: CLINIC | Age: 65
End: 2020-02-07

## 2020-02-07 NOTE — TELEPHONE ENCOUNTER
----- Message from Marti Bautista RN sent at 2/7/2020  2:38 PM EST -----  Regarding: INR Labs  TCM call completed on 2/07/2020.    Patient is planning to have her INR drawn on Monday morning. She needs a standing order so that she can go to the lab. Her TCM appt is also scheduled for 2/10/2020.    Thank you!

## 2020-02-07 NOTE — PROGRESS NOTES
NAME: Jennifer Aly    MRN: [id number]    YOB: 1955    EVENT DETAILS    Discharging Facility: [facility]  Date of Admission: 01/25/20  Date of Discharge: 02/06/20  Discharge Instructions Reviewed?: Yes         Reason for Admission:  GIB      HPI: Spoke with patient. She went to the E D after 3 days of melena followed by BRBPR and symptomatic acute anemia. Heme+ dark stool on rectal by ED physician. Hgb on admission 6.8. She was given 2 units PRBC and 1-unit FFP and 2 mg oral vitamin K due to being on Coumadin.  Patient had both an endoscopy and capsule study by Gastroenterology without active signs of bleeding. Patient hemoglobin remained stable. She was bridged back to warfarin without any signs of bleeding. INR at discharge is  2.2.      Patient stated that she is very tired. She is also reporting back pain from lying in the hospital bed. She is alternating heat and ice. She is taking OTC Tylenol PRN.    Patient has had no further bleeding.     Patient is not reporting fever, chills, weakness, dizziness, lightheadedness,abnormal cardiac, respiratory, G I or G U symptoms.    Patient is tolerating food and fluids. She is planning to return to work on 2/12/2020.        MEDICATION REVIEW:    Reported by:: Patient  Prescriptions Filled?: Yes     Was a medication discrepancy indentified?: No     Medication understanding?: Yes     Medication Adherence?: Yes     Any side effects from medication?: No       Medication review Reviewed, see medication history    Additional Comments: New: Pantoprazole    Change: Coumadin 8 mg Daily  Except Tues- Thurs take 4 mg      FOLLOW-UP TESTS/PROCEDURES: INR to be repeated on 2/10/2020. Patient needs a standing order for an INR.       HOME MANAGEMENT    Living Arrangement: Spouse or Significant Other       HOME CARE SERVICES    Receiving Home Care Services: No          DURABLE MEDICAL EQUIPMENT    Durable Medical Equipment: Other(CPAP)  Oxygen Use: No           BARRIERS TO CARE    SELF-CARE    Living Arrangement: Spouse or Significant Other       SOCIOECONOMIC    Financial Problems?: No     Transportation Issues?: No         INTERVENTION/CARE COORDINATION      PCP Appt: 2/10/2020       PLAN OF CARE:    Reviewed signs/symptoms of worsening condition or complication that necessitate a call to the Physician's office.  Educated patient on access to care.  RN phone number given for future care management needs.       Marti Bautista RN

## 2020-02-07 NOTE — TELEPHONE ENCOUNTER
I put in for every 2 weeks and then possibly chage to monthly ([facility]S)

## 2020-02-10 ENCOUNTER — OFFICE VISIT (OUTPATIENT)
Dept: PRIMARY CARE | Facility: CLINIC | Age: 65
End: 2020-02-10
Payer: MEDICARE

## 2020-02-10 VITALS
HEIGHT: 62 IN | BODY MASS INDEX: 35.51 KG/M2 | WEIGHT: 193 LBS | SYSTOLIC BLOOD PRESSURE: 118 MMHG | TEMPERATURE: 97.8 F | DIASTOLIC BLOOD PRESSURE: 60 MMHG

## 2020-02-10 DIAGNOSIS — I10 ESSENTIAL HYPERTENSION: Primary | ICD-10-CM

## 2020-02-10 DIAGNOSIS — I48.0 PAROXYSMAL ATRIAL FIBRILLATION (CMS/HCC): ICD-10-CM

## 2020-02-10 DIAGNOSIS — G47.33 OBSTRUCTIVE SLEEP APNEA: Chronic | ICD-10-CM

## 2020-02-10 DIAGNOSIS — K92.2 GASTROINTESTINAL HEMORRHAGE, UNSPECIFIED GASTROINTESTINAL HEMORRHAGE TYPE: ICD-10-CM

## 2020-02-10 DIAGNOSIS — K92.1 MELENA: ICD-10-CM

## 2020-02-10 DIAGNOSIS — E78.5 HYPERLIPIDEMIA, UNSPECIFIED HYPERLIPIDEMIA TYPE: ICD-10-CM

## 2020-02-10 DIAGNOSIS — Z86.711 HISTORY OF PULMONARY EMBOLUS (PE): ICD-10-CM

## 2020-02-10 DIAGNOSIS — I27.82 OTHER CHRONIC PULMONARY EMBOLISM WITHOUT ACUTE COR PULMONALE: Chronic | ICD-10-CM

## 2020-02-10 LAB
INR PPP: 2.5 INR
PROTHROMBIN TIME: 26.5 SEC (ref 12.2–14.5)

## 2020-02-10 PROCEDURE — 85610 PROTHROMBIN TIME: CPT | Performed by: FAMILY MEDICINE

## 2020-02-10 PROCEDURE — 99496 TRANSJ CARE MGMT HIGH F2F 7D: CPT | Performed by: FAMILY MEDICINE

## 2020-02-10 RX ORDER — PANTOPRAZOLE SODIUM 40 MG/1
40 TABLET, DELAYED RELEASE ORAL DAILY
Qty: 90 TABLET | Refills: 0 | Status: SHIPPED | OUTPATIENT
Start: 2020-02-10 | End: 2020-07-30

## 2020-02-10 RX ORDER — WARFARIN 4 MG/1
TABLET ORAL
Qty: 200 TABLET | Refills: 1
Start: 2020-02-10 | End: 2020-09-01 | Stop reason: SDUPTHER

## 2020-02-10 RX ORDER — AMOXICILLIN 500 MG/1
TABLET, FILM COATED ORAL
Qty: 4 TABLET | Refills: 0 | Status: SHIPPED | OUTPATIENT
Start: 2020-02-10 | End: 2020-11-09 | Stop reason: ALTCHOICE

## 2020-02-10 RX ORDER — CYCLOBENZAPRINE HCL 5 MG
5 TABLET ORAL 3 TIMES DAILY PRN
Qty: 30 TABLET | Refills: 3 | Status: SHIPPED | OUTPATIENT
Start: 2020-02-10 | End: 2020-07-30

## 2020-02-10 ASSESSMENT — ENCOUNTER SYMPTOMS
EYE PAIN: 0
DIARRHEA: 0
SORE THROAT: 0
ABDOMINAL PAIN: 0
BLOOD IN STOOL: 0
SINUS PAIN: 0
MYALGIAS: 0
FEVER: 0
PALPITATIONS: 0
COLOR CHANGE: 0
EYE ITCHING: 0
ARTHRALGIAS: 0
NAUSEA: 0
ACTIVITY CHANGE: 0
EYE DISCHARGE: 0
COUGH: 0
TROUBLE SWALLOWING: 0
HEADACHES: 0
DIZZINESS: 0
FATIGUE: 1
AGITATION: 0
CHEST TIGHTNESS: 0
ADENOPATHY: 0
SINUS PRESSURE: 0
VOMITING: 0
APPETITE CHANGE: 0
DYSURIA: 0
CONFUSION: 0
SHORTNESS OF BREATH: 0
BACK PAIN: 0

## 2020-02-10 NOTE — PATIENT INSTRUCTIONS
Stay healthy and hopefully you feel more energetic as you're resting back at home! Eat well and drink lots of water. Return to work without restrictions next week.

## 2020-02-10 NOTE — PROGRESS NOTES
Subjective      Patient ID: Jennifer Aly is a 64 y.o. female.    63 yo female presenting for a hospital follow up visit for INR draw today and transition of care management.  Hospitalization on January 25th for 12 day stay, after loss of consciousness at home and GI bleed. Previous to that, 2 days of black tarry stool.  Today, only reporting fatigue -- no problems with bowel movements, normal appetite, slight headaches infrequently.     Pt still very weak, needs note to RTW Next week.     Discharging Facility: [facility]  Date of Admission: 01/25/20  Date of Discharge: 02/06/20  Discharge Instructions Reviewed?: Yes        Reason for Admission:  GIB        HPI: My Nurse  spoke with patient. She went to the E D after 3 days of melena followed by [facility] and symptomatic acute anemia. Heme+ dark stool on rectal by ED physician. Hgb on admission 6.8. She was given 2 units PRBC and 1-unit FFP and 2 mg oral vitamin K due to being on Coumadin.  Patient had both an endoscopy and capsule study by Gastroenterology without active signs of bleeding. Patient hemoglobin remained stable. She was bridged back to warfarin without any signs of bleeding. INR at discharge is  2.2.      Patient stated that she is very tired. She is also reporting back pain from lying in the hospital bed. She is alternating heat and ice. She is taking OTC Tylenol PRN.     Patient has had no further bleeding.      Patient is not reporting fever, chills, weakness, dizziness, lightheadedness,abnormal cardiac, respiratory, G I or G U symptoms.     Patient is tolerating food and fluids. She is planning to return to work on 2/12/2020.           MEDICATION REVIEW:     Reported by:: Patient  Prescriptions Filled?: Yes  Was a medication discrepancy indentified?: No  Medication understanding?: Yes  Medication Adherence?: Yes  Any side effects from medication?: No     Medication review Reviewed, see medication history     Additional Comments:  New: Pantoprazole     Change: Coumadin 8 mg Daily  Except Tues- Thurs take 4 mg        FOLLOW-UP TESTS/PROCEDURES: INR to be repeated on 2/10/2020. Patient needs a standing order for an INR.         HOME MANAGEMENT     Living Arrangement: Spouse or Significant Other     HOME CARE SERVICES     Receiving Home Care Services: No     DURABLE MEDICAL EQUIPMENT     Durable Medical Equipment: Other(CPAP)  Oxygen Use: No     BARRIERS TO CARE     SELF-CARE     Living Arrangement: Spouse or Significant Other     SOCIOECONOMIC     Financial Problems?: No  Transportation Issues?: No        INTERVENTION/CARE COORDINATION        PCP Appt: 2/10/2020     PLAN OF CARE:     Reviewed signs/symptoms of worsening condition or complication that necessitate a call to the Physician's office.  Educated patient on access to care.  RN phone number given for future care management needs.           Chronic Problem List:   * GI bleed  Assessment & Plan  Concern for UGI bleed at anastomoses site given bariatric surgery hx.  -S/p EGD on 1/27 which revealed no source of bleeding  -S/p video capsule study on 1/28 with no evidence of active bleeding     History of pulmonary embolus (PE)  Assessment & Plan  Patient with hx of unprovoked PE, on warfarin. INR therapeutic on presentation.  - INR daily; goal 2-3     30-Jan 31-Jan 1-Feb 2-Feb 3-Feb 4-Feb 5-Feb 6-Feb  INR 1 1.1 1 1 1 1.4 2.1 2.2  Warfarin Dose 4 4 6 8 12.5 12.5 10       -- Anemia  Assessment & Plan  Acute anemia noted in s/o GI bleed.  - no further episodes of bleeding; Hgb stable     HLD (hyperlipidemia)  - C/w statin     Paroxysmal atrial fibrillation (CMS/HCC)  - Resumed A/C      Essential hypertension  - restart Dilt      Obstructive sleep apnea  - CPAP        The following have been reviewed and updated as appropriate in this visit:  Allergies  Meds  Problems         Past Medical History:   Diagnosis Date   • Arthritis    • DMII (diabetes mellitus, type 2) (CMS/HCC)    • Fistula    •  Gout    • HLD (hyperlipidemia)    • IFG (impaired fasting glucose)    • Obesity    • Polyarthritis    • Pulmonary emboli (CMS/HCC)     pulmonary emboli   • Torn rotator cuff        Past Surgical History:   Procedure Laterality Date   • APPENDECTOMY     • CARDIAC CATHETERIZATION  2012   • INTRACAPSULAR CATARACT EXTRACTION Bilateral 2019   • IVC FILTER RETRIEVAL     • IVC FILTER RETRIEVAL  2013   • RECTOURETHRAL FISTULA REPAIR  , 2017   • ROTATOR CUFF REPAIR Left 2016   • ALTAF-EN-Y PROCEDURE  2017   • TOTAL ABDOMINAL HYSTERECTOMY     • TOTAL KNEE ARTHROPLASTY Bilateral    • TOTAL KNEE ARTHROPLASTY Right 2012    revision   • TRIGGER FINGER RELEASE Right     right thumb   • ULNAR NERVE REPAIR         Family History   Problem Relation Age of Onset   • Diabetes Biological Mother    • Lung cancer Biological Mother    • Alzheimer's disease Biological Mother    • Diabetes type II Biological Father    • Heart failure Biological Father        Social History     Socioeconomic History   • Marital status:      Spouse name: Not on file   • Number of children: Not on file   • Years of education: Not on file   • Highest education level: Not on file   Occupational History   • Not on file   Social Needs   • Financial resource strain: Not on file   • Food insecurity:     Worry: Not on file     Inability: Not on file   • Transportation needs:     Medical: Not on file     Non-medical: Not on file   Tobacco Use   • Smoking status: Former Smoker     Years: 15.     Last attempt to quit:      Years since quittin.1   • Smokeless tobacco: Never Used   Substance and Sexual Activity   • Alcohol use: Yes     Comment: occasional wine   • Drug use: No   • Sexual activity: Not Currently   Lifestyle   • Physical activity:     Days per week: Not on file     Minutes per session: Not on file   • Stress: Not on file   Relationships   • Social connections:     Talks on phone: Not on file      Gets together: Not on file     Attends [employer] service: Not on file     Active member of club or organization: Not on file     Attends meetings of clubs or organizations: Not on file     Relationship status: Not on file   • Intimate partner violence:     Fear of current or ex partner: Not on file     Emotionally abused: Not on file     Physically abused: Not on file     Forced sexual activity: Not on file   Other Topics Concern   • Not on file   Social History Narrative   • Not on file       Allergies   Allergen Reactions   • Sulfa (Sulfonamide Antibiotics) Hives       Current Outpatient Medications   Medication Sig Dispense Refill   • atorvastatin (LIPITOR) 40 mg tablet TAKE 1 TABLET BY MOUTH EVERY DAY 90 tablet 0   • calcium carbonate-vitamin D3 500 mg(1,250mg) -200 unit per tablet Take 2 tablets by mouth 2 (two) times a day with meals.       • clobetasol (TEMOVATE) 0.05 % cream      • cyclobenzaprine (FLEXERIL) 5 mg tablet Take 1 tablet (5 mg total) by mouth 3 (three) times a day as needed for muscle spasms. 30 tablet 3   • diltiazem (TIAZAC) 120 mg 24 hr capsule TAKE ONE CAPSULE BY MOUTH EVERY DAY 90 capsule 3   • fluticasone propionate (FLONASE) 50 mcg/actuation nasal spray Administer 1 spray into each nostril daily as needed for rhinitis. 1 Bottle 3   • pantoprazole (PROTONIX) 40 mg EC tablet Take 1 tablet (40 mg total) by mouth daily Indications: stress ulcer prevention. 90 tablet 0   • warfarin (COUMADIN) 4 mg tablet Take 2 by mouth daily (8 mg daily) except T/TH take one (4mg). Start this regimen on 2/7 200 tablet 1   • amoxicillin (AMOXIL) 500 mg tablet Take 4 pills by mouth 1 hour before Dental work 4 tablet 0     No current facility-administered medications for this visit.        Review of Systems   Constitutional: Positive for fatigue. Negative for activity change, appetite change and fever.        Fatigue since leaving hospital on Thursday   HENT: Negative for congestion, sinus pressure, sinus pain,  sore throat and trouble swallowing.    Eyes: Negative for pain, discharge, itching and visual disturbance.   Respiratory: Negative for cough, chest tightness and shortness of breath.    Cardiovascular: Negative for chest pain, palpitations and leg swelling.   Gastrointestinal: Negative for abdominal pain, blood in stool, diarrhea, nausea and vomiting.   Endocrine: Negative for cold intolerance and heat intolerance.   Genitourinary: Negative for dysuria.   Musculoskeletal: Negative for arthralgias, back pain, gait problem and myalgias.   Skin: Negative for color change and rash.   Allergic/Immunologic: Negative for environmental allergies and food allergies.   Neurological: Negative for dizziness and headaches.   Hematological: Negative for adenopathy.   Psychiatric/Behavioral: Negative for agitation, behavioral problems and confusion.       Objective     Vitals:    02/10/20 1337   BP: 118/60   Temp: 36.6 °C (97.8 °F)       Physical Exam   Constitutional: She is oriented to person, place, and time. She appears well-developed and well-nourished.   lethargic   HENT:   Head: Normocephalic and atraumatic.   Right Ear: External ear normal.   Left Ear: External ear normal.   Nose: Nose normal.   Mouth/Throat: Oropharynx is clear and moist.   Eyes: Pupils are equal, round, and reactive to light. Conjunctivae and EOM are normal.   Neck: Normal range of motion. Neck supple. No thyromegaly present.   Cardiovascular: Normal rate, regular rhythm, normal heart sounds and intact distal pulses.   No murmur heard.  Pulmonary/Chest: Effort normal and breath sounds normal. No respiratory distress. She has no wheezes. She has no rales.   Abdominal: Soft. Bowel sounds are normal. She exhibits no distension and no mass. There is no tenderness. There is no guarding.   Musculoskeletal: Normal range of motion. She exhibits no edema or tenderness.   Neurological: She is alert and oriented to person, place, and time. She displays normal  reflexes. No cranial nerve deficit.   Skin: Skin is warm and dry. Capillary refill takes less than 2 seconds. No rash noted.   Psychiatric: She has a normal mood and affect. Her behavior is normal. Judgment and thought content normal.       Assessment/Plan     Problem List Items Addressed This Visit        Respiratory    Pulmonary emboli (CMS/HCC) (Chronic)    Overview     Overview:          Current Assessment & Plan     Managing with warfarin         Relevant Orders    Protime-INR    Obstructive sleep apnea (Chronic)    Overview     Overview:          Current Assessment & Plan     Uses cpap at night            Circulatory    Essential hypertension - Primary    Current Assessment & Plan     Stable  Meds  Continue to take all your blood pressure medications as instructed,  Stay well by eating a healthy diet with less salt and fatty foods. Maintain a healthy weight, and keep your regularly scheduled doctor appointments.            Paroxysmal atrial fibrillation (CMS/HCC)    Current Assessment & Plan     Follows with cardiology  On warfarin  Lab Results   Component Value Date    INR 2.2 02/06/2020    INR 2.1 02/05/2020    INR 1.9 02/05/2020              Relevant Medications    warfarin (COUMADIN) 4 mg tablet    Other Relevant Orders    Protime-INR       Digestive    Gastrointestinal hemorrhage    Overview     Added automatically from request for surgery [id number]         Current Assessment & Plan     Hopes are top get back at baseline after recent hospitalization  On protonix  Will get INR Done  Will follow  Still very Lethargic - will get INR stat today  Rest, back to work next week, recuperate at home this week.          Melena       Endocrine/Metabolic    HLD (hyperlipidemia)    Current Assessment & Plan     Managing w atorvastatin            Other    History of pulmonary embolus (PE)          DIAGNOSIS  1. Essential hypertension    2. Gastrointestinal hemorrhage, unspecified gastrointestinal hemorrhage type    3.  Paroxysmal atrial fibrillation (CMS/HCC)    4. Other chronic pulmonary embolism without acute cor pulmonale (CMS/HCC)    5. Melena    6. Hyperlipidemia, unspecified hyperlipidemia type    7. History of pulmonary embolus (PE)    8. Obstructive sleep apnea        No exam data present        Demarco Moreau DO  2/10/2020

## 2020-02-10 NOTE — ASSESSMENT & PLAN NOTE
Follows with cardiology  On warfarin  Lab Results   Component Value Date    INR 2.2 02/06/2020    INR 2.1 02/05/2020    INR 1.9 02/05/2020

## 2020-02-10 NOTE — ASSESSMENT & PLAN NOTE
Hopes are top get back at baseline after recent hospitalization  On protonix  Will get INR Done  Will follow  Still very Lethargic - will get INR stat today  Rest, back to work next week, recuperate at home this week.

## 2020-02-10 NOTE — ASSESSMENT & PLAN NOTE
Stable  Meds  Continue to take all your blood pressure medications as instructed,  Stay well by eating a healthy diet with less salt and fatty foods. Maintain a healthy weight, and keep your regularly scheduled doctor appointments.

## 2020-02-24 ENCOUNTER — TELEPHONE (OUTPATIENT)
Dept: PRIMARY CARE | Facility: CLINIC | Age: 65
End: 2020-02-24

## 2020-02-24 DIAGNOSIS — I26.99 PULMONARY EMBOLISM WITHOUT ACUTE COR PULMONALE, UNSPECIFIED CHRONICITY, UNSPECIFIED PULMONARY EMBOLISM TYPE (CMS/HCC): ICD-10-CM

## 2020-02-24 DIAGNOSIS — R53.83 MALAISE AND FATIGUE: ICD-10-CM

## 2020-02-24 DIAGNOSIS — R53.81 MALAISE AND FATIGUE: ICD-10-CM

## 2020-02-24 DIAGNOSIS — I27.82 OTHER CHRONIC PULMONARY EMBOLISM WITHOUT ACUTE COR PULMONALE: Primary | Chronic | ICD-10-CM

## 2020-02-24 NOTE — TELEPHONE ENCOUNTER
Pt informed of PT/INR order, asked if there are any in particular labs to be completed along with it before appt. She mentioned she is extremely tired and fatigued still

## 2020-02-24 NOTE — TELEPHONE ENCOUNTER
Mannie, patient called and would like Dr Moreau to add a Protime-INR blood slip added to the patient's chart today. Please give her a callback when it is added. Patient has an appt with Dr Moreau next week on 3/4/20.

## 2020-02-25 ENCOUNTER — APPOINTMENT (OUTPATIENT)
Dept: LAB | Facility: CLINIC | Age: 65
End: 2020-02-25
Attending: FAMILY MEDICINE
Payer: MEDICARE

## 2020-02-25 DIAGNOSIS — R53.81 MALAISE AND FATIGUE: ICD-10-CM

## 2020-02-25 DIAGNOSIS — I26.99 ACUTE PULMONARY EMBOLISM, UNSPECIFIED PULMONARY EMBOLISM TYPE, UNSPECIFIED WHETHER ACUTE COR PULMONALE PRESENT (CMS/HCC): ICD-10-CM

## 2020-02-25 DIAGNOSIS — I27.82 OTHER CHRONIC PULMONARY EMBOLISM WITHOUT ACUTE COR PULMONALE: Chronic | ICD-10-CM

## 2020-02-25 DIAGNOSIS — R53.83 MALAISE AND FATIGUE: ICD-10-CM

## 2020-02-25 LAB
BASOPHILS # BLD: 0.04 K/UL (ref 0.01–0.1)
BASOPHILS NFR BLD: 0.6 %
DIFFERENTIAL METHOD BLD: ABNORMAL
EOSINOPHIL # BLD: 0.2 K/UL (ref 0.04–0.36)
EOSINOPHIL NFR BLD: 3.2 %
ERYTHROCYTE [DISTWIDTH] IN BLOOD BY AUTOMATED COUNT: 16.7 % (ref 11.7–14.4)
HCT VFR BLDCO AUTO: 31.9 % (ref 35–45)
HGB BLD-MCNC: 9.5 G/DL (ref 11.8–15.7)
IMM GRANULOCYTES # BLD AUTO: 0.02 K/UL (ref 0–0.08)
IMM GRANULOCYTES NFR BLD AUTO: 0.3 %
INR PPP: 2.5 INR
LYMPHOCYTES # BLD: 1.68 K/UL (ref 1.2–3.5)
LYMPHOCYTES NFR BLD: 26.9 %
MCH RBC QN AUTO: 26.8 PG (ref 28–33.2)
MCHC RBC AUTO-ENTMCNC: 29.8 G/DL (ref 32.2–35.5)
MCV RBC AUTO: 90.1 FL (ref 83–98)
MONOCYTES # BLD: 0.43 K/UL (ref 0.28–0.8)
MONOCYTES NFR BLD: 6.9 %
NEUTROPHILS # BLD: 3.88 K/UL (ref 1.7–7)
NEUTS SEG NFR BLD: 62.1 %
NRBC BLD-RTO: 0 %
PDW BLD AUTO: 9.8 FL (ref 9.4–12.3)
PLATELET # BLD AUTO: 263 K/UL (ref 150–369)
PROTHROMBIN TIME: 26.3 SEC (ref 12.2–14.5)
RBC # BLD AUTO: 3.54 M/UL (ref 3.93–5.22)
TSH SERPL DL<=0.05 MIU/L-ACNC: 1.13 MIU/L (ref 0.34–5.6)
WBC # BLD AUTO: 6.25 K/UL (ref 3.8–10.5)

## 2020-02-25 PROCEDURE — 85610 PROTHROMBIN TIME: CPT

## 2020-02-25 PROCEDURE — 85025 COMPLETE CBC W/AUTO DIFF WBC: CPT

## 2020-02-25 PROCEDURE — 84443 ASSAY THYROID STIM HORMONE: CPT

## 2020-02-25 PROCEDURE — 36415 COLL VENOUS BLD VENIPUNCTURE: CPT

## 2020-02-26 ENCOUNTER — TELEPHONE (OUTPATIENT)
Dept: PRIMARY CARE | Facility: CLINIC | Age: 65
End: 2020-02-26

## 2020-02-26 NOTE — TELEPHONE ENCOUNTER
----- Message from Demarco Moreau DO sent at 2/25/2020  4:23 PM EST -----  Labs stable, INR 2.5, CBC improving, HgB increasing-good

## 2020-03-04 ENCOUNTER — OFFICE VISIT (OUTPATIENT)
Dept: PRIMARY CARE | Facility: CLINIC | Age: 65
End: 2020-03-04
Payer: MEDICARE

## 2020-03-04 VITALS
DIASTOLIC BLOOD PRESSURE: 72 MMHG | SYSTOLIC BLOOD PRESSURE: 120 MMHG | RESPIRATION RATE: 16 BRPM | TEMPERATURE: 98 F | HEART RATE: 78 BPM | OXYGEN SATURATION: 98 % | BODY MASS INDEX: 36.33 KG/M2 | HEIGHT: 62 IN | WEIGHT: 197.4 LBS

## 2020-03-04 DIAGNOSIS — K92.2 GASTROINTESTINAL HEMORRHAGE, UNSPECIFIED GASTROINTESTINAL HEMORRHAGE TYPE: ICD-10-CM

## 2020-03-04 DIAGNOSIS — M15.9 OSTEOARTHRITIS OF MULTIPLE JOINTS, UNSPECIFIED OSTEOARTHRITIS TYPE: Chronic | ICD-10-CM

## 2020-03-04 DIAGNOSIS — I48.0 PAROXYSMAL ATRIAL FIBRILLATION (CMS/HCC): ICD-10-CM

## 2020-03-04 DIAGNOSIS — R53.83 MALAISE AND FATIGUE: Primary | ICD-10-CM

## 2020-03-04 DIAGNOSIS — Z86.711 HISTORY OF PULMONARY EMBOLUS (PE): ICD-10-CM

## 2020-03-04 DIAGNOSIS — R53.81 MALAISE AND FATIGUE: Primary | ICD-10-CM

## 2020-03-04 DIAGNOSIS — D50.8 IRON DEFICIENCY ANEMIA SECONDARY TO INADEQUATE DIETARY IRON INTAKE: ICD-10-CM

## 2020-03-04 DIAGNOSIS — L40.9 PSORIASIS: ICD-10-CM

## 2020-03-04 DIAGNOSIS — I27.82 OTHER CHRONIC PULMONARY EMBOLISM WITHOUT ACUTE COR PULMONALE: Chronic | ICD-10-CM

## 2020-03-04 PROCEDURE — 99214 OFFICE O/P EST MOD 30 MIN: CPT | Performed by: FAMILY MEDICINE

## 2020-03-04 ASSESSMENT — ENCOUNTER SYMPTOMS
EYE PAIN: 0
SHORTNESS OF BREATH: 0
CHEST TIGHTNESS: 0
ADENOPATHY: 0
BLOOD IN STOOL: 0
BACK PAIN: 1
CONFUSION: 0
SORE THROAT: 0
APPETITE CHANGE: 0
ABDOMINAL PAIN: 0
ARTHRALGIAS: 0
SINUS PAIN: 0
PALPITATIONS: 0
COLOR CHANGE: 0
FEVER: 0
VOMITING: 0
HEADACHES: 1
MYALGIAS: 0
TROUBLE SWALLOWING: 0
WEAKNESS: 1
SINUS PRESSURE: 0
FATIGUE: 1
DYSURIA: 0
EYE DISCHARGE: 0
AGITATION: 0
DIARRHEA: 0
COUGH: 0
DIZZINESS: 0
ACTIVITY CHANGE: 0
NAUSEA: 0
EYE ITCHING: 0

## 2020-03-04 NOTE — PROGRESS NOTES
Subjective      Patient ID: Jennifer Aly is a 64 y.o. female.    64 year old presents today for episodic visit regarding concerns of tiredness. She was seen by our office a week ago for hospital follow up visit for INR draw and transition of care management. Hospitalization on January 25th for 12 day stay, after loss of consciousness at home and GI bleed, 2 days of black tarry stool. Pt does eat red meat, states she's limited in quantity by bariatric surgery but still is consuming significant amounts of meat. Patient works as a , states she walks a good amount each day.     Today, reporting significant fatigue -- no problems with bowel movements, noted increased appetite (patient wonders if it is her new probiotic), slight headaches infrequently.     Lab Results       Component                Value               Date                       WBC                      6.25                02/25/2020                 HGB                      9.5 (L)             02/25/2020                 HCT                      31.9 (L)            02/25/2020                 MCV                      90.1                02/25/2020                 PLT                      263                 02/25/2020                  Chronic Problem List:     --GI bleed  - no recent blood in stool since late January episode  -S/p EGD on 1/27 which revealed no source of bleeding  -S/p video capsule study on 1/28 with no evidence of active bleeding     History of pulmonary embolus (PE)  Patient with hx of unprovoked PE, on warfarin. INR therapeutic on presentation. Pt takes 8mg sun, mon, weds, fri, sat. Tues and Thurs 4mg  Patient wants a standing order to get her INR   Lab Results       Component                Value               Date                       INR                      2.5                 02/25/2020                 INR                      2.5                 02/10/2020                 INR                      2.2                  02/06/2020                  -- Anemia  Acute anemia noted in s/o GI bleed.  - no further episodes of bleeding; Hgb stable     HLD (hyperlipidemia)  - C/w statin     Paroxysmal atrial fibrillation (CMS/HCC)  - Resumed A/C      Essential hypertension  - restart Dilt      Obstructive sleep apnea  - CPAP      The following have been reviewed and updated as appropriate in this visit:  Allergies  Meds  Problems         Past Medical History:   Diagnosis Date   • Arthritis    • DMII (diabetes mellitus, type 2) (CMS/HCC)    • Fistula    • Gout    • HLD (hyperlipidemia)    • IFG (impaired fasting glucose)    • Obesity    • Polyarthritis    • Pulmonary emboli (CMS/HCC)     pulmonary emboli   • Torn rotator cuff        Past Surgical History:   Procedure Laterality Date   • APPENDECTOMY  2008   • CARDIAC CATHETERIZATION  11/2012   • INTRACAPSULAR CATARACT EXTRACTION Bilateral 08/2019   • IVC FILTER RETRIEVAL  2012   • IVC FILTER RETRIEVAL  02/2013   • RECTOURETHRAL FISTULA REPAIR  2008, 5/2017   • ROTATOR CUFF REPAIR Left 01/2016   • ALTAF-EN-Y PROCEDURE  02/07/2017   • TOTAL ABDOMINAL HYSTERECTOMY  1987   • TOTAL KNEE ARTHROPLASTY Bilateral 2010   • TOTAL KNEE ARTHROPLASTY Right 2012    revision   • TRIGGER FINGER RELEASE Right 2012    right thumb   • ULNAR NERVE REPAIR  2013       Family History   Problem Relation Age of Onset   • Diabetes Biological Mother    • Lung cancer Biological Mother    • Alzheimer's disease Biological Mother    • Diabetes type II Biological Father    • Heart failure Biological Father        Social History     Socioeconomic History   • Marital status:      Spouse name: Not on file   • Number of children: Not on file   • Years of education: Not on file   • Highest education level: Not on file   Occupational History   • Not on file   Social Needs   • Financial resource strain: Not on file   • Food insecurity:     Worry: Not on file     Inability: Not on file   • Transportation needs:      Medical: Not on file     Non-medical: Not on file   Tobacco Use   • Smoking status: Former Smoker     Years: 15.00     Last attempt to quit:      Years since quittin.1   • Smokeless tobacco: Never Used   Substance and Sexual Activity   • Alcohol use: Yes     Comment: occasional wine   • Drug use: No   • Sexual activity: Not Currently   Lifestyle   • Physical activity:     Days per week: Not on file     Minutes per session: Not on file   • Stress: Not on file   Relationships   • Social connections:     Talks on phone: Not on file     Gets together: Not on file     Attends Mos[employer] service: Not on file     Active member of club or organization: Not on file     Attends meetings of clubs or organizations: Not on file     Relationship status: Not on file   • Intimate partner violence:     Fear of current or ex partner: Not on file     Emotionally abused: Not on file     Physically abused: Not on file     Forced sexual activity: Not on file   Other Topics Concern   • Not on file   Social History Narrative   • Not on file       Allergies   Allergen Reactions   • Sulfa (Sulfonamide Antibiotics) Hives       Current Outpatient Medications   Medication Sig Dispense Refill   • amoxicillin (AMOXIL) 500 mg tablet Take 4 pills by mouth 1 hour before Dental work 4 tablet 0   • atorvastatin (LIPITOR) 40 mg tablet TAKE 1 TABLET BY MOUTH EVERY DAY 90 tablet 0   • calcium carbonate-vitamin D3 500 mg(1,250mg) -200 unit per tablet Take 2 tablets by mouth 2 (two) times a day with meals.       • clobetasol (TEMOVATE) 0.05 % cream      • cyclobenzaprine (FLEXERIL) 5 mg tablet Take 1 tablet (5 mg total) by mouth 3 (three) times a day as needed for muscle spasms. 30 tablet 3   • diltiazem (TIAZAC) 120 mg 24 hr capsule TAKE ONE CAPSULE BY MOUTH EVERY DAY 90 capsule 3   • fluticasone propionate (FLONASE) 50 mcg/actuation nasal spray Administer 1 spray into each nostril daily as needed for rhinitis. 1 Bottle 3   • pantoprazole  (PROTONIX) 40 mg EC tablet Take 1 tablet (40 mg total) by mouth daily Indications: stress ulcer prevention. 90 tablet 0   • warfarin (COUMADIN) 4 mg tablet Take 2 by mouth daily (8 mg daily) except T/TH take one (4mg). Start this regimen on 2/7 200 tablet 1     No current facility-administered medications for this visit.        Review of Systems   Constitutional: Positive for fatigue. Negative for activity change, appetite change and fever.   HENT: Negative for congestion, sinus pressure, sinus pain, sore throat and trouble swallowing.    Eyes: Negative for pain, discharge, itching and visual disturbance.   Respiratory: Negative for cough, chest tightness and shortness of breath.    Cardiovascular: Negative for chest pain, palpitations and leg swelling.   Gastrointestinal: Negative for abdominal pain, blood in stool, diarrhea, nausea and vomiting.        Patient takes stool softeners nightly   Endocrine: Negative for cold intolerance and heat intolerance.   Genitourinary: Negative for dysuria.   Musculoskeletal: Positive for back pain. Negative for arthralgias, gait problem and myalgias.   Skin: Negative for color change and rash.   Allergic/Immunologic: Negative for environmental allergies and food allergies.   Neurological: Positive for weakness and headaches. Negative for dizziness.   Hematological: Negative for adenopathy.   Psychiatric/Behavioral: Negative for agitation, behavioral problems and confusion.       Objective     Vitals:    03/04/20 1045   BP: 120/72   Pulse: 78   Resp: 16   Temp: 36.7 °C (98 °F)   SpO2: 98%       Physical Exam   Constitutional: She is oriented to person, place, and time. She appears well-developed and well-nourished.   HENT:   Head: Normocephalic and atraumatic.   Right Ear: External ear normal.   Left Ear: External ear normal.   Nose: Nose normal.   Mouth/Throat: Oropharynx is clear and moist.   Eyes: Pupils are equal, round, and reactive to light. Conjunctivae and EOM are normal.    Neck: Normal range of motion. Neck supple. No thyromegaly present.   Cardiovascular: Normal rate, regular rhythm, normal heart sounds and intact distal pulses.   No murmur heard.  Pulmonary/Chest: Effort normal and breath sounds normal. No respiratory distress. She has no wheezes. She has no rales.   Abdominal: Soft. Bowel sounds are normal. She exhibits no distension and no mass. There is no tenderness. There is no guarding.   Musculoskeletal: Normal range of motion. She exhibits no edema or tenderness.   Neurological: She is alert and oriented to person, place, and time. She displays normal reflexes. No cranial nerve deficit.   Skin: Skin is warm and dry. Capillary refill takes less than 2 seconds. No rash noted.   Psychiatric: She has a normal mood and affect. Her behavior is normal. Judgment and thought content normal.       Assessment/Plan     Problem List Items Addressed This Visit        Respiratory    Pulmonary emboli (CMS/HCC) (Chronic)    Overview     Overview:          Current Assessment & Plan     6 years ago, unprovoked  Follows w Cardio, no longer hematologist  Would like INR standing order rx            Circulatory    Paroxysmal atrial fibrillation (CMS/HCC)    Current Assessment & Plan     Follows w Cardiology         Relevant Orders    Protime-INR       Digestive    GI bleed       Musculoskeletal    Polyosteoarthritis, unspecified (Chronic)    Overview     Overview:             Hematologic    Iron deficiency anemia secondary to inadequate dietary iron intake    Current Assessment & Plan     Had discussion with pt  Takes 3 months for bone marrow to make new RBCs  Pt does not want to take oral iron because of gastric surgery and constipation  If this does not resolve soon, we can send patient to Hematology for infusion    Discussed moderate water intake - not too little, not too much              Immune    Psoriasis       Other    Malaise and fatigue - Primary    Current Assessment & Plan     Lab  slip today for CBC         History of pulmonary embolus (PE)    Relevant Orders    Protime-INR          DIAGNOSIS  1. Malaise and fatigue    2. Iron deficiency anemia secondary to inadequate dietary iron intake    3. History of pulmonary embolus (PE)    4. Gastrointestinal hemorrhage, unspecified gastrointestinal hemorrhage type    5. Other chronic pulmonary embolism without acute cor pulmonale (CMS/HCC)    6. Osteoarthritis of multiple joints, unspecified osteoarthritis type    7. Psoriasis    8. Paroxysmal atrial fibrillation (CMS/HCC)        No exam data present        Demarco Moreau DO  3/4/2020

## 2020-03-04 NOTE — ASSESSMENT & PLAN NOTE
Had discussion with pt  Takes 3 months for bone marrow to make new RBCs  Pt does not want to take oral iron because of gastric surgery and constipation  If this does not resolve soon, we can send patient to Hematology for infusion    Discussed moderate water intake - not too little, not too much

## 2020-03-04 NOTE — PATIENT INSTRUCTIONS
Keep eating a healthy balanced diet, and continue exercising. Rest, and hopefully you will feel more energetic soon. If you still feel weak, we can send you a referral to talk with a Hematologist. Stay well!

## 2020-03-17 RX ORDER — AZITHROMYCIN 250 MG/1
TABLET, FILM COATED ORAL
Qty: 6 TABLET | Refills: 0 | Status: SHIPPED | OUTPATIENT
Start: 2020-03-17 | End: 2020-11-09 | Stop reason: ALTCHOICE

## 2020-03-17 RX ORDER — PROMETHAZINE HYDROCHLORIDE AND DEXTROMETHORPHAN HYDROBROMIDE 6.25; 15 MG/5ML; MG/5ML
5 SYRUP ORAL EVERY 6 HOURS PRN
Qty: 240 ML | Refills: 1 | Status: SHIPPED | OUTPATIENT
Start: 2020-03-17 | End: 2020-03-27

## 2020-03-17 NOTE — TELEPHONE ENCOUNTER
Pt states she have a bad cold which started on Sunday.  Pt suffering from post nasal drip, sore throat, cough and runny nose.  Pt not having any chest pain.  Pt would like medication sent to [facility] if Dr Moreau approve medication

## 2020-03-17 NOTE — TELEPHONE ENCOUNTER
Mannie, patient called and stated that the pharmacy [facility] they are out of the Promethazine-DM. Can a replacement be sent tonight. Thanks.

## 2020-03-18 RX ORDER — PROMETHAZINE HYDROCHLORIDE 6.25 MG/5ML
6.25 SYRUP ORAL 4 TIMES DAILY PRN
Qty: 120 ML | Refills: 0 | Status: SHIPPED | OUTPATIENT
Start: 2020-03-18 | End: 2020-03-25

## 2020-04-17 RX ORDER — ATORVASTATIN CALCIUM 40 MG/1
TABLET, FILM COATED ORAL
Qty: 90 TABLET | Refills: 0 | Status: SHIPPED | OUTPATIENT
Start: 2020-04-17 | End: 2020-07-30

## 2020-04-17 NOTE — TELEPHONE ENCOUNTER
Medicine Refill Request    Last Office Visit: 3/4/2020  Next Office Visit: 6/8/2020        Current Outpatient Medications:   •  amoxicillin (AMOXIL) 500 mg tablet, Take 4 pills by mouth 1 hour before Dental work, Disp: 4 tablet, Rfl: 0  •  atorvastatin (LIPITOR) 40 mg tablet, TAKE 1 TABLET BY MOUTH EVERY DAY, Disp: 90 tablet, Rfl: 0  •  azithromycin (ZITHROMAX) 250 mg tablet, Take 2 tablets the first day, then 1 tablet daily for 4 days., Disp: 6 tablet, Rfl: 0  •  calcium carbonate-vitamin D3 500 mg(1,250mg) -200 unit per tablet, Take 2 tablets by mouth 2 (two) times a day with meals.  , Disp: , Rfl:   •  clobetasol (TEMOVATE) 0.05 % cream, , Disp: , Rfl:   •  cyclobenzaprine (FLEXERIL) 5 mg tablet, Take 1 tablet (5 mg total) by mouth 3 (three) times a day as needed for muscle spasms., Disp: 30 tablet, Rfl: 3  •  diltiazem (TIAZAC) 120 mg 24 hr capsule, TAKE ONE CAPSULE BY MOUTH EVERY DAY, Disp: 90 capsule, Rfl: 3  •  fluticasone propionate (FLONASE) 50 mcg/actuation nasal spray, Administer 1 spray into each nostril daily as needed for rhinitis., Disp: 1 Bottle, Rfl: 3  •  pantoprazole (PROTONIX) 40 mg EC tablet, Take 1 tablet (40 mg total) by mouth daily Indications: stress ulcer prevention., Disp: 90 tablet, Rfl: 0  •  warfarin (COUMADIN) 4 mg tablet, Take 2 by mouth daily (8 mg daily) except T/TH take one (4mg). Start this regimen on 2/7, Disp: 200 tablet, Rfl: 1      BP Readings from Last 3 Encounters:   03/04/20 120/72   02/10/20 118/60   02/06/20 111/60       Recent Lab results:  Lab Results   Component Value Date    CHOL 175 01/20/2020   ,   Lab Results   Component Value Date    HDL 69 01/20/2020   ,   Lab Results   Component Value Date    LDLCALC 90 01/20/2020   ,   Lab Results   Component Value Date    TRIG 81 01/20/2020        Lab Results   Component Value Date    GLUCOSE 96 02/03/2020   ,   Lab Results   Component Value Date    HGBA1C 6.3 (H) 01/20/2020         Lab Results   Component Value Date     CREATININE 0.5 (L) 02/03/2020       Lab Results   Component Value Date    TSH 1.13 02/25/2020

## 2020-04-21 ENCOUNTER — PATIENT OUTREACH (OUTPATIENT)
Dept: CASE MANAGEMENT | Facility: CLINIC | Age: 65
End: 2020-04-21

## 2020-04-21 NOTE — PROGRESS NOTES
LCM call to patient. Patient is feeling well and but she is afraid to go where sick people are. Patient on Coumadin, needs INR denies any bleeding rectal, bruising or nose bleeding. CM encourage her to go to [facility]  for OP testing. She agreed to go.  No DM at this time due to bariatric surgery. She is not testing her blood sugars or is on any medication.  Respiratory and bleeding precautions reviewed and patient verbalized understanding.   No ongoing LCM needs identified.

## 2020-04-27 NOTE — PLAN OF CARE
Problem: Adult Inpatient Plan of Care  Goal: Plan of Care Review  Outcome: Progressing    INR goal 2-3. D/C home over the next 48 hrs.       Purse String (Simple) Text: Given the location of the defect and the characteristics of the surrounding skin a purse string closure was deemed most appropriate.  Undermining was performed circumfirentially around the surgical defect.  A purse string suture was then placed and tightened.

## 2020-05-06 ENCOUNTER — TELEPHONE (OUTPATIENT)
Dept: PRIMARY CARE | Facility: CLINIC | Age: 65
End: 2020-05-06

## 2020-05-06 NOTE — TELEPHONE ENCOUNTER
Pts boyfriend, mother caregiver was just diagnosis with COVID 19.  Pt not having any symptoms. Pt would like to know how long it will take before she have any symptoms of COVID

## 2020-05-13 ENCOUNTER — TELEPHONE (OUTPATIENT)
Dept: PRIMARY CARE | Facility: CLINIC | Age: 65
End: 2020-05-13

## 2020-05-13 ENCOUNTER — APPOINTMENT (OUTPATIENT)
Dept: LAB | Facility: CLINIC | Age: 65
End: 2020-05-13
Attending: FAMILY MEDICINE
Payer: MEDICARE

## 2020-05-13 DIAGNOSIS — I48.0 PAROXYSMAL ATRIAL FIBRILLATION (CMS/HCC): ICD-10-CM

## 2020-05-13 DIAGNOSIS — D64.9 ANEMIA, UNSPECIFIED TYPE: Primary | ICD-10-CM

## 2020-05-13 DIAGNOSIS — Z86.711 HISTORY OF PULMONARY EMBOLUS (PE): ICD-10-CM

## 2020-05-13 LAB
INR PPP: 1.9 INR
PROTHROMBIN TIME: 21.4 SEC (ref 12.2–14.5)

## 2020-05-13 PROCEDURE — 85610 PROTHROMBIN TIME: CPT

## 2020-05-13 PROCEDURE — 36415 COLL VENOUS BLD VENIPUNCTURE: CPT

## 2020-05-13 NOTE — TELEPHONE ENCOUNTER
"Patient's next appt: 06/08/2020.     She is asking if she should have an updated CBC/Hemoglobin checked.     Last CBC drawn: 02/25/2020 :  Hemoglobin 9.5.   RBC 3.54  Result note: \"CBC improving, HGB increasing - good.\"     Ok to order?       "

## 2020-05-13 NOTE — TELEPHONE ENCOUNTER
Patient calling to ask for a lab script to get her Hemoglobin checked and CBC. Patient asked if she has to fast for those test. Patient's contact #[phone].

## 2020-05-14 NOTE — TELEPHONE ENCOUNTER
----- Message from Demarco Moreau, DO sent at 5/14/2020 12:22 PM EDT -----  INR 1.9, same dose and repeat in 2 weeks, if still below 2 in 2 weeks I will adjust dose.

## 2020-05-15 ENCOUNTER — TELEPHONE (OUTPATIENT)
Dept: PRIMARY CARE | Facility: CLINIC | Age: 65
End: 2020-05-15

## 2020-05-15 NOTE — TELEPHONE ENCOUNTER
Patient already had a PT/INR standing order that's set up in advance for 11 lab draws, every 4 weeks. Will print, and will arrange patient to come in to . Patient will  in office at .

## 2020-05-15 NOTE — TELEPHONE ENCOUNTER
Pt says she wants a paper script for her PTINR because [place]'s [place] and  on Monday when your in the office on Monday if ok

## 2020-05-18 ENCOUNTER — APPOINTMENT (OUTPATIENT)
Dept: LAB | Facility: CLINIC | Age: 65
End: 2020-05-18
Attending: FAMILY MEDICINE
Payer: MEDICARE

## 2020-05-18 DIAGNOSIS — D64.9 ANEMIA, UNSPECIFIED TYPE: ICD-10-CM

## 2020-05-18 LAB
BASOPHILS # BLD: 0.04 K/UL (ref 0.01–0.1)
BASOPHILS NFR BLD: 0.7 %
DIFFERENTIAL METHOD BLD: ABNORMAL
EOSINOPHIL # BLD: 0.12 K/UL (ref 0.04–0.36)
EOSINOPHIL NFR BLD: 2 %
ERYTHROCYTE [DISTWIDTH] IN BLOOD BY AUTOMATED COUNT: 25.2 % (ref 11.7–14.4)
HCT VFR BLDCO AUTO: 39.4 % (ref 35–45)
HGB BLD-MCNC: 12.4 G/DL (ref 11.8–15.7)
IMM GRANULOCYTES # BLD AUTO: 0.01 K/UL (ref 0–0.08)
IMM GRANULOCYTES NFR BLD AUTO: 0.2 %
LYMPHOCYTES # BLD: 1.35 K/UL (ref 1.2–3.5)
LYMPHOCYTES NFR BLD: 22.9 %
MCH RBC QN AUTO: 25.9 PG (ref 28–33.2)
MCHC RBC AUTO-ENTMCNC: 31.5 G/DL (ref 32.2–35.5)
MCV RBC AUTO: 82.4 FL (ref 83–98)
MONOCYTES # BLD: 0.52 K/UL (ref 0.28–0.8)
MONOCYTES NFR BLD: 8.8 %
NEUTROPHILS # BLD: 3.86 K/UL (ref 1.7–7)
NEUTS SEG NFR BLD: 65.4 %
NRBC BLD-RTO: 0 %
PDW BLD AUTO: 11 FL (ref 9.4–12.3)
PLATELET # BLD AUTO: 250 K/UL (ref 150–369)
RBC # BLD AUTO: 4.78 M/UL (ref 3.93–5.22)
WBC # BLD AUTO: 5.9 K/UL (ref 3.8–10.5)

## 2020-05-18 PROCEDURE — 85025 COMPLETE CBC W/AUTO DIFF WBC: CPT

## 2020-05-18 PROCEDURE — 36415 COLL VENOUS BLD VENIPUNCTURE: CPT

## 2020-05-19 ENCOUNTER — TELEPHONE (OUTPATIENT)
Dept: PRIMARY CARE | Facility: CLINIC | Age: 65
End: 2020-05-19

## 2020-05-19 NOTE — TELEPHONE ENCOUNTER
----- Message from Demarco Moreau DO sent at 5/18/2020  5:38 PM EDT -----  Labs stable, no longer anemic, HGB up to 12.4 good

## 2020-06-24 ENCOUNTER — TELEPHONE (OUTPATIENT)
Dept: PRIMARY CARE | Facility: CLINIC | Age: 65
End: 2020-06-24

## 2020-06-24 LAB
INR PPP: 4.2 (ref 0.8–1.2)
PROTHROMBIN TIME: 40.2 SEC (ref 9.1–12)

## 2020-06-24 NOTE — TELEPHONE ENCOUNTER
----- Message from Demarco Moreau, DO sent at 6/24/2020  2:23 PM EDT -----  Now too high on the INR-I Assume we manage? If so, confirm her dosing and let me know (text me) and I can adjust dose and then  Repeat the INR in 1-2 weeks.

## 2020-06-24 NOTE — TELEPHONE ENCOUNTER
Spoke to patient, relayed the result. Patient confirmed she takes 8mg daily, except Tuesday/thursdays takes 4mg.     Per Dr. Moreau - he wants to adjust her dose to 4mg daily, except M/W/F take 8mg.     Called patient back and gave her updated instructions. Patient aware and verbalized understanding. Patient will have next INR level drawn 1-2 weeks.

## 2020-07-17 ENCOUNTER — TELEPHONE (OUTPATIENT)
Dept: PRIMARY CARE | Facility: CLINIC | Age: 65
End: 2020-07-17

## 2020-07-17 LAB
INR PPP: 2.4 (ref 0.8–1.2)
PROTHROMBIN TIME: 23.6 SEC (ref 9.1–12)

## 2020-07-17 NOTE — TELEPHONE ENCOUNTER
----- Message from Demarco Moreau DO sent at 7/17/2020  2:25 PM EDT -----  INR Stable, same dose warfarin, repeat in 1 mo

## 2020-07-30 RX ORDER — PANTOPRAZOLE SODIUM 40 MG/1
40 TABLET, DELAYED RELEASE ORAL DAILY
Qty: 90 TABLET | Refills: 0 | Status: SHIPPED | OUTPATIENT
Start: 2020-07-30 | End: 2020-09-01 | Stop reason: SDUPTHER

## 2020-07-30 RX ORDER — CYCLOBENZAPRINE HCL 5 MG
TABLET ORAL
Qty: 30 TABLET | Refills: 3 | Status: SHIPPED | OUTPATIENT
Start: 2020-07-30 | End: 2020-09-01 | Stop reason: SDUPTHER

## 2020-07-30 RX ORDER — ATORVASTATIN CALCIUM 40 MG/1
TABLET, FILM COATED ORAL
Qty: 90 TABLET | Refills: 0 | Status: SHIPPED | OUTPATIENT
Start: 2020-07-30 | End: 2020-09-01 | Stop reason: SDUPTHER

## 2020-07-30 NOTE — TELEPHONE ENCOUNTER

## 2020-09-01 RX ORDER — ATORVASTATIN CALCIUM 40 MG/1
40 TABLET, FILM COATED ORAL
Qty: 90 TABLET | Refills: 0 | Status: SHIPPED | OUTPATIENT
Start: 2020-09-01 | End: 2020-11-09 | Stop reason: SDUPTHER

## 2020-09-01 RX ORDER — PANTOPRAZOLE SODIUM 40 MG/1
40 TABLET, DELAYED RELEASE ORAL DAILY
Qty: 90 TABLET | Refills: 0 | Status: SHIPPED | OUTPATIENT
Start: 2020-09-01 | End: 2020-11-09 | Stop reason: SDUPTHER

## 2020-09-01 RX ORDER — WARFARIN 4 MG/1
TABLET ORAL
Qty: 200 TABLET | Refills: 1 | Status: SHIPPED | OUTPATIENT
Start: 2020-09-01 | End: 2020-11-09 | Stop reason: SDUPTHER

## 2020-09-01 RX ORDER — DILTIAZEM HYDROCHLORIDE 120 MG/1
120 CAPSULE, EXTENDED RELEASE ORAL
Qty: 90 CAPSULE | Refills: 3 | Status: SHIPPED | OUTPATIENT
Start: 2020-09-01 | End: 2020-11-09

## 2020-09-01 RX ORDER — CYCLOBENZAPRINE HCL 5 MG
5 TABLET ORAL 3 TIMES DAILY PRN
Qty: 30 TABLET | Refills: 3 | Status: SHIPPED | OUTPATIENT
Start: 2020-09-01 | End: 2021-02-08 | Stop reason: SDUPTHER

## 2020-09-01 NOTE — TELEPHONE ENCOUNTER
Pt needs a refill on     Atorvastatin 40 mg ,  Pantoprazole 40 mg  Cyclobenzaprine 5 mg  Diltiazem  120 mg  Warfarin 4 mg

## 2020-09-03 RX ORDER — DILTIAZEM HYDROCHLORIDE 120 MG/1
CAPSULE, EXTENDED RELEASE ORAL
Qty: 90 CAPSULE | Refills: 3 | Status: SHIPPED | OUTPATIENT
Start: 2020-09-03 | End: 2020-11-09

## 2020-09-03 NOTE — TELEPHONE ENCOUNTER
Medicine Refill Request    Last Office Visit: 3/4/2020  Last Telemedicine Visit: Visit date not found    Next Office Visit: Visit date not found  Next Telemedicine Visit: Visit date not found         Current Outpatient Medications:   •  amoxicillin (AMOXIL) 500 mg tablet, Take 4 pills by mouth 1 hour before Dental work, Disp: 4 tablet, Rfl: 0  •  atorvastatin (LIPITOR) 40 mg tablet, Take 1 tablet (40 mg total) by mouth once daily., Disp: 90 tablet, Rfl: 0  •  azithromycin (ZITHROMAX) 250 mg tablet, Take 2 tablets the first day, then 1 tablet daily for 4 days., Disp: 6 tablet, Rfl: 0  •  calcium carbonate-vitamin D3 500 mg(1,250mg) -200 unit per tablet, Take 2 tablets by mouth 2 (two) times a day with meals.  , Disp: , Rfl:   •  clobetasol (TEMOVATE) 0.05 % cream, , Disp: , Rfl:   •  cyclobenzaprine (FLEXERIL) 5 mg tablet, Take 1 tablet (5 mg total) by mouth 3 (three) times a day as needed for muscle spasms., Disp: 30 tablet, Rfl: 3  •  diltiazem (TIAZAC) 120 mg 24 hr capsule, Take 1 capsule (120 mg total) by mouth once daily., Disp: 90 capsule, Rfl: 3  •  fluticasone propionate (FLONASE) 50 mcg/actuation nasal spray, Administer 1 spray into each nostril daily as needed for rhinitis., Disp: 1 Bottle, Rfl: 3  •  pantoprazole (PROTONIX) 40 mg EC tablet, Take 1 tablet (40 mg total) by mouth daily Indications: stress ulcer prevention., Disp: 90 tablet, Rfl: 0  •  warfarin (COUMADIN) 4 mg tablet, Take 2 by mouth daily (8 mg daily) except T/TH take one (4mg). Start this regimen on 2/7, Disp: 200 tablet, Rfl: 1      BP Readings from Last 3 Encounters:   03/04/20 120/72   02/10/20 118/60   02/06/20 111/60       Recent Lab results:  Lab Results   Component Value Date    CHOL 175 01/20/2020   ,   Lab Results   Component Value Date    HDL 69 01/20/2020   ,   Lab Results   Component Value Date    LDLCALC 90 01/20/2020   ,   Lab Results   Component Value Date    TRIG 81 01/20/2020        Lab Results   Component Value Date     GLUCOSE 96 02/03/2020   ,   Lab Results   Component Value Date    HGBA1C 6.3 (H) 01/20/2020         Lab Results   Component Value Date    CREATININE 0.5 (L) 02/03/2020       Lab Results   Component Value Date    TSH 1.13 02/25/2020

## 2020-09-30 ENCOUNTER — TELEPHONE (OUTPATIENT)
Dept: PRIMARY CARE | Facility: CLINIC | Age: 65
End: 2020-09-30

## 2020-09-30 NOTE — TELEPHONE ENCOUNTER
Please advise- immunization report states pt had prevnar 13 in 2016 and 2017. Pneumovax not documented in chart.

## 2020-09-30 NOTE — TELEPHONE ENCOUNTER
Per Dr. Moreau added pneumovax 23 date into chart and informed pt she is up to date. Pt expressed understanding.

## 2020-09-30 NOTE — TELEPHONE ENCOUNTER
Pt would like to know if she ever received her pneumonia vaccine, and if so when was it performed

## 2020-10-01 ENCOUNTER — TELEPHONE (OUTPATIENT)
Dept: PRIMARY CARE | Facility: CLINIC | Age: 65
End: 2020-10-01

## 2020-10-01 DIAGNOSIS — Z12.31 ENCOUNTER FOR SCREENING MAMMOGRAM FOR MALIGNANT NEOPLASM OF BREAST: Primary | ICD-10-CM

## 2020-10-01 NOTE — TELEPHONE ENCOUNTER
Pt need a script for a routine mammogram which she will have done at the [place] location.  Please call pt once script is done

## 2020-10-02 ENCOUNTER — TELEPHONE (OUTPATIENT)
Dept: PRIMARY CARE | Facility: CLINIC | Age: 65
End: 2020-10-02

## 2020-10-05 ENCOUNTER — APPOINTMENT (OUTPATIENT)
Dept: LAB | Facility: CLINIC | Age: 65
End: 2020-10-05
Attending: FAMILY MEDICINE
Payer: MEDICARE

## 2020-10-05 ENCOUNTER — HOSPITAL ENCOUNTER (OUTPATIENT)
Dept: RADIOLOGY | Facility: CLINIC | Age: 65
Discharge: HOME | End: 2020-10-05
Attending: FAMILY MEDICINE
Payer: MEDICARE

## 2020-10-05 DIAGNOSIS — Z86.711 HISTORY OF PULMONARY EMBOLUS (PE): ICD-10-CM

## 2020-10-05 DIAGNOSIS — I48.0 PAROXYSMAL ATRIAL FIBRILLATION (CMS/HCC): ICD-10-CM

## 2020-10-05 DIAGNOSIS — Z12.31 ENCOUNTER FOR SCREENING MAMMOGRAM FOR MALIGNANT NEOPLASM OF BREAST: ICD-10-CM

## 2020-10-05 LAB
INR PPP: 2 INR
PROTHROMBIN TIME: 22.3 SEC (ref 12.2–14.5)

## 2020-10-05 PROCEDURE — 85610 PROTHROMBIN TIME: CPT

## 2020-10-05 PROCEDURE — 77063 BREAST TOMOSYNTHESIS BI: CPT

## 2020-10-05 PROCEDURE — 36415 COLL VENOUS BLD VENIPUNCTURE: CPT

## 2020-10-06 ENCOUNTER — TELEPHONE (OUTPATIENT)
Dept: PRIMARY CARE | Facility: CLINIC | Age: 65
End: 2020-10-06

## 2020-10-06 NOTE — TELEPHONE ENCOUNTER
----- Message from Demarco Moreau, DO sent at 10/6/2020  8:04 AM EDT -----  Gail, INR stable at 2.0. same dose warfarin (I assume we manage her Afib med-Coumadin), if not fax to Cardio if they do, otherwise-same dose and repeat monthly, thank you

## 2020-10-28 ENCOUNTER — TELEPHONE (OUTPATIENT)
Dept: PRIMARY CARE | Facility: CLINIC | Age: 65
End: 2020-10-28

## 2020-10-28 RX ORDER — CIPROFLOXACIN 500 MG/1
500 TABLET ORAL 2 TIMES DAILY
Qty: 14 TABLET | Refills: 0 | Status: SHIPPED | OUTPATIENT
Start: 2020-10-28 | End: 2020-11-04

## 2020-10-28 NOTE — TELEPHONE ENCOUNTER
Pt have a UTI for the past 3 to 4 days, pt having burning and irritation.  Pt would like Dr Moreau to prescribe Cipro 500mg and send to [facility] in [place]

## 2020-10-28 NOTE — TELEPHONE ENCOUNTER
Pt stated she has pain and burning with urination started 2 days ago and is requesting Cipro, Denies blood in urine, fever or abdominal discomfort. Please advise.

## 2020-11-09 ENCOUNTER — TELEPHONE (OUTPATIENT)
Dept: PRIMARY CARE | Facility: CLINIC | Age: 65
End: 2020-11-09

## 2020-11-09 ENCOUNTER — OFFICE VISIT (OUTPATIENT)
Dept: PRIMARY CARE | Facility: CLINIC | Age: 65
End: 2020-11-09
Payer: MEDICARE

## 2020-11-09 ENCOUNTER — APPOINTMENT (OUTPATIENT)
Dept: LAB | Facility: CLINIC | Age: 65
End: 2020-11-09
Attending: FAMILY MEDICINE
Payer: MEDICARE

## 2020-11-09 VITALS
HEART RATE: 52 BPM | SYSTOLIC BLOOD PRESSURE: 118 MMHG | WEIGHT: 195 LBS | DIASTOLIC BLOOD PRESSURE: 64 MMHG | HEIGHT: 62 IN | RESPIRATION RATE: 16 BRPM | OXYGEN SATURATION: 96 % | BODY MASS INDEX: 35.88 KG/M2 | TEMPERATURE: 98.1 F

## 2020-11-09 DIAGNOSIS — I10 ESSENTIAL HYPERTENSION: ICD-10-CM

## 2020-11-09 DIAGNOSIS — I48.0 PAROXYSMAL ATRIAL FIBRILLATION (CMS/HCC): ICD-10-CM

## 2020-11-09 DIAGNOSIS — Z13.820 OSTEOPOROSIS SCREENING: ICD-10-CM

## 2020-11-09 DIAGNOSIS — G47.33 OBSTRUCTIVE SLEEP APNEA: Chronic | ICD-10-CM

## 2020-11-09 DIAGNOSIS — E78.00 PURE HYPERCHOLESTEROLEMIA: Chronic | ICD-10-CM

## 2020-11-09 DIAGNOSIS — I27.82 OTHER CHRONIC PULMONARY EMBOLISM WITHOUT ACUTE COR PULMONALE: Chronic | ICD-10-CM

## 2020-11-09 DIAGNOSIS — M81.0 AGE-RELATED OSTEOPOROSIS WITHOUT CURRENT PATHOLOGICAL FRACTURE: ICD-10-CM

## 2020-11-09 DIAGNOSIS — K92.2 GASTROINTESTINAL HEMORRHAGE, UNSPECIFIED GASTROINTESTINAL HEMORRHAGE TYPE: Primary | ICD-10-CM

## 2020-11-09 LAB
INR PPP: 1.5 INR
PROTHROMBIN TIME: 17.4 SEC (ref 12.2–14.5)

## 2020-11-09 PROCEDURE — 99214 OFFICE O/P EST MOD 30 MIN: CPT | Performed by: FAMILY MEDICINE

## 2020-11-09 PROCEDURE — 85610 PROTHROMBIN TIME: CPT

## 2020-11-09 PROCEDURE — 36415 COLL VENOUS BLD VENIPUNCTURE: CPT

## 2020-11-09 RX ORDER — ATORVASTATIN CALCIUM 40 MG/1
40 TABLET, FILM COATED ORAL
Qty: 90 TABLET | Refills: 0 | Status: SHIPPED | OUTPATIENT
Start: 2020-11-09 | End: 2021-03-01

## 2020-11-09 RX ORDER — DILTIAZEM HYDROCHLORIDE 120 MG/1
120 CAPSULE, EXTENDED RELEASE ORAL
Qty: 90 CAPSULE | Refills: 3 | Status: SHIPPED | OUTPATIENT
Start: 2020-11-09 | End: 2021-07-15 | Stop reason: SDUPTHER

## 2020-11-09 RX ORDER — KETOCONAZOLE 20 MG/G
CREAM TOPICAL
COMMUNITY
Start: 2020-10-25

## 2020-11-09 RX ORDER — WARFARIN 4 MG/1
TABLET ORAL
Qty: 60 TABLET | Refills: 11 | Status: SHIPPED | OUTPATIENT
Start: 2020-11-09 | End: 2021-01-12

## 2020-11-09 RX ORDER — PANTOPRAZOLE SODIUM 40 MG/1
40 TABLET, DELAYED RELEASE ORAL DAILY
Qty: 90 TABLET | Refills: 0 | Status: SHIPPED | OUTPATIENT
Start: 2020-11-09 | End: 2021-03-30

## 2020-11-09 ASSESSMENT — ENCOUNTER SYMPTOMS
ADENOPATHY: 0
ARTHRALGIAS: 0
SINUS PRESSURE: 0
SINUS PAIN: 0
HEADACHES: 0
CONFUSION: 0
DIZZINESS: 0
DYSURIA: 0
MYALGIAS: 0
PALPITATIONS: 0
APPETITE CHANGE: 0
ABDOMINAL PAIN: 0
FEVER: 0
EYE PAIN: 0
BACK PAIN: 0
DIARRHEA: 0
EYE ITCHING: 0
CHEST TIGHTNESS: 0
FATIGUE: 0
COLOR CHANGE: 0
EYE DISCHARGE: 0
NAUSEA: 0
SHORTNESS OF BREATH: 0
ACTIVITY CHANGE: 0
SORE THROAT: 0
COUGH: 0
TROUBLE SWALLOWING: 0
VOMITING: 0
AGITATION: 0
BLOOD IN STOOL: 0

## 2020-11-09 NOTE — PATIENT INSTRUCTIONS
Stay well. Stay safe. Stay active. Have a great day!    Discussed the importance of social distancing-Avoid large crowds-Stay at home if possible, Self monitor temp nilda 12 hours. Wash hands diligently, avoid contact with face from hands, cough/sneeze into the elbow, etc. Pt understands these universal precautions and current guidelines and will adhere to them. Pt knows to call us with any increase in symptoms such as SOB/ROGERS, High Fevers etc.

## 2020-11-09 NOTE — ASSESSMENT & PLAN NOTE
Stable  Cont meds- diltiazem    Continue to take all your blood pressure medications as instructed,  Stay well by eating a healthy diet with less salt and fatty foods. Maintain a healthy weight, and keep your regularly scheduled doctor appointments.

## 2020-11-09 NOTE — PROGRESS NOTES
Subjective      Patient ID: Jennifer Aly is a 65 y.o. female.    Jennifer is a 66yo female presenting today for follow up and review of chronic medical issues/medications.    Feeling well with no complaints. Currently on a weight loss diet. Dr. Osman ordered labs for this January.      Chronic Problem List:      --GI bleed  - no recent blood in stool since late January episode  -S/p EGD on 1/27/20 which revealed no source of bleeding  -S/p video capsule study on 1/28/20 with no evidence of active bleeding     History of pulmonary embolus (PE)  Patient with hx of unprovoked PE, on warfarin. INR therapeutic on presentation. Pt takes 8mg sun, mon, weds, fri, sat. Tues and Thurs 4mg  Patient wants a standing order to get her INR   Lab Results       Component                Value               Date                       INR                      2.0                 10/05/2020                 INR                      2.4 (H)             07/16/2020                 INR                      4.2 (H)             06/23/2020                           -- Anemia  Acute anemia noted in s/o GI bleed.  - no further episodes of bleeding; Hgb stable     HLD (hyperlipidemia)  - C/w statin  Lab Results       Component                Value               Date                       CHOL                     175                 01/20/2020                 CHOL                     157                 04/04/2019                 CHOL                     144                 03/07/2019            Lab Results       Component                Value               Date                       HDL                      69                  01/20/2020                 HDL                      56                  04/04/2019                 HDL                      59                  03/07/2019            Lab Results       Component                Value               Date                       LDLCALC                  90                  01/20/2020                  LDLCALC                  90                  04/04/2019                 LDLCALC                  74                  03/07/2019            Lab Results       Component                Value               Date                       TRIG                     81                  01/20/2020                 TRIG                     56                  04/04/2019                 TRIG                     53                  03/07/2019            No results found for: CHOLHDL       Paroxysmal atrial fibrillation (CMS/HCC)  - Planning to see new cardiologist at [place]     Essential hypertension  - Continue diltiazam     Obstructive sleep apnea  - CPAP  -Sees Dr. Gallo        The following have been reviewed and updated as appropriate in this visit:         Past Medical History:   Diagnosis Date   • Arthritis    • DMII (diabetes mellitus, type 2) (CMS/HCC)    • Fistula    • Gout    • HLD (hyperlipidemia)    • IFG (impaired fasting glucose)    • Obesity    • Polyarthritis    • Pulmonary emboli (CMS/HCC)     pulmonary emboli   • Torn rotator cuff        Past Surgical History:   Procedure Laterality Date   • APPENDECTOMY  2008   • CARDIAC CATHETERIZATION  11/2012   • INTRACAPSULAR CATARACT EXTRACTION Bilateral 08/2019   • IVC FILTER RETRIEVAL  2012   • IVC FILTER RETRIEVAL  02/2013   • RECTOURETHRAL FISTULA REPAIR  2008, 5/2017   • ROTATOR CUFF REPAIR Left 01/2016   • ALTAF-EN-Y PROCEDURE  02/07/2017   • TOTAL ABDOMINAL HYSTERECTOMY  1987   • TOTAL KNEE ARTHROPLASTY Bilateral 2010   • TOTAL KNEE ARTHROPLASTY Right 2012    revision   • TRIGGER FINGER RELEASE Right 2012    right thumb   • ULNAR NERVE REPAIR  2013       Family History   Problem Relation Age of Onset   • Diabetes Biological Mother    • Lung cancer Biological Mother    • Alzheimer's disease Biological Mother    • Diabetes type II Biological Father    • Heart failure Biological Father        Social History     Socioeconomic History   • Marital status:      Spouse  name: Not on file   • Number of children: Not on file   • Years of education: Not on file   • Highest education level: Not on file   Occupational History   • Not on file   Social Needs   • Financial resource strain: Not on file   • Food insecurity     Worry: Not on file     Inability: Not on file   • Transportation needs     Medical: Not on file     Non-medical: Not on file   Tobacco Use   • Smoking status: Former Smoker     Years: 15.00     Quit date:      Years since quittin.8   • Smokeless tobacco: Never Used   Substance and Sexual Activity   • Alcohol use: Yes     Comment: occasional wine   • Drug use: No   • Sexual activity: Not Currently   Lifestyle   • Physical activity     Days per week: Not on file     Minutes per session: Not on file   • Stress: Not on file   Relationships   • Social connections     Talks on phone: Not on file     Gets together: Not on file     Attends [employer] witness service: Not on file     Active member of club or organization: Not on file     Attends meetings of clubs or organizations: Not on file     Relationship status: Not on file   • Intimate partner violence     Fear of current or ex partner: Not on file     Emotionally abused: Not on file     Physically abused: Not on file     Forced sexual activity: Not on file   Other Topics Concern   • Not on file   Social History Narrative   • Not on file       Allergies   Allergen Reactions   • Sulfa (Sulfonamide Antibiotics) Hives       Current Outpatient Medications   Medication Sig Dispense Refill   • amoxicillin (AMOXIL) 500 mg tablet Take 4 pills by mouth 1 hour before Dental work 4 tablet 0   • atorvastatin (LIPITOR) 40 mg tablet Take 1 tablet (40 mg total) by mouth once daily. 90 tablet 0   • calcium carbonate-vitamin D3 500 mg(1,250mg) -200 unit per tablet Take 2 tablets by mouth 2 (two) times a day with meals.       • clobetasol (TEMOVATE) 0.05 % cream      • cyclobenzaprine (FLEXERIL) 5 mg tablet Take 1 tablet (5 mg total) by  mouth 3 (three) times a day as needed for muscle spasms. 30 tablet 3   • diltiazem (TIAZAC) 120 mg 24 hr capsule Take 1 capsule (120 mg total) by mouth once daily. 90 capsule 3   • fluticasone propionate (FLONASE) 50 mcg/actuation nasal spray Administer 1 spray into each nostril daily as needed for rhinitis. 1 Bottle 3   • ketoconazole (NIZORAL) 2 % cream APPLY ON THE SKIN TWICE A DAY APPLY TO AFFECTED AREA RASH IN GROIN/ABDOMEN TWICE A DAY FOR 3 WEEKS     • pantoprazole (PROTONIX) 40 mg EC tablet Take 1 tablet (40 mg total) by mouth daily Indications: stress ulcer prevention. 90 tablet 0   • TIADYLT  mg 24 hr capsule TAKE 1 CAPSULE BY MOUTH EVERY DAY 90 capsule 3   • warfarin (COUMADIN) 4 mg tablet Take 2 by mouth daily (8 mg daily) except T/TH take one (4mg). Start this regimen on 2/7 200 tablet 1   • azithromycin (ZITHROMAX) 250 mg tablet Take 2 tablets the first day, then 1 tablet daily for 4 days. (Patient not taking: Reported on 11/9/2020 ) 6 tablet 0     No current facility-administered medications for this visit.        Review of Systems   Constitutional: Negative for activity change, appetite change, fatigue and fever.   HENT: Negative for congestion, sinus pressure, sinus pain, sore throat and trouble swallowing.    Eyes: Negative for pain, discharge, itching and visual disturbance.   Respiratory: Negative for cough, chest tightness and shortness of breath.    Cardiovascular: Negative for chest pain, palpitations and leg swelling.   Gastrointestinal: Negative for abdominal pain, blood in stool, diarrhea, nausea and vomiting.   Endocrine: Negative for cold intolerance and heat intolerance.   Genitourinary: Negative for dysuria.   Musculoskeletal: Negative for arthralgias, back pain, gait problem and myalgias.   Skin: Negative for color change and rash.   Allergic/Immunologic: Negative for environmental allergies and food allergies.   Neurological: Negative for dizziness and headaches.   Hematological:  Negative for adenopathy.   Psychiatric/Behavioral: Negative for agitation, behavioral problems and confusion.       Objective     Vitals:    11/09/20 1016   BP: 132/76   Pulse: (!) 52   Resp: 16   Temp: 36.7 °C (98.1 °F)   SpO2: 96%       Physical Exam  Constitutional:       Appearance: She is well-developed.   HENT:      Head: Normocephalic and atraumatic.      Right Ear: Tympanic membrane, ear canal and external ear normal.      Left Ear: Tympanic membrane, ear canal and external ear normal.      Nose: Nose normal.      Mouth/Throat:      Mouth: Mucous membranes are moist.      Pharynx: Oropharynx is clear.   Eyes:      Conjunctiva/sclera: Conjunctivae normal.      Pupils: Pupils are equal, round, and reactive to light.   Neck:      Musculoskeletal: Normal range of motion and neck supple.      Thyroid: No thyromegaly.   Cardiovascular:      Rate and Rhythm: Normal rate and regular rhythm.      Heart sounds: Normal heart sounds. No murmur.   Pulmonary:      Effort: Pulmonary effort is normal. No respiratory distress.      Breath sounds: Normal breath sounds. No wheezing or rales.   Abdominal:      General: Bowel sounds are normal. There is no distension.      Palpations: Abdomen is soft. There is no mass.      Tenderness: There is no abdominal tenderness. There is no guarding.   Musculoskeletal: Normal range of motion.         General: No tenderness.   Skin:     General: Skin is warm and dry.      Capillary Refill: Capillary refill takes less than 2 seconds.      Findings: No rash.   Neurological:      Mental Status: She is alert and oriented to person, place, and time.      Cranial Nerves: No cranial nerve deficit.      Deep Tendon Reflexes: Reflexes normal.   Psychiatric:         Behavior: Behavior normal.         Thought Content: Thought content normal.         Judgment: Judgment normal.         Assessment/Plan     Problem List Items Addressed This Visit        Respiratory    Obstructive sleep apnea (Chronic)     Overview     Overview:          Current Assessment & Plan     Uses CPAP             Circulatory    Pulmonary emboli (CMS/HCC) (Chronic)    Overview     Overview:          Essential hypertension    Current Assessment & Plan     Stable  Cont meds- diltiazem    Continue to take all your blood pressure medications as instructed,  Stay well by eating a healthy diet with less salt and fatty foods. Maintain a healthy weight, and keep your regularly scheduled doctor appointments.            Paroxysmal atrial fibrillation (CMS/HCC)    Current Assessment & Plan     Follows with Cardiology            Digestive    GI bleed - Primary    Current Assessment & Plan     S/p EGD on 1/27/20 that revealed no bleeding source  S/p video capsule study on 1/28/20 with no evidence of active bleeding    Regular diet  PPI daily  Restarted anticoagulation            Endocrine/Metabolic    Pure hypercholesterolemia (Chronic)    Overview     Overview:          Current Assessment & Plan     Stable  On Meds  Continue same    Lab Results   Component Value Date    CHOL 175 01/20/2020    CHOL 157 04/04/2019    CHOL 144 03/07/2019     Lab Results   Component Value Date    HDL 69 01/20/2020    HDL 56 04/04/2019    HDL 59 03/07/2019     Lab Results   Component Value Date    LDLCALC 90 01/20/2020    LDLCALC 90 04/04/2019    LDLCALC 74 03/07/2019     Lab Results   Component Value Date    TRIG 81 01/20/2020    TRIG 56 04/04/2019    TRIG 53 03/07/2019     No results found for: CHOLHDL                 DIAGNOSIS  1. Gastrointestinal hemorrhage, unspecified gastrointestinal hemorrhage type    2. Other chronic pulmonary embolism without acute cor pulmonale (CMS/HCC)    3. Pure hypercholesterolemia    4. Essential hypertension    5. Paroxysmal atrial fibrillation (CMS/HCC)    6. Obstructive sleep apnea        No exam data present        Demarco Moreau,   11/9/2020

## 2020-11-09 NOTE — ASSESSMENT & PLAN NOTE
Stable  On Meds  Continue same    Lab Results   Component Value Date    CHOL 175 01/20/2020    CHOL 157 04/04/2019    CHOL 144 03/07/2019     Lab Results   Component Value Date    HDL 69 01/20/2020    HDL 56 04/04/2019    HDL 59 03/07/2019     Lab Results   Component Value Date    LDLCALC 90 01/20/2020    LDLCALC 90 04/04/2019    LDLCALC 74 03/07/2019     Lab Results   Component Value Date    TRIG 81 01/20/2020    TRIG 56 04/04/2019    TRIG 53 03/07/2019     No results found for: CHOLHDL

## 2020-11-09 NOTE — TELEPHONE ENCOUNTER
Spoke with pt who stated the bus drivers wife is positive the  has a pending test. School wants her to be tested but pt has no symptoms. I informed pt she can get testing done at a local pharmacy or health department as we are not testing for asymptomatic patients. Pt expressed understanding.

## 2020-11-09 NOTE — TELEPHONE ENCOUNTER
Spoke with pt- that current dose is correct. She will bump up to 8mg daily and recheck in 1-2 weeks.

## 2020-11-09 NOTE — TELEPHONE ENCOUNTER
----- Message from Demarco Moreau, DO sent at 11/9/2020  4:36 PM EST -----  Gail, check out her dosage, I have 4mg takes 2 daily except 2 days a week takes only 1 pill (4mg), so INR low, she should take 4 mg tabs - TWO each day , so 8 mg daily and repeat the INR in 1-2 weeks. thx

## 2020-11-09 NOTE — TELEPHONE ENCOUNTER
Pt called  was exposed to someone who tested positive.  It has been 8 days since she was in and around them.  She has no symptoms.  The school is requesting her to get a Covid test.  She has been self quarantining.  She can be reached at [phone].

## 2020-11-09 NOTE — ASSESSMENT & PLAN NOTE
S/p EGD on 1/27/20 that revealed no bleeding source  S/p video capsule study on 1/28/20 with no evidence of active bleeding    Regular diet  PPI daily  Restarted anticoagulation

## 2020-12-21 RX ORDER — FLUTICASONE PROPIONATE 50 MCG
1 SPRAY, SUSPENSION (ML) NASAL DAILY PRN
Qty: 1 BOTTLE | Refills: 3 | Status: SHIPPED | OUTPATIENT
Start: 2020-12-21 | End: 2023-11-01 | Stop reason: SDUPTHER

## 2021-01-12 RX ORDER — WARFARIN 4 MG/1
TABLET ORAL
Qty: 144 TABLET | Refills: 2 | Status: SHIPPED | OUTPATIENT
Start: 2021-01-12 | End: 2021-07-15 | Stop reason: SDUPTHER

## 2021-01-12 NOTE — TELEPHONE ENCOUNTER
Medicine Refill Request    Last Office Visit: 11/9/2020  Last Telemedicine Visit: Visit date not found    Next Office Visit: 5/17/2021  Next Telemedicine Visit: Visit date not found         Current Outpatient Medications:   •  atorvastatin (LIPITOR) 40 mg tablet, Take 1 tablet (40 mg total) by mouth once daily., Disp: 90 tablet, Rfl: 0  •  calcium carbonate-vitamin D3 500 mg(1,250mg) -200 unit per tablet, Take 2 tablets by mouth 2 (two) times a day with meals.  , Disp: , Rfl:   •  clobetasol (TEMOVATE) 0.05 % cream, , Disp: , Rfl:   •  cyclobenzaprine (FLEXERIL) 5 mg tablet, Take 1 tablet (5 mg total) by mouth 3 (three) times a day as needed for muscle spasms., Disp: 30 tablet, Rfl: 3  •  diltiazem (TIAZAC) 120 mg 24 hr capsule, Take 1 capsule (120 mg total) by mouth once daily., Disp: 90 capsule, Rfl: 3  •  fluticasone propionate (FLONASE) 50 mcg/actuation nasal spray, Administer 1 spray into each nostril daily as needed for rhinitis. Make an appointment if help is needed for nasal symptoms., Disp: 1 Bottle, Rfl: 3  •  ketoconazole (NIZORAL) 2 % cream, APPLY ON THE SKIN TWICE A DAY APPLY TO AFFECTED AREA RASH IN GROIN/ABDOMEN TWICE A DAY FOR 3 WEEKS, Disp: , Rfl:   •  pantoprazole (PROTONIX) 40 mg EC tablet, Take 1 tablet (40 mg total) by mouth daily Indications: stress ulcer prevention., Disp: 90 tablet, Rfl: 0  •  warfarin (COUMADIN) 4 mg tablet, Take 2 by mouth daily (8 mg daily) except T/TH take one (4mg). Start this regimen on 2/7, Disp: 60 tablet, Rfl: 11      BP Readings from Last 3 Encounters:   11/09/20 118/64   03/04/20 120/72   02/10/20 118/60       Recent Lab results:  Lab Results   Component Value Date    CHOL 175 01/20/2020   ,   Lab Results   Component Value Date    HDL 69 01/20/2020   ,   Lab Results   Component Value Date    LDLCALC 90 01/20/2020   ,   Lab Results   Component Value Date    TRIG 81 01/20/2020        Lab Results   Component Value Date    GLUCOSE 96 02/03/2020   ,   Lab Results    Component Value Date    HGBA1C 6.3 (H) 01/20/2020         Lab Results   Component Value Date    CREATININE 0.5 (L) 02/03/2020       Lab Results   Component Value Date    TSH 1.13 02/25/2020

## 2021-01-23 ENCOUNTER — TELEPHONE (OUTPATIENT)
Dept: FAMILY MEDICINE | Facility: CLINIC | Age: 66
End: 2021-01-23

## 2021-01-23 RX ORDER — TRAMADOL HYDROCHLORIDE 50 MG/1
50 TABLET ORAL EVERY 6 HOURS PRN
Qty: 60 TABLET | Refills: 0 | Status: SHIPPED | OUTPATIENT
Start: 2021-01-23 | End: 2021-02-02

## 2021-01-23 NOTE — TELEPHONE ENCOUNTER
On call: severe lower back pain  She is in excruciating pain on lower back on right side    Started: on and off for a week  Tried heat and ice  She can barely walk  Taking deep breath hurts  Doesn't really radiate  No urinary sx  Had bariatric surgery: can't take nsaids  Last year had gi bleed possibly from steroid    Plan: call the office Monday for appt  Will do tramadol for pain since she is so limited and she was in a good bit of pain.  Call if worsens

## 2021-02-08 ENCOUNTER — OFFICE VISIT (OUTPATIENT)
Dept: PRIMARY CARE | Facility: CLINIC | Age: 66
End: 2021-02-08
Payer: MEDICARE

## 2021-02-08 ENCOUNTER — HOSPITAL ENCOUNTER (OUTPATIENT)
Dept: RADIOLOGY | Facility: CLINIC | Age: 66
Discharge: HOME | End: 2021-02-08
Attending: FAMILY MEDICINE
Payer: MEDICARE

## 2021-02-08 ENCOUNTER — APPOINTMENT (OUTPATIENT)
Dept: LAB | Facility: CLINIC | Age: 66
End: 2021-02-08
Attending: FAMILY MEDICINE
Payer: MEDICARE

## 2021-02-08 VITALS
SYSTOLIC BLOOD PRESSURE: 120 MMHG | BODY MASS INDEX: 35.67 KG/M2 | OXYGEN SATURATION: 99 % | RESPIRATION RATE: 16 BRPM | HEIGHT: 62 IN | HEART RATE: 75 BPM | DIASTOLIC BLOOD PRESSURE: 84 MMHG

## 2021-02-08 DIAGNOSIS — M54.50 LOW BACK PAIN WITH RADIATION: Primary | ICD-10-CM

## 2021-02-08 DIAGNOSIS — R53.83 MALAISE AND FATIGUE: ICD-10-CM

## 2021-02-08 DIAGNOSIS — R53.81 MALAISE AND FATIGUE: ICD-10-CM

## 2021-02-08 DIAGNOSIS — D50.8 IRON DEFICIENCY ANEMIA SECONDARY TO INADEQUATE DIETARY IRON INTAKE: ICD-10-CM

## 2021-02-08 DIAGNOSIS — E78.5 HYPERLIPIDEMIA, UNSPECIFIED HYPERLIPIDEMIA TYPE: ICD-10-CM

## 2021-02-08 DIAGNOSIS — I27.82 OTHER CHRONIC PULMONARY EMBOLISM WITHOUT ACUTE COR PULMONALE: Chronic | ICD-10-CM

## 2021-02-08 DIAGNOSIS — I48.0 PAROXYSMAL ATRIAL FIBRILLATION (CMS/HCC): ICD-10-CM

## 2021-02-08 DIAGNOSIS — K21.9 GERD WITHOUT ESOPHAGITIS: ICD-10-CM

## 2021-02-08 DIAGNOSIS — M54.50 LOW BACK PAIN WITH RADIATION: ICD-10-CM

## 2021-02-08 DIAGNOSIS — I10 ESSENTIAL HYPERTENSION: ICD-10-CM

## 2021-02-08 LAB
INR PPP: 1.7
PROTHROMBIN TIME: 20.1 SEC (ref 12.2–14.5)

## 2021-02-08 PROCEDURE — 72100 X-RAY EXAM L-S SPINE 2/3 VWS: CPT

## 2021-02-08 PROCEDURE — 36415 COLL VENOUS BLD VENIPUNCTURE: CPT

## 2021-02-08 PROCEDURE — 85610 PROTHROMBIN TIME: CPT

## 2021-02-08 PROCEDURE — 99214 OFFICE O/P EST MOD 30 MIN: CPT | Performed by: FAMILY MEDICINE

## 2021-02-08 RX ORDER — TRAMADOL HYDROCHLORIDE 50 MG/1
50 TABLET ORAL EVERY 8 HOURS PRN
Qty: 50 TABLET | Refills: 1 | Status: SHIPPED | OUTPATIENT
Start: 2021-02-08 | End: 2024-11-15

## 2021-02-08 RX ORDER — CYCLOBENZAPRINE HCL 5 MG
5 TABLET ORAL 3 TIMES DAILY PRN
Qty: 50 TABLET | Refills: 3 | Status: SHIPPED | OUTPATIENT
Start: 2021-02-08 | End: 2021-07-15

## 2021-02-08 ASSESSMENT — ENCOUNTER SYMPTOMS
SHORTNESS OF BREATH: 0
VOMITING: 0
CONFUSION: 0
NAUSEA: 0
MYALGIAS: 1
SINUS PRESSURE: 0
ADENOPATHY: 0
SORE THROAT: 0
COLOR CHANGE: 0
PALPITATIONS: 0
COUGH: 0
ABDOMINAL PAIN: 0
ARTHRALGIAS: 1
FEVER: 0
DIARRHEA: 0
FATIGUE: 0
DIZZINESS: 0
DYSURIA: 0
EYE ITCHING: 0
EYE DISCHARGE: 0
BACK PAIN: 1
HEADACHES: 0
SINUS PAIN: 0
TROUBLE SWALLOWING: 0
AGITATION: 0
EYE PAIN: 0
CHEST TIGHTNESS: 0
BLOOD IN STOOL: 0
ACTIVITY CHANGE: 0
APPETITE CHANGE: 0

## 2021-02-08 NOTE — ASSESSMENT & PLAN NOTE
Stable  Continue to take all your blood pressure medications as instructed,  Stay well by eating a healthy diet with less salt and fatty foods. Maintain a healthy weight, and keep your regularly scheduled doctor appointments.

## 2021-02-08 NOTE — PATIENT INSTRUCTIONS
Great to see you!  Stay well. Stay safe. Stay active. Have a great day!    Discussed the importance of social distancing-Avoid large crowds-Stay at home if possible, Self monitor temp nilda 12 hours. Wash hands diligently, avoid contact with face from hands, cough/sneeze into the elbow, etc. Pt understands these universal precautions and current guidelines and will adhere to them. Pt knows to call us with any increase in symptoms such as SOB/ROGERS, High Fevers etc.

## 2021-02-08 NOTE — PROGRESS NOTES
Subjective      Patient ID: Jennifer Aly is a 65 y.o. female.    Patient is a 65 year old female who presents to the office today due to LBP with radiation down her right leg.   She wakes up in the night with tingling in her right leg. Constant LBP currently 4/10. Denies increased right leg weakness.  Seen by chiropractor on 2/5/21.  Flexeril and Tramadol PRN.  Heat/cold therapy.  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  No missed days at work.      Lab Results  Lab Results       Component                Value               Date                       INR                      1.5                 11/09/2020                 INR                      2.0                 10/05/2020                 INR                      2.4 (H)             07/16/2020                -- Anemia  Acute anemia noted in s/o GI bleed.  - no further episodes of bleeding; Hgb stable     HLD (hyperlipidemia)  - C/w statin  Lab Results       Component                Value               Date                       CHOL                     175                 01/20/2020                 CHOL                     157                 04/04/2019                 CHOL                     144                 03/07/2019            Lab Results       Component                Value               Date                       HDL                      69                  01/20/2020                 HDL                      56                  04/04/2019                 HDL                      59                  03/07/2019            Lab Results       Component                Value               Date                       LDLCALC                  90                  01/20/2020                 LDLCALC                  90                  04/04/2019                 LDLCALC                  74                  03/07/2019            Lab Results       Component                Value               Date                       TRIG                     81                   01/20/2020                 TRIG                     56                  04/04/2019                 TRIG                     53                  03/07/2019            No results found for: CHOLHDL     The 10-year ASCVD risk score (Eagle FARAH Jr., et al., 2013) is: 13.3%    Values used to calculate the score:      Age: 65 years      Sex: Female      Is Non- : No      Diabetic: Yes      Tobacco smoker: No      Systolic Blood Pressure: 120 mmHg      Is BP treated: Yes      HDL Cholesterol: 69 mg/dL      Total Cholesterol: 175 mg/dL     Paroxysmal atrial fibrillation (CMS/HCC)  - Planning to see new cardiologist at [place]     Essential hypertension  - Continue diltiazam     Obstructive sleep apnea  - CPAP  -Sees Dr. Gallo            The following have been reviewed and updated as appropriate in this visit:  Allergies  Meds  Problems         Past Medical History:   Diagnosis Date   • Arthritis    • DMII (diabetes mellitus, type 2) (CMS/HCC)    • Fistula    • Gout    • HLD (hyperlipidemia)    • IFG (impaired fasting glucose)    • Obesity    • Polyarthritis    • Pulmonary emboli (CMS/HCC)     pulmonary emboli   • Torn rotator cuff        Past Surgical History:   Procedure Laterality Date   • APPENDECTOMY  2008   • CARDIAC CATHETERIZATION  11/2012   • INTRACAPSULAR CATARACT EXTRACTION Bilateral 08/2019   • IVC FILTER RETRIEVAL  2012   • IVC FILTER RETRIEVAL  02/2013   • RECTOURETHRAL FISTULA REPAIR  2008, 5/2017   • ROTATOR CUFF REPAIR Left 01/2016   • ALTAF-EN-Y PROCEDURE  02/07/2017   • TOTAL ABDOMINAL HYSTERECTOMY  1987   • TOTAL KNEE ARTHROPLASTY Bilateral 2010   • TOTAL KNEE ARTHROPLASTY Right 2012    revision   • TRIGGER FINGER RELEASE Right 2012    right thumb   • ULNAR NERVE REPAIR  2013       Family History   Problem Relation Age of Onset   • Diabetes Biological Mother    • Lung cancer Biological Mother    • Alzheimer's disease Biological Mother    • Diabetes type II  Biological Father    • Heart failure Biological Father        Social History     Socioeconomic History   • Marital status:      Spouse name: Not on file   • Number of children: Not on file   • Years of education: Not on file   • Highest education level: Not on file   Occupational History   • Not on file   Social Needs   • Financial resource strain: Not on file   • Food insecurity     Worry: Not on file     Inability: Not on file   • Transportation needs     Medical: Not on file     Non-medical: Not on file   Tobacco Use   • Smoking status: Former Smoker     Years: 15.00     Quit date:      Years since quittin.1   • Smokeless tobacco: Never Used   Substance and Sexual Activity   • Alcohol use: Yes     Comment: occasional wine   • Drug use: No   • Sexual activity: Not Currently   Lifestyle   • Physical activity     Days per week: Not on file     Minutes per session: Not on file   • Stress: Not on file   Relationships   • Social connections     Talks on phone: Not on file     Gets together: Not on file     Attends Jain service: Not on file     Active member of club or organization: Not on file     Attends meetings of clubs or organizations: Not on file     Relationship status: Not on file   • Intimate partner violence     Fear of current or ex partner: Not on file     Emotionally abused: Not on file     Physically abused: Not on file     Forced sexual activity: Not on file   Other Topics Concern   • Not on file   Social History Narrative   • Not on file       Allergies   Allergen Reactions   • Sulfa (Sulfonamide Antibiotics) Hives       Current Outpatient Medications   Medication Sig Dispense Refill   • atorvastatin (LIPITOR) 40 mg tablet Take 1 tablet (40 mg total) by mouth once daily. 90 tablet 0   • calcium carbonate-vitamin D3 500 mg(1,250mg) -200 unit per tablet Take 2 tablets by mouth 2 (two) times a day with meals.       • clobetasol (TEMOVATE) 0.05 % cream      • cyclobenzaprine (FLEXERIL)  5 mg tablet Take 1 tablet (5 mg total) by mouth 3 (three) times a day as needed for muscle spasms. 50 tablet 3   • diltiazem (TIAZAC) 120 mg 24 hr capsule Take 1 capsule (120 mg total) by mouth once daily. 90 capsule 3   • fluticasone propionate (FLONASE) 50 mcg/actuation nasal spray Administer 1 spray into each nostril daily as needed for rhinitis. Make an appointment if help is needed for nasal symptoms. 1 Bottle 3   • ketoconazole (NIZORAL) 2 % cream APPLY ON THE SKIN TWICE A DAY APPLY TO AFFECTED AREA RASH IN GROIN/ABDOMEN TWICE A DAY FOR 3 WEEKS     • pantoprazole (PROTONIX) 40 mg EC tablet Take 1 tablet (40 mg total) by mouth daily Indications: stress ulcer prevention. 90 tablet 0   • warfarin (COUMADIN) 4 mg tablet TAKE 2 TABLET BY MOUTH DAILY (8 MG DAILY) EXCEPT T/TH TAKE ONE (4MG) 144 tablet 2   • traMADoL (ULTRAM) 50 mg tablet Take 1 tablet (50 mg total) by mouth every 8 (eight) hours as needed for moderate pain or severe pain. 50 tablet 1     No current facility-administered medications for this visit.        Review of Systems   Constitutional: Negative for activity change, appetite change, fatigue and fever.   HENT: Negative for congestion, sinus pressure, sinus pain, sore throat and trouble swallowing.    Eyes: Negative for pain, discharge, itching and visual disturbance.   Respiratory: Negative for cough, chest tightness and shortness of breath.    Cardiovascular: Negative for chest pain, palpitations and leg swelling.   Gastrointestinal: Negative for abdominal pain, blood in stool, diarrhea, nausea and vomiting.   Endocrine: Negative for cold intolerance and heat intolerance.   Genitourinary: Negative for dysuria.   Musculoskeletal: Positive for arthralgias, back pain and myalgias. Negative for gait problem.   Skin: Negative for color change and rash.   Allergic/Immunologic: Negative for environmental allergies and food allergies.   Neurological: Negative for dizziness and headaches.   Hematological:  Negative for adenopathy.   Psychiatric/Behavioral: Negative for agitation, behavioral problems and confusion.       Objective     Vitals:    02/08/21 1006   BP: 120/84   Pulse: 75   Resp: 16   SpO2: 99%       Physical Exam  Constitutional:       Appearance: She is well-developed.   HENT:      Head: Normocephalic and atraumatic.      Right Ear: Tympanic membrane, ear canal and external ear normal.      Left Ear: Tympanic membrane, ear canal and external ear normal.      Nose: Nose normal.   Eyes:      Conjunctiva/sclera: Conjunctivae normal.      Pupils: Pupils are equal, round, and reactive to light.   Neck:      Musculoskeletal: Normal range of motion and neck supple.      Thyroid: No thyromegaly.   Cardiovascular:      Rate and Rhythm: Normal rate and regular rhythm.      Heart sounds: Normal heart sounds. No murmur.   Pulmonary:      Effort: Pulmonary effort is normal. No respiratory distress.      Breath sounds: Normal breath sounds. No wheezing or rales.   Abdominal:      General: Bowel sounds are normal. There is no distension.      Palpations: Abdomen is soft. There is no mass.      Tenderness: There is no abdominal tenderness. There is no guarding.   Musculoskeletal:         General: Tenderness present.      Comments: LBP with R Sided pain down Leg   Skin:     General: Skin is warm and dry.      Capillary Refill: Capillary refill takes less than 2 seconds.      Findings: No rash.   Neurological:      Mental Status: She is alert and oriented to person, place, and time.      Cranial Nerves: No cranial nerve deficit.      Deep Tendon Reflexes: Reflexes normal.   Psychiatric:         Behavior: Behavior normal.         Thought Content: Thought content normal.         Judgment: Judgment normal.         Assessment/Plan     Problem List Items Addressed This Visit        Nervous    Low back pain with radiation - Primary    Current Assessment & Plan     Get Xray  Refill meds  May need PT    Will follow          Relevant Orders    X-RAY LUMBAR SPINE 2 OR 3 VIEWS       Circulatory    Pulmonary emboli (CMS/HCC) (Chronic)    Overview     Overview:          Current Assessment & Plan     Stable  INR    Will follow         Relevant Orders    Protime-INR    Essential hypertension    Current Assessment & Plan     Stable  Continue to take all your blood pressure medications as instructed,  Stay well by eating a healthy diet with less salt and fatty foods. Maintain a healthy weight, and keep your regularly scheduled doctor appointments.            Relevant Orders    Lipid panel    Comprehensive metabolic panel    Paroxysmal atrial fibrillation (CMS/HCC)    Current Assessment & Plan     Stable  Diagnosed by CARDs  Denies s/s of palps. Dizziness or SOB           Relevant Orders    Protime-INR       Digestive    GERD without esophagitis    Current Assessment & Plan     Stable   Was on Protonix x 1 year  Feeling well, will trial off and see how she feels              Endocrine/Metabolic    HLD (hyperlipidemia)    Current Assessment & Plan     Stable  Meds  Diet and TLC    Lab Results   Component Value Date    CHOL 175 01/20/2020    CHOL 157 04/04/2019    CHOL 144 03/07/2019     Lab Results   Component Value Date    HDL 69 01/20/2020    HDL 56 04/04/2019    HDL 59 03/07/2019     Lab Results   Component Value Date    LDLCALC 90 01/20/2020    LDLCALC 90 04/04/2019    LDLCALC 74 03/07/2019     Lab Results   Component Value Date    TRIG 81 01/20/2020    TRIG 56 04/04/2019    TRIG 53 03/07/2019     No results found for: CHOLHDL              Hematologic    Iron deficiency anemia secondary to inadequate dietary iron intake       Other    Malaise and fatigue    Relevant Orders    CBC and differential    TSH          DIAGNOSIS  1. Low back pain with radiation    2. Other chronic pulmonary embolism without acute cor pulmonale (CMS/HCC)    3. Essential hypertension    4. Paroxysmal atrial fibrillation (CMS/HCC)    5. GERD without esophagitis    6.  Malaise and fatigue    7. Iron deficiency anemia secondary to inadequate dietary iron intake    8. Hyperlipidemia, unspecified hyperlipidemia type        No exam data present        Demarco Moreau DO  2/8/2021

## 2021-02-08 NOTE — ASSESSMENT & PLAN NOTE
Stable  Meds  Diet and TLC    Lab Results   Component Value Date    CHOL 175 01/20/2020    CHOL 157 04/04/2019    CHOL 144 03/07/2019     Lab Results   Component Value Date    HDL 69 01/20/2020    HDL 56 04/04/2019    HDL 59 03/07/2019     Lab Results   Component Value Date    LDLCALC 90 01/20/2020    LDLCALC 90 04/04/2019    LDLCALC 74 03/07/2019     Lab Results   Component Value Date    TRIG 81 01/20/2020    TRIG 56 04/04/2019    TRIG 53 03/07/2019     No results found for: CHOLHDL

## 2021-02-09 ENCOUNTER — TELEPHONE (OUTPATIENT)
Dept: PRIMARY CARE | Facility: CLINIC | Age: 66
End: 2021-02-09

## 2021-02-09 NOTE — TELEPHONE ENCOUNTER
Spoke with the pt and reviewed her xray results, pt expressed understanding.     Pt did express concern over her INR results, I informed her I would send a message over to you in regards to this matter for advisement.

## 2021-02-09 NOTE — TELEPHONE ENCOUNTER
----- Message from Demarco Moreau DO sent at 2/8/2021  6:50 PM EST -----  Slipping disc but no herniation so take the meds and hoes are she will start feeling better soon!

## 2021-02-10 NOTE — TELEPHONE ENCOUNTER
Spoke with pt and informed her of results. Pt stated she did not get rest of labs complete because she was not fasting- she is going back to the lab on Friday for the rest of the blood work. We discussed results and current regimen and the one you wrote was correct (8mg daily except T/TH 4mg). I advised pt to change to take 2 pills every day except Wednesday where she will take 1 pill and repeat in 1-2 weeks. Pt expressed understanding.

## 2021-02-10 NOTE — TELEPHONE ENCOUNTER
Yes We are still waitingfor her CBC diff? Not sure chaya is taking so long.  Her INR is on the low side-Confirm she is taking 2 (4mg) pills 9so 8 mg total) every day, except T/TH she only takes one pill, If that is her dosing, then she can take 2 pills every day except Wednesday take one pill, then repeat the INR in 1-2 weeks. Keep me posted thx

## 2021-02-15 ENCOUNTER — APPOINTMENT (OUTPATIENT)
Dept: LAB | Facility: CLINIC | Age: 66
End: 2021-02-15
Attending: FAMILY MEDICINE
Payer: MEDICARE

## 2021-02-15 ENCOUNTER — TELEPHONE (OUTPATIENT)
Dept: PRIMARY CARE | Facility: CLINIC | Age: 66
End: 2021-02-15

## 2021-02-15 DIAGNOSIS — I10 ESSENTIAL HYPERTENSION: ICD-10-CM

## 2021-02-15 DIAGNOSIS — R53.81 MALAISE AND FATIGUE: ICD-10-CM

## 2021-02-15 DIAGNOSIS — R53.83 MALAISE AND FATIGUE: ICD-10-CM

## 2021-02-15 DIAGNOSIS — I27.82 OTHER CHRONIC PULMONARY EMBOLISM WITHOUT ACUTE COR PULMONALE: Chronic | ICD-10-CM

## 2021-02-15 DIAGNOSIS — I48.0 PAROXYSMAL ATRIAL FIBRILLATION (CMS/HCC): ICD-10-CM

## 2021-02-15 LAB
ALBUMIN SERPL-MCNC: 3.8 G/DL (ref 3.4–5)
ALP SERPL-CCNC: 106 IU/L (ref 35–126)
ALT SERPL-CCNC: 28 IU/L (ref 11–54)
ANION GAP SERPL CALC-SCNC: 8 MEQ/L (ref 3–15)
AST SERPL-CCNC: 24 IU/L (ref 15–41)
BASOPHILS # BLD: 0.04 K/UL (ref 0.01–0.1)
BASOPHILS NFR BLD: 0.7 %
BILIRUB SERPL-MCNC: 1 MG/DL (ref 0.3–1.2)
BUN SERPL-MCNC: 17 MG/DL (ref 8–20)
CALCIUM SERPL-MCNC: 9.8 MG/DL (ref 8.9–10.3)
CHLORIDE SERPL-SCNC: 104 MEQ/L (ref 98–109)
CHOLEST SERPL-MCNC: 145 MG/DL
CO2 SERPL-SCNC: 29 MEQ/L (ref 22–32)
CREAT SERPL-MCNC: 0.8 MG/DL (ref 0.6–1.1)
DIFFERENTIAL METHOD BLD: ABNORMAL
EOSINOPHIL # BLD: 0.19 K/UL (ref 0.04–0.36)
EOSINOPHIL NFR BLD: 3.3 %
ERYTHROCYTE [DISTWIDTH] IN BLOOD BY AUTOMATED COUNT: 15.2 % (ref 11.7–14.4)
GFR SERPL CREATININE-BSD FRML MDRD: >60 ML/MIN/1.73M*2
GLUCOSE SERPL-MCNC: 98 MG/DL (ref 70–99)
HCT VFR BLDCO AUTO: 40.1 % (ref 35–45)
HDLC SERPL-MCNC: 63 MG/DL
HDLC SERPL: 2.3 {RATIO}
HGB BLD-MCNC: 12.7 G/DL (ref 11.8–15.7)
IMM GRANULOCYTES # BLD AUTO: 0.01 K/UL (ref 0–0.08)
IMM GRANULOCYTES NFR BLD AUTO: 0.2 %
INR PPP: 1.7
LDLC SERPL CALC-MCNC: 72 MG/DL
LYMPHOCYTES # BLD: 1.67 K/UL (ref 1.2–3.5)
LYMPHOCYTES NFR BLD: 29 %
MCH RBC QN AUTO: 30.6 PG (ref 28–33.2)
MCHC RBC AUTO-ENTMCNC: 31.7 G/DL (ref 32.2–35.5)
MCV RBC AUTO: 96.6 FL (ref 83–98)
MONOCYTES # BLD: 0.51 K/UL (ref 0.28–0.8)
MONOCYTES NFR BLD: 8.9 %
NEUTROPHILS # BLD: 3.34 K/UL (ref 1.7–7)
NEUTS SEG NFR BLD: 57.9 %
NONHDLC SERPL-MCNC: 82 MG/DL
NRBC BLD-RTO: 0 %
PDW BLD AUTO: 10.9 FL (ref 9.4–12.3)
PLATELET # BLD AUTO: 245 K/UL (ref 150–369)
POTASSIUM SERPL-SCNC: 4.7 MEQ/L (ref 3.6–5.1)
PROT SERPL-MCNC: 6.3 G/DL (ref 6–8.2)
PROTHROMBIN TIME: 20.4 SEC (ref 12.2–14.5)
RBC # BLD AUTO: 4.15 M/UL (ref 3.93–5.22)
SODIUM SERPL-SCNC: 141 MEQ/L (ref 136–144)
TRIGL SERPL-MCNC: 48 MG/DL (ref 30–149)
TSH SERPL DL<=0.05 MIU/L-ACNC: 2.39 MIU/L (ref 0.34–5.6)
WBC # BLD AUTO: 5.76 K/UL (ref 3.8–10.5)

## 2021-02-15 PROCEDURE — 36415 COLL VENOUS BLD VENIPUNCTURE: CPT

## 2021-02-15 PROCEDURE — 84443 ASSAY THYROID STIM HORMONE: CPT

## 2021-02-15 PROCEDURE — 85025 COMPLETE CBC W/AUTO DIFF WBC: CPT

## 2021-02-15 PROCEDURE — 85610 PROTHROMBIN TIME: CPT

## 2021-02-15 PROCEDURE — 80053 COMPREHEN METABOLIC PANEL: CPT

## 2021-02-15 PROCEDURE — 82465 ASSAY BLD/SERUM CHOLESTEROL: CPT

## 2021-02-15 NOTE — TELEPHONE ENCOUNTER
Spoke with pt who just had her INR drawn last week. Pt changed her dose to 8mg every day except Wednesday is 4mg. Pt was to repeat labs in 1-2 weeks after that dose change. She went to lab today for different blood work and the pulled the INR order and judi it early. I spoke with Dr. Moreau who confirmed no dose change at this time and pt will repeat INR next week. Pt expressed understanding.

## 2021-02-15 NOTE — TELEPHONE ENCOUNTER
----- Message from Demarco Moreau, DO sent at 2/15/2021  3:32 PM EST -----  Gail check and confirm her dose of coumadin, as I will need to increase it and then you can put in a INR for 2 weeks

## 2021-02-17 ENCOUNTER — TELEPHONE (OUTPATIENT)
Dept: PRIMARY CARE | Facility: CLINIC | Age: 66
End: 2021-02-17

## 2021-02-17 NOTE — TELEPHONE ENCOUNTER
----- Message from Demarco Moreau DO sent at 2/16/2021  4:53 PM EST -----  Other Labs are all stable-GREAT!

## 2021-03-01 RX ORDER — ATORVASTATIN CALCIUM 40 MG/1
TABLET, FILM COATED ORAL
Qty: 90 TABLET | Refills: 0 | Status: SHIPPED | OUTPATIENT
Start: 2021-03-01 | End: 2021-06-10

## 2021-03-01 NOTE — TELEPHONE ENCOUNTER
Medicine Refill Request    Last Office Visit: 2/8/2021  Last Telemedicine Visit: Visit date not found    Next Office Visit: 5/17/2021  Next Telemedicine Visit: Visit date not found         Current Outpatient Medications:   •  atorvastatin (LIPITOR) 40 mg tablet, Take 1 tablet (40 mg total) by mouth once daily., Disp: 90 tablet, Rfl: 0  •  calcium carbonate-vitamin D3 500 mg(1,250mg) -200 unit per tablet, Take 2 tablets by mouth 2 (two) times a day with meals.  , Disp: , Rfl:   •  clobetasol (TEMOVATE) 0.05 % cream, , Disp: , Rfl:   •  cyclobenzaprine (FLEXERIL) 5 mg tablet, Take 1 tablet (5 mg total) by mouth 3 (three) times a day as needed for muscle spasms., Disp: 50 tablet, Rfl: 3  •  diltiazem (TIAZAC) 120 mg 24 hr capsule, Take 1 capsule (120 mg total) by mouth once daily., Disp: 90 capsule, Rfl: 3  •  fluticasone propionate (FLONASE) 50 mcg/actuation nasal spray, Administer 1 spray into each nostril daily as needed for rhinitis. Make an appointment if help is needed for nasal symptoms., Disp: 1 Bottle, Rfl: 3  •  ketoconazole (NIZORAL) 2 % cream, APPLY ON THE SKIN TWICE A DAY APPLY TO AFFECTED AREA RASH IN GROIN/ABDOMEN TWICE A DAY FOR 3 WEEKS, Disp: , Rfl:   •  pantoprazole (PROTONIX) 40 mg EC tablet, Take 1 tablet (40 mg total) by mouth daily Indications: stress ulcer prevention., Disp: 90 tablet, Rfl: 0  •  traMADoL (ULTRAM) 50 mg tablet, Take 1 tablet (50 mg total) by mouth every 8 (eight) hours as needed for moderate pain or severe pain., Disp: 50 tablet, Rfl: 1  •  warfarin (COUMADIN) 4 mg tablet, TAKE 2 TABLET BY MOUTH DAILY (8 MG DAILY) EXCEPT T/TH TAKE ONE (4MG), Disp: 144 tablet, Rfl: 2      BP Readings from Last 3 Encounters:   02/08/21 120/84   11/09/20 118/64   03/04/20 120/72       Recent Lab results:  Lab Results   Component Value Date    CHOL 145 02/15/2021   ,   Lab Results   Component Value Date    HDL 63 02/15/2021   ,   Lab Results   Component Value Date    LDLCALC 72 02/15/2021   ,   Lab  Results   Component Value Date    TRIG 48 02/15/2021        Lab Results   Component Value Date    GLUCOSE 98 02/15/2021   ,   Lab Results   Component Value Date    HGBA1C 6.3 (H) 01/20/2020         Lab Results   Component Value Date    CREATININE 0.8 02/15/2021       Lab Results   Component Value Date    TSH 2.39 02/15/2021

## 2021-03-03 ENCOUNTER — DOCUMENTATION (OUTPATIENT)
Dept: BARIATRICS | Facility: CLINIC | Age: 66
End: 2021-03-03

## 2021-03-03 ENCOUNTER — DOCUMENTATION (OUTPATIENT)
Dept: SURGERY | Facility: CLINIC | Age: 66
End: 2021-03-03

## 2021-03-03 VITALS — HEIGHT: 62 IN | BODY MASS INDEX: 34.04 KG/M2 | WEIGHT: 185 LBS

## 2021-03-04 ENCOUNTER — TELEMEDICINE (OUTPATIENT)
Dept: BARIATRICS | Facility: CLINIC | Age: 66
End: 2021-03-04
Payer: MEDICARE

## 2021-03-04 VITALS — WEIGHT: 185 LBS | HEIGHT: 62 IN | BODY MASS INDEX: 34.04 KG/M2

## 2021-03-04 DIAGNOSIS — Z98.84 STATUS POST BARIATRIC SURGERY: Primary | ICD-10-CM

## 2021-03-04 DIAGNOSIS — E55.9 VITAMIN D DEFICIENCY: ICD-10-CM

## 2021-03-04 DIAGNOSIS — Z13.21 ENCOUNTER FOR VITAMIN DEFICIENCY SCREENING: ICD-10-CM

## 2021-03-04 DIAGNOSIS — I10 ESSENTIAL HYPERTENSION: ICD-10-CM

## 2021-03-04 DIAGNOSIS — Z13.228 ENCOUNTER FOR SCREENING FOR METABOLIC DISORDER: ICD-10-CM

## 2021-03-04 DIAGNOSIS — Z13.21 ENCOUNTER FOR SCREENING FOR NUTRITIONAL DISORDER: ICD-10-CM

## 2021-03-04 DIAGNOSIS — D50.8 IRON DEFICIENCY ANEMIA SECONDARY TO INADEQUATE DIETARY IRON INTAKE: ICD-10-CM

## 2021-03-04 DIAGNOSIS — K91.2 POSTSURGICAL MALABSORPTION: ICD-10-CM

## 2021-03-04 PROCEDURE — 99212 OFFICE O/P EST SF 10 MIN: CPT | Mod: 95 | Performed by: NURSE PRACTITIONER

## 2021-03-04 ASSESSMENT — ENCOUNTER SYMPTOMS
GASTROINTESTINAL NEGATIVE: 1
PSYCHIATRIC NEGATIVE: 1
NEUROLOGICAL NEGATIVE: 1
CONSTITUTIONAL NEGATIVE: 1

## 2021-03-04 NOTE — Clinical Note
I think she's going to call to set up dietitian and exercise herself - but can you keep her on your radar to contact tomorrow if you don't hear anything?

## 2021-03-04 NOTE — PROGRESS NOTES
Verification of Patient Location:  The patient affirms they are currently located in the following state: [place]    Request for Consent:   Video Encounter   Emily, my name is CHON Nagy.  Before we proceed, can you please verify your identification by telling me your full name and date of birth?  Can you tell me who is in the room with you?    You and I are about to have a telemedicine check-in or visit because you have requested it.  This is a live video-conference.  I am a real person, speaking to you in real time.  There is no one else with me on the video-conference.  However, when we use ([employer], [employer], etc) it is important for you to know that the video-conference may not be secure or private.  I am not recording this conversation and I am asking you not to record it.  This telemedicine visit will be billed to your health insurance or you, if you are self-insured.  You understand you will be responsible for any copayments or coinsurances that apply to your telemedicine visit.  Communication platform used for this encounter:  Integrated Zoom via [employer] Video Visit     Before starting our telemedicine visit, I am required to get your consent for this virtual check-in or visit by telemedicine. Do you consent?      Patient Response to Request for Consent:  Yes      Visit Documentation:      Patient ID: Jennifer Aly                              : 1955  MRN: [id number]                                            Visit Date: 3/4/2021  Encounter Provider: Renee Ortiz  Referring Provider: No ref. provider found      HPI:  Jennifer Aly is a 65 y.o. female who presents in the office today for their 4 year follow-up visit.   She is s/p Lap Alexander-en-Y gastric bypass, performed by Dr. Osman on  2017 at [facility].  The patient's postoperative course has not been complicated.  She is tolerating a bariatric diet. Activity is progressing at the recommended level.    She has resolved  the following co-morbidities since having bariatric surgery, none.     She needs lab work March 2021.  She had a GI bleed last year, January 2020. She had been on prednisone.   She put on some weight during summer but has since lost 25 lbs.   Her goal weight is 160-165 lbs.   I recommended she follow up with dietitian and exercise to help her get down to her goal weight.   We discussed tracking her food on the [employer] el[employer].        Past Medical History: She  has a past medical history of Arthritis, DMII (diabetes mellitus, type 2) (CMS/[facility]), Fistula, Gout, HLD (hyperlipidemia), IFG (impaired fasting glucose), Obesity, Polyarthritis, Pulmonary emboli (CMS/[facility]), and Torn rotator cuff..  Past Surgical History: She  has a past surgical history that includes Rotator cuff repair (Left, 01/2016); rectourethral fistula repair (2008, 5/2017); Total knee arthroplasty (Bilateral, 2010); Trigger finger release (Right, 2012); Cardiac catheterization (11/2012); IVC filter retrieval (2012); Total knee arthroplasty (Right, 2012); IVC filter retrieval (02/2013); Ulnar nerve repair (2013); Appendectomy (2008); Total abdominal hysterectomy (1987); Alexander-en-y procedure (02/07/2017); and Intracapsular cataract extraction (Bilateral, 08/2019)..  Medication: She has a current medication list which includes the following prescription(s): atorvastatin, calcium carbonate-vitamin d3, clobetasol, cyclobenzaprine, diltiazem, fluticasone propionate, ketoconazole, pantoprazole, tramadol, and warfarin..  Allergies: She is allergic to sulfa (sulfonamide antibiotics)..    Nutrition:   Jennifer ACOSTA's food preferences include healthy food choices.  She is taking their vitamin supplements  She is taking their protein supplements  She is following a proper nutritional diet, high in protein and low in fat, 3 meals a day plus 2 snacks.    Exercise:  Jennifer ACOSTA reports they exercise: regularly as directed .    Social History:   Social History     Tobacco Use  "  Smoking Status Former Smoker   • Years: 15.00   • Quit date:    • Years since quittin.1   Smokeless Tobacco Never Used     Social History     Substance and Sexual Activity   Alcohol Use Yes    Comment: occasional wine       Review of Systems  Review of Systems   Constitutional: Negative.    Gastrointestinal: Negative.    Skin: Negative.    Neurological: Negative.    Psychiatric/Behavioral: Negative.        Vitals:  Her  height is 1.575 m (5' 2\") and weight is 83.9 kg (185 lb).   Weight trend:   Wt Readings from Last 5 Encounters:   21 83.9 kg (185 lb)   21 83.9 kg (185 lb)   20 88.5 kg (195 lb)   20 89.5 kg (197 lb 6.4 oz)   02/10/20 87.5 kg (193 lb)    body mass index is 33.84 kg/m².    Physical Exam deferred due to telemedicine visit due to COVID19 pandemic.      Patient Active Problem List   Diagnosis   • Diaphragmatic hernia without obstruction or gangrene   • Morbid obesity due to excess calories (CMS/HCC)   • Obstructive sleep apnea   • Polyosteoarthritis, unspecified   • Pulmonary emboli (CMS/HCC)   • Unspecified asthma, uncomplicated   • Pure hypercholesterolemia   • Acute gout involving toe of left foot   • IFG (impaired fasting glucose)   • Essential hypertension   • Paroxysmal atrial fibrillation (CMS/HCC)   • Coronary artery disease involving native coronary artery of native heart without angina pectoris   • Iron deficiency anemia secondary to inadequate dietary iron intake   • GERD without esophagitis   • Chronic UTI   • Seasonal allergies   • Bilateral impacted cerumen   • Pulmonary nodule   • History of colonic polyps   • Age-related nuclear cataract of left eye   • HLD (hyperlipidemia)   • GI bleed   • Anemia   • History of pulmonary embolus (PE)   • Melena   • Malaise and fatigue   • Psoriasis   • Low back pain with radiation       Assessment/Plan     We discussed the schedule for milestone visits.  Patients are strongly advised to have regular blood work, " dietitian and exercise physiology follow-up around the time of these visits. They have been instructed to take  g of protein, vitamins as listed by our program, additional supplements recommended based on lab results if needed, and at least 64 oz of fluid daily. They understand it is vitally important to follow these recommendations to maintain their health and wellness even after they have reached their goal.    We have discussed the importance of tracking her food intake and exercise either online, in a mobile application on a smartphone device or on paper. We have discussed the importance of regular aerobic exercise at least 30 minutes per day, 5 days a week including achieving and measuring their target heart rate.     We discussed avoiding aspirin, NSAID medications, both prescription and over the counter products, as these medications can cause ulcers and bleeding.  Their use should be only under the direction of the patient's own medical specialist.      She is very compliant with our program recommendations and was congratulated on their efforts and hard work.  Jennifer ACOSTA will need dietician and exercise physiologist follow-up in 1 year .       Orders Placed This Encounter   Procedures   • Ferritin     Standing Status:   Future     Standing Expiration Date:   3/4/2022     Order Specific Question:   Release to patient     Answer:   Immediate   • Folate     Standing Status:   Future     Standing Expiration Date:   3/4/2022     Order Specific Question:   Release to patient     Answer:   Immediate   • Iron and TIBC     Standing Status:   Future     Standing Expiration Date:   3/4/2022     Order Specific Question:   Release to patient     Answer:   Immediate   • Prealbumin     Standing Status:   Future     Standing Expiration Date:   3/4/2022     Order Specific Question:   Release to patient     Answer:   Immediate   • PTH, intact     Standing Status:   Future     Standing Expiration Date:   3/4/2022     Order  Specific Question:   Release to patient     Answer:   Immediate   • Vitamin A     Standing Status:   Future     Standing Expiration Date:   3/4/2022     Order Specific Question:   Release to patient     Answer:   Immediate   • Vitamin B1, whole blood ( thiamine)     Standing Status:   Future     Standing Expiration Date:   3/4/2022     Order Specific Question:   Release to patient     Answer:   Immediate   • Vitamin B12     Standing Status:   Future     Standing Expiration Date:   3/4/2022     Order Specific Question:   Release to patient     Answer:   Immediate   • Vitamin D, 25- hydroxy     Standing Status:   Future     Standing Expiration Date:   3/4/2022     Order Specific Question:   Release to patient     Answer:   Immediate   • Ambulatory referral to Nutrition Services     Standing Status:   Future     Standing Expiration Date:   9/4/2021     Referral Priority:   Routine     Referral Type:   Consultation     Referral Reason:   Specialty Services Required     Requested Specialty:   Nutrition     Number of Visits Requested:   1   • Ambulatory Referral to Cardiac Rehab (Exercise)     Scheduling Instructions:      **EMERGENCY ORDERS**      -  Use ACLS protocols in case of emergency      -  Activate and follow Code Blue guidelines      -  Nitroglycerin GR 1/150 SL.PRN-chest pain      - Follow approved emergency treatment protocols      - Call Emergency Response Team, or 911 if ambulatory, if patient is unstable      - Implement diabetic protocol for monitoring safe blood sugar levels     Order Specific Question:   Enter patient into the Outpatient Cardiac Rehab Service     Answer:   Yes     Order Specific Question:   Nurse will complete initial interview, medical record review and cardiovascular assessment     Answer:   Yes     Order Specific Question:   Perform 6 minute walk test as part of pre and post cardiac rehab to evaluate functional status     Answer:   Yes     Return in about 1 year (around  3/4/2022).    I spent 11 minutes on this date of service performing the following activities: entering orders, documenting, preparing for visit, providing counseling and education and coordinating care.      CHON Nagy 3/4/2021

## 2021-03-30 RX ORDER — PANTOPRAZOLE SODIUM 40 MG/1
40 TABLET, DELAYED RELEASE ORAL DAILY
Qty: 90 TABLET | Refills: 0 | Status: SHIPPED | OUTPATIENT
Start: 2021-03-30 | End: 2021-06-22 | Stop reason: ALTCHOICE

## 2021-03-30 NOTE — TELEPHONE ENCOUNTER
Medicine Refill Request    Last Office Visit: 2/8/2021  Last Telemedicine Visit: Visit date not found    Next Office Visit: 5/17/2021  Next Telemedicine Visit: Visit date not found         Current Outpatient Medications:   •  atorvastatin (LIPITOR) 40 mg tablet, TAKE 1 TABLET BY MOUTH EVERY DAY, Disp: 90 tablet, Rfl: 0  •  calcium carbonate-vitamin D3 500 mg(1,250mg) -200 unit per tablet, Take 2 tablets by mouth 2 (two) times a day with meals.  , Disp: , Rfl:   •  clobetasol (TEMOVATE) 0.05 % cream, , Disp: , Rfl:   •  cyclobenzaprine (FLEXERIL) 5 mg tablet, Take 1 tablet (5 mg total) by mouth 3 (three) times a day as needed for muscle spasms., Disp: 50 tablet, Rfl: 3  •  diltiazem (TIAZAC) 120 mg 24 hr capsule, Take 1 capsule (120 mg total) by mouth once daily., Disp: 90 capsule, Rfl: 3  •  fluticasone propionate (FLONASE) 50 mcg/actuation nasal spray, Administer 1 spray into each nostril daily as needed for rhinitis. Make an appointment if help is needed for nasal symptoms., Disp: 1 Bottle, Rfl: 3  •  ketoconazole (NIZORAL) 2 % cream, APPLY ON THE SKIN TWICE A DAY APPLY TO AFFECTED AREA RASH IN GROIN/ABDOMEN TWICE A DAY FOR 3 WEEKS, Disp: , Rfl:   •  pantoprazole (PROTONIX) 40 mg EC tablet, Take 1 tablet (40 mg total) by mouth daily Indications: stress ulcer prevention., Disp: 90 tablet, Rfl: 0  •  traMADoL (ULTRAM) 50 mg tablet, Take 1 tablet (50 mg total) by mouth every 8 (eight) hours as needed for moderate pain or severe pain., Disp: 50 tablet, Rfl: 1  •  warfarin (COUMADIN) 4 mg tablet, TAKE 2 TABLET BY MOUTH DAILY (8 MG DAILY) EXCEPT T/TH TAKE ONE (4MG), Disp: 144 tablet, Rfl: 2      BP Readings from Last 3 Encounters:   02/08/21 120/84   11/09/20 118/64   03/04/20 120/72       Recent Lab results:  Lab Results   Component Value Date    CHOL 145 02/15/2021   ,   Lab Results   Component Value Date    HDL 63 02/15/2021   ,   Lab Results   Component Value Date    LDLCALC 72 02/15/2021   ,   Lab Results    Component Value Date    TRIG 48 02/15/2021        Lab Results   Component Value Date    GLUCOSE 98 02/15/2021   ,   Lab Results   Component Value Date    HGBA1C 6.3 (H) 01/20/2020         Lab Results   Component Value Date    CREATININE 0.8 02/15/2021       Lab Results   Component Value Date    TSH 2.39 02/15/2021

## 2021-06-08 NOTE — TELEPHONE ENCOUNTER
Medicine Refill Request    Last Office Visit: 2/8/2021  Last Telemedicine Visit: Visit date not found    Next Office Visit: 9/16/2021  Next Telemedicine Visit: Visit date not found         Current Outpatient Medications:   •  atorvastatin (LIPITOR) 40 mg tablet, TAKE 1 TABLET BY MOUTH EVERY DAY, Disp: 90 tablet, Rfl: 0  •  calcium carbonate-vitamin D3 500 mg(1,250mg) -200 unit per tablet, Take 2 tablets by mouth 2 (two) times a day with meals.  , Disp: , Rfl:   •  clobetasol (TEMOVATE) 0.05 % cream, , Disp: , Rfl:   •  cyclobenzaprine (FLEXERIL) 5 mg tablet, Take 1 tablet (5 mg total) by mouth 3 (three) times a day as needed for muscle spasms., Disp: 50 tablet, Rfl: 3  •  diltiazem (TIAZAC) 120 mg 24 hr capsule, Take 1 capsule (120 mg total) by mouth once daily., Disp: 90 capsule, Rfl: 3  •  fluticasone propionate (FLONASE) 50 mcg/actuation nasal spray, Administer 1 spray into each nostril daily as needed for rhinitis. Make an appointment if help is needed for nasal symptoms., Disp: 1 Bottle, Rfl: 3  •  ketoconazole (NIZORAL) 2 % cream, APPLY ON THE SKIN TWICE A DAY APPLY TO AFFECTED AREA RASH IN GROIN/ABDOMEN TWICE A DAY FOR 3 WEEKS, Disp: , Rfl:   •  pantoprazole (PROTONIX) 40 mg EC tablet, TAKE 1 TABLET (40 MG TOTAL) BY MOUTH DAILY INDICATIONS: STRESS ULCER PREVENTION., Disp: 90 tablet, Rfl: 0  •  traMADoL (ULTRAM) 50 mg tablet, Take 1 tablet (50 mg total) by mouth every 8 (eight) hours as needed for moderate pain or severe pain., Disp: 50 tablet, Rfl: 1  •  warfarin (COUMADIN) 4 mg tablet, TAKE 2 TABLET BY MOUTH DAILY (8 MG DAILY) EXCEPT T/TH TAKE ONE (4MG), Disp: 144 tablet, Rfl: 2      BP Readings from Last 3 Encounters:   02/08/21 120/84   11/09/20 118/64   03/04/20 120/72       Recent Lab results:  Lab Results   Component Value Date    CHOL 145 02/15/2021   ,   Lab Results   Component Value Date    HDL 63 02/15/2021   ,   Lab Results   Component Value Date    LDLCALC 72 02/15/2021   ,   Lab Results    Component Value Date    TRIG 48 02/15/2021        Lab Results   Component Value Date    GLUCOSE 98 02/15/2021   ,   Lab Results   Component Value Date    HGBA1C 6.3 (H) 01/20/2020         Lab Results   Component Value Date    CREATININE 0.8 02/15/2021       Lab Results   Component Value Date    TSH 2.39 02/15/2021

## 2021-06-10 RX ORDER — ATORVASTATIN CALCIUM 40 MG/1
TABLET, FILM COATED ORAL
Qty: 90 TABLET | Refills: 1 | Status: SHIPPED | OUTPATIENT
Start: 2021-06-10 | End: 2021-07-15 | Stop reason: SDUPTHER

## 2021-06-22 ENCOUNTER — OFFICE VISIT (OUTPATIENT)
Dept: OTOLARYNGOLOGY | Facility: CLINIC | Age: 66
End: 2021-06-22
Payer: MEDICARE

## 2021-06-22 ENCOUNTER — PROCEDURE VISIT (OUTPATIENT)
Dept: OTOLARYNGOLOGY | Facility: CLINIC | Age: 66
End: 2021-06-22
Payer: MEDICARE

## 2021-06-22 ENCOUNTER — APPOINTMENT (OUTPATIENT)
Dept: LAB | Facility: CLINIC | Age: 66
End: 2021-06-22
Attending: FAMILY MEDICINE
Payer: MEDICARE

## 2021-06-22 VITALS
SYSTOLIC BLOOD PRESSURE: 120 MMHG | WEIGHT: 185 LBS | DIASTOLIC BLOOD PRESSURE: 78 MMHG | BODY MASS INDEX: 34.04 KG/M2 | HEIGHT: 62 IN | HEART RATE: 98 BPM | OXYGEN SATURATION: 98 %

## 2021-06-22 DIAGNOSIS — H61.23 BILATERAL IMPACTED CERUMEN: ICD-10-CM

## 2021-06-22 DIAGNOSIS — J30.1 SEASONAL ALLERGIC RHINITIS DUE TO POLLEN: ICD-10-CM

## 2021-06-22 DIAGNOSIS — G47.33 OBSTRUCTIVE SLEEP APNEA: ICD-10-CM

## 2021-06-22 DIAGNOSIS — I48.0 PAROXYSMAL ATRIAL FIBRILLATION (CMS/HCC): ICD-10-CM

## 2021-06-22 DIAGNOSIS — H90.3 SENSORINEURAL HEARING LOSS (SNHL) OF BOTH EARS: Primary | ICD-10-CM

## 2021-06-22 LAB
INR PPP: 2.5
PROTHROMBIN TIME: 25.5 SEC (ref 12.2–14.5)

## 2021-06-22 PROCEDURE — G8754 DIAS BP LESS 90: HCPCS | Performed by: OTOLARYNGOLOGY

## 2021-06-22 PROCEDURE — G8752 SYS BP LESS 140: HCPCS | Performed by: OTOLARYNGOLOGY

## 2021-06-22 PROCEDURE — 92567 TYMPANOMETRY: CPT | Performed by: AUDIOLOGIST-HEARING AID FITTER

## 2021-06-22 PROCEDURE — 92557 COMPREHENSIVE HEARING TEST: CPT | Performed by: AUDIOLOGIST-HEARING AID FITTER

## 2021-06-22 PROCEDURE — 99999 PR OFFICE/OUTPT VISIT,PROCEDURE ONLY: CPT | Performed by: AUDIOLOGIST-HEARING AID FITTER

## 2021-06-22 PROCEDURE — 99213 OFFICE O/P EST LOW 20 MIN: CPT | Mod: 25 | Performed by: OTOLARYNGOLOGY

## 2021-06-22 PROCEDURE — 85610 PROTHROMBIN TIME: CPT

## 2021-06-22 PROCEDURE — 36415 COLL VENOUS BLD VENIPUNCTURE: CPT

## 2021-06-22 ASSESSMENT — ENCOUNTER SYMPTOMS
SHORTNESS OF BREATH: 0
TROUBLE SWALLOWING: 0
FREQUENCY: 0
POLYPHAGIA: 0
FEVER: 0
ARTHRALGIAS: 0
FATIGUE: 0
CONSTIPATION: 0
NERVOUS/ANXIOUS: 0
NAUSEA: 0
SINUS PRESSURE: 0
WHEEZING: 0
RHINORRHEA: 0
DYSPHORIC MOOD: 0
HEADACHES: 0
BACK PAIN: 0
ADENOPATHY: 0
WEAKNESS: 0
VOICE CHANGE: 0
PALPITATIONS: 0
SLEEP DISTURBANCE: 1
MYALGIAS: 0
DIARRHEA: 0
VOMITING: 0
DIZZINESS: 0
COUGH: 0

## 2021-06-22 NOTE — PROGRESS NOTES
"    [facility]  [address], [address]  [place] [address]  Phone: [phone]  Fax: [phone]      Patient ID: Jennifer Aly                              : 1955    Visit Date: 2021  Referring Provider: Demarco Moreau DO    Chief Complaint: Cerumen Impaction and hearing loss on the right      Jennifer Aly returned to the [place] office today in regard to fullness in the right ear which occurs every night and is associated with diminished hearing.    I last saw Jennifer over 2 years ago.  At that time, we had removed cerumen and treated her for allergies.  Prior to that visit, she had undergone an audiogram in early  which revealed high-frequency sensorineural hearing loss.  She returns now because her ears feel blocked with cerumen and she is also concerned about further hearing loss possibly on the right.  The right ear seems to \"get filled with fluid overnight and the hearing drops\" and then when she is upright and awake the next morning, the ear seems to open up a bit.  She has not had any itchiness in the ears.  She has not had a flareup of allergies nor any respiratory illness.  She uses Flonase daily for mild allergic rhinitis.  Her sense of smell remains diminished.    Jennifer also has obstructive sleep apnea and uses CPAP.  Apparently, she really struggles with the CPAP fit and has not done well with it at all.  She was just seen by another otolaryngologist at [facility] and has made a plan to be fitted with the Inspire pacemaker device for the tongue base.    Review of Systems   Constitutional: Negative for fatigue and fever.   HENT: Positive for congestion and hearing loss. Negative for nosebleeds, postnasal drip, rhinorrhea, sinus pressure, tinnitus, trouble swallowing and voice change.    Eyes: Negative for visual disturbance.   Respiratory: Negative for cough, shortness of breath and wheezing.    Cardiovascular: Negative for chest pain and palpitations. " "  Gastrointestinal: Negative for constipation, diarrhea, nausea and vomiting.   Endocrine: Negative for polyphagia and polyuria.   Genitourinary: Negative for frequency.   Musculoskeletal: Negative for arthralgias, back pain, gait problem and myalgias.   Skin: Negative for rash.   Allergic/Immunologic: Negative for environmental allergies.   Neurological: Negative for dizziness, weakness and headaches.   Hematological: Negative for adenopathy.   Psychiatric/Behavioral: Positive for sleep disturbance. Negative for dysphoric mood. The patient is not nervous/anxious.        Current Medications: Atorvastatin, calcium carbonate-vitamin d3, clobetasol, cyclobenzaprine, diltiazem, fluticasone propionate, ketoconazole, tramadol, and warfarin.    Physical Exam:  Vitals:   Visit Vitals  /78   Pulse 98   Ht 1.575 m (5' 2\")   Wt 83.9 kg (185 lb)   SpO2 98%   BMI 33.84 kg/m²     General:  Well-developed, well-nourished 65 y.o. who is in no acute distress.  Voice: Normal without hoarseness, breathiness or stridor.  Head/face:  No scars or lesions.  No parotid masses. No presinus tenderness. Seventh cranial nerves intact.  Eyes:  Extraocular movements and gaze alignment normal.  Ears: Auricles normal.  Complete cerumen impactions meticulously removed from the external auditory canals using curette under binocular magnification.  Tympanic membranes clear, mobile and without retraction or scar.  Nose:  Dorsum straight.  Septum sinusoidal with partial obstruction.  Turbinates purple and edematous but normal in size and orientation.  Dry patches on anterior septum.  No pus, polyps or crusts.  Oral Cavity/oropharynx: Normal tongue thrust. Normal gag reflex. No masses, leukoplakia, erythroplakia, ulcers or other abnormalities at the tongue, floor of mouth, buccal mucosa, palate or posterior pharyngeal wall.  Posterior pharynx is crowded.  Larynx/hypopharynx: Examination deferred due to the pandemic.  Neck:  No masses, adenopathy, " cervical spasm or thyroid enlargement.  Cranial Nerves II through XII: Grossly intact.  Mental status: Awake, alert and oriented ×3.  No anxiety or depression.    Audiogram: Mild sensorineural hearing loss above 6000 Hz bilaterally.  Discrimination scores: 100% of 50 dB bilaterally.    Tympanogram: Normal bilaterally.      Impression:  1.  Bilateral high-frequency sensorineural hearing loss which is very mild and does not require treatment.  2.  Bilateral cerumen impactions, removed with symptom relief.  3.  Allergic rhinitis/rhinitis sicca.    Recommendations and Plan:  1.  You have very mild hearing loss for which hearing aids are not yet needed.  2.  Olive oil, 2 drops to each ear 1 night per week.  Put a cotton ball in the ear and go to sleep after applying the oil.  3.  Simply Saline, 2 squirts to each nostril twice daily.  4.  Flonase Sensimist, 1 squirt to each nostril after the saline in the morning.  5.  Follow-up in 1 year or sooner as needed.        Tita Man MD

## 2021-06-22 NOTE — PATIENT INSTRUCTIONS
1.  You have very mild hearing loss for which hearing aids are not yet needed.  2.  Olive oil, 2 drops to each ear 1 night per week.  Put a cotton ball in the ear and go to sleep after applying the oil.  3.  Simply Saline, 2 squirts to each nostril twice daily.  4.  Flonase Sensimist, 1 squirt to each nostril after the saline in the morning.  5.  Follow-up in 1 year or sooner as needed.

## 2021-06-23 ENCOUNTER — TELEPHONE (OUTPATIENT)
Dept: PRIMARY CARE | Facility: CLINIC | Age: 66
End: 2021-06-23

## 2021-06-23 NOTE — TELEPHONE ENCOUNTER
Left pt a detailed message with results and recommendations. Advised pt to call with any questions.

## 2021-06-23 NOTE — TELEPHONE ENCOUNTER
----- Message from Demarco Moreau DO sent at 6/22/2021  4:41 PM EDT -----  INR stable- GOOD, same dose, repeat 1 mo

## 2021-07-15 ENCOUNTER — TELEPHONE (OUTPATIENT)
Dept: PRIMARY CARE | Facility: CLINIC | Age: 66
End: 2021-07-15

## 2021-07-15 ENCOUNTER — HOSPITAL ENCOUNTER (OUTPATIENT)
Dept: RADIOLOGY | Facility: CLINIC | Age: 66
Discharge: HOME | End: 2021-07-15
Attending: FAMILY MEDICINE
Payer: MEDICARE

## 2021-07-15 ENCOUNTER — OFFICE VISIT (OUTPATIENT)
Dept: PRIMARY CARE | Facility: CLINIC | Age: 66
End: 2021-07-15
Payer: MEDICARE

## 2021-07-15 VITALS
OXYGEN SATURATION: 97 % | HEIGHT: 62 IN | DIASTOLIC BLOOD PRESSURE: 82 MMHG | HEART RATE: 65 BPM | RESPIRATION RATE: 16 BRPM | WEIGHT: 187.2 LBS | SYSTOLIC BLOOD PRESSURE: 132 MMHG | BODY MASS INDEX: 34.45 KG/M2

## 2021-07-15 DIAGNOSIS — K21.9 GERD WITHOUT ESOPHAGITIS: ICD-10-CM

## 2021-07-15 DIAGNOSIS — S16.1XXA CERVICAL STRAIN, ACUTE, INITIAL ENCOUNTER: ICD-10-CM

## 2021-07-15 DIAGNOSIS — E66.01 MORBID OBESITY DUE TO EXCESS CALORIES (CMS/HCC): Chronic | ICD-10-CM

## 2021-07-15 DIAGNOSIS — R73.01 IFG (IMPAIRED FASTING GLUCOSE): ICD-10-CM

## 2021-07-15 DIAGNOSIS — Z11.59 ENCOUNTER FOR HEPATITIS C SCREENING TEST FOR LOW RISK PATIENT: ICD-10-CM

## 2021-07-15 DIAGNOSIS — R53.83 MALAISE AND FATIGUE: ICD-10-CM

## 2021-07-15 DIAGNOSIS — S16.1XXA CERVICAL STRAIN, ACUTE, INITIAL ENCOUNTER: Primary | ICD-10-CM

## 2021-07-15 DIAGNOSIS — E78.00 PURE HYPERCHOLESTEROLEMIA: Chronic | ICD-10-CM

## 2021-07-15 DIAGNOSIS — I48.0 PAROXYSMAL ATRIAL FIBRILLATION (CMS/HCC): ICD-10-CM

## 2021-07-15 DIAGNOSIS — I10 ESSENTIAL HYPERTENSION: ICD-10-CM

## 2021-07-15 DIAGNOSIS — E46 PROTEIN-CALORIE MALNUTRITION, UNSPECIFIED SEVERITY (CMS/HCC): ICD-10-CM

## 2021-07-15 DIAGNOSIS — R53.81 MALAISE AND FATIGUE: ICD-10-CM

## 2021-07-15 DIAGNOSIS — E11.618 TYPE 2 DIABETES MELLITUS WITH OTHER DIABETIC ARTHROPATHY, WITHOUT LONG-TERM CURRENT USE OF INSULIN: ICD-10-CM

## 2021-07-15 PROBLEM — E11.9 DIABETES MELLITUS (CMS/HCC): Status: ACTIVE | Noted: 2021-07-15

## 2021-07-15 PROCEDURE — G8754 DIAS BP LESS 90: HCPCS | Performed by: FAMILY MEDICINE

## 2021-07-15 PROCEDURE — 99214 OFFICE O/P EST MOD 30 MIN: CPT | Performed by: FAMILY MEDICINE

## 2021-07-15 PROCEDURE — G8752 SYS BP LESS 140: HCPCS | Performed by: FAMILY MEDICINE

## 2021-07-15 PROCEDURE — 72040 X-RAY EXAM NECK SPINE 2-3 VW: CPT

## 2021-07-15 RX ORDER — ATORVASTATIN CALCIUM 40 MG/1
40 TABLET, FILM COATED ORAL
Qty: 90 TABLET | Refills: 3 | Status: SHIPPED | OUTPATIENT
Start: 2021-07-15 | End: 2022-07-22

## 2021-07-15 RX ORDER — DILTIAZEM HYDROCHLORIDE 120 MG/1
120 CAPSULE, EXTENDED RELEASE ORAL
Qty: 90 CAPSULE | Refills: 3 | Status: SHIPPED | OUTPATIENT
Start: 2021-07-15 | End: 2022-07-22

## 2021-07-15 RX ORDER — METHOCARBAMOL 750 MG/1
TABLET, FILM COATED ORAL
Qty: 50 TABLET | Refills: 1 | Status: SHIPPED | OUTPATIENT
Start: 2021-07-15 | End: 2021-09-16 | Stop reason: SDUPTHER

## 2021-07-15 RX ORDER — WARFARIN 4 MG/1
TABLET ORAL
Qty: 180 TABLET | Refills: 3 | Status: SHIPPED | OUTPATIENT
Start: 2021-07-15 | End: 2022-05-10 | Stop reason: SDUPTHER

## 2021-07-15 ASSESSMENT — ENCOUNTER SYMPTOMS
BLOOD IN STOOL: 0
DIZZINESS: 0
EYE PAIN: 0
FEVER: 0
FATIGUE: 0
DIARRHEA: 0
SHORTNESS OF BREATH: 0
APPETITE CHANGE: 0
HEADACHES: 0
MYALGIAS: 0
ARTHRALGIAS: 1
BACK PAIN: 0
EYE ITCHING: 0
COUGH: 0
SORE THROAT: 0
PALPITATIONS: 0
SINUS PAIN: 0
ABDOMINAL PAIN: 0
DYSURIA: 0
ACTIVITY CHANGE: 0
SINUS PRESSURE: 0
TROUBLE SWALLOWING: 0
CHEST TIGHTNESS: 0
AGITATION: 0
NECK STIFFNESS: 1
EYE DISCHARGE: 0
COLOR CHANGE: 0
CONFUSION: 0
VOMITING: 0
NECK PAIN: 1
NAUSEA: 0
ADENOPATHY: 0

## 2021-07-15 NOTE — TELEPHONE ENCOUNTER
----- Message from Demarco Moreau DO sent at 7/15/2021  4:11 PM EDT -----  Suspect all muscle spasms, NO acute herniation (good), get well soon!

## 2021-07-15 NOTE — ASSESSMENT & PLAN NOTE
Lab Results   Component Value Date    CHOL 145 02/15/2021    CHOL 175 01/20/2020    CHOL 157 04/04/2019     Lab Results   Component Value Date    HDL 63 02/15/2021    HDL 69 01/20/2020    HDL 56 04/04/2019     Lab Results   Component Value Date    LDLCALC 72 02/15/2021    LDLCALC 90 01/20/2020    LDLCALC 90 04/04/2019     Lab Results   Component Value Date    TRIG 48 02/15/2021    TRIG 81 01/20/2020    TRIG 56 04/04/2019     No results found for: CHOLHDL  The 10-year ASCVD risk score (Eagle FARAH Jr., et al., 2013) is: 17.1%    Values used to calculate the score:      Age: 66 years      Sex: Female      Is Non- : No      Diabetic: Yes      Tobacco smoker: No      Systolic Blood Pressure: 132 mmHg      Is BP treated: Yes      HDL Cholesterol: 63 mg/dL      Total Cholesterol: 145 mg/dL  Continue to take all your medications as instructed.  Eat healthy with less cholesterol rich and fatty foods.  Maintain a healthy weight by dieting and exercising.  Eating well and maintaining a healthy weight and a good cholesterol panel can decrease your risk of future heart disease and stroke. Come to your next appointment as scheduled. Stay well, God Bless.

## 2021-07-15 NOTE — PROGRESS NOTES
Subjective      Patient ID: Jennifer Aly is a 66 y.o. female.    65 yo female presents to the office with severe neck pain. The pain began a month ago and pt is experiencing severe pain in the left side of her neck and down her shoulder. Pt has been on cyclobenzaprine with  No pain relief. Pt is sensitive to steroids because of previous GI bleed and bariatric surgery. Restricted range of motion in all planes. Unable to life left arm over the head. Takes extra strength tylenol for pain relief but only temporary.     Chronic medical conditions           -- Anemia  Acute anemia noted in s/o GI bleed.  - no further episodes of bleeding; Hgb stable     HLD (hyperlipidemia)  - C/w statin  Lab Results       Component                Value               Date                       CHOL                     175                 01/20/2020                 CHOL                     157                 04/04/2019                 CHOL                     144                 03/07/2019            Lab Results       Component                Value               Date                       HDL                      69                  01/20/2020                 HDL                      56                  04/04/2019                 HDL                      59                  03/07/2019            Lab Results       Component                Value               Date                       LDLCALC                  90                  01/20/2020                 LDLCALC                  90                  04/04/2019                 LDLCALC                  74                  03/07/2019            Lab Results       Component                Value               Date                       TRIG                     81                  01/20/2020                 TRIG                     56                  04/04/2019                 TRIG                     53                  03/07/2019            No results found for: CHOLHDL      The 10-year ASCVD  risk score (Eagle FARAH Jr., et al., 2013) is: 13.3%    Values used to calculate the score:      Age: 65 years      Sex: Female      Is Non- : No      Diabetic: Yes      Tobacco smoker: No      Systolic Blood Pressure: 120 mmHg      Is BP treated: Yes      HDL Cholesterol: 69 mg/dL      Total Cholesterol: 175 mg/dL     Paroxysmal atrial fibrillation (CMS/HCC)  - Planning to see new cardiologist at [facility]     Essential hypertension  - Continue diltiazam     Obstructive sleep apnea  - CPAP  -Sees Dr. Gallo         The following have been reviewed and updated as appropriate in this visit:  Allergies  Meds  Problems         Past Medical History:   Diagnosis Date   • Arthritis    • Fistula    • Gout    • HLD (hyperlipidemia)    • IFG (impaired fasting glucose)    • Obesity    • Polyarthritis    • Pulmonary emboli (CMS/HCC)     pulmonary emboli   • Torn rotator cuff        Past Surgical History:   Procedure Laterality Date   • APPENDECTOMY  2008   • CARDIAC CATHETERIZATION  11/2012   • INTRACAPSULAR CATARACT EXTRACTION Bilateral 08/2019   • IVC FILTER RETRIEVAL  2012   • IVC FILTER RETRIEVAL  02/2013   • RECTOURETHRAL FISTULA REPAIR  2008, 5/2017   • ROTATOR CUFF REPAIR Left 01/2016   • ALTAF-EN-Y PROCEDURE  02/07/2017   • TOTAL ABDOMINAL HYSTERECTOMY  1987   • TOTAL KNEE ARTHROPLASTY Bilateral 2010   • TOTAL KNEE ARTHROPLASTY Right 2012    revision   • TRIGGER FINGER RELEASE Right 2012    right thumb   • ULNAR NERVE REPAIR  2013       Family History   Problem Relation Age of Onset   • Diabetes Biological Mother    • Lung cancer Biological Mother    • Alzheimer's disease Biological Mother    • Diabetes type II Biological Father    • Heart failure Biological Father        Social History     Socioeconomic History   • Marital status:      Spouse name: Not on file   • Number of children: Not on file   • Years of education: Not on file   • Highest education level: Not on file   Occupational History    • Not on file   Tobacco Use   • Smoking status: Former Smoker     Years: 15.00     Quit date:      Years since quittin.5   • Smokeless tobacco: Never Used   Substance and Sexual Activity   • Alcohol use: Yes     Comment: occasional wine   • Drug use: No   • Sexual activity: Not Currently   Other Topics Concern   • Not on file   Social History Narrative   • Not on file     Social Determinants of Health     Financial Resource Strain:    • Difficulty of Paying Living Expenses:    Food Insecurity:    • Worried About Running Out of Food in the Last Year:    • Ran Out of Food in the Last Year:    Transportation Needs:    • Lack of Transportation (Medical):    • Lack of Transportation (Non-Medical):    Physical Activity:    • Days of Exercise per Week:    • Minutes of Exercise per Session:    Stress:    • Feeling of Stress :    Social Connections:    • Frequency of Communication with Friends and Family:    • Frequency of Social Gatherings with Friends and Family:    • Attends Christian Services:    • Active Member of Clubs or Organizations:    • Attends Club or Organization Meetings:    • Marital Status:    Intimate Partner Violence:    • Fear of Current or Ex-Partner:    • Emotionally Abused:    • Physically Abused:    • Sexually Abused:        Allergies   Allergen Reactions   • Sulfa (Sulfonamide Antibiotics) Hives       Current Outpatient Medications   Medication Sig Dispense Refill   • atorvastatin (LIPITOR) 40 mg tablet Take 1 tablet (40 mg total) by mouth once daily. 90 tablet 3   • calcium carbonate-vitamin D3 500 mg(1,250mg) -200 unit per tablet Take 2 tablets by mouth 2 (two) times a day with meals.       • clobetasol (TEMOVATE) 0.05 % cream      • diltiazem (TIAZAC) 120 mg 24 hr capsule Take 1 capsule (120 mg total) by mouth once daily. 90 capsule 3   • fluticasone propionate (FLONASE) 50 mcg/actuation nasal spray Administer 1 spray into each nostril daily as needed for rhinitis. Make an appointment if  help is needed for nasal symptoms. 1 Bottle 3   • ketoconazole (NIZORAL) 2 % cream APPLY ON THE SKIN TWICE A DAY APPLY TO AFFECTED AREA RASH IN GROIN/ABDOMEN TWICE A DAY FOR 3 WEEKS     • traMADoL (ULTRAM) 50 mg tablet Take 1 tablet (50 mg total) by mouth every 8 (eight) hours as needed for moderate pain or severe pain. 50 tablet 1   • warfarin (COUMADIN) 4 mg tablet TAKE 2 TABLET BY MOUTH DAILY (8 MG DAILY) EXCEPT T/TH TAKE ONE (4MG) 180 tablet 3   • methocarbamoL (ROBAXIN-750) 750 mg tablet Take one by mouth ONLY as needed every 6-8 hours for muscle pains and spasms 50 tablet 1     No current facility-administered medications for this visit.       Review of Systems   Constitutional: Negative for activity change, appetite change, fatigue and fever.   HENT: Negative for congestion, sinus pressure, sinus pain, sore throat and trouble swallowing.    Eyes: Negative for pain, discharge, itching and visual disturbance.   Respiratory: Negative for cough, chest tightness and shortness of breath.    Cardiovascular: Negative for chest pain, palpitations and leg swelling.   Gastrointestinal: Negative for abdominal pain, blood in stool, diarrhea, nausea and vomiting.   Endocrine: Negative for cold intolerance and heat intolerance.   Genitourinary: Negative for dysuria.   Musculoskeletal: Positive for arthralgias, neck pain and neck stiffness. Negative for back pain, gait problem and myalgias.   Skin: Negative for color change and rash.   Allergic/Immunologic: Negative for environmental allergies and food allergies.   Neurological: Negative for dizziness and headaches.   Hematological: Negative for adenopathy.   Psychiatric/Behavioral: Negative for agitation, behavioral problems and confusion.       Objective     Vitals:    07/15/21 1029   BP: 132/82   Pulse: 65   Resp: 16   SpO2: 97%       Physical Exam  Constitutional:       Appearance: She is well-developed.   HENT:      Head: Normocephalic and atraumatic.      Right Ear:  Tympanic membrane, ear canal and external ear normal.      Left Ear: Tympanic membrane, ear canal and external ear normal.      Nose: Nose normal.   Eyes:      Conjunctiva/sclera: Conjunctivae normal.      Pupils: Pupils are equal, round, and reactive to light.   Neck:      Thyroid: No thyromegaly.   Cardiovascular:      Rate and Rhythm: Normal rate and regular rhythm.      Heart sounds: Normal heart sounds. No murmur heard.     Pulmonary:      Effort: Pulmonary effort is normal. No respiratory distress.      Breath sounds: Normal breath sounds. No wheezing or rales.   Abdominal:      General: Bowel sounds are normal. There is no distension.      Palpations: Abdomen is soft. There is no mass.      Tenderness: There is no abdominal tenderness. There is no guarding.   Musculoskeletal:         General: Tenderness present.      Cervical back: Normal range of motion and neck supple.      Comments: Neck pain and stiffness radiating to LEFT arm   Feet:      Right foot:      Protective Sensation: 10 sites tested. 10 sites sensed.      Left foot:      Protective Sensation: 10 sites tested. 9 sites sensed.   Skin:     General: Skin is warm and dry.      Capillary Refill: Capillary refill takes less than 2 seconds.      Findings: No rash.   Neurological:      Mental Status: She is alert and oriented to person, place, and time.      Cranial Nerves: No cranial nerve deficit.      Deep Tendon Reflexes: Reflexes normal.   Psychiatric:         Behavior: Behavior normal.         Thought Content: Thought content normal.         Judgment: Judgment normal.         Assessment/Plan     Problem List Items Addressed This Visit        Circulatory    Essential hypertension    Current Assessment & Plan     Stable  Meds  Continue to take all your blood pressure medications as instructed,  Stay well by eating a healthy diet with less salt and fatty foods. Maintain a healthy weight, and keep your regularly scheduled doctor appointments.             Relevant Medications    diltiazem (TIAZAC) 120 mg 24 hr capsule    Paroxysmal atrial fibrillation (CMS/HCC)    Current Assessment & Plan     Stable  Will follow  Coumadin         Relevant Medications    atorvastatin (LIPITOR) 40 mg tablet    diltiazem (TIAZAC) 120 mg 24 hr capsule    warfarin (COUMADIN) 4 mg tablet       Digestive    GERD without esophagitis    Current Assessment & Plan     Stable  meds            Musculoskeletal    Cervical strain, acute, initial encounter - Primary    Current Assessment & Plan     Rest, stay well hydrated    Change to Robaxin  Will follow            Endocrine/Metabolic    IFG (impaired fasting glucose) (Chronic)    Current Assessment & Plan     Lab Results   Component Value Date    HGBA1C 6.3 (H) 01/20/2020   diet and TLC           Relevant Orders    Hemoglobin A1c    Morbid obesity due to excess calories (CMS/HCC) (Chronic)    Overview     Overview:          Current Assessment & Plan     Diet and TLC reinforced         Pure hypercholesterolemia (Chronic)    Overview     Overview:          Current Assessment & Plan     Lab Results   Component Value Date    CHOL 145 02/15/2021    CHOL 175 01/20/2020    CHOL 157 04/04/2019     Lab Results   Component Value Date    HDL 63 02/15/2021    HDL 69 01/20/2020    HDL 56 04/04/2019     Lab Results   Component Value Date    LDLCALC 72 02/15/2021    LDLCALC 90 01/20/2020    LDLCALC 90 04/04/2019     Lab Results   Component Value Date    TRIG 48 02/15/2021    TRIG 81 01/20/2020    TRIG 56 04/04/2019     No results found for: CHOLHDL  The 10-year ASCVD risk score (Eagle FARAH Jr., et al., 2013) is: 17.1%    Values used to calculate the score:      Age: 66 years      Sex: Female      Is Non- : No      Diabetic: Yes      Tobacco smoker: No      Systolic Blood Pressure: 132 mmHg      Is BP treated: Yes      HDL Cholesterol: 63 mg/dL      Total Cholesterol: 145 mg/dL  Continue to take all your medications as instructed.  Eat  healthy with less cholesterol rich and fatty foods.  Maintain a healthy weight by dieting and exercising.  Eating well and maintaining a healthy weight and a good cholesterol panel can decrease your risk of future heart disease and stroke. Come to your next appointment as scheduled. Stay well, God Bless.           Relevant Medications    atorvastatin (LIPITOR) 40 mg tablet    Other Relevant Orders    Lipid panel    Comprehensive metabolic panel       Other    Malaise and fatigue    Relevant Orders    CBC and differential    TSH      Other Visit Diagnoses     Encounter for hepatitis C screening test for low risk patient        Relevant Orders    Hepatitis C antibody          DIAGNOSIS  1. Cervical strain, acute, initial encounter    2. Paroxysmal atrial fibrillation (CMS/HCC)    3. Essential hypertension    4. GERD without esophagitis    5. Pure hypercholesterolemia    6. Morbid obesity due to excess calories (CMS/HCC)    7. Malaise and fatigue    8. IFG (impaired fasting glucose)    9. Encounter for hepatitis C screening test for low risk patient        No exam data present        Demarco Moreau,   7/15/2021

## 2021-07-29 ENCOUNTER — HOSPITAL ENCOUNTER (OUTPATIENT)
Dept: SLEEP MEDICINE | Facility: HOSPITAL | Age: 66
Discharge: HOME | End: 2021-07-29
Attending: OTOLARYNGOLOGY
Payer: MEDICARE

## 2021-07-29 DIAGNOSIS — G47.33 OBSTRUCTIVE SLEEP APNEA (ADULT) (PEDIATRIC): ICD-10-CM

## 2021-07-29 PROCEDURE — G0399 HOME SLEEP TEST/TYPE 3 PORTA: HCPCS

## 2021-08-23 ENCOUNTER — DOCUMENTATION (OUTPATIENT)
Dept: PRIMARY CARE | Facility: CLINIC | Age: 66
End: 2021-08-23

## 2021-08-23 PROBLEM — E44.1 MILD PROTEIN-CALORIE MALNUTRITION (CMS/HCC): Status: ACTIVE | Noted: 2021-07-15

## 2021-08-23 NOTE — PROGRESS NOTES
Coding Clarification ([facility]) - [facility]  Note       DATE: 2021    RE: Jennifer Aly  : 1955      Under the direction of Demarco Moreau DO, the following adjustments were made to this patients Problem List:      Specified Code: E44.1  Code Description: Mild protein-calorie malnutrition   Clarification Type EMR System Encounter Type Date of Service Provider   Code Specificity Epic Progress Note 2019 Demarco Moreau DO   Rationale: Claims data indicate Demarco Larsen DO  submitted E44.1 in association with clinical care rendered on DOS 2019. No Clinical data available for review by .  Higher specificity for E46  from Problem List is available to be E44.1     Original Code: E46

## 2021-09-16 ENCOUNTER — OFFICE VISIT (OUTPATIENT)
Dept: PRIMARY CARE | Facility: CLINIC | Age: 66
End: 2021-09-16
Payer: MEDICARE

## 2021-09-16 VITALS
BODY MASS INDEX: 36.07 KG/M2 | HEART RATE: 52 BPM | WEIGHT: 196 LBS | DIASTOLIC BLOOD PRESSURE: 76 MMHG | SYSTOLIC BLOOD PRESSURE: 124 MMHG | RESPIRATION RATE: 16 BRPM | HEIGHT: 62 IN | OXYGEN SATURATION: 94 %

## 2021-09-16 DIAGNOSIS — R53.81 MALAISE AND FATIGUE: ICD-10-CM

## 2021-09-16 DIAGNOSIS — E11.618 TYPE 2 DIABETES MELLITUS WITH OTHER DIABETIC ARTHROPATHY, WITHOUT LONG-TERM CURRENT USE OF INSULIN: ICD-10-CM

## 2021-09-16 DIAGNOSIS — E44.1 MILD PROTEIN-CALORIE MALNUTRITION (CMS/HCC): ICD-10-CM

## 2021-09-16 DIAGNOSIS — R53.83 MALAISE AND FATIGUE: ICD-10-CM

## 2021-09-16 DIAGNOSIS — Z11.4 ENCOUNTER FOR SCREENING FOR HUMAN IMMUNODEFICIENCY VIRUS (HIV): ICD-10-CM

## 2021-09-16 DIAGNOSIS — I48.0 PAROXYSMAL ATRIAL FIBRILLATION (CMS/HCC): ICD-10-CM

## 2021-09-16 DIAGNOSIS — Z11.59 NEED FOR HEPATITIS C SCREENING TEST: ICD-10-CM

## 2021-09-16 DIAGNOSIS — E78.5 HYPERLIPIDEMIA, UNSPECIFIED HYPERLIPIDEMIA TYPE: ICD-10-CM

## 2021-09-16 DIAGNOSIS — Z79.01 ANTICOAGULATED ON COUMADIN: ICD-10-CM

## 2021-09-16 DIAGNOSIS — Z23 IMMUNIZATION DUE: ICD-10-CM

## 2021-09-16 DIAGNOSIS — I10 ESSENTIAL HYPERTENSION: ICD-10-CM

## 2021-09-16 DIAGNOSIS — D64.9 ANEMIA, UNSPECIFIED TYPE: ICD-10-CM

## 2021-09-16 DIAGNOSIS — M54.2 CERVICAL PAIN: Primary | ICD-10-CM

## 2021-09-16 DIAGNOSIS — E66.01 MORBID OBESITY DUE TO EXCESS CALORIES (CMS/HCC): Chronic | ICD-10-CM

## 2021-09-16 PROCEDURE — G0008 ADMIN INFLUENZA VIRUS VAC: HCPCS | Performed by: FAMILY MEDICINE

## 2021-09-16 PROCEDURE — 99214 OFFICE O/P EST MOD 30 MIN: CPT | Mod: 25 | Performed by: FAMILY MEDICINE

## 2021-09-16 PROCEDURE — G8754 DIAS BP LESS 90: HCPCS | Performed by: FAMILY MEDICINE

## 2021-09-16 PROCEDURE — G8752 SYS BP LESS 140: HCPCS | Performed by: FAMILY MEDICINE

## 2021-09-16 PROCEDURE — 90694 VACC AIIV4 NO PRSRV 0.5ML IM: CPT | Performed by: FAMILY MEDICINE

## 2021-09-16 RX ORDER — METHOCARBAMOL 750 MG/1
TABLET, FILM COATED ORAL
Qty: 50 TABLET | Refills: 1 | Status: SHIPPED | OUTPATIENT
Start: 2021-09-16 | End: 2024-11-15

## 2021-09-16 RX ORDER — ACETAMINOPHEN 500 MG
5000 TABLET ORAL DAILY
COMMUNITY
End: 2024-09-03

## 2021-09-16 RX ORDER — DICLOFENAC SODIUM 10 MG/G
GEL TOPICAL 2 TIMES DAILY PRN
Qty: 100 G | Refills: 1 | Status: SHIPPED | OUTPATIENT
Start: 2021-09-16 | End: 2023-04-25 | Stop reason: ALTCHOICE

## 2021-09-16 ASSESSMENT — ENCOUNTER SYMPTOMS
DIZZINESS: 0
FEVER: 0
ACTIVITY CHANGE: 0
ABDOMINAL PAIN: 0
AGITATION: 0
CHEST TIGHTNESS: 0
NAUSEA: 0
PALPITATIONS: 0
SINUS PRESSURE: 0
MYALGIAS: 0
EYE ITCHING: 0
ARTHRALGIAS: 0
BACK PAIN: 0
EYE DISCHARGE: 0
SHORTNESS OF BREATH: 0
EYE PAIN: 0
HEADACHES: 0
SINUS PAIN: 0
COUGH: 0
COLOR CHANGE: 0
APPETITE CHANGE: 0
TROUBLE SWALLOWING: 0
SORE THROAT: 0
ADENOPATHY: 0
FATIGUE: 0
DYSURIA: 0
DIARRHEA: 0
VOMITING: 0
BLOOD IN STOOL: 0
CONFUSION: 0

## 2021-09-16 NOTE — ASSESSMENT & PLAN NOTE
Stable  Lab Results   Component Value Date    WBC 5.76 02/15/2021    HGB 12.7 02/15/2021    HCT 40.1 02/15/2021    MCV 96.6 02/15/2021     02/15/2021

## 2021-09-16 NOTE — ASSESSMENT & PLAN NOTE
Lab Results   Component Value Date    HGBA1C 6.3 (H) 01/20/2020   diet and TLC  Continue to take all your medications as prescribed. Remember, the main treatment for diabetes is self management-knowing to eat healthy with less carbohydrates and sweets, and staying in shape by exercising and  maintaining a healthy weight.  Don't forget, with diabetes you need lab work at least every 6 months, and annually should get your urine checked and see an Eye Doctor for a dilated retinal exam. Stay well, God Bless.

## 2021-09-16 NOTE — PROGRESS NOTES
Subjective      Patient ID: Jennifer Aly is a 66 y.o. female.    Pt is 65 y/o female, coming in today for 6 month follow up    Neck aching her....    Neck pain  - L side, radiates above scapula  -Pain gets worse w/ any head movements  - L neck strength +4  -reports no numbness/tingling of UE    -- Anemia  -Stable    Lab Results       Component                Value               Date                       WBC                      5.76                02/15/2021                 HGB                      12.7                02/15/2021                 HCT                      40.1                02/15/2021                 MCV                      96.6                02/15/2021                 PLT                      245                 02/15/2021               HLD (hyperlipidemia)  - Stable  -Educated pt to continue take statin  -Educate pt on diet/TLC    - C/w statin  Lab Results       Component                Value               Date                       CHOL                     145                 02/15/2021                 CHOL                     175                 01/20/2020                 CHOL                     157                 04/04/2019            Lab Results       Component                Value               Date                       HDL                      63                  02/15/2021                 HDL                      69                  01/20/2020                 HDL                      56                  04/04/2019            Lab Results       Component                Value               Date                       LDLCALC                  72                  02/15/2021                 LDLCALC                  90                  01/20/2020                 LDLCALC                  90                  04/04/2019            Lab Results       Component                Value               Date                       TRIG                     48                  02/15/2021                 TRIG                      81                  01/20/2020                 TRIG                     56                  04/04/2019            No results found for: CHOLHDL  The 10-year ASCVD risk score (Eagle FARAH Jr., et al., 2013) is: 15.2%    Values used to calculate the score:      Age: 66 years      Sex: Female      Is Non- : No      Diabetic: Yes      Tobacco smoker: No      Systolic Blood Pressure: 124 mmHg      Is BP treated: Yes      HDL Cholesterol: 63 mg/dL      Total Cholesterol: 145 mg/dL    Paroxysmal atrial fibrillation (CMS/HCC)  -stable  -Continue to take Diltiazem   -Haven't established an appointment w/ Cardiolgist     Essential hypertension  - Stable  - Continue taking BP med  BP Readings from Last 3 Encounters:  09/16/21 : 124/76  07/15/21 : 132/82  06/22/21 : 120/78  - Continue diltiazam     Obstructive sleep apnea  - CPAP  -Sees Dr. Gallo      The following have been reviewed and updated as appropriate in this visit:  Allergies  Meds  Problems         Past Medical History:   Diagnosis Date   • Arthritis    • Fistula    • Gout    • HLD (hyperlipidemia)    • IFG (impaired fasting glucose)    • Obesity    • Polyarthritis    • Pulmonary emboli (CMS/HCC)     pulmonary emboli   • Torn rotator cuff        Past Surgical History:   Procedure Laterality Date   • APPENDECTOMY  2008   • CARDIAC CATHETERIZATION  11/2012   • INTRACAPSULAR CATARACT EXTRACTION Bilateral 08/2019   • IVC FILTER RETRIEVAL  2012   • IVC FILTER RETRIEVAL  02/2013   • RECTOURETHRAL FISTULA REPAIR  2008, 5/2017   • ROTATOR CUFF REPAIR Left 01/2016   • ALTAF-EN-Y PROCEDURE  02/07/2017   • TOTAL ABDOMINAL HYSTERECTOMY  1987   • TOTAL KNEE ARTHROPLASTY Bilateral 2010   • TOTAL KNEE ARTHROPLASTY Right 2012    revision   • TRIGGER FINGER RELEASE Right 2012    right thumb   • ULNAR NERVE REPAIR  2013       Family History   Problem Relation Age of Onset   • Diabetes Biological Mother    • Lung cancer Biological Mother    •  Alzheimer's disease Biological Mother    • Diabetes type II Biological Father    • Heart failure Biological Father        Social History     Socioeconomic History   • Marital status:      Spouse name: Not on file   • Number of children: Not on file   • Years of education: Not on file   • Highest education level: Not on file   Occupational History   • Not on file   Tobacco Use   • Smoking status: Former Smoker     Years: 15.00     Quit date:      Years since quittin.7   • Smokeless tobacco: Never Used   Substance and Sexual Activity   • Alcohol use: Yes     Comment: occasional wine   • Drug use: No   • Sexual activity: Not Currently   Other Topics Concern   • Not on file   Social History Narrative   • Not on file     Social Determinants of Health     Financial Resource Strain:    • Difficulty of Paying Living Expenses:    Food Insecurity:    • Worried About Running Out of Food in the Last Year:    • Ran Out of Food in the Last Year:    Transportation Needs:    • Lack of Transportation (Medical):    • Lack of Transportation (Non-Medical):    Physical Activity:    • Days of Exercise per Week:    • Minutes of Exercise per Session:    Stress:    • Feeling of Stress :    Social Connections:    • Frequency of Communication with Friends and Family:    • Frequency of Social Gatherings with Friends and Family:    • Attends [employer] Services:    • Active Member of Clubs or Organizations:    • Attends Club or Organization Meetings:    • Marital Status:    Intimate Partner Violence:    • Fear of Current or Ex-Partner:    • Emotionally Abused:    • Physically Abused:    • Sexually Abused:        Allergies   Allergen Reactions   • Sulfa (Sulfonamide Antibiotics) Hives       Current Outpatient Medications   Medication Sig Dispense Refill   • atorvastatin (LIPITOR) 40 mg tablet Take 1 tablet (40 mg total) by mouth once daily. 90 tablet 3   • calcium carbonate-vitamin D3 500 mg(1,250mg) -200 unit per tablet Take 2  tablets by mouth 2 (two) times a day with meals.       • cholecalciferol, vitamin D3, (VITAMIN D3) 5,000 unit (125 mcg) tablet Take 5,000 Units by mouth daily.     • clobetasol (TEMOVATE) 0.05 % cream      • diltiazem (TIAZAC) 120 mg 24 hr capsule Take 1 capsule (120 mg total) by mouth once daily. 90 capsule 3   • fluticasone propionate (FLONASE) 50 mcg/actuation nasal spray Administer 1 spray into each nostril daily as needed for rhinitis. Make an appointment if help is needed for nasal symptoms. 1 Bottle 3   • ketoconazole (NIZORAL) 2 % cream APPLY ON THE SKIN TWICE A DAY APPLY TO AFFECTED AREA RASH IN GROIN/ABDOMEN TWICE A DAY FOR 3 WEEKS     • methocarbamoL (ROBAXIN-750) 750 mg tablet Take one by mouth ONLY as needed every 6-8 hours for muscle pains and spasms 50 tablet 1   • multivitamin capsule Take 1 capsule by mouth daily.     • traMADoL (ULTRAM) 50 mg tablet Take 1 tablet (50 mg total) by mouth every 8 (eight) hours as needed for moderate pain or severe pain. 50 tablet 1   • vitamin B complex/folic acid (B COMPLEX 100 ORAL) Take by mouth.     • warfarin (COUMADIN) 4 mg tablet TAKE 2 TABLET BY MOUTH DAILY (8 MG DAILY) EXCEPT T/TH TAKE ONE (4MG) 180 tablet 3   • diclofenac sodium (VOLTAREN) 1 % topical gel Apply topically 2 (two) times a day as needed for mild pain. 100 g 1     No current facility-administered medications for this visit.       Review of Systems   Constitutional: Negative for activity change, appetite change, fatigue and fever.   HENT: Negative for congestion, sinus pressure, sinus pain, sore throat and trouble swallowing.    Eyes: Negative for pain, discharge, itching and visual disturbance.   Respiratory: Negative for cough, chest tightness and shortness of breath.    Cardiovascular: Negative for chest pain, palpitations and leg swelling.   Gastrointestinal: Negative for abdominal pain, blood in stool, diarrhea, nausea and vomiting.   Endocrine: Negative for cold intolerance and heat  intolerance.   Genitourinary: Negative for dysuria.   Musculoskeletal: Negative for arthralgias, back pain, gait problem and myalgias.   Skin: Negative for color change and rash.   Allergic/Immunologic: Negative for environmental allergies and food allergies.   Neurological: Negative for dizziness and headaches.   Hematological: Negative for adenopathy.   Psychiatric/Behavioral: Negative for agitation, behavioral problems and confusion.       Objective     Vitals:    09/16/21 0955   BP: 124/76   Pulse: (!) 52   Resp: 16   SpO2: 94%       Physical Exam  Constitutional:       Appearance: She is well-developed.   HENT:      Head: Normocephalic and atraumatic.      Right Ear: Tympanic membrane, ear canal and external ear normal.      Left Ear: Tympanic membrane, ear canal and external ear normal.      Nose: Nose normal.   Eyes:      Conjunctiva/sclera: Conjunctivae normal.      Pupils: Pupils are equal, round, and reactive to light.   Neck:      Thyroid: No thyromegaly.   Cardiovascular:      Rate and Rhythm: Normal rate and regular rhythm.      Heart sounds: Normal heart sounds. No murmur heard.     Pulmonary:      Effort: Pulmonary effort is normal. No respiratory distress.      Breath sounds: Normal breath sounds. No wheezing or rales.   Abdominal:      General: Bowel sounds are normal. There is no distension.      Palpations: Abdomen is soft. There is no mass.      Tenderness: There is no abdominal tenderness. There is no guarding.   Musculoskeletal:         General: No tenderness. Normal range of motion.      Cervical back: Normal range of motion and neck supple. Pain with movement present.   Skin:     General: Skin is warm and dry.      Capillary Refill: Capillary refill takes less than 2 seconds.      Findings: No rash.   Neurological:      Mental Status: She is alert and oriented to person, place, and time.      Cranial Nerves: No cranial nerve deficit.      Deep Tendon Reflexes: Reflexes normal.   Psychiatric:          Behavior: Behavior normal.         Thought Content: Thought content normal.         Judgment: Judgment normal.         Assessment/Plan     Problem List Items Addressed This Visit        Nervous    Cervical pain - Primary    Current Assessment & Plan     Try Chiropractor  Trial VOltaren Gel  Flexeril at night            Circulatory    Paroxysmal atrial fibrillation (CMS/HCC)    Current Assessment & Plan     -stable  -continue w/ coumadin         Relevant Orders    Protime-INR    Essential hypertension    Current Assessment & Plan     Stable  Meds  Continue to take all your blood pressure medications as instructed,  Stay well by eating a healthy diet with less salt and fatty foods. Maintain a healthy weight, and keep your regularly scheduled doctor appointments.               Digestive    Mild protein-calorie malnutrition (CMS/HCC)    Current Assessment & Plan     stable            Endocrine/Metabolic    Morbid obesity due to excess calories (CMS/HCC) (Chronic)    Overview     Overview:          Current Assessment & Plan     Diet and TLC         HLD (hyperlipidemia)    Current Assessment & Plan     Stable  Meds  Lab Results   Component Value Date    CHOL 145 02/15/2021    CHOL 175 01/20/2020    CHOL 157 04/04/2019     Lab Results   Component Value Date    HDL 63 02/15/2021    HDL 69 01/20/2020    HDL 56 04/04/2019     Lab Results   Component Value Date    LDLCALC 72 02/15/2021    LDLCALC 90 01/20/2020    LDLCALC 90 04/04/2019     Lab Results   Component Value Date    TRIG 48 02/15/2021    TRIG 81 01/20/2020    TRIG 56 04/04/2019     No results found for: CHOLHDL   The 10-year ASCVD risk score (Eagle FARAH Jr., et al., 2013) is: 17.1%    Values used to calculate the score:      Age: 66 years      Sex: Female      Is Non- : No      Diabetic: Yes      Tobacco smoker: No      Systolic Blood Pressure: 132 mmHg      Is BP treated: Yes      HDL Cholesterol: 63 mg/dL      Total Cholesterol: 145  mg/dL           Relevant Orders    Lipid panel    Diabetes mellitus (CMS/HCC)    Current Assessment & Plan     Lab Results   Component Value Date    HGBA1C 6.3 (H) 01/20/2020   diet and TLC  Continue to take all your medications as prescribed. Remember, the main treatment for diabetes is self management-knowing to eat healthy with less carbohydrates and sweets, and staying in shape by exercising and  maintaining a healthy weight.  Don't forget, with diabetes you need lab work at least every 6 months, and annually should get your urine checked and see an Eye Doctor for a dilated retinal exam. Stay well, God Bless.               Relevant Orders    Microalbumin / creatinine urine ratio    Hemoglobin A1c       Hematologic    Anemia    Current Assessment & Plan     Stable  Lab Results   Component Value Date    WBC 5.76 02/15/2021    HGB 12.7 02/15/2021    HCT 40.1 02/15/2021    MCV 96.6 02/15/2021     02/15/2021              Relevant Orders    CBC and differential       Other    Malaise and fatigue    Relevant Orders    Comprehensive metabolic panel    TSH      Other Visit Diagnoses     Anticoagulated on Coumadin        Relevant Orders    Protime-INR    Need for hepatitis C screening test        Relevant Orders    Hepatitis C antibody    Encounter for screening for human immunodeficiency virus (HIV)        Relevant Orders    HIV 1,2 AB P24 AG    Immunization due        Relevant Orders    Influenza vaccine Quad Adjuvanted 65 and Older (FluAd Quad) (Completed)          DIAGNOSIS  1. Cervical pain    2. Essential hypertension    3. Paroxysmal atrial fibrillation (CMS/HCC)    4. Hyperlipidemia, unspecified hyperlipidemia type    5. Anemia, unspecified type    6. Anticoagulated on Coumadin    7. Malaise and fatigue    8. Need for hepatitis C screening test    9. Encounter for screening for human immunodeficiency virus (HIV)    10. Immunization due    11. Mild protein-calorie malnutrition (CMS/HCC)    12. Morbid obesity  due to excess calories (CMS/[facility])    13. Type 2 diabetes mellitus with other diabetic arthropathy, without long-term current use of insulin (CMS/[facility])        No exam data present        Demarco Moreau DO  9/16/2021

## 2021-09-16 NOTE — ASSESSMENT & PLAN NOTE
Stable  Meds  Lab Results   Component Value Date    CHOL 145 02/15/2021    CHOL 175 01/20/2020    CHOL 157 04/04/2019     Lab Results   Component Value Date    HDL 63 02/15/2021    HDL 69 01/20/2020    HDL 56 04/04/2019     Lab Results   Component Value Date    LDLCALC 72 02/15/2021    LDLCALC 90 01/20/2020    LDLCALC 90 04/04/2019     Lab Results   Component Value Date    TRIG 48 02/15/2021    TRIG 81 01/20/2020    TRIG 56 04/04/2019     No results found for: CHOLHDL   The 10-year ASCVD risk score (Eagle FARAH Jr., et al., 2013) is: 17.1%    Values used to calculate the score:      Age: 66 years      Sex: Female      Is Non- : No      Diabetic: Yes      Tobacco smoker: No      Systolic Blood Pressure: 132 mmHg      Is BP treated: Yes      HDL Cholesterol: 63 mg/dL      Total Cholesterol: 145 mg/dL

## 2021-11-02 ENCOUNTER — TELEPHONE (OUTPATIENT)
Dept: PRIMARY CARE | Facility: CLINIC | Age: 66
End: 2021-11-02

## 2021-11-02 ENCOUNTER — APPOINTMENT (OUTPATIENT)
Dept: LAB | Facility: CLINIC | Age: 66
End: 2021-11-02
Attending: FAMILY MEDICINE
Payer: MEDICARE

## 2021-11-02 DIAGNOSIS — R53.81 MALAISE AND FATIGUE: ICD-10-CM

## 2021-11-02 DIAGNOSIS — R53.83 MALAISE AND FATIGUE: ICD-10-CM

## 2021-11-02 DIAGNOSIS — Z12.31 ENCOUNTER FOR SCREENING MAMMOGRAM FOR MALIGNANT NEOPLASM OF BREAST: ICD-10-CM

## 2021-11-02 DIAGNOSIS — D64.9 ANEMIA, UNSPECIFIED TYPE: ICD-10-CM

## 2021-11-02 DIAGNOSIS — Z13.820 SCREENING FOR OSTEOPOROSIS: Primary | ICD-10-CM

## 2021-11-02 DIAGNOSIS — I48.0 PAROXYSMAL ATRIAL FIBRILLATION (CMS/HCC): ICD-10-CM

## 2021-11-02 DIAGNOSIS — E11.618 TYPE 2 DIABETES MELLITUS WITH OTHER DIABETIC ARTHROPATHY, WITHOUT LONG-TERM CURRENT USE OF INSULIN: ICD-10-CM

## 2021-11-02 DIAGNOSIS — Z79.01 ANTICOAGULATED ON COUMADIN: ICD-10-CM

## 2021-11-02 DIAGNOSIS — Z11.4 ENCOUNTER FOR SCREENING FOR HUMAN IMMUNODEFICIENCY VIRUS (HIV): ICD-10-CM

## 2021-11-02 DIAGNOSIS — Z11.59 NEED FOR HEPATITIS C SCREENING TEST: ICD-10-CM

## 2021-11-02 DIAGNOSIS — E78.5 HYPERLIPIDEMIA, UNSPECIFIED HYPERLIPIDEMIA TYPE: ICD-10-CM

## 2021-11-02 DIAGNOSIS — Z78.0 POST-MENOPAUSAL: ICD-10-CM

## 2021-11-02 LAB
BASOPHILS # BLD: 0.02 K/UL (ref 0.01–0.1)
BASOPHILS NFR BLD: 0.4 %
DIFFERENTIAL METHOD BLD: ABNORMAL
EOSINOPHIL # BLD: 0.17 K/UL (ref 0.04–0.36)
EOSINOPHIL NFR BLD: 3.4 %
ERYTHROCYTE [DISTWIDTH] IN BLOOD BY AUTOMATED COUNT: 13.8 % (ref 11.7–14.4)
HCT VFR BLDCO AUTO: 44.2 % (ref 35–45)
HGB BLD-MCNC: 13.8 G/DL (ref 11.8–15.7)
IMM GRANULOCYTES # BLD AUTO: 0.01 K/UL (ref 0–0.08)
IMM GRANULOCYTES NFR BLD AUTO: 0.2 %
LYMPHOCYTES # BLD: 1.22 K/UL (ref 1.2–3.5)
LYMPHOCYTES NFR BLD: 24.6 %
MCH RBC QN AUTO: 31.1 PG (ref 28–33.2)
MCHC RBC AUTO-ENTMCNC: 31.2 G/DL (ref 32.2–35.5)
MCV RBC AUTO: 99.5 FL (ref 83–98)
MONOCYTES # BLD: 0.47 K/UL (ref 0.28–0.8)
MONOCYTES NFR BLD: 9.5 %
NEUTROPHILS # BLD: 3.07 K/UL (ref 1.7–7)
NEUTS SEG NFR BLD: 61.9 %
NRBC BLD-RTO: 0 %
PDW BLD AUTO: 11.2 FL (ref 9.4–12.3)
PLATELET # BLD AUTO: 212 K/UL (ref 150–369)
RBC # BLD AUTO: 4.44 M/UL (ref 3.93–5.22)
WBC # BLD AUTO: 4.96 K/UL (ref 3.8–10.5)

## 2021-11-02 PROCEDURE — 80061 LIPID PANEL: CPT

## 2021-11-02 PROCEDURE — 82570 ASSAY OF URINE CREATININE: CPT

## 2021-11-02 PROCEDURE — 86480 TB TEST CELL IMMUN MEASURE: CPT

## 2021-11-02 PROCEDURE — 80053 COMPREHEN METABOLIC PANEL: CPT

## 2021-11-02 PROCEDURE — 36415 COLL VENOUS BLD VENIPUNCTURE: CPT

## 2021-11-02 PROCEDURE — 85610 PROTHROMBIN TIME: CPT

## 2021-11-02 PROCEDURE — 87389 HIV-1 AG W/HIV-1&-2 AB AG IA: CPT

## 2021-11-02 PROCEDURE — 84443 ASSAY THYROID STIM HORMONE: CPT

## 2021-11-02 PROCEDURE — 86803 HEPATITIS C AB TEST: CPT

## 2021-11-02 PROCEDURE — 83036 HEMOGLOBIN GLYCOSYLATED A1C: CPT

## 2021-11-02 PROCEDURE — 85025 COMPLETE CBC W/AUTO DIFF WBC: CPT

## 2021-11-02 NOTE — TELEPHONE ENCOUNTER
Patient scheduled mammogram on 11/17 downstairs in our building.  They told her they need a script put into the system for her get this done.  She gets a routine mammo screening.    She also needs to schedule a dexa screening put into the system as the one she has expires on 11/9 and they won't schedule it until a new one is put into the system.

## 2021-11-03 LAB
ALBUMIN SERPL-MCNC: 4.2 G/DL (ref 3.4–5)
ALBUMIN/CREAT UR: 9.5 UG/MG
ALP SERPL-CCNC: 100 IU/L (ref 35–126)
ALT SERPL-CCNC: 27 IU/L (ref 11–54)
ANION GAP SERPL CALC-SCNC: 14 MEQ/L (ref 3–15)
AST SERPL-CCNC: 27 IU/L (ref 15–41)
BILIRUB SERPL-MCNC: 0.8 MG/DL (ref 0.3–1.2)
BUN SERPL-MCNC: 19 MG/DL (ref 8–20)
CALCIUM SERPL-MCNC: 9.9 MG/DL (ref 8.9–10.3)
CHLORIDE SERPL-SCNC: 107 MEQ/L (ref 98–109)
CHOLEST SERPL-MCNC: 167 MG/DL
CO2 SERPL-SCNC: 23 MEQ/L (ref 22–32)
CREAT SERPL-MCNC: 0.6 MG/DL (ref 0.6–1.1)
CREAT UR-MCNC: 73.7 MG/DL
EST. AVERAGE GLUCOSE BLD GHB EST-MCNC: 120 MG/DL
GFR SERPL CREATININE-BSD FRML MDRD: >60 ML/MIN/1.73M*2
GLUCOSE SERPL-MCNC: 91 MG/DL (ref 70–99)
HBA1C MFR BLD HPLC: 5.8 %
HCV AB SER QL: NONREACTIVE
HDLC SERPL-MCNC: 70 MG/DL
HDLC SERPL: 2.4 {RATIO}
HIV 1+2 AB+HIV1 P24 AG SERPL QL IA: NONREACTIVE
INR PPP: 1.7
LDLC SERPL CALC-MCNC: 85 MG/DL
MICROALBUMIN UR-MCNC: 7 MG/L
NONHDLC SERPL-MCNC: 97 MG/DL
POTASSIUM SERPL-SCNC: 4.7 MEQ/L (ref 3.6–5.1)
PROT SERPL-MCNC: 6.9 G/DL (ref 6–8.2)
PROTHROMBIN TIME: 18.8 SEC (ref 12.2–14.5)
SODIUM SERPL-SCNC: 144 MEQ/L (ref 136–144)
TRIGL SERPL-MCNC: 60 MG/DL (ref 30–149)
TSH SERPL DL<=0.05 MIU/L-ACNC: 1.83 MIU/L (ref 0.34–5.6)

## 2021-11-04 ENCOUNTER — TELEPHONE (OUTPATIENT)
Dept: PRIMARY CARE | Facility: CLINIC | Age: 66
End: 2021-11-04

## 2021-11-04 DIAGNOSIS — Z86.711 HISTORY OF PULMONARY EMBOLUS (PE): ICD-10-CM

## 2021-11-04 DIAGNOSIS — I48.0 PAROXYSMAL ATRIAL FIBRILLATION (CMS/HCC): Primary | ICD-10-CM

## 2021-11-04 LAB
M TB IFN-G BLD-IMP: NEGATIVE
MITOGEN-NIL: 4.29 IU/ML
NIL: 0.02 IU/ML
TB AG-NIL: 0 IU/ML
TB2 AG - NIL: 0 IU/ML

## 2021-11-04 NOTE — TELEPHONE ENCOUNTER
Spoke with pt and informed her of results. Confirmed current dose and had her change to new recommended dose. Pt requested anew lab slip for standing order for PTINR, ordered.

## 2021-11-04 NOTE — TELEPHONE ENCOUNTER
----- Message from Demarco Moreau DO sent at 11/3/2021  2:10 PM EDT -----  All stable except INR low, confirm dose is : TAKE 2 TABLET BY MOUTH DAILY (8 MG DAILY) EXCEPT T/TH TAKE ONE (4MG)  If it si (assume we take care of it) then change to  2 (8mg) daily except Wed take one (4mg)  Repeat INR in 1-2 weeks

## 2021-11-17 ENCOUNTER — HOSPITAL ENCOUNTER (OUTPATIENT)
Dept: RADIOLOGY | Facility: CLINIC | Age: 66
Discharge: HOME | End: 2021-11-17
Attending: FAMILY MEDICINE
Payer: MEDICARE

## 2021-11-17 ENCOUNTER — APPOINTMENT (OUTPATIENT)
Dept: LAB | Facility: CLINIC | Age: 66
End: 2021-11-17
Attending: FAMILY MEDICINE
Payer: MEDICARE

## 2021-11-17 DIAGNOSIS — Z12.31 ENCOUNTER FOR SCREENING MAMMOGRAM FOR MALIGNANT NEOPLASM OF BREAST: ICD-10-CM

## 2021-11-17 DIAGNOSIS — Z86.711 HISTORY OF PULMONARY EMBOLUS (PE): ICD-10-CM

## 2021-11-17 DIAGNOSIS — I48.0 PAROXYSMAL ATRIAL FIBRILLATION (CMS/HCC): ICD-10-CM

## 2021-11-17 LAB
INR PPP: 2.7
PROTHROMBIN TIME: 27.2 SEC (ref 12.2–14.5)

## 2021-11-17 PROCEDURE — 85610 PROTHROMBIN TIME: CPT

## 2021-11-17 PROCEDURE — 77063 BREAST TOMOSYNTHESIS BI: CPT

## 2021-11-17 PROCEDURE — 36415 COLL VENOUS BLD VENIPUNCTURE: CPT

## 2021-11-17 PROCEDURE — 77067 SCR MAMMO BI INCL CAD: CPT

## 2021-11-18 RX ORDER — WARFARIN 4 MG/1
TABLET ORAL
Qty: 40 TABLET | Refills: 10 | OUTPATIENT
Start: 2021-11-18

## 2021-11-18 NOTE — TELEPHONE ENCOUNTER
Medicine Refill Request    Last Office Visit: 9/16/2021  Last Telemedicine Visit: Visit date not found    Next Office Visit: 3/9/2022  Next Telemedicine Visit: Visit date not found         Current Outpatient Medications:   •  atorvastatin (LIPITOR) 40 mg tablet, Take 1 tablet (40 mg total) by mouth once daily., Disp: 90 tablet, Rfl: 3  •  calcium carbonate-vitamin D3 500 mg(1,250mg) -200 unit per tablet, Take 2 tablets by mouth 2 (two) times a day with meals.  , Disp: , Rfl:   •  cholecalciferol, vitamin D3, (VITAMIN D3) 5,000 unit (125 mcg) tablet, Take 5,000 Units by mouth daily., Disp: , Rfl:   •  clobetasol (TEMOVATE) 0.05 % cream, , Disp: , Rfl:   •  diclofenac sodium (VOLTAREN) 1 % topical gel, Apply topically 2 (two) times a day as needed for mild pain., Disp: 100 g, Rfl: 1  •  diltiazem (TIAZAC) 120 mg 24 hr capsule, Take 1 capsule (120 mg total) by mouth once daily., Disp: 90 capsule, Rfl: 3  •  fluticasone propionate (FLONASE) 50 mcg/actuation nasal spray, Administer 1 spray into each nostril daily as needed for rhinitis. Make an appointment if help is needed for nasal symptoms., Disp: 1 Bottle, Rfl: 3  •  ketoconazole (NIZORAL) 2 % cream, APPLY ON THE SKIN TWICE A DAY APPLY TO AFFECTED AREA RASH IN GROIN/ABDOMEN TWICE A DAY FOR 3 WEEKS, Disp: , Rfl:   •  methocarbamoL (ROBAXIN-750) 750 mg tablet, Take one by mouth ONLY as needed every 6-8 hours for muscle pains and spasms, Disp: 50 tablet, Rfl: 1  •  multivitamin capsule, Take 1 capsule by mouth daily., Disp: , Rfl:   •  traMADoL (ULTRAM) 50 mg tablet, Take 1 tablet (50 mg total) by mouth every 8 (eight) hours as needed for moderate pain or severe pain., Disp: 50 tablet, Rfl: 1  •  vitamin B complex/folic acid (B COMPLEX 100 ORAL), Take by mouth., Disp: , Rfl:   •  warfarin (COUMADIN) 4 mg tablet, TAKE 2 TABLET BY MOUTH DAILY (8 MG DAILY) EXCEPT T/TH TAKE ONE (4MG), Disp: 180 tablet, Rfl: 3      BP Readings from Last 3 Encounters:   09/16/21 124/76    07/15/21 132/82   06/22/21 120/78       Recent Lab results:  Lab Results   Component Value Date    CHOL 167 11/02/2021   ,   Lab Results   Component Value Date    HDL 70 11/02/2021   ,   Lab Results   Component Value Date    LDLCALC 85 11/02/2021   ,   Lab Results   Component Value Date    TRIG 60 11/02/2021        Lab Results   Component Value Date    GLUCOSE 91 11/02/2021   ,   Lab Results   Component Value Date    HGBA1C 5.8 (H) 11/02/2021         Lab Results   Component Value Date    CREATININE 0.6 11/02/2021       Lab Results   Component Value Date    TSH 1.83 11/02/2021

## 2021-11-18 NOTE — TELEPHONE ENCOUNTER
----- Message from Demarco Moreau DO sent at 11/17/2021  6:59 PM EST -----  INR stable at 2.7, same dose, repeat in 1 mo

## 2021-12-30 ENCOUNTER — HOSPITAL ENCOUNTER (OUTPATIENT)
Dept: RADIOLOGY | Facility: CLINIC | Age: 66
Discharge: HOME | End: 2021-12-30
Attending: FAMILY MEDICINE
Payer: MEDICARE

## 2021-12-30 ENCOUNTER — TELEPHONE (OUTPATIENT)
Dept: PRIMARY CARE | Facility: CLINIC | Age: 66
End: 2021-12-30
Payer: MEDICARE

## 2021-12-30 DIAGNOSIS — Z78.0 POST-MENOPAUSAL: ICD-10-CM

## 2021-12-30 DIAGNOSIS — Z13.820 SCREENING FOR OSTEOPOROSIS: ICD-10-CM

## 2021-12-30 PROCEDURE — 77080 DXA BONE DENSITY AXIAL: CPT

## 2021-12-30 NOTE — TELEPHONE ENCOUNTER
----- Message from Demarco Moreau DO sent at 12/30/2021  1:14 PM EST -----  Osteopenia, cont Ca++ and Vit D and stay active

## 2022-03-18 RX ORDER — WARFARIN 4 MG/1
TABLET ORAL
Qty: 40 TABLET | Refills: 10 | OUTPATIENT
Start: 2022-03-18

## 2022-03-18 RX ORDER — WARFARIN 4 MG/1
TABLET ORAL
Qty: 180 TABLET | Refills: 3 | Status: CANCELLED | OUTPATIENT
Start: 2022-03-18

## 2022-03-18 NOTE — TELEPHONE ENCOUNTER
Medicine Refill Request    Last Office: 9/16/2021   Last Consult Visit: 7/29/2019  Last Telemedicine Visit: Visit date not found    Next Appointment: 3/30/2022      Current Outpatient Medications:   •  atorvastatin (LIPITOR) 40 mg tablet, Take 1 tablet (40 mg total) by mouth once daily., Disp: 90 tablet, Rfl: 3  •  calcium carbonate-vitamin D3 500 mg(1,250mg) -200 unit per tablet, Take 2 tablets by mouth 2 (two) times a day with meals.  , Disp: , Rfl:   •  cholecalciferol, vitamin D3, (VITAMIN D3) 5,000 unit (125 mcg) tablet, Take 5,000 Units by mouth daily., Disp: , Rfl:   •  clobetasol (TEMOVATE) 0.05 % cream, , Disp: , Rfl:   •  diclofenac sodium (VOLTAREN) 1 % topical gel, Apply topically 2 (two) times a day as needed for mild pain., Disp: 100 g, Rfl: 1  •  diltiazem (TIAZAC) 120 mg 24 hr capsule, Take 1 capsule (120 mg total) by mouth once daily., Disp: 90 capsule, Rfl: 3  •  fluticasone propionate (FLONASE) 50 mcg/actuation nasal spray, Administer 1 spray into each nostril daily as needed for rhinitis. Make an appointment if help is needed for nasal symptoms., Disp: 1 Bottle, Rfl: 3  •  ketoconazole (NIZORAL) 2 % cream, APPLY ON THE SKIN TWICE A DAY APPLY TO AFFECTED AREA RASH IN GROIN/ABDOMEN TWICE A DAY FOR 3 WEEKS, Disp: , Rfl:   •  methocarbamoL (ROBAXIN-750) 750 mg tablet, Take one by mouth ONLY as needed every 6-8 hours for muscle pains and spasms, Disp: 50 tablet, Rfl: 1  •  multivitamin capsule, Take 1 capsule by mouth daily., Disp: , Rfl:   •  traMADoL (ULTRAM) 50 mg tablet, Take 1 tablet (50 mg total) by mouth every 8 (eight) hours as needed for moderate pain or severe pain., Disp: 50 tablet, Rfl: 1  •  vitamin B complex/folic acid (B COMPLEX 100 ORAL), Take by mouth., Disp: , Rfl:   •  warfarin (COUMADIN) 4 mg tablet, TAKE 2 TABLET BY MOUTH DAILY (8 MG DAILY) EXCEPT T/TH TAKE ONE (4MG), Disp: 180 tablet, Rfl: 3      BP Readings from Last 3 Encounters:   09/16/21 124/76   07/15/21 132/82   06/22/21  120/78       Recent Lab results:  Lab Results   Component Value Date    CHOL 167 11/02/2021   ,   Lab Results   Component Value Date    HDL 70 11/02/2021   ,   Lab Results   Component Value Date    LDLCALC 85 11/02/2021   ,   Lab Results   Component Value Date    TRIG 60 11/02/2021        Lab Results   Component Value Date    GLUCOSE 91 11/02/2021   ,   Lab Results   Component Value Date    HGBA1C 5.8 (H) 11/02/2021         Lab Results   Component Value Date    CREATININE 0.6 11/02/2021       Lab Results   Component Value Date    TSH 1.83 11/02/2021

## 2022-03-21 NOTE — TELEPHONE ENCOUNTER
Pt's warfarin was already refilled 7/15/21, #180 with 3 RF, and we did receive a confirmation receipt from the requested pharmacy that they got it.

## 2022-03-30 ENCOUNTER — APPOINTMENT (OUTPATIENT)
Dept: LAB | Facility: CLINIC | Age: 67
End: 2022-03-30
Attending: FAMILY MEDICINE
Payer: MEDICARE

## 2022-03-30 ENCOUNTER — TELEPHONE (OUTPATIENT)
Dept: PRIMARY CARE | Facility: CLINIC | Age: 67
End: 2022-03-30

## 2022-03-30 ENCOUNTER — OFFICE VISIT (OUTPATIENT)
Dept: PRIMARY CARE | Facility: CLINIC | Age: 67
End: 2022-03-30
Payer: MEDICARE

## 2022-03-30 VITALS
DIASTOLIC BLOOD PRESSURE: 74 MMHG | BODY MASS INDEX: 35.88 KG/M2 | HEIGHT: 62 IN | OXYGEN SATURATION: 98 % | HEART RATE: 54 BPM | SYSTOLIC BLOOD PRESSURE: 122 MMHG | RESPIRATION RATE: 16 BRPM | WEIGHT: 195 LBS

## 2022-03-30 DIAGNOSIS — I27.82 OTHER CHRONIC PULMONARY EMBOLISM WITHOUT ACUTE COR PULMONALE: Chronic | ICD-10-CM

## 2022-03-30 DIAGNOSIS — R09.82 POST-NASAL DRIP: Primary | ICD-10-CM

## 2022-03-30 DIAGNOSIS — J45.20 MILD INTERMITTENT ASTHMA WITHOUT COMPLICATION: Chronic | ICD-10-CM

## 2022-03-30 DIAGNOSIS — E44.1 MILD PROTEIN-CALORIE MALNUTRITION (CMS/HCC): ICD-10-CM

## 2022-03-30 DIAGNOSIS — G47.33 OBSTRUCTIVE SLEEP APNEA: Chronic | ICD-10-CM

## 2022-03-30 DIAGNOSIS — I10 ESSENTIAL HYPERTENSION: ICD-10-CM

## 2022-03-30 DIAGNOSIS — R53.83 MALAISE AND FATIGUE: ICD-10-CM

## 2022-03-30 DIAGNOSIS — E78.00 PURE HYPERCHOLESTEROLEMIA: Chronic | ICD-10-CM

## 2022-03-30 DIAGNOSIS — K21.9 GERD WITHOUT ESOPHAGITIS: ICD-10-CM

## 2022-03-30 DIAGNOSIS — J30.2 SEASONAL ALLERGIES: ICD-10-CM

## 2022-03-30 DIAGNOSIS — K92.2 GASTROINTESTINAL HEMORRHAGE, UNSPECIFIED GASTROINTESTINAL HEMORRHAGE TYPE: ICD-10-CM

## 2022-03-30 DIAGNOSIS — E78.5 HYPERLIPIDEMIA, UNSPECIFIED HYPERLIPIDEMIA TYPE: ICD-10-CM

## 2022-03-30 DIAGNOSIS — I48.0 PAROXYSMAL ATRIAL FIBRILLATION (CMS/HCC): ICD-10-CM

## 2022-03-30 DIAGNOSIS — D64.9 ANEMIA, UNSPECIFIED TYPE: ICD-10-CM

## 2022-03-30 DIAGNOSIS — E66.01 MORBID OBESITY DUE TO EXCESS CALORIES (CMS/HCC): Chronic | ICD-10-CM

## 2022-03-30 DIAGNOSIS — E11.618 TYPE 2 DIABETES MELLITUS WITH OTHER DIABETIC ARTHROPATHY, WITHOUT LONG-TERM CURRENT USE OF INSULIN: ICD-10-CM

## 2022-03-30 DIAGNOSIS — R53.81 MALAISE AND FATIGUE: ICD-10-CM

## 2022-03-30 DIAGNOSIS — R73.01 IFG (IMPAIRED FASTING GLUCOSE): Chronic | ICD-10-CM

## 2022-03-30 LAB
INR PPP: 1.5
PROTHROMBIN TIME: 17.2 SEC (ref 12.2–14.5)

## 2022-03-30 PROCEDURE — G8754 DIAS BP LESS 90: HCPCS | Performed by: FAMILY MEDICINE

## 2022-03-30 PROCEDURE — 85610 PROTHROMBIN TIME: CPT

## 2022-03-30 PROCEDURE — 36415 COLL VENOUS BLD VENIPUNCTURE: CPT

## 2022-03-30 PROCEDURE — 99214 OFFICE O/P EST MOD 30 MIN: CPT | Performed by: FAMILY MEDICINE

## 2022-03-30 PROCEDURE — G8752 SYS BP LESS 140: HCPCS | Performed by: FAMILY MEDICINE

## 2022-03-30 RX ORDER — RISANKIZUMAB-RZAA 150 MG/ML
INJECTION SUBCUTANEOUS
COMMUNITY
Start: 2022-03-16

## 2022-03-30 ASSESSMENT — ENCOUNTER SYMPTOMS
APPETITE CHANGE: 0
VOMITING: 0
SINUS PRESSURE: 0
COLOR CHANGE: 0
CONFUSION: 0
TROUBLE SWALLOWING: 0
NAUSEA: 0
EYE PAIN: 0
DYSURIA: 0
DIARRHEA: 0
SORE THROAT: 0
ADENOPATHY: 0
AGITATION: 0
SINUS PAIN: 0
MYALGIAS: 0
FATIGUE: 0
PALPITATIONS: 0
CHEST TIGHTNESS: 0
BLOOD IN STOOL: 0
EYE DISCHARGE: 0
SHORTNESS OF BREATH: 0
HEADACHES: 0
BACK PAIN: 0
COUGH: 1
EYE ITCHING: 0
ARTHRALGIAS: 0
DIZZINESS: 0
ABDOMINAL PAIN: 0
ACTIVITY CHANGE: 0
FEVER: 0

## 2022-03-30 ASSESSMENT — PATIENT HEALTH QUESTIONNAIRE - PHQ9: SUM OF ALL RESPONSES TO PHQ9 QUESTIONS 1 & 2: 0

## 2022-03-30 NOTE — ASSESSMENT & PLAN NOTE
Stable  On coumadin  Lab Results   Component Value Date    INR 2.7 11/17/2021    INR 1.7 11/02/2021    INR 2.5 06/22/2021

## 2022-03-30 NOTE — ASSESSMENT & PLAN NOTE
Diet and TLC  Lab Results   Component Value Date    CHOL 167 11/02/2021    CHOL 145 02/15/2021    CHOL 175 01/20/2020     Lab Results   Component Value Date    HDL 70 11/02/2021    HDL 63 02/15/2021    HDL 69 01/20/2020     Lab Results   Component Value Date    LDLCALC 85 11/02/2021    LDLCALC 72 02/15/2021    LDLCALC 90 01/20/2020     Lab Results   Component Value Date    TRIG 60 11/02/2021    TRIG 48 02/15/2021    TRIG 81 01/20/2020     No results found for: CHOLHDL

## 2022-03-30 NOTE — ASSESSMENT & PLAN NOTE
Stable  On meds  Lab Results   Component Value Date    CHOL 167 11/02/2021    CHOL 145 02/15/2021    CHOL 175 01/20/2020     Lab Results   Component Value Date    HDL 70 11/02/2021    HDL 63 02/15/2021    HDL 69 01/20/2020     Lab Results   Component Value Date    LDLCALC 85 11/02/2021    LDLCALC 72 02/15/2021    LDLCALC 90 01/20/2020     Lab Results   Component Value Date    TRIG 60 11/02/2021    TRIG 48 02/15/2021    TRIG 81 01/20/2020     No results found for: CHOLHDL

## 2022-03-30 NOTE — PROGRESS NOTES
Subjective      Patient ID: Jennifer Aly is a 66 y.o. female.    65 yo female presents to the office with cough and post nasal drip.  Woke up Monday morning with a sore throat but has since resolved. She also had a headache which has also resolved.  Is also having dry cough and post nasal drip.  Pt is afebrile. No sinus pressure or pain.  No lymph node tenderness.  Has not taken any meds. Pt states that her voice is not yet back to baseline; has been having issues with losing her voice due to the PND.    Active and Chronic:    -Neck pain     L side, radiates above scapula  Pain gets worse w/ any head movements   L neck strength +4  reports no numbness/tingling of UE     -- Anemia  -Stable   Lab Results       Component                Value               Date                       WBC                      4.96                11/02/2021                 HGB                      13.8                11/02/2021                 HCT                      44.2                11/02/2021                 MCV                      99.5 (H)            11/02/2021                 PLT                      212                 11/02/2021              HLD (hyperlipidemia)  - Stable  -Educated pt to continue take statin  -Educate pt on diet/TLC     - C/w statin  Lab Results       Component                Value               Date                       CHOL                     145                 02/15/2021                 CHOL                     175                 01/20/2020                 CHOL                     157                 04/04/2019            Lab Results       Component                Value               Date                       HDL                      63                  02/15/2021                 HDL                      69                  01/20/2020                 HDL                      56                  04/04/2019            Lab Results       Component                Value               Date                        LDLCALC                  72                  02/15/2021                 LDLCALC                  90                  01/20/2020                 LDLCALC                  90                  04/04/2019            Lab Results       Component                Value               Date                       TRIG                     48                  02/15/2021                 TRIG                     81                  01/20/2020                 TRIG                     56                  04/04/2019            No results found for: CHOLHDL  The 10-year ASCVD risk score (Eagle FARAH Jr., et al., 2013) is: 15.2%    Values used to calculate the score:      Age: 66 years      Sex: Female      Is Non- : No      Diabetic: Yes      Tobacco smoker: No      Systolic Blood Pressure: 124 mmHg      Is BP treated: Yes      HDL Cholesterol: 63 mg/dL      Total Cholesterol: 145 mg/dL     Paroxysmal atrial fibrillation (CMS/HCC)  -stable  -Continue to take Diltiazem   -Haven't established an appointment w/ Cardiolgist     Essential hypertension  - Stable  - Continue taking BP med  BP Readings from Last 3 Encounters:  09/16/21 : 124/76  07/15/21 : 132/82  06/22/21 : 120/78  - Continue diltiazam     Obstructive sleep apnea  - CPAP  -Sees Dr. Gallo      The following have been reviewed and updated as appropriate in this visit:   Allergies  Meds  Problems           Past Medical History:   Diagnosis Date   • Arthritis    • Fistula    • Gout    • HLD (hyperlipidemia)    • IFG (impaired fasting glucose)    • Obesity    • Polyarthritis    • Pulmonary emboli (CMS/HCC)     pulmonary emboli   • Torn rotator cuff        Past Surgical History:   Procedure Laterality Date   • APPENDECTOMY  2008   • CARDIAC CATHETERIZATION  11/2012   • INTRACAPSULAR CATARACT EXTRACTION Bilateral 08/2019   • IVC FILTER RETRIEVAL  2012   • IVC FILTER RETRIEVAL  02/2013   • RECTOURETHRAL FISTULA REPAIR  2008, 5/2017   • ROTATOR CUFF REPAIR Left  2016   • ALTAF-EN-Y PROCEDURE  2017   • TOTAL ABDOMINAL HYSTERECTOMY     • TOTAL KNEE ARTHROPLASTY Bilateral 2010   • TOTAL KNEE ARTHROPLASTY Right 2012    revision   • TRIGGER FINGER RELEASE Right 2012    right thumb   • ULNAR NERVE REPAIR  2013       Family History   Problem Relation Age of Onset   • Diabetes Biological Mother    • Lung cancer Biological Mother    • Alzheimer's disease Biological Mother    • Diabetes type II Biological Father    • Heart failure Biological Father        Social History     Socioeconomic History   • Marital status:      Spouse name: Not on file   • Number of children: Not on file   • Years of education: Not on file   • Highest education level: Not on file   Occupational History   • Not on file   Tobacco Use   • Smoking status: Former Smoker     Years: 15.00     Quit date:      Years since quittin.2   • Smokeless tobacco: Never Used   Substance and Sexual Activity   • Alcohol use: Yes     Comment: occasional wine   • Drug use: No   • Sexual activity: Not Currently   Other Topics Concern   • Not on file   Social History Narrative   • Not on file     Social Determinants of Health     Financial Resource Strain: Not on file   Food Insecurity: Not on file   Transportation Needs: Not on file   Physical Activity: Not on file   Stress: Not on file   Social Connections: Not on file   Intimate Partner Violence: Not on file   Housing Stability: Not on file       Allergies   Allergen Reactions   • Sulfa (Sulfonamide Antibiotics) Hives       Current Outpatient Medications   Medication Sig Dispense Refill   • atorvastatin (LIPITOR) 40 mg tablet Take 1 tablet (40 mg total) by mouth once daily. 90 tablet 3   • calcium carbonate-vitamin D3 500 mg(1,250mg) -200 unit per tablet Take 2 tablets by mouth 2 (two) times a day with meals.       • cholecalciferol, vitamin D3, 5,000 unit (125 mcg) tablet Take 5,000 Units by mouth daily.     • clobetasol (TEMOVATE) 0.05 % cream      •  diclofenac sodium (VOLTAREN) 1 % topical gel Apply topically 2 (two) times a day as needed for mild pain. 100 g 1   • diltiazem (TIAZAC) 120 mg 24 hr capsule Take 1 capsule (120 mg total) by mouth once daily. 90 capsule 3   • fluticasone propionate (FLONASE) 50 mcg/actuation nasal spray Administer 1 spray into each nostril daily as needed for rhinitis. Make an appointment if help is needed for nasal symptoms. 1 Bottle 3   • ketoconazole (NIZORAL) 2 % cream APPLY ON THE SKIN TWICE A DAY APPLY TO AFFECTED AREA RASH IN GROIN/ABDOMEN TWICE A DAY FOR 3 WEEKS     • methocarbamoL (ROBAXIN-750) 750 mg tablet Take one by mouth ONLY as needed every 6-8 hours for muscle pains and spasms 50 tablet 1   • multivitamin capsule Take 1 capsule by mouth daily.     • SKYRIZI 150 mg/mL syringe      • traMADoL (ULTRAM) 50 mg tablet Take 1 tablet (50 mg total) by mouth every 8 (eight) hours as needed for moderate pain or severe pain. 50 tablet 1   • vitamin B complex/folic acid (B COMPLEX 100 ORAL) Take by mouth.     • warfarin (COUMADIN) 4 mg tablet TAKE 2 TABLET BY MOUTH DAILY (8 MG DAILY) EXCEPT T/TH TAKE ONE (4MG) 180 tablet 3     No current facility-administered medications for this visit.       Review of Systems   Constitutional: Negative for activity change, appetite change, fatigue and fever.   HENT: Positive for postnasal drip. Negative for congestion, sinus pressure, sinus pain, sore throat and trouble swallowing.    Eyes: Negative for pain, discharge, itching and visual disturbance.   Respiratory: Positive for cough. Negative for chest tightness and shortness of breath.    Cardiovascular: Negative for chest pain, palpitations and leg swelling.   Gastrointestinal: Negative for abdominal pain, blood in stool, diarrhea, nausea and vomiting.   Endocrine: Negative for cold intolerance and heat intolerance.   Genitourinary: Negative for dysuria.   Musculoskeletal: Negative for arthralgias, back pain, gait problem and myalgias.    Skin: Negative for color change and rash.   Allergic/Immunologic: Negative for environmental allergies and food allergies.   Neurological: Negative for dizziness and headaches.   Hematological: Negative for adenopathy.   Psychiatric/Behavioral: Negative for agitation, behavioral problems and confusion.       Objective     Vitals:    03/30/22 1058   BP: 122/74   Pulse: (!) 54   Resp: 16   SpO2: 98%       Physical Exam  Vitals and nursing note reviewed.   Constitutional:       Appearance: She is well-developed.   HENT:      Head: Normocephalic and atraumatic.      Right Ear: Tympanic membrane, ear canal and external ear normal.      Left Ear: Tympanic membrane, ear canal and external ear normal.      Nose: Nose normal.   Eyes:      Conjunctiva/sclera: Conjunctivae normal.      Pupils: Pupils are equal, round, and reactive to light.   Neck:      Thyroid: No thyromegaly.   Cardiovascular:      Rate and Rhythm: Normal rate and regular rhythm.      Heart sounds: Normal heart sounds. No murmur heard.  Pulmonary:      Effort: Pulmonary effort is normal. No respiratory distress.      Breath sounds: Normal breath sounds. No wheezing or rales.   Abdominal:      General: Bowel sounds are normal. There is no distension.      Palpations: Abdomen is soft. There is no mass.      Tenderness: There is no abdominal tenderness. There is no guarding.   Musculoskeletal:         General: No tenderness. Normal range of motion.      Cervical back: Normal range of motion and neck supple.   Skin:     General: Skin is warm and dry.      Capillary Refill: Capillary refill takes less than 2 seconds.      Findings: No lesion or rash.   Neurological:      Mental Status: She is alert and oriented to person, place, and time.      Cranial Nerves: No cranial nerve deficit.      Deep Tendon Reflexes: Reflexes normal.   Psychiatric:         Behavior: Behavior normal.         Thought Content: Thought content normal.         Judgment: Judgment normal.          Assessment/Plan     Problem List Items Addressed This Visit        Respiratory    Obstructive sleep apnea (Chronic)    Overview     Overview:            Current Assessment & Plan     Follows with Dr. Sabino Bronson  Uses CPAP           Unspecified asthma, uncomplicated (Chronic)    Overview     Overview:            Current Assessment & Plan     Stable  On inhaler Prn              Circulatory    Paroxysmal atrial fibrillation (CMS/HCC)    Current Assessment & Plan     Stable  On coumadin           Relevant Orders    Protime-INR    Pulmonary emboli (CMS/HCC) (Chronic)    Overview     Overview:            Current Assessment & Plan     Stable  On coumadin  Lab Results   Component Value Date    INR 2.7 11/17/2021    INR 1.7 11/02/2021    INR 2.5 06/22/2021              Relevant Orders    Protime-INR    Essential hypertension    Current Assessment & Plan     Stable  On meds  Low salt diet              Digestive    GERD without esophagitis    Current Assessment & Plan     Stable  Not on meds           GI bleed    Current Assessment & Plan     S/p EGD  Lab Results   Component Value Date    WBC 4.96 11/02/2021    HGB 13.8 11/02/2021    HCT 44.2 11/02/2021    MCV 99.5 (H) 11/02/2021     11/02/2021              Mild protein-calorie malnutrition (CMS/HCC)    Current Assessment & Plan     Stable  On multi vitamins              Endocrine/Metabolic    IFG (impaired fasting glucose) (Chronic)    Current Assessment & Plan     Diet and TLC  Lab Results   Component Value Date    HGBA1C 5.8 (H) 11/02/2021              Morbid obesity due to excess calories (CMS/HCC) (Chronic)    Overview     Overview:            Current Assessment & Plan     Diet and TLC           Pure hypercholesterolemia (Chronic)    Overview     Overview:            Current Assessment & Plan     Diet and TLC  Lab Results   Component Value Date    CHOL 167 11/02/2021    CHOL 145 02/15/2021    CHOL 175 01/20/2020     Lab Results   Component Value Date     HDL 70 11/02/2021    HDL 63 02/15/2021    HDL 69 01/20/2020     Lab Results   Component Value Date    LDLCALC 85 11/02/2021    LDLCALC 72 02/15/2021    LDLCALC 90 01/20/2020     Lab Results   Component Value Date    TRIG 60 11/02/2021    TRIG 48 02/15/2021    TRIG 81 01/20/2020     No results found for: CHOLHDL           HLD (hyperlipidemia)    Current Assessment & Plan     Stable  On meds  Lab Results   Component Value Date    CHOL 167 11/02/2021    CHOL 145 02/15/2021    CHOL 175 01/20/2020     Lab Results   Component Value Date    HDL 70 11/02/2021    HDL 63 02/15/2021    HDL 69 01/20/2020     Lab Results   Component Value Date    LDLCALC 85 11/02/2021    LDLCALC 72 02/15/2021    LDLCALC 90 01/20/2020     Lab Results   Component Value Date    TRIG 60 11/02/2021    TRIG 48 02/15/2021    TRIG 81 01/20/2020     No results found for: CHOLHDL           Diabetes mellitus (CMS/HCC)       Hematologic    Anemia       Other    Malaise and fatigue    Seasonal allergies    Current Assessment & Plan     On flonase           Post-nasal drip - Primary    Current Assessment & Plan     Pt with PND and dry cough since Monday.   Pt is afebrile. No sinus pressure or pain.  No lymph node tenderness.  Has not taken any meds. Pt states that her voice is not yet back to baseline; has been having issues with losing her voice due to the PND.  Trial flonase, mucinex  Stay well hydrated.                 DIAGNOSIS  1. Post-nasal drip    2. Seasonal allergies    3. Mild intermittent asthma without complication    4. Pure hypercholesterolemia    5. Other chronic pulmonary embolism without acute cor pulmonale (CMS/HCC)    6. Paroxysmal atrial fibrillation (CMS/HCC)    7. Morbid obesity due to excess calories (CMS/HCC)    8. Mild protein-calorie malnutrition (CMS/HCC)    9. Malaise and fatigue    10. IFG (impaired fasting glucose)    11. Hyperlipidemia, unspecified hyperlipidemia type    12. Gastrointestinal hemorrhage, unspecified  gastrointestinal hemorrhage type    13. GERD without esophagitis    14. Essential hypertension    15. Type 2 diabetes mellitus with other diabetic arthropathy, without long-term current use of insulin (CMS/[facility])    16. Anemia, unspecified type    17. Obstructive sleep apnea        No exam data present        Demarco Moreau DO  3/30/2022

## 2022-03-30 NOTE — ASSESSMENT & PLAN NOTE
S/p EGD  Lab Results   Component Value Date    WBC 4.96 11/02/2021    HGB 13.8 11/02/2021    HCT 44.2 11/02/2021    MCV 99.5 (H) 11/02/2021     11/02/2021

## 2022-03-30 NOTE — ASSESSMENT & PLAN NOTE
Pt with PND and dry cough since Monday.   Pt is afebrile. No sinus pressure or pain.  No lymph node tenderness.  Has not taken any meds. Pt states that her voice is not yet back to baseline; has been having issues with losing her voice due to the PND.  Trial flonase, mucinex  Stay well hydrated.

## 2022-03-30 NOTE — TELEPHONE ENCOUNTER
Spoke to patient - she confirmed she was taking 8mg daily except T/TH taking 4mg. She also confirmed she did not skip any doses.   She is aware of the new medication directions and will repeat level in 1-2 weeks. She read the result note herself on her [employer] account too.

## 2022-03-30 NOTE — TELEPHONE ENCOUNTER
----- Message from Demarco Moreau, DO sent at 3/30/2022  5:28 PM EDT -----  INR low, confirm her dose... TAKE 2 TABLET BY MOUTH DAILY (8 MG DAILY) EXCEPT T/TH TAKE ONE (4MG)  If she is compliant then change to 8mg daily and 4 mg just Wed, repeat INR in 1-2 weeks, if she has missed doses, tell her take them every day as Rx'd and repeat in 2 weeks, thx (keep me posted)

## 2022-04-28 ENCOUNTER — TELEPHONE (OUTPATIENT)
Dept: PRIMARY CARE | Facility: CLINIC | Age: 67
End: 2022-04-28
Payer: MEDICARE

## 2022-04-28 NOTE — TELEPHONE ENCOUNTER
Pt states she was instructed to receive training on how to operate her INR Monitor, and would like to know if Dr. Moreau provides that instruction.        Please advise.

## 2022-04-28 NOTE — TELEPHONE ENCOUNTER
Spoke with pt, scheduled her an appointment for 5/25 (first available time that worked for her schedule). Pt is going to reach out to the INR monitoring system company to see if they are able to help her sooner. She will call back to cancel here if they can help her sooner.

## 2022-04-28 NOTE — TELEPHONE ENCOUNTER
"She could bring it in or see if the company provides any training, It is a finger stick device POCT. But may have some \"controls\" to do which the package would usually have in it?   "

## 2022-05-06 LAB — INR PPP: 1.5

## 2022-05-10 ENCOUNTER — TELEPHONE (OUTPATIENT)
Dept: PRIMARY CARE | Facility: CLINIC | Age: 67
End: 2022-05-10
Payer: MEDICARE

## 2022-05-10 RX ORDER — WARFARIN 4 MG/1
TABLET ORAL
Qty: 180 TABLET | Refills: 3
Start: 2022-05-10 | End: 2022-06-02 | Stop reason: SDUPTHER

## 2022-05-10 NOTE — TELEPHONE ENCOUNTER
Se if she is taking 8 mg daily except T/TH if so, then have her increase to 8mg daily, and repeat the INR in 1-2 weeks

## 2022-05-10 NOTE — TELEPHONE ENCOUNTER
Pt wants Dr to know she has been trained on Monitor,  however it has Blue tooth capabilities and not sure who gets readings, but INR is down to 1.5

## 2022-05-10 NOTE — TELEPHONE ENCOUNTER
Spoke with pt her current regimen is 8mg daily except Wednesdays is 4 mg. Please advise if 8mg will be good or if you need a different increase.

## 2022-05-16 LAB
INR PPP: 1.9
INR PPP: 1.9

## 2022-05-18 ENCOUNTER — TELEPHONE (OUTPATIENT)
Dept: PRIMARY CARE | Facility: CLINIC | Age: 67
End: 2022-05-18
Payer: MEDICARE

## 2022-05-18 NOTE — TELEPHONE ENCOUNTER
Shital whippleINR result for pt that INR is 1.9. Pt Dr. Moreau pt will continue 8mg daily and repeat in 1 week. Pt ida.

## 2022-05-23 LAB — INR PPP: 2.3

## 2022-05-25 ENCOUNTER — TELEPHONE (OUTPATIENT)
Dept: PRIMARY CARE | Facility: CLINIC | Age: 67
End: 2022-05-25
Payer: MEDICARE

## 2022-05-25 NOTE — TELEPHONE ENCOUNTER
Have her ask the ,it should be a bloodless procedure, But if they expect bleeding, then stop 5 days prior and she can resume the next day.

## 2022-05-25 NOTE — TELEPHONE ENCOUNTER
Received INR result of 2.3. Per Dr. HOOKS I called pt and informed her labs stable and keep same med dose.    Pt stated she is going to get her eyebrows tattooed and wants to know if she would need to stop her blood thinner for this? If so, when she should stop/ resume taking it.

## 2022-05-30 LAB — INR PPP: 1.8

## 2022-06-02 ENCOUNTER — TELEPHONE (OUTPATIENT)
Dept: PRIMARY CARE | Facility: CLINIC | Age: 67
End: 2022-06-02
Payer: MEDICARE

## 2022-06-02 RX ORDER — WARFARIN 4 MG/1
TABLET ORAL
Qty: 180 TABLET | Refills: 3
Start: 2022-06-02 | End: 2022-06-29 | Stop reason: SDUPTHER

## 2022-06-02 NOTE — TELEPHONE ENCOUNTER
----- Message from Gail Gallo MA sent at 6/2/2022 11:16 AM EDT -----  Regarding: FW: INR results  I placed lab back on your desk.   ----- Message -----  From: Jennifer Aly  Sent: 6/2/2022  11:03 AM EDT  To: [facility] Clinical Support [facility]  Subject: INR results                                      Hi Gail,  My INR on 5/30 was 1.8  And on 5/23 it was 2.3  And on 5/16 it was 1.9  Also 5/6 it was 1.5  Stable?? Shouldn’t it be in between 2.0-3.0??  Just questioning the dose I take 2 tablets, 4mgs a day= 8mgs.  Thanks,  Jennifer Aly

## 2022-06-06 LAB — INR PPP: 2.7

## 2022-06-13 LAB — INR PPP: 1.8

## 2022-06-20 LAB — INR PPP: 3.5

## 2022-06-27 LAB — INR PPP: 4.8

## 2022-06-29 ENCOUNTER — TELEPHONE (OUTPATIENT)
Dept: PRIMARY CARE | Facility: CLINIC | Age: 67
End: 2022-06-29
Payer: MEDICARE

## 2022-06-29 RX ORDER — WARFARIN 4 MG/1
TABLET ORAL
Qty: 180 TABLET | Refills: 3
Start: 2022-06-29 | End: 2022-08-10 | Stop reason: SDUPTHER

## 2022-06-29 NOTE — TELEPHONE ENCOUNTER
----- Message from Gail Gallo MA sent at 6/29/2022  7:56 AM EDT -----  Regarding: FW: INR results    ----- Message -----  From: Jennifer Aly  Sent: 6/28/2022   6:48 PM EDT  To: [facility] Clinical Support P  Subject: INR results                                      I’m taking 8 mgs a day, Monday thru Sunday except for Tuesday and Thursday I take 10 mgs. I will be taking my INR test again on Monday.  Thank you,  Jennifer Aly

## 2022-06-29 NOTE — TELEPHONE ENCOUNTER
New dose adjustmnet based on INR of 4.8, see notes  I sent Jennifer a portal message, she will just take 8mg daily now. Let her know Monday is July 4th and I am off Tuesday too. See if she can get the INR on Wednesday, thx

## 2022-07-18 LAB — INR PPP: 2.5

## 2022-07-22 RX ORDER — DILTIAZEM HYDROCHLORIDE 120 MG/1
CAPSULE, EXTENDED RELEASE ORAL
Qty: 90 CAPSULE | Refills: 3 | Status: SHIPPED | OUTPATIENT
Start: 2022-07-22 | End: 2023-04-25 | Stop reason: SDUPTHER

## 2022-07-22 RX ORDER — ATORVASTATIN CALCIUM 40 MG/1
TABLET, FILM COATED ORAL
Qty: 90 TABLET | Refills: 3 | Status: SHIPPED | OUTPATIENT
Start: 2022-07-22 | End: 2023-04-25 | Stop reason: SDUPTHER

## 2022-07-22 NOTE — TELEPHONE ENCOUNTER
Medicine Refill Request    Last Office: 3/30/2022   Last Consult Visit: 7/29/2019  Last Telemedicine Visit: Visit date not found    Next Appointment: 10/5/2022      Current Outpatient Medications:   •  atorvastatin (LIPITOR) 40 mg tablet, Take 1 tablet (40 mg total) by mouth once daily., Disp: 90 tablet, Rfl: 3  •  calcium carbonate-vitamin D3 500 mg(1,250mg) -200 unit per tablet, Take 2 tablets by mouth 2 (two) times a day with meals.  , Disp: , Rfl:   •  cholecalciferol, vitamin D3, 5,000 unit (125 mcg) tablet, Take 5,000 Units by mouth daily., Disp: , Rfl:   •  clobetasol (TEMOVATE) 0.05 % cream, , Disp: , Rfl:   •  diclofenac sodium (VOLTAREN) 1 % topical gel, Apply topically 2 (two) times a day as needed for mild pain., Disp: 100 g, Rfl: 1  •  diltiazem (TIAZAC) 120 mg 24 hr capsule, Take 1 capsule (120 mg total) by mouth once daily., Disp: 90 capsule, Rfl: 3  •  fluticasone propionate (FLONASE) 50 mcg/actuation nasal spray, Administer 1 spray into each nostril daily as needed for rhinitis. Make an appointment if help is needed for nasal symptoms., Disp: 1 Bottle, Rfl: 3  •  ketoconazole (NIZORAL) 2 % cream, APPLY ON THE SKIN TWICE A DAY APPLY TO AFFECTED AREA RASH IN GROIN/ABDOMEN TWICE A DAY FOR 3 WEEKS, Disp: , Rfl:   •  methocarbamoL (ROBAXIN-750) 750 mg tablet, Take one by mouth ONLY as needed every 6-8 hours for muscle pains and spasms, Disp: 50 tablet, Rfl: 1  •  multivitamin capsule, Take 1 capsule by mouth daily., Disp: , Rfl:   •  SKYRIZI 150 mg/mL syringe, , Disp: , Rfl:   •  traMADoL (ULTRAM) 50 mg tablet, Take 1 tablet (50 mg total) by mouth every 8 (eight) hours as needed for moderate pain or severe pain., Disp: 50 tablet, Rfl: 1  •  vitamin B complex/folic acid (B COMPLEX 100 ORAL), Take by mouth., Disp: , Rfl:   •  warfarin (COUMADIN) 4 mg tablet, (2 tablets) 8 mg daily, Disp: 180 tablet, Rfl: 3      BP Readings from Last 3 Encounters:   03/30/22 122/74   09/16/21 124/76   07/15/21 132/82        Recent Lab results:  Lab Results   Component Value Date    CHOL 167 11/02/2021   ,   Lab Results   Component Value Date    HDL 70 11/02/2021   ,   Lab Results   Component Value Date    LDLCALC 85 11/02/2021   ,   Lab Results   Component Value Date    TRIG 60 11/02/2021        Lab Results   Component Value Date    GLUCOSE 91 11/02/2021   ,   Lab Results   Component Value Date    HGBA1C 5.8 (H) 11/02/2021         Lab Results   Component Value Date    CREATININE 0.6 11/02/2021       Lab Results   Component Value Date    TSH 1.83 11/02/2021

## 2022-07-25 LAB — INR PPP: 3.2

## 2022-08-05 LAB — INR PPP: 1.5

## 2022-08-10 ENCOUNTER — TELEPHONE (OUTPATIENT)
Dept: PRIMARY CARE | Facility: CLINIC | Age: 67
End: 2022-08-10
Payer: MEDICARE

## 2022-08-10 RX ORDER — WARFARIN 4 MG/1
TABLET ORAL
Qty: 180 TABLET | Refills: 3
Start: 2022-08-10 | End: 2022-08-23

## 2022-08-10 NOTE — TELEPHONE ENCOUNTER
----- Message from Gail Gallo MA sent at 8/10/2022  7:50 AM EDT -----  Regarding: FW: INR results  INR result was 1.5, please advise  ----- Message -----  From: Jennifer Aly  Sent: 8/9/2022   7:27 PM EDT  To: [facility] Clinical Support P  Subject: INR results                                      No. I have been taking 8mgs Sunday,Monday, Wednesday,Friday and Saturday.  Then 4mgs on Tuesday and Thursday.

## 2022-08-15 LAB — INR PPP: 2.9

## 2022-08-15 RX ORDER — WARFARIN 4 MG/1
TABLET ORAL
Qty: 60 TABLET | Refills: 11 | OUTPATIENT
Start: 2022-08-15

## 2022-08-15 NOTE — TELEPHONE ENCOUNTER
Medicine Refill Request    Last Office: 3/30/2022   Last Consult Visit: 7/29/2019  Last Telemedicine Visit: Visit date not found    Next Appointment: 10/5/2022      Current Outpatient Medications:   •  atorvastatin (LIPITOR) 40 mg tablet, TAKE 1 TABLET BY MOUTH EVERY DAY, Disp: 90 tablet, Rfl: 3  •  calcium carbonate-vitamin D3 500 mg(1,250mg) -200 unit per tablet, Take 2 tablets by mouth 2 (two) times a day with meals.  , Disp: , Rfl:   •  cholecalciferol, vitamin D3, 5,000 unit (125 mcg) tablet, Take 5,000 Units by mouth daily., Disp: , Rfl:   •  clobetasol (TEMOVATE) 0.05 % cream, , Disp: , Rfl:   •  diclofenac sodium (VOLTAREN) 1 % topical gel, Apply topically 2 (two) times a day as needed for mild pain., Disp: 100 g, Rfl: 1  •  fluticasone propionate (FLONASE) 50 mcg/actuation nasal spray, Administer 1 spray into each nostril daily as needed for rhinitis. Make an appointment if help is needed for nasal symptoms., Disp: 1 Bottle, Rfl: 3  •  ketoconazole (NIZORAL) 2 % cream, APPLY ON THE SKIN TWICE A DAY APPLY TO AFFECTED AREA RASH IN GROIN/ABDOMEN TWICE A DAY FOR 3 WEEKS, Disp: , Rfl:   •  methocarbamoL (ROBAXIN-750) 750 mg tablet, Take one by mouth ONLY as needed every 6-8 hours for muscle pains and spasms, Disp: 50 tablet, Rfl: 1  •  multivitamin capsule, Take 1 capsule by mouth daily., Disp: , Rfl:   •  SKYRIZI 150 mg/mL syringe, , Disp: , Rfl:   •  TIADYLT  mg 24 hr capsule, TAKE 1 CAPSULE BY MOUTH ONCE DAILY, Disp: 90 capsule, Rfl: 3  •  traMADoL (ULTRAM) 50 mg tablet, Take 1 tablet (50 mg total) by mouth every 8 (eight) hours as needed for moderate pain or severe pain., Disp: 50 tablet, Rfl: 1  •  vitamin B complex/folic acid (B COMPLEX 100 ORAL), Take by mouth., Disp: , Rfl:   •  warfarin (COUMADIN) 4 mg tablet, (2 tablets) 8 mg daily,except Wednesday take 4mg (one tablet), Disp: 180 tablet, Rfl: 3      BP Readings from Last 3 Encounters:   03/30/22 122/74   09/16/21 124/76   07/15/21 132/82        Recent Lab results:  Lab Results   Component Value Date    CHOL 167 11/02/2021   ,   Lab Results   Component Value Date    HDL 70 11/02/2021   ,   Lab Results   Component Value Date    LDLCALC 85 11/02/2021   ,   Lab Results   Component Value Date    TRIG 60 11/02/2021        Lab Results   Component Value Date    GLUCOSE 91 11/02/2021   ,   Lab Results   Component Value Date    HGBA1C 5.8 (H) 11/02/2021         Lab Results   Component Value Date    CREATININE 0.6 11/02/2021       Lab Results   Component Value Date    TSH 1.83 11/02/2021

## 2022-08-22 LAB — INR PPP: 2.9

## 2022-08-23 RX ORDER — WARFARIN 4 MG/1
TABLET ORAL
Qty: 180 TABLET | Refills: 3 | Status: SHIPPED | OUTPATIENT
Start: 2022-08-23 | End: 2022-10-05 | Stop reason: ALTCHOICE

## 2022-08-29 ENCOUNTER — TELEPHONE (OUTPATIENT)
Dept: PRIMARY CARE | Facility: CLINIC | Age: 67
End: 2022-08-29
Payer: MEDICARE

## 2022-08-29 NOTE — TELEPHONE ENCOUNTER
Tough to say, sounds viral, Push clear liquids and stay well hydrated, then slowly increase diet to soft foods and more Fiber, keep us posted.  Dr. HOOKS

## 2022-08-29 NOTE — TELEPHONE ENCOUNTER
Pt left VM stating she has been having some bowel movement issues lately. Pt stated she has dark green stool and some diarrhea. Pt stated she is not having any abdominal pain or any pain or discomfort with bowel movements. Pt is concerned about the color and wasn't sure if she should be taking any medication to help clear it up. Please advise on how to proceed.

## 2022-09-05 ENCOUNTER — TELEPHONE (OUTPATIENT)
Dept: FAMILY MEDICINE | Facility: CLINIC | Age: 67
End: 2022-09-05
Payer: MEDICARE

## 2022-09-05 LAB — INR PPP: 3.5

## 2022-09-06 ENCOUNTER — TELEPHONE (OUTPATIENT)
Dept: PRIMARY CARE | Facility: CLINIC | Age: 67
End: 2022-09-06
Payer: MEDICARE

## 2022-09-06 RX ORDER — WARFARIN 4 MG/1
TABLET ORAL
Start: 2022-09-06 | End: 2022-12-14 | Stop reason: SDUPTHER

## 2022-09-06 NOTE — TELEPHONE ENCOUNTER
Pt called into report her INR was slightly elevated and wanted to ifor you that she has been taking 8mg of warfarin m.w.f.sat, sun and 4mg every Tuesday and Thursday.

## 2022-09-06 NOTE — TELEPHONE ENCOUNTER
"Tell her to take 4mg every day now and 8 mg m/w/f repeat in 1 week INR (pleas put in new dose and put \"ni print\" so ist is just a med rec, thx  "

## 2022-09-06 NOTE — TELEPHONE ENCOUNTER
Paged by on call answering service by INR group. INR was 6.8 today.  Contacted patient.  Her INR was 6.8 today, 3.5 last week. She repeated her INR after the 6.8 and it was 3.5 again.  She had never had an INR that high before.  She will continue with regimen and will talk to PCP about #s moving forward.

## 2022-09-08 ENCOUNTER — TELEPHONE (OUTPATIENT)
Dept: PRIMARY CARE | Facility: CLINIC | Age: 67
End: 2022-09-08
Payer: MEDICARE

## 2022-09-08 RX ORDER — NEOMYCIN SULFATE, POLYMYXIN B SULFATE AND HYDROCORTISONE 10; 3.5; 1 MG/ML; MG/ML; [USP'U]/ML
SUSPENSION/ DROPS AURICULAR (OTIC)
Qty: 10 ML | Refills: 0 | Status: SHIPPED | OUTPATIENT
Start: 2022-09-08 | End: 2022-10-05 | Stop reason: ALTCHOICE

## 2022-09-08 NOTE — TELEPHONE ENCOUNTER
----- Message from Gail Gallo MA sent at 9/8/2022  1:40 PM EDT -----  Regarding: FW: Prescription    ----- Message -----  From: Jennifer Aly  Sent: 9/8/2022   1:15 PM EDT  To: [facility] Clinical Support [facility]  Subject: Prescription                                     Hi! I have an ear infection going on and was hoping to get a script. In the past I was prescribed: Cortisporin 1% Otic Loreta. Please let me know if it can be subscribed without an office visit. Thank you, Jennifer Aly

## 2022-09-20 LAB — INR PPP: 2.2

## 2022-09-22 NOTE — PROGRESS NOTES
Cardiology  Office Consult Note         Reason for visit:   Chief Complaint   Patient presents with    Initial Evaluation       HPI     Jennifer Aly is a 67 y.o. female who presents to the office for cardiovascular evaluation.    Her past medical history is significant for Afib?, HTN, sleep apnea with cpap, PE and pure hypercholesteremia, GI Bleed, and bariatric surgery 2017.    She follows with Dr Moreau for her primary medical management and has followed with [facility] in the past? Called them and they do not have records for her. Dr Moreau manages her Coumadin therapy for her PE years a go. She reports that she had a cardiac cath in the past in 2012 to r/o afib? She had a blood clot and they thought that maybe this was related to afib but this was never captured on any EKG or telemtry montior. I am unable to find record of this in [facility].     Today she reports that she has been feeling tired and fatigued lately with exertional shortness of breath. She has also felt lightheaded when she is walking and short of breath. She has also noted that she feels like her pulse is pounding in her ears which is new for her.       Past Medical History:   Diagnosis Date    Arthritis     Fistula     Gout     HLD (hyperlipidemia)     IFG (impaired fasting glucose)     Obesity     Polyarthritis     Pulmonary emboli (CMS/HCC)     pulmonary emboli    Torn rotator cuff        Past Surgical History:   Procedure Laterality Date    APPENDECTOMY  2008    CARDIAC CATHETERIZATION  11/2012    INTRACAPSULAR CATARACT EXTRACTION Bilateral 08/2019    IVC FILTER RETRIEVAL  2012    IVC FILTER RETRIEVAL  02/2013    RECTOURETHRAL FISTULA REPAIR  2008, 5/2017    ROTATOR CUFF REPAIR Left 01/2016    ALTAF-EN-Y PROCEDURE  02/07/2017    TOTAL ABDOMINAL HYSTERECTOMY  1987    TOTAL KNEE ARTHROPLASTY Bilateral 2010    TOTAL KNEE ARTHROPLASTY Right 2012    revision    TRIGGER FINGER RELEASE Right 2012    right thumb     ULNAR NERVE REPAIR          Social History     Tobacco Use   Smoking Status Former Smoker    Years: 15.    Quit date:     Years since quittin.7   Smokeless Tobacco Never Used     Social History     Tobacco Use    Smoking status: Former Smoker     Years: 15.     Quit date:      Years since quittin.7    Smokeless tobacco: Never Used   Substance Use Topics    Alcohol use: Yes     Comment: occasional wine    Drug use: No       Family History   Problem Relation Age of Onset    Diabetes Biological Mother     Lung cancer Biological Mother     Alzheimer's disease Biological Mother     Diabetes type II Biological Father     Heart failure Biological Father     Heart attack Biological Sister        Allergies:  Sulfa (sulfonamide antibiotics)    Current Outpatient Medications   Medication Sig Dispense Refill    atorvastatin (LIPITOR) 40 mg tablet TAKE 1 TABLET BY MOUTH EVERY DAY 90 tablet 3    calcium carbonate-vitamin D3 500 mg(1,250mg) -200 unit per tablet Take 2 tablets by mouth 2 (two) times a day with meals.        cholecalciferol, vitamin D3, 5,000 unit (125 mcg) tablet Take 5,000 Units by mouth daily.      clobetasol (TEMOVATE) 0.05 % cream       fluticasone propionate (FLONASE) 50 mcg/actuation nasal spray Administer 1 spray into each nostril daily as needed for rhinitis. Make an appointment if help is needed for nasal symptoms. 1 Bottle 3    ketoconazole (NIZORAL) 2 % cream APPLY ON THE SKIN TWICE A DAY APPLY TO AFFECTED AREA RASH IN GROIN/ABDOMEN TWICE A DAY FOR 3 WEEKS      methocarbamoL (ROBAXIN-750) 750 mg tablet Take one by mouth ONLY as needed every 6-8 hours for muscle pains and spasms 50 tablet 1    multivitamin capsule Take 1 capsule by mouth daily.      neomycin-polymyxin-hydrocortisone (CORTISPORIN) 3.5-10,000-1 mg/mL-unit/mL-% otic suspension Apply 4-5 drops in affected Ear(s) 3-4 times a day for 7-10 days 10 mL 0    SKYRIZI 150 mg/mL syringe       TIADYLT   mg 24 hr capsule TAKE 1 CAPSULE BY MOUTH ONCE DAILY 90 capsule 3    traMADoL (ULTRAM) 50 mg tablet Take 1 tablet (50 mg total) by mouth every 8 (eight) hours as needed for moderate pain or severe pain. 50 tablet 1    vitamin B complex/folic acid (B COMPLEX 100 ORAL) Take by mouth.      warfarin (COUMADIN) 4 mg tablet TAKE 2 TABLETS BY MOUTH EVERY DAY EXCEPT ON TUE/THUR TAKE 1 TABLET 180 tablet 3    warfarin (COUMADIN) 4 mg tablet Patient taking 4mg daily except M, W, F taking 8mg.      diclofenac sodium (VOLTAREN) 1 % topical gel Apply topically 2 (two) times a day as needed for mild pain. 100 g 1     No current facility-administered medications for this visit.       Review of Systems   Constitutional: Positive for malaise/fatigue.   Cardiovascular: Positive for dyspnea on exertion and palpitations. Negative for chest pain, irregular heartbeat, near-syncope and syncope.   Respiratory: Negative for sleep disturbances due to breathing and snoring.    Neurological: Positive for light-headedness. Negative for dizziness and headaches.       Objective     Vitals:    09/23/22 1118   BP: 110/76   Pulse: 67   SpO2: 99%       Wt Readings from Last 1 Encounters:   09/23/22 91.6 kg (202 lb)       Body mass index is 36.95 kg/m².    Physical Exam  Vitals reviewed.   Cardiovascular:      Rate and Rhythm: Regular rhythm. Bradycardia present.      Pulses: Normal pulses.           Posterior tibial pulses are 2+ on the right side and 2+ on the left side.      Heart sounds: Normal heart sounds.   Pulmonary:      Effort: Pulmonary effort is normal.   Musculoskeletal:         General: Normal range of motion.   Skin:     General: Skin is warm.      Capillary Refill: Capillary refill takes less than 2 seconds.   Neurological:      Mental Status: She is alert and oriented to person, place, and time.          ECG   Sinus bradycardia    Hematology  Lab Results   Component Value Date    WBC 4.96 11/02/2021    HGB 13.8 11/02/2021     HCT 44.2 11/02/2021     11/02/2021    INR 2.2 09/20/2022       Chemistries  Lab Results   Component Value Date     11/02/2021    K 4.7 11/02/2021     11/02/2021    CREATININE 0.6 11/02/2021    BUN 19 11/02/2021    CO2 23 11/02/2021    GLUCOSE 91 11/02/2021    CALCIUM 9.9 11/02/2021    MG 1.9 02/03/2020    ALT 27 11/02/2021    AST 27 11/02/2021       Cholesterol  Lab Results   Component Value Date    CHOL 167 11/02/2021    TRIG 60 11/02/2021    HDL 70 11/02/2021    LDLCALC 85 11/02/2021    NONHDLCALC 97 11/02/2021       Endocrine  Lab Results   Component Value Date    TSH 1.83 11/02/2021    HGBA1C 5.8 (H) 11/02/2021       Assessment/Plan     Paroxysmal atrial fibrillation (CMS/HCC)  Dx is in chart but pt denies hx of this. She does not believe it is a true diagnosis and has asked Dr. Moreau to remove the dx from the chart  She wears a FitBit sense and I advised her to set up Heart rhythm assessment with the Fitbit ECG elizabeth.   Will check TTE to eval for associated structural heart disease  Will check 2-week MCOT    If she does need continued AC, either for afib or PE then would potentially plan for transition to DOAC from coumadin  There is data supporting eliquis use post-RYGB ([facility], 2021)  Hematology may be able to provide additional insight    Obstructive sleep apnea  Wears CPAP  Follows with Danita Cummins at [place]  Advised d/w Pulm regarding her ROGERS as well    Pulmonary emboli (CMS/HCC)  On embryl at the time and she wonders if this could have contributed  It was about a year after a knee surgery, not immediately following  PE x1 over 10 y ago    Morbid obesity due to excess calories (CMS/HCC)  Sees Dr. Osman, had bypass surgery  Gained 20 lb since start of covid- plans to lose this     Diabetes mellitus (CMS/HCC)  Off med since bypass surgery  Recent a1c 5.8    HLD (hyperlipidemia)  On atorvastatin 40 mg daily  LDL 85 on recent check    Essential hypertension  Off medications since bypass  surgery  110/76 in the office today    ROGERS (dyspnea on exertion)  Will check TTE  Advised d/w Pulm as well  Will f/u 3 mo and decide on further workup if ongoing sx and without dx on initial eval        Orders Placed This Encounter   Procedures    Lipid panel     Standing Status:   Future     Standing Expiration Date:   9/23/2023     Order Specific Question:   Release to patient     Answer:   Immediate    Ambulatory referral to Hematology / Oncology     Standing Status:   Future     Standing Expiration Date:   3/23/2023     Referral Priority:   Routine     Referral Type:   Consultation     Referral Reason:   Specialty Services Required     Referred to Provider:   Brian Arrieta MD     Number of Visits Requested:   10    Mobile cardiac outpatient telemetry     Order Specific Question:   Length of monitoring (days)     Answer:   14     Order Specific Question:   Release to patient     Answer:   Immediate    ECG 12 lead     Scheduling Instructions:      PLEASE USE THIS ORDER FOR ECG'S PERFORMED IN PHYSICIAN OFFICES     Order Specific Question:   Release to patient     Answer:   Immediate    Transthoracic echo (TTE) complete     Standing Status:   Future     Standing Expiration Date:   9/23/2024     Scheduling Instructions:      To schedule cardiology appointments with [facility], call Central Scheduling at [phone]. Thank you.     Order Specific Question:   Is Definity and/or agitated saline (bubbles) indicated for the study?     Answer:   No     Order Specific Question:   Release to patient     Answer:   Immediate       Follow Up Plans:  Return in about 3 months (around 12/23/2022).          IClair, am scribing for, and in the presence of, Charles Vasquez MD.     ICharles MD, personally performed the services described in this documentation as scribed by Clair Clolins in my presence, and it is both accurate and complete.     Charles Vasquez MD  9/23/2022

## 2022-09-23 ENCOUNTER — OFFICE VISIT (OUTPATIENT)
Dept: CARDIOLOGY | Facility: CLINIC | Age: 67
End: 2022-09-23
Payer: MEDICARE

## 2022-09-23 VITALS
OXYGEN SATURATION: 99 % | WEIGHT: 202 LBS | BODY MASS INDEX: 37.17 KG/M2 | HEART RATE: 67 BPM | HEIGHT: 62 IN | SYSTOLIC BLOOD PRESSURE: 110 MMHG | DIASTOLIC BLOOD PRESSURE: 76 MMHG

## 2022-09-23 DIAGNOSIS — R06.09 DYSPNEA ON EXERTION: ICD-10-CM

## 2022-09-23 DIAGNOSIS — I27.82 OTHER CHRONIC PULMONARY EMBOLISM WITHOUT ACUTE COR PULMONALE: ICD-10-CM

## 2022-09-23 DIAGNOSIS — Z76.89 ENCOUNTER TO ESTABLISH CARE: Primary | ICD-10-CM

## 2022-09-23 DIAGNOSIS — E78.5 HYPERLIPIDEMIA, UNSPECIFIED HYPERLIPIDEMIA TYPE: ICD-10-CM

## 2022-09-23 DIAGNOSIS — Z01.818 PRE-OP EXAMINATION: ICD-10-CM

## 2022-09-23 DIAGNOSIS — I48.0 PAROXYSMAL ATRIAL FIBRILLATION (CMS/HCC): ICD-10-CM

## 2022-09-23 PROCEDURE — 93000 ELECTROCARDIOGRAM COMPLETE: CPT | Performed by: INTERNAL MEDICINE

## 2022-09-23 PROCEDURE — 99204 OFFICE O/P NEW MOD 45 MIN: CPT | Performed by: INTERNAL MEDICINE

## 2022-09-23 PROCEDURE — G8752 SYS BP LESS 140: HCPCS | Performed by: INTERNAL MEDICINE

## 2022-09-23 PROCEDURE — G8754 DIAS BP LESS 90: HCPCS | Performed by: INTERNAL MEDICINE

## 2022-09-23 ASSESSMENT — ENCOUNTER SYMPTOMS
IRREGULAR HEARTBEAT: 0
LIGHT-HEADEDNESS: 1
DIZZINESS: 0
SYNCOPE: 0
DYSPNEA ON EXERTION: 1
SNORING: 0
PALPITATIONS: 1
HEADACHES: 0
NEAR-SYNCOPE: 0
SLEEP DISTURBANCES DUE TO BREATHING: 0

## 2022-09-23 NOTE — ASSESSMENT & PLAN NOTE
Dx is in chart but pt denies hx of this. She does not believe it is a true diagnosis and has asked Dr. Moreau to remove the dx from the chart  She wears a FitBit sense and I advised her to set up Heart rhythm assessment with the Fitbit ECG elizabeth.   Will check TTE to eval for associated structural heart disease  Will check 2-week MCOT    If she does need continued AC, either for afib or PE then would potentially plan for transition to DOAC from coumadin  There is data supporting eliquis use post-RYGB ([facility], 2021)  Hematology may be able to provide additional insight

## 2022-09-23 NOTE — ASSESSMENT & PLAN NOTE
On prateek at the time and she wonders if this could have contributed  It was about a year after a knee surgery, not immediately following  PE x1 over 10 y ago

## 2022-09-23 NOTE — PATIENT INSTRUCTIONS
Please set up Heart rhythm assessment with the Fitbit ECG elizabeth to monitor for atrial fibrillation. The compatible ECG elizabeth on Fitbit Sense assesses a users heart rhythm for signs of atrial fibrillation (AFib)

## 2022-09-23 NOTE — ASSESSMENT & PLAN NOTE
Will check TTE  Advised d/w Pulm as well  Will f/u 3 mo and decide on further workup if ongoing sx and without dx on initial eval

## 2022-09-23 NOTE — LETTER
September 23, 2022     Demarco Moreau,   [address]  [facility] [address]    Patient: Jennifer Aly  YOB: 1955  Date of Visit: 9/23/2022      Dear Dr. Moreau:    Thank you for referring Jennifer Aly to me for evaluation. Below are my notes for this consultation.    If you have questions, please do not hesitate to call me. I look forward to following your patient along with you.         Sincerely,        Charles Vasquez MD        CC: MD Christina Castelan Elliot, MD  9/23/2022 12:22 PM  Signed       Cardiology  Office Consult Note         Reason for visit:   Chief Complaint   Patient presents with    Initial Evaluation       HPI     Jennifer Aly is a 67 y.o. female who presents to the office for cardiovascular evaluation.    Her past medical history is significant for Afib?, HTN, sleep apnea with cpap, PE and pure hypercholesteremia, GI Bleed, and bariatric surgery 2017.    She follows with Dr Moreau for her primary medical management and has followed with [facility] in the past? Called them and they do not have records for her. Dr Moreau manages her Coumadin therapy for her PE years a go. She reports that she had a cardiac cath in the past in 2012 to r/o afib? She had a blood clot and they thought that maybe this was related to afib but this was never captured on any EKG or telemtry montior. I am unable to find record of this in [facility].     Today she reports that she has been feeling tired and fatigued lately with exertional shortness of breath. She has also felt lightheaded when she is walking and short of breath. She has also noted that she feels like her pulse is pounding in her ears which is new for her.       Past Medical History:   Diagnosis Date    Arthritis     Fistula     Gout     HLD (hyperlipidemia)     IFG (impaired fasting glucose)     Obesity     Polyarthritis     Pulmonary emboli (CMS/HCC)     pulmonary emboli    Torn rotator cuff         Past Surgical History:   Procedure Laterality Date    APPENDECTOMY  2008    CARDIAC CATHETERIZATION  2012    INTRACAPSULAR CATARACT EXTRACTION Bilateral 2019    IVC FILTER RETRIEVAL      IVC FILTER RETRIEVAL  2013    RECTOURETHRAL FISTULA REPAIR  , 2017    ROTATOR CUFF REPAIR Left 2016    ALTAF-EN-Y PROCEDURE  2017    TOTAL ABDOMINAL HYSTERECTOMY  1987    TOTAL KNEE ARTHROPLASTY Bilateral 2010    TOTAL KNEE ARTHROPLASTY Right 2012    revision    TRIGGER FINGER RELEASE Right     right thumb    ULNAR NERVE REPAIR          Social History     Tobacco Use   Smoking Status Former Smoker    Years: 15.    Quit date:     Years since quittin.7   Smokeless Tobacco Never Used     Social History     Tobacco Use    Smoking status: Former Smoker     Years: 15.     Quit date:      Years since quittin.7    Smokeless tobacco: Never Used   Substance Use Topics    Alcohol use: Yes     Comment: occasional wine    Drug use: No       Family History   Problem Relation Age of Onset    Diabetes Biological Mother     Lung cancer Biological Mother     Alzheimer's disease Biological Mother     Diabetes type II Biological Father     Heart failure Biological Father     Heart attack Biological Sister        Allergies:  Sulfa (sulfonamide antibiotics)    Current Outpatient Medications   Medication Sig Dispense Refill    atorvastatin (LIPITOR) 40 mg tablet TAKE 1 TABLET BY MOUTH EVERY DAY 90 tablet 3    calcium carbonate-vitamin D3 500 mg(1,250mg) -200 unit per tablet Take 2 tablets by mouth 2 (two) times a day with meals.        cholecalciferol, vitamin D3, 5,000 unit (125 mcg) tablet Take 5,000 Units by mouth daily.      clobetasol (TEMOVATE) 0.05 % cream       fluticasone propionate (FLONASE) 50 mcg/actuation nasal spray Administer 1 spray into each nostril daily as needed for rhinitis. Make an appointment if help is needed for nasal symptoms. 1 Bottle 3     ketoconazole (NIZORAL) 2 % cream APPLY ON THE SKIN TWICE A DAY APPLY TO AFFECTED AREA RASH IN GROIN/ABDOMEN TWICE A DAY FOR 3 WEEKS      methocarbamoL (ROBAXIN-750) 750 mg tablet Take one by mouth ONLY as needed every 6-8 hours for muscle pains and spasms 50 tablet 1    multivitamin capsule Take 1 capsule by mouth daily.      neomycin-polymyxin-hydrocortisone (CORTISPORIN) 3.5-10,000-1 mg/mL-unit/mL-% otic suspension Apply 4-5 drops in affected Ear(s) 3-4 times a day for 7-10 days 10 mL 0    SKYRIZI 150 mg/mL syringe       TIADYLT  mg 24 hr capsule TAKE 1 CAPSULE BY MOUTH ONCE DAILY 90 capsule 3    traMADoL (ULTRAM) 50 mg tablet Take 1 tablet (50 mg total) by mouth every 8 (eight) hours as needed for moderate pain or severe pain. 50 tablet 1    vitamin B complex/folic acid (B COMPLEX 100 ORAL) Take by mouth.      warfarin (COUMADIN) 4 mg tablet TAKE 2 TABLETS BY MOUTH EVERY DAY EXCEPT ON TUE/THUR TAKE 1 TABLET 180 tablet 3    warfarin (COUMADIN) 4 mg tablet Patient taking 4mg daily except M, W, F taking 8mg.      diclofenac sodium (VOLTAREN) 1 % topical gel Apply topically 2 (two) times a day as needed for mild pain. 100 g 1     No current facility-administered medications for this visit.       Review of Systems   Constitutional: Positive for malaise/fatigue.   Cardiovascular: Positive for dyspnea on exertion and palpitations. Negative for chest pain, irregular heartbeat, near-syncope and syncope.   Respiratory: Negative for sleep disturbances due to breathing and snoring.    Neurological: Positive for light-headedness. Negative for dizziness and headaches.       Objective     Vitals:    09/23/22 1118   BP: 110/76   Pulse: 67   SpO2: 99%       Wt Readings from Last 1 Encounters:   09/23/22 91.6 kg (202 lb)       Body mass index is 36.95 kg/m².    Physical Exam  Vitals reviewed.   Cardiovascular:      Rate and Rhythm: Regular rhythm. Bradycardia present.      Pulses: Normal pulses.            Posterior tibial pulses are 2+ on the right side and 2+ on the left side.      Heart sounds: Normal heart sounds.   Pulmonary:      Effort: Pulmonary effort is normal.   Musculoskeletal:         General: Normal range of motion.   Skin:     General: Skin is warm.      Capillary Refill: Capillary refill takes less than 2 seconds.   Neurological:      Mental Status: She is alert and oriented to person, place, and time.          ECG   Sinus bradycardia    Hematology  Lab Results   Component Value Date    WBC 4.96 11/02/2021    HGB 13.8 11/02/2021    HCT 44.2 11/02/2021     11/02/2021    INR 2.2 09/20/2022       Chemistries  Lab Results   Component Value Date     11/02/2021    K 4.7 11/02/2021     11/02/2021    CREATININE 0.6 11/02/2021    BUN 19 11/02/2021    CO2 23 11/02/2021    GLUCOSE 91 11/02/2021    CALCIUM 9.9 11/02/2021    MG 1.9 02/03/2020    ALT 27 11/02/2021    AST 27 11/02/2021       Cholesterol  Lab Results   Component Value Date    CHOL 167 11/02/2021    TRIG 60 11/02/2021    HDL 70 11/02/2021    LDLCALC 85 11/02/2021    NONHDLCALC 97 11/02/2021       Endocrine  Lab Results   Component Value Date    TSH 1.83 11/02/2021    HGBA1C 5.8 (H) 11/02/2021       Assessment/Plan     Paroxysmal atrial fibrillation (CMS/HCC)  Dx is in chart but pt denies hx of this. She does not believe it is a true diagnosis and has asked Dr. Moreau to remove the dx from the chart  She wears a FitBit sense and I advised her to set up Heart rhythm assessment with the Fitbit ECG elizabeth.   Will check TTE to eval for associated structural heart disease  Will check 2-week MCOT    If she does need continued AC, either for afib or PE then would potentially plan for transition to DOAC from coumadin  There is data supporting eliquis use post-RYGB ([facility], 2021)  Hematology may be able to provide additional insight    Obstructive sleep apnea  Wears CPAP  Follows with Danita Cummins at [place]  Advised d/w Pulm regarding her  ROGERS as well    Pulmonary emboli (CMS/HCC)  On embryl at the time and she wonders if this could have contributed  It was about a year after a knee surgery, not immediately following  PE x1 over 10 y ago    Morbid obesity due to excess calories (CMS/HCC)  Sees Dr. Osman, had bypass surgery  Gained 20 lb since start of covid- plans to lose this     Diabetes mellitus (CMS/HCC)  Off med since bypass surgery  Recent a1c 5.8    HLD (hyperlipidemia)  On atorvastatin 40 mg daily  LDL 85 on recent check    Essential hypertension  Off medications since bypass surgery  110/76 in the office today    ROGERS (dyspnea on exertion)  Will check TTE  Advised d/w Pulm as well  Will f/u 3 mo and decide on further workup if ongoing sx and without dx on initial eval        Orders Placed This Encounter   Procedures    Lipid panel     Standing Status:   Future     Standing Expiration Date:   9/23/2023     Order Specific Question:   Release to patient     Answer:   Immediate    Ambulatory referral to Hematology / Oncology     Standing Status:   Future     Standing Expiration Date:   3/23/2023     Referral Priority:   Routine     Referral Type:   Consultation     Referral Reason:   Specialty Services Required     Referred to Provider:   Brian Arrieta MD     Number of Visits Requested:   10    Mobile cardiac outpatient telemetry     Order Specific Question:   Length of monitoring (days)     Answer:   14     Order Specific Question:   Release to patient     Answer:   Immediate    ECG 12 lead     Scheduling Instructions:      PLEASE USE THIS ORDER FOR ECG'S PERFORMED IN PHYSICIAN OFFICES     Order Specific Question:   Release to patient     Answer:   Immediate    Transthoracic echo (TTE) complete     Standing Status:   Future     Standing Expiration Date:   9/23/2024     Scheduling Instructions:      To schedule cardiology appointments with [facility], call Central Scheduling at [phone]. Thank you.     Order Specific Question:    Is Definity and/or agitated saline (bubbles) indicated for the study?     Answer:   No     Order Specific Question:   Release to patient     Answer:   Immediate       Follow Up Plans:  Return in about 3 months (around 12/23/2022).          Clair DOWNING, am scribing for, and in the presence of, Charles Vasquez MD.     Charles DOWNING MD, personally performed the services described in this documentation as scribed by Clair Collins in my presence, and it is both accurate and complete.     Charles Vasquez MD  9/23/2022

## 2022-09-26 ENCOUNTER — TELEPHONE (OUTPATIENT)
Dept: SCHEDULING | Facility: CLINIC | Age: 67
End: 2022-09-26
Payer: MEDICARE

## 2022-09-26 LAB — INR PPP: 1.9

## 2022-10-03 ENCOUNTER — HOSPITAL ENCOUNTER (OUTPATIENT)
Dept: CARDIOLOGY | Facility: CLINIC | Age: 67
Discharge: HOME | End: 2022-10-03
Payer: MEDICARE

## 2022-10-03 VITALS
DIASTOLIC BLOOD PRESSURE: 76 MMHG | WEIGHT: 202 LBS | BODY MASS INDEX: 37.17 KG/M2 | SYSTOLIC BLOOD PRESSURE: 110 MMHG | HEIGHT: 62 IN

## 2022-10-03 DIAGNOSIS — R06.09 DYSPNEA ON EXERTION: ICD-10-CM

## 2022-10-03 LAB
AORTIC ROOT ANNULUS: 3.3 CM
ASCENDING AORTA: 3.3 CM
AV PEAK GRADIENT: 9 MMHG
AV PEAK VELOCITY-S: 1.49 M/S
AV VALVE AREA: 2.61 CM2
BSA FOR ECHO PROCEDURE: 2 M2
CUSP SEPARATION: 1.8 CM
E WAVE DECELERATION TIME: 208 MS
E/A RATIO: 1.3
E/E' RATIO: 15.1
E/LAT E' RATIO: 10
EDV (BP): 76.5 CM3
EF (A4C): 70.1 %
EF A2C: 73.3 %
EJECTION FRACTION: 72.4 %
EST RIGHT VENT SYSTOLIC PRESSURE BY TRICUSPID REGURGITATION JET: 38 MMHG
ESTIMATED PASP: 40 MMHG
ESV (BP): 21.1 CM3
FRACTIONAL SHORTENING: 34.8 %
INTERVENTRICULAR SEPTUM: 1.2 CM
LA ESV (BP): 84.2 CM3
LA ESV INDEX (A2C): 44.05 CM3/M2
LA ESV INDEX (BP): 42.1 CM3/M2
LA/AORTA RATIO: 1.45
LAAS-AP2: 25.8 CM2
LAAS-AP4: 24.6 CM2
LAD 2D: 4.8 CM
LALD A4C: 6.06 CM
LALD A4C: 6.13 CM
LAV-S: 88.1 CM3
LEFT ATRIUM VOLUME INDEX: 39.85 CM3/M2
LEFT ATRIUM VOLUME: 79.7 CM3
LEFT INTERNAL DIMENSION IN SYSTOLE: 3 CM (ref 2.71–4.11)
LEFT VENTRICLE DIASTOLIC VOLUME INDEX: 30.45 CM3/M2
LEFT VENTRICLE DIASTOLIC VOLUME: 60.9 CM3
LEFT VENTRICLE SYSTOLIC VOLUME INDEX: 9.1 CM3/M2
LEFT VENTRICLE SYSTOLIC VOLUME: 18.2 CM3
LEFT VENTRICULAR INTERNAL DIMENSION IN DIASTOLE: 4.6 CM (ref 4.6–6.39)
LEFT VENTRICULAR POSTERIOR WALL IN END DIASTOLE: 1.2 CM (ref 0.6–1.12)
LV DIASTOLIC VOLUME: 93.1 CM3
LV ESV (APICAL 2 CHAMBER): 25 CM3
LVAD-AP2: 28.5 CM2
LVAD-AP4: 23.6 CM2
LVAS-AP2: 11.5 CM2
LVAS-AP4: 10.1 CM2
LVEDVI(A2C): 46.55 CM3/M2
LVEDVI(BP): 38.25 CM3/M2
LVESVI(A2C): 12.5 CM3/M2
LVESVI(BP): 10.55 CM3/M2
LVLD-AP2: 7.46 CM
LVLD-AP4: 7.72 CM
LVLS-AP2: 5 CM
LVLS-AP4: 4.99 CM
LVOT 2D: 2 CM
LVOT A: 3.14 CM2
LVOT PEAK VELOCITY: 1.24 M/S
MV E'TISSUE VEL-LAT: 0.13 M/S
MV E'TISSUE VEL-MED: 0.08 M/S
MV PEAK A VEL: 0.99 M/S
MV PEAK E VEL: 1.25 M/S
POSTERIOR WALL: 1.2 CM
PV PEAK GRADIENT: 4 MMHG
PV PV: 1.04 M/S
RAP: 3 MMHG
RVOT VMAX: 0.85 M/S
TR MAX PG: 35 MMHG
TRICUSPID VALVE PEAK REGURGITATION VELOCITY: 2.97 M/S
Z-SCORE OF LEFT VENTRICULAR DIMENSION IN END DIASTOLE: -1.64
Z-SCORE OF LEFT VENTRICULAR DIMENSION IN END SYSTOLE: -0.84
Z-SCORE OF LEFT VENTRICULAR POSTERIOR WALL IN END DIASTOLE: 1.99

## 2022-10-03 PROCEDURE — 93306 TTE W/DOPPLER COMPLETE: CPT | Performed by: INTERNAL MEDICINE

## 2022-10-04 ASSESSMENT — ENCOUNTER SYMPTOMS
ADENOPATHY: 0
DIZZINESS: 0
COUGH: 0
TROUBLE SWALLOWING: 0
VOMITING: 0
FEVER: 0
DIARRHEA: 0
COLOR CHANGE: 0
ACTIVITY CHANGE: 0
SORE THROAT: 0
EYE ITCHING: 0
DYSURIA: 0
AGITATION: 0
CHEST TIGHTNESS: 0
HEADACHES: 0
EYE DISCHARGE: 0
BACK PAIN: 0
CONFUSION: 0
ABDOMINAL PAIN: 0
FATIGUE: 0
SINUS PRESSURE: 0
SHORTNESS OF BREATH: 0
BLOOD IN STOOL: 0
EYE PAIN: 0
MYALGIAS: 0
SINUS PAIN: 0
NAUSEA: 0
APPETITE CHANGE: 0
PALPITATIONS: 0
ARTHRALGIAS: 0

## 2022-10-04 NOTE — PROGRESS NOTES
Subjective      Patient ID: Jennifer Aly is a 67 y.o. female.    Patient is 67 year old female that presents today for a 6 month follow up.      Active and Chronic:     -Neck pain      L side, radiates above scapula  Pain gets worse w/ any head movements   L neck strength +4  reports no numbness/tingling of UE     -- Anemia  -Stable  Lab Results       Component                Value               Date                       WBC                      4.96                11/02/2021                 HGB                      13.8                11/02/2021                 HCT                      44.2                11/02/2021                 MCV                      99.5 (H)            11/02/2021                 PLT                      212                 11/02/2021               HLD (hyperlipidemia)  - Stable  -Educated pt to continue take statin  -Educate pt on diet/TLC     - C/w statin  Lab Results       Component                Value               Date                       CHOL                     167                 11/02/2021                 CHOL                     145                 02/15/2021                 CHOL                     175                 01/20/2020            Lab Results       Component                Value               Date                       HDL                      70                  11/02/2021                 HDL                      63                  02/15/2021                 HDL                      69                  01/20/2020            Lab Results       Component                Value               Date                       LDLCALC                  85                  11/02/2021                 LDLCALC                  72                  02/15/2021                 LDLCALC                  90                  01/20/2020            Lab Results       Component                Value               Date                       TRIG                     60                  11/02/2021                  TRIG                     48                  02/15/2021                 TRIG                     81                  01/20/2020            No results found for: CHOLHDL  The 10-year ASCVD risk score (Eagle FARAH Jr., et al., 2013) is: 16.1%    Values used to calculate the score:      Age: 67 years      Sex: Female      Is Non- : No      Diabetic: Yes      Tobacco smoker: No      Systolic Blood Pressure: 118 mmHg      Is BP treated: Yes      HDL Cholesterol: 70 mg/dL      Total Cholesterol: 167 mg/dL       Paroxysmal atrial fibrillation (CMS/HCC)  -stable  -Continue to take Diltiazem   -Haven't established an appointment w/ Cardiolgist     Essential hypertension  - Stable  - Continue taking BP med  BP Readings from Last 3 Encounters:  10/05/22 : 118/74  10/03/22 : 110/76  09/23/22 : 110/76  - Continue diltiazam     Obstructive sleep apnea  - CPAP  -Sees Dr. Gallo    -IFG  Lab Results       Component                Value               Date                       HGBA1C                   5.8 (H)             11/02/2021                    The following have been reviewed and updated as appropriate in this visit:   Allergies  Meds  Problems           Past Medical History:   Diagnosis Date    Arthritis     Fistula     Gout     HLD (hyperlipidemia)     IFG (impaired fasting glucose)     Obesity     Polyarthritis     Pulmonary emboli (CMS/HCC)     pulmonary emboli    Torn rotator cuff        Past Surgical History:   Procedure Laterality Date    APPENDECTOMY  2008    CARDIAC CATHETERIZATION  11/2012    INTRACAPSULAR CATARACT EXTRACTION Bilateral 08/2019    IVC FILTER RETRIEVAL  2012    IVC FILTER RETRIEVAL  02/2013    RECTOURETHRAL FISTULA REPAIR  2008, 5/2017    ROTATOR CUFF REPAIR Left 01/2016    ALTAF-EN-Y PROCEDURE  02/07/2017    TOTAL ABDOMINAL HYSTERECTOMY  1987    TOTAL KNEE ARTHROPLASTY Bilateral 2010    TOTAL KNEE ARTHROPLASTY Right 2012    revision    TRIGGER  FINGER RELEASE Right 2012    right thumb    ULNAR NERVE REPAIR  2013       Family History   Problem Relation Age of Onset    Diabetes Biological Mother     Lung cancer Biological Mother     Alzheimer's disease Biological Mother     Diabetes type II Biological Father     Heart failure Biological Father     Heart attack Biological Sister        Social History     Socioeconomic History    Marital status:      Spouse name: Not on file    Number of children: Not on file    Years of education: Not on file    Highest education level: Not on file   Occupational History    Not on file   Tobacco Use    Smoking status: Former Smoker     Years: 15.00     Quit date:      Years since quittin.7    Smokeless tobacco: Never Used   Substance and Sexual Activity    Alcohol use: Yes     Comment: occasional wine    Drug use: No    Sexual activity: Not Currently   Other Topics Concern    Not on file   Social History Narrative    Not on file     Social Determinants of Health     Financial Resource Strain: Not on file   Food Insecurity: Not on file   Transportation Needs: Not on file   Physical Activity: Not on file   Stress: Not on file   Social Connections: Not on file   Intimate Partner Violence: Not on file   Housing Stability: Not on file       Allergies   Allergen Reactions    Sulfa (Sulfonamide Antibiotics) Hives       Current Outpatient Medications   Medication Sig Dispense Refill    atorvastatin (LIPITOR) 40 mg tablet TAKE 1 TABLET BY MOUTH EVERY DAY 90 tablet 3    calcium carbonate-vitamin D3 500 mg(1,250mg) -200 unit per tablet Take 2 tablets by mouth 2 (two) times a day with meals.        cholecalciferol, vitamin D3, 5,000 unit (125 mcg) tablet Take 5,000 Units by mouth daily.      clobetasol (TEMOVATE) 0.05 % cream       diclofenac sodium (VOLTAREN) 1 % topical gel Apply topically 2 (two) times a day as needed for mild pain. 100 g 1    fluticasone propionate (FLONASE) 50 mcg/actuation  nasal spray Administer 1 spray into each nostril daily as needed for rhinitis. Make an appointment if help is needed for nasal symptoms. 1 Bottle 3    ketoconazole (NIZORAL) 2 % cream APPLY ON THE SKIN TWICE A DAY APPLY TO AFFECTED AREA RASH IN GROIN/ABDOMEN TWICE A DAY FOR 3 WEEKS      methocarbamoL (ROBAXIN-750) 750 mg tablet Take one by mouth ONLY as needed every 6-8 hours for muscle pains and spasms 50 tablet 1    multivitamin capsule Take 1 capsule by mouth daily.      semaglutide Inject 1 mg under the skin every (seven) 7 days. 3 mL 11    SKYRIZI 150 mg/mL syringe       TIADYLT  mg 24 hr capsule TAKE 1 CAPSULE BY MOUTH ONCE DAILY 90 capsule 3    traMADoL (ULTRAM) 50 mg tablet Take 1 tablet (50 mg total) by mouth every 8 (eight) hours as needed for moderate pain or severe pain. 50 tablet 1    vitamin B complex/folic acid (B COMPLEX 100 ORAL) Take by mouth.      warfarin (COUMADIN) 4 mg tablet Patient taking 4mg daily except M, W, F taking 8mg.       No current facility-administered medications for this visit.       Review of Systems   Constitutional: Negative for activity change, appetite change, fatigue and fever.   HENT: Negative for congestion, sinus pressure, sinus pain, sore throat and trouble swallowing.    Eyes: Negative for pain, discharge, itching and visual disturbance.   Respiratory: Negative for cough, chest tightness and shortness of breath.    Cardiovascular: Negative for chest pain, palpitations and leg swelling.   Gastrointestinal: Negative for abdominal pain, blood in stool, diarrhea, nausea and vomiting.   Endocrine: Negative for cold intolerance and heat intolerance.   Genitourinary: Negative for dysuria.   Musculoskeletal: Negative for arthralgias, back pain, gait problem and myalgias.   Skin: Negative for color change and rash.   Allergic/Immunologic: Negative for environmental allergies and food allergies.   Neurological: Negative for dizziness and headaches.    Hematological: Negative for adenopathy.   Psychiatric/Behavioral: Negative for agitation, behavioral problems and confusion.       Objective     Vitals:    10/05/22 1030   BP: 118/74   Pulse: 73   Resp: 16   SpO2: 98%       Physical Exam  Vitals and nursing note reviewed.   Constitutional:       Appearance: She is well-developed.   HENT:      Head: Normocephalic and atraumatic.      Right Ear: Tympanic membrane, ear canal and external ear normal.      Left Ear: Tympanic membrane, ear canal and external ear normal.      Nose: Nose normal.   Eyes:      Conjunctiva/sclera: Conjunctivae normal.      Pupils: Pupils are equal, round, and reactive to light.   Neck:      Thyroid: No thyromegaly.   Cardiovascular:      Rate and Rhythm: Normal rate and regular rhythm.      Heart sounds: Normal heart sounds. No murmur heard.  Pulmonary:      Effort: Pulmonary effort is normal. No respiratory distress.      Breath sounds: Normal breath sounds. No wheezing or rales.   Abdominal:      General: Bowel sounds are normal. There is no distension.      Palpations: Abdomen is soft. There is no mass.      Tenderness: There is no abdominal tenderness. There is no guarding.   Musculoskeletal:         General: No tenderness. Normal range of motion.      Cervical back: Normal range of motion and neck supple.   Feet:      Right foot:      Protective Sensation: 10 sites tested. 8 sites sensed.      Left foot:      Protective Sensation: 10 sites tested. 9 sites sensed.   Skin:     General: Skin is warm and dry.      Capillary Refill: Capillary refill takes less than 2 seconds.      Findings: No lesion or rash.   Neurological:      Mental Status: She is alert and oriented to person, place, and time.      Cranial Nerves: No cranial nerve deficit.      Deep Tendon Reflexes: Reflexes normal.   Psychiatric:         Behavior: Behavior normal.         Thought Content: Thought content normal.         Judgment: Judgment normal.         Assessment/Plan      Problem List Items Addressed This Visit        Circulatory    Paroxysmal atrial fibrillation (CMS/HCC)    Pulmonary emboli (CMS/HCC) - Primary (Chronic)    Overview     Overview:               Endocrine/Metabolic    IFG (impaired fasting glucose) (Chronic)    Current Assessment & Plan     Will follow  stable           Morbid obesity due to excess calories (CMS/HCC) (Chronic)    Overview     Overview:            Current Assessment & Plan     Discussed Ozempic    Will follow           Mild protein-calorie malnutrition (CMS/HCC)    Current Assessment & Plan     Stable.  On Multivitamins.           Diabetes mellitus (CMS/HCC)    Relevant Medications    semaglutide       Hematologic    Iron deficiency anemia secondary to inadequate dietary iron intake    Current Assessment & Plan     baseline           Relevant Orders    CBC and differential       Mental Health    Malaise and fatigue    Relevant Orders    TSH    CBC and differential       Other    Pure hypercholesterolemia (Chronic)    Overview     Overview:            Current Assessment & Plan     Diet and TLC  Continue to take all your medications as instructed.  Eat healthy with less cholesterol rich and fatty foods.  Maintain a healthy weight by dieting and exercising.  Eating well and maintaining a healthy weight and a good cholesterol panel can decrease your risk of future heart disease and stroke. Come to your next appointment as scheduled. Stay well, God Bless.             Relevant Orders    Lipid panel    Comprehensive metabolic panel          DIAGNOSIS  1. Other chronic pulmonary embolism without acute cor pulmonale (CMS/HCC)    2. Paroxysmal atrial fibrillation (CMS/HCC)    3. Morbid obesity due to excess calories (CMS/HCC)    4. Mild protein-calorie malnutrition (CMS/HCC)    5. Type 2 diabetes mellitus with other diabetic arthropathy, without long-term current use of insulin (CMS/HCC)    6. Pure hypercholesterolemia    7. Iron deficiency anemia secondary  to inadequate dietary iron intake    8. IFG (impaired fasting glucose)    9. Malaise and fatigue        No exam data present        Demarco Moreau DO  10/5/2022

## 2022-10-05 ENCOUNTER — OFFICE VISIT (OUTPATIENT)
Dept: PRIMARY CARE | Facility: CLINIC | Age: 67
End: 2022-10-05
Payer: MEDICARE

## 2022-10-05 VITALS
HEART RATE: 73 BPM | BODY MASS INDEX: 37.36 KG/M2 | RESPIRATION RATE: 16 BRPM | HEIGHT: 62 IN | WEIGHT: 203 LBS | DIASTOLIC BLOOD PRESSURE: 74 MMHG | SYSTOLIC BLOOD PRESSURE: 118 MMHG | OXYGEN SATURATION: 98 %

## 2022-10-05 DIAGNOSIS — I27.82 OTHER CHRONIC PULMONARY EMBOLISM WITHOUT ACUTE COR PULMONALE: Primary | Chronic | ICD-10-CM

## 2022-10-05 DIAGNOSIS — R53.81 MALAISE AND FATIGUE: ICD-10-CM

## 2022-10-05 DIAGNOSIS — R73.01 IFG (IMPAIRED FASTING GLUCOSE): Chronic | ICD-10-CM

## 2022-10-05 DIAGNOSIS — E11.618 TYPE 2 DIABETES MELLITUS WITH OTHER DIABETIC ARTHROPATHY, WITHOUT LONG-TERM CURRENT USE OF INSULIN: ICD-10-CM

## 2022-10-05 DIAGNOSIS — D50.8 IRON DEFICIENCY ANEMIA SECONDARY TO INADEQUATE DIETARY IRON INTAKE: ICD-10-CM

## 2022-10-05 DIAGNOSIS — E78.00 PURE HYPERCHOLESTEROLEMIA: Chronic | ICD-10-CM

## 2022-10-05 DIAGNOSIS — E66.01 MORBID OBESITY DUE TO EXCESS CALORIES (CMS/HCC): Chronic | ICD-10-CM

## 2022-10-05 DIAGNOSIS — I48.0 PAROXYSMAL ATRIAL FIBRILLATION (CMS/HCC): ICD-10-CM

## 2022-10-05 DIAGNOSIS — R53.83 MALAISE AND FATIGUE: ICD-10-CM

## 2022-10-05 DIAGNOSIS — E44.1 MILD PROTEIN-CALORIE MALNUTRITION (CMS/HCC): ICD-10-CM

## 2022-10-05 PROCEDURE — 90677 PCV20 VACCINE IM: CPT | Performed by: FAMILY MEDICINE

## 2022-10-05 PROCEDURE — G0008 ADMIN INFLUENZA VIRUS VAC: HCPCS | Performed by: FAMILY MEDICINE

## 2022-10-05 PROCEDURE — G8752 SYS BP LESS 140: HCPCS | Performed by: FAMILY MEDICINE

## 2022-10-05 PROCEDURE — 99214 OFFICE O/P EST MOD 30 MIN: CPT | Mod: 25 | Performed by: FAMILY MEDICINE

## 2022-10-05 PROCEDURE — G8754 DIAS BP LESS 90: HCPCS | Performed by: FAMILY MEDICINE

## 2022-10-05 PROCEDURE — 90694 VACC AIIV4 NO PRSRV 0.5ML IM: CPT | Performed by: FAMILY MEDICINE

## 2022-10-05 PROCEDURE — G0009 ADMIN PNEUMOCOCCAL VACCINE: HCPCS | Performed by: FAMILY MEDICINE

## 2022-10-05 NOTE — ASSESSMENT & PLAN NOTE
Diet and TLC  Continue to take all your medications as instructed.  Eat healthy with less cholesterol rich and fatty foods.  Maintain a healthy weight by dieting and exercising.  Eating well and maintaining a healthy weight and a good cholesterol panel can decrease your risk of future heart disease and stroke. Come to your next appointment as scheduled. Stay well, God Bless.

## 2022-10-24 ENCOUNTER — TRANSCRIBE ORDERS (OUTPATIENT)
Dept: LAB | Facility: CLINIC | Age: 67
End: 2022-10-24

## 2022-10-24 ENCOUNTER — APPOINTMENT (OUTPATIENT)
Dept: LAB | Facility: CLINIC | Age: 67
End: 2022-10-24
Attending: FAMILY MEDICINE
Payer: MEDICARE

## 2022-10-24 DIAGNOSIS — I26.99 OTHER PULMONARY EMBOLISM WITHOUT ACUTE COR PULMONALE: Primary | ICD-10-CM

## 2022-10-24 DIAGNOSIS — R53.81 MALAISE AND FATIGUE: ICD-10-CM

## 2022-10-24 DIAGNOSIS — L40.0 PSORIASIS VULGARIS: Primary | ICD-10-CM

## 2022-10-24 DIAGNOSIS — E78.00 PURE HYPERCHOLESTEROLEMIA: Chronic | ICD-10-CM

## 2022-10-24 DIAGNOSIS — L40.0 PSORIASIS VULGARIS: ICD-10-CM

## 2022-10-24 DIAGNOSIS — R53.83 MALAISE AND FATIGUE: ICD-10-CM

## 2022-10-24 DIAGNOSIS — D50.8 IRON DEFICIENCY ANEMIA SECONDARY TO INADEQUATE DIETARY IRON INTAKE: ICD-10-CM

## 2022-10-24 DIAGNOSIS — I26.99 OTHER PULMONARY EMBOLISM WITHOUT ACUTE COR PULMONALE: ICD-10-CM

## 2022-10-24 LAB
ALBUMIN SERPL-MCNC: 4 G/DL (ref 3.4–5)
ALP SERPL-CCNC: 95 IU/L (ref 35–126)
ALT SERPL-CCNC: 20 IU/L (ref 11–54)
ANION GAP SERPL CALC-SCNC: 10 MEQ/L (ref 3–15)
AST SERPL-CCNC: 22 IU/L (ref 15–41)
BASOPHILS # BLD: 0.03 K/UL (ref 0.01–0.1)
BASOPHILS NFR BLD: 0.5 %
BILIRUB SERPL-MCNC: 0.6 MG/DL (ref 0.3–1.2)
BUN SERPL-MCNC: 12 MG/DL (ref 8–20)
CALCIUM SERPL-MCNC: 9.9 MG/DL (ref 8.9–10.3)
CHLORIDE SERPL-SCNC: 105 MEQ/L (ref 98–109)
CHOLEST SERPL-MCNC: 135 MG/DL
CO2 SERPL-SCNC: 24 MEQ/L (ref 22–32)
CREAT SERPL-MCNC: 0.6 MG/DL (ref 0.6–1.1)
DIFFERENTIAL METHOD BLD: ABNORMAL
EOSINOPHIL # BLD: 0.13 K/UL (ref 0.04–0.36)
EOSINOPHIL NFR BLD: 2.3 %
ERYTHROCYTE [DISTWIDTH] IN BLOOD BY AUTOMATED COUNT: 15.1 % (ref 11.7–14.4)
ERYTHROCYTE [DISTWIDTH] IN BLOOD BY AUTOMATED COUNT: 15.1 % (ref 11.7–14.4)
FERRITIN SERPL-MCNC: 13 NG/ML (ref 11–250)
FOLATE SERPL-MCNC: >20 NG/ML
GFR SERPL CREATININE-BSD FRML MDRD: >60 ML/MIN/1.73M*2
GLUCOSE SERPL-MCNC: 77 MG/DL (ref 70–99)
HCT VFR BLDCO AUTO: 39.5 % (ref 35–45)
HCT VFR BLDCO AUTO: 39.5 % (ref 35–45)
HDLC SERPL-MCNC: 72 MG/DL
HDLC SERPL: 1.9 {RATIO}
HGB BLD-MCNC: 12.4 G/DL (ref 11.8–15.7)
HGB BLD-MCNC: 12.4 G/DL (ref 11.8–15.7)
IMM GRANULOCYTES # BLD AUTO: 0.01 K/UL (ref 0–0.08)
IMM GRANULOCYTES NFR BLD AUTO: 0.2 %
IRON SATN MFR SERPL: 25 % (ref 15–45)
IRON SERPL-MCNC: 116 UG/DL (ref 35–150)
LDLC SERPL CALC-MCNC: 53 MG/DL
LYMPHOCYTES # BLD: 1.26 K/UL (ref 1.2–3.5)
LYMPHOCYTES NFR BLD: 22.4 %
MCH RBC QN AUTO: 29.5 PG (ref 28–33.2)
MCH RBC QN AUTO: 29.5 PG (ref 28–33.2)
MCHC RBC AUTO-ENTMCNC: 31.4 G/DL (ref 32.2–35.5)
MCHC RBC AUTO-ENTMCNC: 31.4 G/DL (ref 32.2–35.5)
MCV RBC AUTO: 93.8 FL (ref 83–98)
MCV RBC AUTO: 93.8 FL (ref 83–98)
MONOCYTES # BLD: 0.51 K/UL (ref 0.28–0.8)
MONOCYTES NFR BLD: 9.1 %
NEUTROPHILS # BLD: 3.68 K/UL (ref 1.7–7)
NEUTS SEG NFR BLD: 65.5 %
NONHDLC SERPL-MCNC: 63 MG/DL
NRBC BLD-RTO: 0 %
PDW BLD AUTO: 11 FL (ref 9.4–12.3)
PDW BLD AUTO: 11 FL (ref 9.4–12.3)
PLATELET # BLD AUTO: 220 K/UL (ref 150–369)
PLATELET # BLD AUTO: 220 K/UL (ref 150–369)
POTASSIUM SERPL-SCNC: 4.2 MEQ/L (ref 3.6–5.1)
PROT SERPL-MCNC: 6.7 G/DL (ref 6–8.2)
RBC # BLD AUTO: 4.21 M/UL (ref 3.93–5.22)
RBC # BLD AUTO: 4.21 M/UL (ref 3.93–5.22)
RETICS #: 0.03 M/UL (ref 0–0.12)
RETICS/RBC NFR: 0.69 % (ref 0.6–2.8)
SODIUM SERPL-SCNC: 139 MEQ/L (ref 136–144)
TIBC SERPL-MCNC: 455 UG/DL (ref 270–460)
TRIGL SERPL-MCNC: 49 MG/DL (ref 30–149)
TSH SERPL DL<=0.05 MIU/L-ACNC: 1.18 MIU/L (ref 0.34–5.6)
UIBC SERPL-MCNC: 339 UG/DL (ref 180–360)
VIT B12 SERPL-MCNC: 577 PG/ML (ref 180–914)
WBC # BLD AUTO: 5.62 K/UL (ref 3.8–10.5)
WBC # BLD AUTO: 5.62 K/UL (ref 3.8–10.5)

## 2022-10-24 PROCEDURE — 85301 ANTITHROMBIN III ANTIGEN: CPT

## 2022-10-24 PROCEDURE — 82784 ASSAY IGA/IGD/IGG/IGM EACH: CPT | Mod: 59

## 2022-10-24 PROCEDURE — 85025 COMPLETE CBC W/AUTO DIFF WBC: CPT

## 2022-10-24 PROCEDURE — 82728 ASSAY OF FERRITIN: CPT | Mod: GA

## 2022-10-24 PROCEDURE — 80053 COMPREHEN METABOLIC PANEL: CPT

## 2022-10-24 PROCEDURE — 82746 ASSAY OF FOLIC ACID SERUM: CPT | Mod: GZ

## 2022-10-24 PROCEDURE — 82607 VITAMIN B-12: CPT | Mod: GZ

## 2022-10-24 PROCEDURE — 81241 F5 GENE: CPT | Mod: GZ

## 2022-10-24 PROCEDURE — 86480 TB TEST CELL IMMUN MEASURE: CPT

## 2022-10-24 PROCEDURE — 85045 AUTOMATED RETICULOCYTE COUNT: CPT

## 2022-10-24 PROCEDURE — 86147 CARDIOLIPIN ANTIBODY EA IG: CPT

## 2022-10-24 PROCEDURE — 85300 ANTITHROMBIN III ACTIVITY: CPT

## 2022-10-24 PROCEDURE — 84443 ASSAY THYROID STIM HORMONE: CPT

## 2022-10-24 PROCEDURE — 80061 LIPID PANEL: CPT

## 2022-10-24 PROCEDURE — 36415 COLL VENOUS BLD VENIPUNCTURE: CPT

## 2022-10-24 PROCEDURE — 85027 COMPLETE CBC AUTOMATED: CPT | Mod: 59

## 2022-10-24 PROCEDURE — 86146 BETA-2 GLYCOPROTEIN ANTIBODY: CPT

## 2022-10-24 PROCEDURE — 83540 ASSAY OF IRON: CPT | Mod: GA

## 2022-10-24 PROCEDURE — 81240 F2 GENE: CPT | Mod: GA

## 2022-10-24 PROCEDURE — 83550 IRON BINDING TEST: CPT | Mod: GZ

## 2022-10-25 LAB
F2 C.20210G>A GENO BLD/T: NORMAL
F5 P.R506Q BLD/T QL: NORMAL
M TB IFN-G BLD-IMP: NEGATIVE
MITOGEN-NIL: 5.16 IU/ML
NIL: 0.03 IU/ML
TB AG-NIL: 0 IU/ML
TB2 AG - NIL: 0 IU/ML

## 2022-10-26 LAB
IGA SERPL-MCNC: 448 MG/DL (ref 84.5–499)
IGG SERPL-MCNC: 978 MG/DL (ref 537–1535)
IGM SERPL-MCNC: 115 MG/DL (ref 35–242)

## 2022-10-27 LAB
AT III ACT/NOR PPP CHRO: 114 % (ref 90–120)
AT III AG PPP IA-MCNC: 102 % NORMAL (ref 80–120)
B2 GLYCOPROT1 IGA SERPL IA-ACNC: <9.4 SAU
B2 GLYCOPROT1 IGG SERPL IA-ACNC: <9.4 SGU
B2 GLYCOPROT1 IGM SERPL IA-ACNC: <9.4 SMU
CARDIOLIPIN IGA SER IA-ACNC: <9.4 APL U/ML
CARDIOLIPIN IGG SER IA-ACNC: <9.4 GPL U/ML
CARDIOLIPIN IGM SER IA-ACNC: 71.1 MPL U/ML

## 2022-12-13 LAB — INR PPP: 2

## 2022-12-14 RX ORDER — WARFARIN 4 MG/1
TABLET ORAL
Qty: 120 TABLET | Refills: 3 | Status: SHIPPED | OUTPATIENT
Start: 2022-12-14 | End: 2023-02-21 | Stop reason: SDUPTHER

## 2022-12-14 NOTE — TELEPHONE ENCOUNTER
Medicine Refill Request    Last Office: 10/5/2022   Last Consult Visit: 7/29/2019  Last Telemedicine Visit: Visit date not found    Next Appointment: 4/5/2023      Current Outpatient Medications:   •  atorvastatin (LIPITOR) 40 mg tablet, TAKE 1 TABLET BY MOUTH EVERY DAY, Disp: 90 tablet, Rfl: 3  •  calcium carbonate-vitamin D3 500 mg(1,250mg) -200 unit per tablet, Take 2 tablets by mouth 2 (two) times a day with meals.  , Disp: , Rfl:   •  cholecalciferol, vitamin D3, 5,000 unit (125 mcg) tablet, Take 5,000 Units by mouth daily., Disp: , Rfl:   •  clobetasol (TEMOVATE) 0.05 % cream, , Disp: , Rfl:   •  diclofenac sodium (VOLTAREN) 1 % topical gel, Apply topically 2 (two) times a day as needed for mild pain., Disp: 100 g, Rfl: 1  •  fluticasone propionate (FLONASE) 50 mcg/actuation nasal spray, Administer 1 spray into each nostril daily as needed for rhinitis. Make an appointment if help is needed for nasal symptoms., Disp: 1 Bottle, Rfl: 3  •  ketoconazole (NIZORAL) 2 % cream, APPLY ON THE SKIN TWICE A DAY APPLY TO AFFECTED AREA RASH IN GROIN/ABDOMEN TWICE A DAY FOR 3 WEEKS, Disp: , Rfl:   •  methocarbamoL (ROBAXIN-750) 750 mg tablet, Take one by mouth ONLY as needed every 6-8 hours for muscle pains and spasms, Disp: 50 tablet, Rfl: 1  •  multivitamin capsule, Take 1 capsule by mouth daily., Disp: , Rfl:   •  semaglutide, Inject 1 mg under the skin every (seven) 7 days., Disp: 3 mL, Rfl: 11  •  SKYRIZI 150 mg/mL syringe, , Disp: , Rfl:   •  TIADYLT  mg 24 hr capsule, TAKE 1 CAPSULE BY MOUTH ONCE DAILY, Disp: 90 capsule, Rfl: 3  •  traMADoL (ULTRAM) 50 mg tablet, Take 1 tablet (50 mg total) by mouth every 8 (eight) hours as needed for moderate pain or severe pain., Disp: 50 tablet, Rfl: 1  •  vitamin B complex/folic acid (B COMPLEX 100 ORAL), Take by mouth., Disp: , Rfl:   •  warfarin (COUMADIN) 4 mg tablet, Patient taking 4mg daily except M, W, F taking 8mg., Disp: , Rfl:       BP Readings from Last 3  Encounters:   10/05/22 118/74   10/03/22 110/76   09/23/22 110/76       Recent Lab results:  Lab Results   Component Value Date    CHOL 135 10/24/2022   ,   Lab Results   Component Value Date    HDL 72 10/24/2022   ,   Lab Results   Component Value Date    LDLCALC 53 10/24/2022   ,   Lab Results   Component Value Date    TRIG 49 10/24/2022        Lab Results   Component Value Date    GLUCOSE 77 10/24/2022   ,   Lab Results   Component Value Date    HGBA1C 5.8 (H) 11/02/2021         Lab Results   Component Value Date    CREATININE 0.6 10/24/2022       Lab Results   Component Value Date    TSH 1.18 10/24/2022

## 2022-12-28 ENCOUNTER — TELEPHONE (OUTPATIENT)
Dept: PRIMARY CARE | Facility: CLINIC | Age: 67
End: 2022-12-28
Payer: MEDICARE

## 2022-12-28 NOTE — TELEPHONE ENCOUNTER
Pt test negative for COVID(3 different test) pt having flu like symptoms.  Pt symptoms are head cold, cough with yellowish/green phlemg and chest congestion.  Pt would like to know if Doctor would prescribe antibiotic and [facility] in [place]

## 2022-12-29 ENCOUNTER — HOSPITAL ENCOUNTER (OUTPATIENT)
Dept: RADIOLOGY | Facility: CLINIC | Age: 67
Discharge: HOME | End: 2022-12-29
Attending: FAMILY MEDICINE
Payer: MEDICARE

## 2022-12-29 ENCOUNTER — DOCUMENTATION (OUTPATIENT)
Dept: PRIMARY CARE | Facility: CLINIC | Age: 67
End: 2022-12-29

## 2022-12-29 ENCOUNTER — OFFICE VISIT (OUTPATIENT)
Dept: PRIMARY CARE | Facility: CLINIC | Age: 67
End: 2022-12-29
Payer: MEDICARE

## 2022-12-29 VITALS
RESPIRATION RATE: 16 BRPM | SYSTOLIC BLOOD PRESSURE: 128 MMHG | WEIGHT: 186.4 LBS | TEMPERATURE: 98.5 F | OXYGEN SATURATION: 98 % | BODY MASS INDEX: 34.09 KG/M2 | HEART RATE: 67 BPM | DIASTOLIC BLOOD PRESSURE: 80 MMHG

## 2022-12-29 DIAGNOSIS — R05.1 ACUTE COUGH: ICD-10-CM

## 2022-12-29 DIAGNOSIS — R05.1 ACUTE COUGH: Primary | ICD-10-CM

## 2022-12-29 PROCEDURE — 71046 X-RAY EXAM CHEST 2 VIEWS: CPT

## 2022-12-29 PROCEDURE — 87637 SARSCOV2&INF A&B&RSV AMP PRB: CPT | Performed by: FAMILY MEDICINE

## 2022-12-29 PROCEDURE — 99213 OFFICE O/P EST LOW 20 MIN: CPT | Performed by: FAMILY MEDICINE

## 2022-12-29 PROCEDURE — G8752 SYS BP LESS 140: HCPCS | Performed by: FAMILY MEDICINE

## 2022-12-29 PROCEDURE — G8754 DIAS BP LESS 90: HCPCS | Performed by: FAMILY MEDICINE

## 2022-12-29 ASSESSMENT — ENCOUNTER SYMPTOMS
SHORTNESS OF BREATH: 0
NECK STIFFNESS: 0
VOMITING: 0
ABDOMINAL PAIN: 0
RHINORRHEA: 0
CONFUSION: 0
CHILLS: 0
FATIGUE: 0
SORE THROAT: 1
DIARRHEA: 0
MYALGIAS: 0
WHEEZING: 1
NAUSEA: 0
COUGH: 1
EYE REDNESS: 0
SINUS PRESSURE: 0
FEVER: 0
ARTHRALGIAS: 0
SINUS PAIN: 0
CHEST TIGHTNESS: 0

## 2022-12-29 ASSESSMENT — ACTIVITIES OF DAILY LIVING (ADL)
PATIENT'S MEMORY ADEQUATE TO SAFELY COMPLETE DAILY ACTIVITIES?: YES
ADEQUATE_TO_COMPLETE_ADL: YES

## 2022-12-29 ASSESSMENT — PATIENT HEALTH QUESTIONNAIRE - PHQ9: SUM OF ALL RESPONSES TO PHQ9 QUESTIONS 1 & 2: 0

## 2022-12-29 NOTE — PROGRESS NOTES
Iam Luna MD    [facility]  [address]  [place] [address]  Phone: [phone]  Fax: [phone]     History of Present Illness     Subjective     Patient ID: Jennifer Aly is a 67 y.o. female.    URI   This is a new problem. Episode onset: 6 days ago. The problem has been unchanged. Maximum temperature: Fever the first 2 days to 99. Associated symptoms include coughing, a sore throat and wheezing. Pertinent negatives include no abdominal pain, chest pain, congestion, diarrhea, ear pain, nausea, rash, rhinorrhea, sinus pain, sneezing or vomiting. Associated symptoms comments: Productive of sputum, post nasal drip, right ear pressure, no shortness of breath . She has tried nothing for the symptoms.     Home COVID test negative. Started with myalgias. Had flu shot.    Past Medical/Surgical/Family/Social History       The following have been reviewed and updated as appropriate in this visit:   Allergies  Meds  Problems         Past Medical History:   Diagnosis Date   • Arthritis    • Fistula    • Gout    • HLD (hyperlipidemia)    • IFG (impaired fasting glucose)    • Obesity    • Polyarthritis    • Pulmonary emboli (CMS/HCC)     pulmonary emboli   • Torn rotator cuff        Past Surgical History:   Procedure Laterality Date   • APPENDECTOMY  2008   • CARDIAC CATHETERIZATION  11/2012   • INTRACAPSULAR CATARACT EXTRACTION Bilateral 08/2019   • IVC FILTER RETRIEVAL  2012   • IVC FILTER RETRIEVAL  02/2013   • RECTOURETHRAL FISTULA REPAIR  2008, 5/2017   • ROTATOR CUFF REPAIR Left 01/2016   • ALTAF-EN-Y PROCEDURE  02/07/2017   • TOTAL ABDOMINAL HYSTERECTOMY  1987   • TOTAL KNEE ARTHROPLASTY Bilateral 2010   • TOTAL KNEE ARTHROPLASTY Right 2012    revision   • TRIGGER FINGER RELEASE Right 2012    right thumb   • ULNAR NERVE REPAIR  2013       Family History   Problem Relation Age of Onset   • Diabetes Biological Mother    • Lung cancer Biological Mother    •  Alzheimer's disease Biological Mother    • Diabetes type II Biological Father    • Heart failure Biological Father    • Heart attack Biological Sister      Social History     Tobacco Use   • Smoking status: Former     Years: 15.00     Types: Cigarettes     Quit date:      Years since quittin.0   • Smokeless tobacco: Never   Substance Use Topics   • Alcohol use: Yes     Comment: occasional wine   • Drug use: No       Patient Care Team:  Demarco Moreau DO as PCP - General  Tita Man MD as Consulting Physician (Otolaryngology)  Kash Luna DO as Consulting Physician (Hematology and Oncology)  Charles Vasquez MD as Consulting Physician (Cardiovascular Disease)     Allergies and Medications       Allergies   Allergen Reactions   • Sulfa (Sulfonamide Antibiotics) Hives       Current Outpatient Medications   Medication Sig Dispense Refill   • atorvastatin (LIPITOR) 40 mg tablet TAKE 1 TABLET BY MOUTH EVERY DAY 90 tablet 3   • calcium carbonate-vitamin D3 500 mg(1,250mg) -200 unit per tablet Take 2 tablets by mouth 2 (two) times a day with meals.       • cholecalciferol, vitamin D3, 5,000 unit (125 mcg) tablet Take 5,000 Units by mouth daily.     • clobetasol (TEMOVATE) 0.05 % cream      • fluticasone propionate (FLONASE) 50 mcg/actuation nasal spray Administer 1 spray into each nostril daily as needed for rhinitis. Make an appointment if help is needed for nasal symptoms. 1 Bottle 3   • ketoconazole (NIZORAL) 2 % cream APPLY ON THE SKIN TWICE A DAY APPLY TO AFFECTED AREA RASH IN GROIN/ABDOMEN TWICE A DAY FOR 3 WEEKS     • methocarbamoL (ROBAXIN-750) 750 mg tablet Take one by mouth ONLY as needed every 6-8 hours for muscle pains and spasms 50 tablet 1   • multivitamin capsule Take 1 capsule by mouth daily.     • semaglutide Inject 1 mg under the skin every (seven) 7 days. 3 mL 11   • SKYRIZI 150 mg/mL syringe      • TIADYLT  mg 24 hr capsule TAKE 1 CAPSULE BY MOUTH ONCE DAILY 90 capsule 3   •  "traMADoL (ULTRAM) 50 mg tablet Take 1 tablet (50 mg total) by mouth every 8 (eight) hours as needed for moderate pain or severe pain. 50 tablet 1   • vitamin B complex/folic acid (B COMPLEX 100 ORAL) Take by mouth.     • warfarin (COUMADIN) 4 mg tablet Patient taking 4mg daily except M, W, F taking 8mg. 120 tablet 3   • diclofenac sodium (VOLTAREN) 1 % topical gel Apply topically 2 (two) times a day as needed for mild pain. 100 g 1     No current facility-administered medications for this visit.          Review of Systems       Review of Systems   Constitutional: Negative for chills, fatigue and fever.   HENT: Positive for sore throat. Negative for congestion, ear pain, rhinorrhea, sinus pressure, sinus pain and sneezing.    Eyes: Negative for redness.   Respiratory: Positive for cough and wheezing. Negative for chest tightness and shortness of breath.    Cardiovascular: Negative for chest pain.   Gastrointestinal: Negative for abdominal pain, diarrhea, nausea and vomiting.   Musculoskeletal: Negative for arthralgias, myalgias and neck stiffness.   Skin: Negative for rash.   Psychiatric/Behavioral: Negative for confusion.        Physical Examination       Objective     Vitals:    12/29/22 0952   BP: 128/80   Pulse: 67   Resp: 16   Temp: 36.9 °C (98.5 °F)   SpO2: 98%       Pulse Readings from Last 5 Encounters:   12/29/22 67   10/05/22 73   09/23/22 67   03/30/22 (!) 54   09/16/21 (!) 52       Wt Readings from Last 5 Encounters:   12/29/22 84.6 kg (186 lb 6.4 oz)   10/05/22 92.1 kg (203 lb)   10/03/22 91.6 kg (202 lb)   09/23/22 91.6 kg (202 lb)   03/30/22 88.5 kg (195 lb)       Ht Readings from Last 5 Encounters:   10/05/22 1.575 m (5' 2\")   10/03/22 1.575 m (5' 2\")   09/23/22 1.575 m (5' 2\")   03/30/22 1.575 m (5' 2\")   09/16/21 1.575 m (5' 2\")       BP Readings from Last 5 Encounters:   12/29/22 128/80   10/05/22 118/74   10/03/22 110/76   09/23/22 110/76   03/30/22 122/74       BMI Readings from Last 4 " Encounters:   12/29/22 34.09 kg/m²   10/05/22 37.13 kg/m²   10/03/22 36.95 kg/m²   09/23/22 36.95 kg/m²       Physical Exam  Constitutional:       General: She is not in acute distress.     Appearance: She is well-developed. She is not ill-appearing, toxic-appearing or diaphoretic.   HENT:      Right Ear: Tympanic membrane, ear canal and external ear normal.      Left Ear: Tympanic membrane, ear canal and external ear normal.      Nose: No mucosal edema, congestion or rhinorrhea.      Right Sinus: No maxillary sinus tenderness or frontal sinus tenderness.      Left Sinus: No maxillary sinus tenderness or frontal sinus tenderness.      Mouth/Throat:      Pharynx: Uvula midline. Posterior oropharyngeal erythema present. No oropharyngeal exudate or uvula swelling.      Tonsils: No tonsillar exudate.   Eyes:      General: Lids are normal.         Right eye: No discharge.         Left eye: No discharge.      Conjunctiva/sclera:      Right eye: Right conjunctiva is not injected. No exudate.     Left eye: Left conjunctiva is not injected. No exudate.     Pupils: Pupils are equal, round, and reactive to light.   Neck:      Thyroid: No thyroid mass or thyromegaly.   Cardiovascular:      Rate and Rhythm: Normal rate and regular rhythm.      Heart sounds: Normal heart sounds, S1 normal and S2 normal. No murmur heard.    No gallop.   Pulmonary:      Effort: Pulmonary effort is normal. No tachypnea, accessory muscle usage or respiratory distress.      Breath sounds: Normal breath sounds. No stridor. No wheezing, rhonchi or rales.   Musculoskeletal:      Cervical back: Normal range of motion and neck supple.   Neurological:      Mental Status: She is alert.   Psychiatric:         Mood and Affect: Mood normal.         Behavior: Behavior normal. Behavior is cooperative.          Laboratory Results     Lab Results   Component Value Date    WBC 5.62 10/24/2022    WBC 5.62 10/24/2022    WBC 4.96 11/02/2021    HGB 12.4 10/24/2022     HGB 12.4 10/24/2022    HGB 13.8 11/02/2021    HCT 39.5 10/24/2022    HCT 39.5 10/24/2022    HCT 44.2 11/02/2021    MCV 93.8 10/24/2022    MCV 93.8 10/24/2022    MCV 99.5 (H) 11/02/2021     10/24/2022     10/24/2022     11/02/2021        Lab Results   Component Value Date    GLUCOSE 77 10/24/2022    GLUCOSE 91 11/02/2021    GLUCOSE 98 02/15/2021    CALCIUM 9.9 10/24/2022    CALCIUM 9.9 11/02/2021    CALCIUM 9.8 02/15/2021     10/24/2022     11/02/2021     02/15/2021    K 4.2 10/24/2022    K 4.7 11/02/2021    K 4.7 02/15/2021    CO2 24 10/24/2022    CO2 23 11/02/2021    CO2 29 02/15/2021     10/24/2022     11/02/2021     02/15/2021    BUN 12 10/24/2022    BUN 19 11/02/2021    BUN 17 02/15/2021    CREATININE 0.6 10/24/2022    CREATININE 0.6 11/02/2021    CREATININE 0.8 02/15/2021    ALKPHOS 95 10/24/2022    ALKPHOS 100 11/02/2021    ALKPHOS 106 02/15/2021    AST 22 10/24/2022    AST 27 11/02/2021    AST 24 02/15/2021    ALT 20 10/24/2022    ALT 27 11/02/2021    ALT 28 02/15/2021    BILITOT 0.6 10/24/2022    BILITOT 0.8 11/02/2021    BILITOT 1.0 02/15/2021    ALBUMIN 4.0 10/24/2022    ALBUMIN 4.2 11/02/2021    ALBUMIN 3.8 02/15/2021       Lab Results   Component Value Date    CHOL 135 10/24/2022    CHOL 167 11/02/2021    CHOL 145 02/15/2021     Lab Results   Component Value Date    HDL 72 10/24/2022    HDL 70 11/02/2021    HDL 63 02/15/2021     Lab Results   Component Value Date    LDLCALC 53 10/24/2022    LDLCALC 85 11/02/2021    LDLCALC 72 02/15/2021     Lab Results   Component Value Date    TRIG 49 10/24/2022    TRIG 60 11/02/2021    TRIG 48 02/15/2021       The 10-year ASCVD risk score (Nelson MONTOYA, et al., 2019) is: 17.3%    Values used to calculate the score:      Age: 67 years      Sex: Female      Is Non- : No      Diabetic: Yes      Tobacco smoker: No      Systolic Blood Pressure: 128 mmHg      Is BP treated: Yes      HDL  Cholesterol: 72 mg/dL      Total Cholesterol: 135 mg/dL    Lab Results   Component Value Date    TSH 1.18 10/24/2022    TSH 1.83 11/02/2021    TSH 2.39 02/15/2021       Lab Results   Component Value Date    HGBA1C 5.8 (H) 11/02/2021    HGBA1C 6.3 (H) 01/20/2020    HGBA1C 5.9 (H) 04/04/2019       Lab Results   Component Value Date    CREATUR 213.1 04/05/2017    CREATUR 122.1 03/22/2016     Lab Results   Component Value Date    MICROALBUR 7.0 11/02/2021    MICROALBUR 7.4 04/05/2017    MICROALBUR 24.7 (H) 03/22/2016     Lab Results   Component Value Date    HEPCAB Nonreactive 11/02/2021     Lab Results   Component Value Date    MG 1.9 02/03/2020    MG 1.9 02/02/2020    MG 1.9 02/01/2020      Immunization History   Administered Date(s) Administered   • INFLUENZA VACCINE QUAD ADJUVANTED 65 and OLDER 09/30/2020, 09/16/2021, 10/05/2022   • Influenza Split High Dose Preservative Free IM 10/25/2016, 09/14/2017   • Influenza Vaccine High Dose 65 And Older 09/25/2018, 09/16/2020   • Influenza Vaccine Quadrivalent Preservative Free 6 Mons and Up 10/02/2019   • Influenza, Unspecified 09/25/2018   • PPD Test 01/06/2011, 10/23/2018   • Pneumococcal Conjugate 13-Valent 10/25/2016, 09/14/2017   • Pneumococcal Conjugate 20-Valent 10/05/2022   • Pneumococcal Polysaccharide 10/25/2016   • SARS-COV-2 (COVID-19) VACCINE, MODERNA BOOSTER 11/19/2021   • Sars-cov-2 (Covid-19) Vaccine, Jodee 03/13/2021   • Zoster 10/25/2017   • Zoster Vaccine Recombinant Adjuvanted (Shingrix) 08/15/2018, 07/08/2019, 10/02/2019       Health Maintenance Topics with due status: Overdue       Topic Date Due    Annual Dilated Retinal Exam Never done    Medicare Annual Wellness Visit Never done    DTaP, Tdap, and Td Vaccines Never done    COVID-19 Vaccine 01/14/2022    Diabetes Kidney Health Evaluation:  uACR 11/02/2022    Hemoglobin A1C 11/02/2022     Health Maintenance Topics with due status: Not Due       Topic Last Completion Date    Colorectal Cancer  Screening 10/15/2013    Breast Cancer Screening 11/17/2021    DEXA Scan 12/30/2021    Diabetic Foot Exam 10/05/2022    Diabetes Kidney Health Evaluation: eGFR 10/24/2022    Depression Screening 12/29/2022    Falls Risk Screening 12/29/2022     Health Maintenance Topics with due status: Completed       Topic Last Completion Date    Zoster Vaccine 10/02/2019    Hepatitis C Screening 11/02/2021    Influenza Vaccine 10/05/2022    Pneumococcal (65 years and older) 10/05/2022     Health Maintenance Topics with due status: Aged Out       Topic Date Due    Meningococcal ACWY Aged Out    HIB Vaccines Aged Out    Hepatitis B Vaccines Aged Out    IPV Vaccines Aged Out    HPV Vaccines Aged Out        Assessment and Plan       Assessment/Plan     Problem List Items Addressed This Visit     Acute cough - Primary    Current Assessment & Plan     Unclear cause at this time. Viral vs. Bacterial. Check chest xray to exclude pneumonia. Check nasal swab for RSV/COVID/FLU  Further recommendations to follow.          Relevant Orders    X-RAY CHEST 2 VIEWS       Return if symptoms worsen or fail to improve.    Orders Placed This Encounter   Procedures   • X-RAY CHEST 2 VIEWS     Standing Status:   Future     Standing Expiration Date:   12/29/2023     Order Specific Question:   Release to patient     Answer:   Immediate              Iam Luna MD    12/29/2022

## 2022-12-29 NOTE — PROGRESS NOTES
Cardiology  Office Progress Note         Reason for visit:   Chief Complaint   Patient presents with   • Follow-up       HPI     Jennifer Aly is a 67 y.o. female who presents to the office for cardiovascular follow up and management of HTN, Sleep apnea with cpap, PE , pure hypercholesteremia, GI bleed, Bariatric surgery in 2017 and a questionable Afib that she does not believe is a real diagnosis.    She was initially seen in the office 9/23/22 for increased exertional shortness of breath and was feeling more tired and fatigued. She also noted feeling lightheaded when walking and also short of breath. She also had been having a pounding in her ears which was new for her. I asked her to complete a TTE and 2 week MCOT. I also planned to possibly transition her from coumadin to DOAC. I also advised pulmonary follow up as well for her shortness of breath. For possibly transition to DOAC from coumadin I also gave a referral to hematology for there insight.     She completed a TTE 9/23/22 showing and EF 70% with preserved LV systolic function. Mild concentric LVH, Grade II LV diastolic dysfunction. Mildly dilated LA,RA. Mild MR, mild to mod TR. Her MCOT showed no evidence of afib.     She returns today in follow up and reports that she has had no further episodes of lightheadedness or exertional shortness of breath. She also had no further pounding in her ears. She followed up with hematology (Dr Luna) and she is awaiting some test results. But he feels that she will not be able to come off of the warfarin.     FLP 10/24/22 , Tgl 49, HDL 72, LDL 53  Past Medical History:   Diagnosis Date   • Arthritis    • Fistula    • Gout    • HLD (hyperlipidemia)    • IFG (impaired fasting glucose)    • Obesity    • Polyarthritis    • Pulmonary emboli (CMS/HCC)     pulmonary emboli   • Torn rotator cuff        Past Surgical History:   Procedure Laterality Date   • APPENDECTOMY  2008   • CARDIAC CATHETERIZATION  11/2012   •  INTRACAPSULAR CATARACT EXTRACTION Bilateral 2019   • IVC FILTER RETRIEVAL     • IVC FILTER RETRIEVAL  2013   • RECTOURETHRAL FISTULA REPAIR  , 2017   • ROTATOR CUFF REPAIR Left 2016   • ALTAF-EN-Y PROCEDURE  2017   • TOTAL ABDOMINAL HYSTERECTOMY  1987   • TOTAL KNEE ARTHROPLASTY Bilateral    • TOTAL KNEE ARTHROPLASTY Right 2012    revision   • TRIGGER FINGER RELEASE Right     right thumb   • ULNAR NERVE REPAIR         Social History     Tobacco Use   • Smoking status: Former     Years: 15.00     Types: Cigarettes     Quit date:      Years since quittin.0   • Smokeless tobacco: Never   Substance Use Topics   • Alcohol use: Yes     Comment: occasional wine   • Drug use: No       Family History   Problem Relation Age of Onset   • Diabetes Biological Mother    • Lung cancer Biological Mother    • Alzheimer's disease Biological Mother    • Diabetes type II Biological Father    • Heart failure Biological Father    • Heart attack Biological Sister        Allergies:  Sulfa (sulfonamide antibiotics)    Current Outpatient Medications   Medication Sig Dispense Refill   • atorvastatin (LIPITOR) 40 mg tablet TAKE 1 TABLET BY MOUTH EVERY DAY 90 tablet 3   • calcium carbonate-vitamin D3 500 mg(1,250mg) -200 unit per tablet Take 2 tablets by mouth 2 (two) times a day with meals.       • cefuroxime (CEFTIN) 500 mg tablet Take 1 tablet (500 mg total) by mouth 2 (two) times a day for 7 days. 14 tablet 0   • cholecalciferol, vitamin D3, 5,000 unit (125 mcg) tablet Take 5,000 Units by mouth daily.     • clobetasol (TEMOVATE) 0.05 % cream      • fluticasone propionate (FLONASE) 50 mcg/actuation nasal spray Administer 1 spray into each nostril daily as needed for rhinitis. Make an appointment if help is needed for nasal symptoms. 1 Bottle 3   • ketoconazole (NIZORAL) 2 % cream APPLY ON THE SKIN TWICE A DAY APPLY TO AFFECTED AREA RASH IN GROIN/ABDOMEN TWICE A DAY FOR 3 WEEKS     • methocarbamoL  (ROBAXIN-750) 750 mg tablet Take one by mouth ONLY as needed every 6-8 hours for muscle pains and spasms 50 tablet 1   • multivitamin capsule Take 1 capsule by mouth daily.     • semaglutide Inject 1 mg under the skin every (seven) 7 days. 3 mL 11   • SKYRIZI 150 mg/mL syringe Once every 12 weeks     • TIADYLT  mg 24 hr capsule TAKE 1 CAPSULE BY MOUTH ONCE DAILY 90 capsule 3   • traMADoL (ULTRAM) 50 mg tablet Take 1 tablet (50 mg total) by mouth every 8 (eight) hours as needed for moderate pain or severe pain. 50 tablet 1   • vitamin B complex/folic acid (B COMPLEX 100 ORAL) Take by mouth.     • warfarin (COUMADIN) 4 mg tablet Patient taking 4mg daily except M, W, F taking 8mg. (Patient taking differently: Patient taking 8mg daily except Tue/Thurs 4mg) 120 tablet 3   • diclofenac sodium (VOLTAREN) 1 % topical gel Apply topically 2 (two) times a day as needed for mild pain. 100 g 1     No current facility-administered medications for this visit.       Review of Systems   Constitutional: Negative for malaise/fatigue.   Cardiovascular: Negative for chest pain, dyspnea on exertion, irregular heartbeat, leg swelling and palpitations.   Respiratory: Negative for shortness of breath and sleep disturbances due to breathing.    Neurological: Negative for dizziness and light-headedness.       Objective     Vitals:    12/30/22 1003   BP: 114/70   Pulse: 65   SpO2: 99%       Wt Readings from Last 3 Encounters:   12/30/22 83.9 kg (185 lb)   12/29/22 84.6 kg (186 lb 6.4 oz)   10/05/22 92.1 kg (203 lb)       Body mass index is 33.84 kg/m².    Physical Exam  Vitals reviewed.   Cardiovascular:      Rate and Rhythm: Normal rate and regular rhythm.      Pulses: Normal pulses.           Posterior tibial pulses are 2+ on the right side and 2+ on the left side.      Heart sounds: Normal heart sounds.   Pulmonary:      Effort: Pulmonary effort is normal.   Musculoskeletal:      Cervical back: Normal range of motion.   Skin:      General: Skin is warm.      Capillary Refill: Capillary refill takes less than 2 seconds.   Neurological:      Mental Status: She is alert and oriented to person, place, and time.       ECG   Sinus bradycardia    Hematology  Lab Results   Component Value Date    WBC 5.62 10/24/2022    WBC 5.62 10/24/2022    HGB 12.4 10/24/2022    HGB 12.4 10/24/2022    HCT 39.5 10/24/2022    HCT 39.5 10/24/2022     10/24/2022     10/24/2022    INR 2.0 12/13/2022       Chemistries  Lab Results   Component Value Date     10/24/2022    K 4.2 10/24/2022     10/24/2022    CREATININE 0.6 10/24/2022    BUN 12 10/24/2022    CO2 24 10/24/2022    GLUCOSE 77 10/24/2022    CALCIUM 9.9 10/24/2022    MG 1.9 02/03/2020    ALT 20 10/24/2022    AST 22 10/24/2022       Cholesterol  Lab Results   Component Value Date    CHOL 135 10/24/2022    TRIG 49 10/24/2022    HDL 72 10/24/2022    LDLCALC 53 10/24/2022    NONHDLCALC 63 10/24/2022       Endocrine  Lab Results   Component Value Date    TSH 1.18 10/24/2022    HGBA1C 5.8 (H) 11/02/2021       Cardiac Imaging    TRANSTHORACIC ECHO (TTE) COMPLETE 10/03/2022    Interpretation Summary  · Normal-sized LV. Preserved LV systolic function. Estimated EF 70%. No regional wall motion abnormalities. Mild concentric left ventricular hypertrophy. Grade II LV diastolic dysfunction.  · Normal-sized RV. Normal RV systolic function.  · Mildly dilated LA. Mildly dilated RA.  · Tricuspid aortic valve. No aortic valve regurgitation. No aortic valve stenosis.  · Moderate mitral annular calcification. Mild mitral valve regurgitation. No significant mitral valve stenosis.  · Mild to moderate tricuspid valve regurgitation. Estimated RVSP = 38 mmHg.  · Mild pulmonic valve regurgitation.  · Normal-sized IVC. IVC demonstrates normal respiratory collapse.  · No evidence of pericardial effusion.      Assessment/Plan     Obstructive sleep apnea  Wears CPAP  Follows with Danita Cummins at [facility]  (dyspnea on exertion)  Overall improved though has been battling a presumed viral URI for the past 3-4 d  TTE without clear etiology though G2 DD was noted. Mild MR, mild-mod TR, mild LAE/KAMERON as well.   Pt states she is overall improved  No e/o acute/decompensated CHF    If recurrent ROGERS, can consider diuretic/trial of HFpEF treatment. Can also consider ischemic eval  Will defer further workup at this time given improvement in symptoms and reassuring exam    Essential hypertension  BP well controlled on diltiazem 120 daily    Paroxysmal atrial fibrillation (CMS/HCC)  Dx is in chart but pt denies hx of this. She does not believe it is a true diagnosis and has asked Dr. Moreau to remove the dx from the chart  She wears a FitBit sense and I advised her to set up Heart rhythm assessment with the Fitbit ECG elizabeth.     2-week MCOT did not show Afib  TTE does show findings that increase the risk of afib; mild MR, mild-mod TR, biatrial enlargement. KIM hx also increases incidence of afib     She is currently on coumadin for PE with lupus anticoagulant. From an afib standpoint, there is not currently a clear indication for AC but she is certainly at risk of recurrent afib. Were she to no longer need AC from a hematologic perspective, would consider ILR placement to detect recurrent afib    Pulmonary emboli (CMS/HCC)  Management per Dr. Luna. Lupus anticoagulant detected per pt report. Current plan is to remain on coumadin. He is repeating testing to confirm finding.     Diabetes mellitus (CMS/HCC)  Off med since bypass surgery  Recent a1c 5.8    HLD (hyperlipidemia)  On atorvastatin 40 mg daily  LDL 85 on recent check    Morbid obesity due to excess calories (CMS/HCC)  Sees Dr. Osman, had bypass surgery  Gained 20 lb since start of covid- plans to lose this         Orders Placed This Encounter   Procedures   • ECG 12 lead     Scheduling Instructions:      PLEASE USE THIS ORDER FOR ECG'S PERFORMED IN PHYSICIAN OFFICES     Order  Specific Question:   Release to patient     Answer:   Immediate       Follow Up Plans:  Return in about 1 year (around 12/30/2023).          I, Clair Collins, am scribing for, and in the presence of, Charles Vasquez MD.     I, Charles Vasquez MD, personally performed the services described in this documentation as scribed by Clair Collins in my presence, and it is both accurate and complete.     Charles Vasquez MD  12/30/2022

## 2022-12-29 NOTE — PROGRESS NOTES
Patient notified that chest x-ray was negative.  Await nasal swab results.  Further recommendations to follow at that time.

## 2022-12-29 NOTE — ASSESSMENT & PLAN NOTE
Unclear cause at this time. Viral vs. Bacterial. Check chest xray to exclude pneumonia. Check nasal swab for RSV/COVID/FLU  Further recommendations to follow.

## 2022-12-29 NOTE — PATIENT INSTRUCTIONS
Problem List Items Addressed This Visit       Acute cough - Primary     Unclear cause at this time. Viral vs. Bacterial. Check chest xray to exclude pneumonia. Check nasal swab for RSV/COVID/FLU  Further recommendations to follow.          Relevant Orders    X-RAY CHEST 2 VIEWS

## 2022-12-30 ENCOUNTER — OFFICE VISIT (OUTPATIENT)
Dept: CARDIOLOGY | Facility: CLINIC | Age: 67
End: 2022-12-30
Payer: MEDICARE

## 2022-12-30 ENCOUNTER — DOCUMENTATION (OUTPATIENT)
Dept: PRIMARY CARE | Facility: CLINIC | Age: 67
End: 2022-12-30
Payer: MEDICARE

## 2022-12-30 VITALS
WEIGHT: 185 LBS | HEIGHT: 62 IN | BODY MASS INDEX: 34.04 KG/M2 | DIASTOLIC BLOOD PRESSURE: 70 MMHG | OXYGEN SATURATION: 99 % | HEART RATE: 65 BPM | SYSTOLIC BLOOD PRESSURE: 114 MMHG

## 2022-12-30 DIAGNOSIS — I48.0 PAROXYSMAL ATRIAL FIBRILLATION (CMS/HCC): Primary | ICD-10-CM

## 2022-12-30 PROBLEM — I25.10 CORONARY ARTERY DISEASE INVOLVING NATIVE CORONARY ARTERY OF NATIVE HEART WITHOUT ANGINA PECTORIS: Status: RESOLVED | Noted: 2018-04-30 | Resolved: 2022-12-30

## 2022-12-30 LAB
FLUAV RNA SPEC QL NAA+PROBE: NEGATIVE
FLUBV RNA SPEC QL NAA+PROBE: NEGATIVE
RSV RNA SPEC QL NAA+PROBE: NEGATIVE
SARS-COV-2 RNA RESP QL NAA+PROBE: NEGATIVE

## 2022-12-30 PROCEDURE — G8754 DIAS BP LESS 90: HCPCS | Performed by: INTERNAL MEDICINE

## 2022-12-30 PROCEDURE — 99215 OFFICE O/P EST HI 40 MIN: CPT | Performed by: INTERNAL MEDICINE

## 2022-12-30 PROCEDURE — G8752 SYS BP LESS 140: HCPCS | Performed by: INTERNAL MEDICINE

## 2022-12-30 PROCEDURE — 93000 ELECTROCARDIOGRAM COMPLETE: CPT | Performed by: INTERNAL MEDICINE

## 2022-12-30 RX ORDER — CEFUROXIME AXETIL 500 MG/1
500 TABLET ORAL 2 TIMES DAILY
Qty: 14 TABLET | Refills: 0 | Status: SHIPPED | OUTPATIENT
Start: 2022-12-30 | End: 2023-01-06

## 2022-12-30 ASSESSMENT — ENCOUNTER SYMPTOMS
PALPITATIONS: 0
IRREGULAR HEARTBEAT: 0
DYSPNEA ON EXERTION: 0
DIZZINESS: 0
SHORTNESS OF BREATH: 0
LIGHT-HEADEDNESS: 0
SLEEP DISTURBANCES DUE TO BREATHING: 0

## 2022-12-30 NOTE — RESULT ENCOUNTER NOTE
Patient notified that nasal swab was negative for COVID, flu, RSV.  Has continued green sputum production.  Will treat with Ceftin 500 mg twice daily for 7 days.  If not improving should call for further instructions.

## 2022-12-30 NOTE — ASSESSMENT & PLAN NOTE
Dx is in chart but pt denies hx of this. She does not believe it is a true diagnosis and has asked Dr. Moreau to remove the dx from the chart  She wears a FitBit sense and I advised her to set up Heart rhythm assessment with the Fitbit ECG elizabeth.     2-week MCOT did not show Afib  TTE does show findings that increase the risk of afib; mild MR, mild-mod TR, biatrial enlargement. KIM hx also increases incidence of afib     She is currently on coumadin for PE with lupus anticoagulant. From an afib standpoint, there is not currently a clear indication for AC but she is certainly at risk of recurrent afib. Were she to no longer need AC from a hematologic perspective, would consider ILR placement to detect recurrent afib

## 2022-12-30 NOTE — ASSESSMENT & PLAN NOTE
Overall improved though has been battling a presumed viral URI for the past 3-4 d  TTE without clear etiology though G2 DD was noted. Mild MR, mild-mod TR, mild LAE/KAMERON as well.   Pt states she is overall improved  No e/o acute/decompensated CHF    If recurrent ROGERS, can consider diuretic/trial of HFpEF treatment. Can also consider ischemic eval  Will defer further workup at this time given improvement in symptoms and reassuring exam

## 2022-12-30 NOTE — ASSESSMENT & PLAN NOTE
Management per Dr. Luna. Lupus anticoagulant detected per pt report. Current plan is to remain on coumadin. He is repeating testing to confirm finding.

## 2023-01-02 LAB — INR PPP: 2.1

## 2023-01-10 ENCOUNTER — TRANSCRIBE ORDERS (OUTPATIENT)
Dept: LAB | Facility: CLINIC | Age: 68
End: 2023-01-10

## 2023-01-10 ENCOUNTER — APPOINTMENT (OUTPATIENT)
Dept: LAB | Facility: CLINIC | Age: 68
End: 2023-01-10
Attending: STUDENT IN AN ORGANIZED HEALTH CARE EDUCATION/TRAINING PROGRAM
Payer: MEDICARE

## 2023-01-10 DIAGNOSIS — I26.99 OTHER PULMONARY EMBOLISM WITHOUT ACUTE COR PULMONALE: ICD-10-CM

## 2023-01-10 DIAGNOSIS — I26.99 OTHER PULMONARY EMBOLISM WITHOUT ACUTE COR PULMONALE: Primary | ICD-10-CM

## 2023-01-10 LAB
BASOPHILS # BLD: 0.04 K/UL (ref 0.01–0.1)
BASOPHILS NFR BLD: 0.5 %
DIFFERENTIAL METHOD BLD: ABNORMAL
EOSINOPHIL # BLD: 0.16 K/UL (ref 0.04–0.36)
EOSINOPHIL NFR BLD: 2 %
ERYTHROCYTE [DISTWIDTH] IN BLOOD BY AUTOMATED COUNT: 19.5 % (ref 11.7–14.4)
HCT VFR BLDCO AUTO: 38.7 % (ref 35–45)
HGB BLD-MCNC: 12.7 G/DL (ref 11.8–15.7)
IMM GRANULOCYTES # BLD AUTO: 0.03 K/UL (ref 0–0.08)
IMM GRANULOCYTES NFR BLD AUTO: 0.4 %
INR PPP: 2.2
LYMPHOCYTES # BLD: 1.51 K/UL (ref 1.2–3.5)
LYMPHOCYTES NFR BLD: 18.8 %
MCH RBC QN AUTO: 28.2 PG (ref 28–33.2)
MCHC RBC AUTO-ENTMCNC: 32.8 G/DL (ref 32.2–35.5)
MCV RBC AUTO: 85.8 FL (ref 83–98)
MONOCYTES # BLD: 0.59 K/UL (ref 0.28–0.8)
MONOCYTES NFR BLD: 7.3 %
NEUTROPHILS # BLD: 5.71 K/UL (ref 1.7–7)
NEUTS SEG NFR BLD: 71 %
NRBC BLD-RTO: 0 %
PDW BLD AUTO: 10.7 FL (ref 9.4–12.3)
PLATELET # BLD AUTO: 239 K/UL (ref 150–369)
RBC # BLD AUTO: 4.51 M/UL (ref 3.93–5.22)
WBC # BLD AUTO: 8.04 K/UL (ref 3.8–10.5)

## 2023-01-10 PROCEDURE — 36415 COLL VENOUS BLD VENIPUNCTURE: CPT

## 2023-01-10 PROCEDURE — 86146 BETA-2 GLYCOPROTEIN ANTIBODY: CPT

## 2023-01-10 PROCEDURE — 86146 BETA-2 GLYCOPROTEIN ANTIBODY: CPT | Mod: 59

## 2023-01-10 PROCEDURE — 30000001 MISCELLANEOUS LAB TEST (NOT TO BE USED FOR COVID19)

## 2023-01-10 PROCEDURE — 86147 CARDIOLIPIN ANTIBODY EA IG: CPT

## 2023-01-10 PROCEDURE — 85670 THROMBIN TIME PLASMA: CPT

## 2023-01-10 PROCEDURE — 85025 COMPLETE CBC W/AUTO DIFF WBC: CPT

## 2023-01-10 PROCEDURE — 30000001 HC MISCELLANEOUS TEST

## 2023-01-10 PROCEDURE — 85598 HEXAGNAL PHOSPH PLTLT NEUTRL: CPT

## 2023-01-10 PROCEDURE — 85613 RUSSELL VIPER VENOM DILUTED: CPT

## 2023-01-10 PROCEDURE — 85730 THROMBOPLASTIN TIME PARTIAL: CPT | Mod: 59,GZ

## 2023-01-12 LAB
B2 GLYCOPROT1 IGA SERPL IA-ACNC: <9.4 SAU
B2 GLYCOPROT1 IGG SERPL IA-ACNC: <9.4 SGU
B2 GLYCOPROT1 IGM SERPL IA-ACNC: <9.4 SMU
CARDIOLIPIN IGA SER IA-ACNC: <9.4 APL U/ML
CARDIOLIPIN IGG SER IA-ACNC: <9.4 GPL U/ML
CARDIOLIPIN IGM SER IA-ACNC: 87.5 MPL U/ML

## 2023-01-17 LAB — LABORATORY REPORT: NORMAL

## 2023-01-18 LAB — INR PPP: 1.4

## 2023-01-25 ENCOUNTER — TELEPHONE (OUTPATIENT)
Dept: PRIMARY CARE | Facility: CLINIC | Age: 68
End: 2023-01-25
Payer: MEDICARE

## 2023-01-25 DIAGNOSIS — Z12.31 ENCOUNTER FOR SCREENING MAMMOGRAM FOR MALIGNANT NEOPLASM OF BREAST: Primary | ICD-10-CM

## 2023-01-27 ENCOUNTER — HOSPITAL ENCOUNTER (OUTPATIENT)
Dept: RADIOLOGY | Facility: CLINIC | Age: 68
Discharge: HOME | End: 2023-01-27
Attending: FAMILY MEDICINE
Payer: MEDICARE

## 2023-01-27 DIAGNOSIS — Z12.31 ENCOUNTER FOR SCREENING MAMMOGRAM FOR MALIGNANT NEOPLASM OF BREAST: ICD-10-CM

## 2023-01-27 PROCEDURE — 77063 BREAST TOMOSYNTHESIS BI: CPT

## 2023-01-30 LAB — INR PPP: 2.1

## 2023-02-06 LAB — INR PPP: 1.7

## 2023-02-21 ENCOUNTER — TELEPHONE (OUTPATIENT)
Dept: PRIMARY CARE | Facility: CLINIC | Age: 68
End: 2023-02-21
Payer: MEDICARE

## 2023-02-21 RX ORDER — WARFARIN 4 MG/1
TABLET ORAL
Qty: 120 TABLET | Refills: 3
Start: 2023-02-21 | End: 2023-07-13

## 2023-02-21 NOTE — TELEPHONE ENCOUNTER
----- Message from Christiana Bird MA sent at 2/21/2023  2:45 PM EST -----  Regarding: FW: INR dose change  Contact: [phone]    ----- Message -----  From: Jennifer Aly  Sent: 2/21/2023   2:40 PM EST  To: [facility] Clinical Support P  Subject: INR dose change                                  Yes! That is the correct dose!

## 2023-03-07 LAB — INR PPP: 3

## 2023-03-09 ENCOUNTER — TELEPHONE (OUTPATIENT)
Dept: PRIMARY CARE | Facility: CLINIC | Age: 68
End: 2023-03-09

## 2023-03-09 RX ORDER — NITROFURANTOIN 25; 75 MG/1; MG/1
100 CAPSULE ORAL 2 TIMES DAILY
Qty: 14 CAPSULE | Refills: 0 | Status: SHIPPED | OUTPATIENT
Start: 2023-03-09 | End: 2023-03-16

## 2023-03-09 NOTE — TELEPHONE ENCOUNTER
Spoke with pt who stated she has frequent UTI's and currently has burning with urination, tenderness and when wipe she sometimes has a little spot of blood. S/s started yesterday. Pt declined visit and wanted to know if you can call something in.  Allergic to Sulfa antibiotics.

## 2023-03-09 NOTE — TELEPHONE ENCOUNTER
Pt is asking if antibiotic can be called into the pharmacy for a UTI. PT has spots of blood when wiping,and tender.

## 2023-03-14 LAB — INR PPP: 4.1

## 2023-03-15 ENCOUNTER — TELEPHONE (OUTPATIENT)
Dept: PRIMARY CARE | Facility: CLINIC | Age: 68
End: 2023-03-15
Payer: MEDICARE

## 2023-03-15 RX ORDER — WARFARIN 4 MG/1
TABLET ORAL
Start: 2023-03-15 | End: 2023-04-25

## 2023-03-15 NOTE — TELEPHONE ENCOUNTER
----- Message from Demarco Moreau DO sent at 3/15/2023  2:35 PM EDT -----  Regarding: FW: INR dose change  Contact: [phone]  Ok, can you alter dose (no print) and tell her just toa take 8 mg daily except now 4 mg Tue/Thurs repeat INR in 2 weeks  THX  JESSEE De León  ----- Message -----  From: Gail Gallo MA  Sent: 3/15/2023   1:23 PM EDT  To: Demarco Moreau DO  Subject: FW: INR dose change                                ----- Message -----  From: Jennifer Aly  Sent: 3/15/2023   1:19 PM EDT  To: [facility] Clinical Support P  Subject: INR dose change                                  Yes that is the correct dose.

## 2023-03-20 LAB — INR PPP: 3.9

## 2023-03-21 ENCOUNTER — TELEPHONE (OUTPATIENT)
Dept: PRIMARY CARE | Facility: CLINIC | Age: 68
End: 2023-03-21
Payer: MEDICARE

## 2023-03-21 ENCOUNTER — DOCUMENTATION (OUTPATIENT)
Dept: PRIMARY CARE | Facility: CLINIC | Age: 68
End: 2023-03-21
Payer: MEDICARE

## 2023-04-04 ENCOUNTER — TELEPHONE (OUTPATIENT)
Dept: PRIMARY CARE | Facility: CLINIC | Age: 68
End: 2023-04-04
Payer: MEDICARE

## 2023-04-04 LAB — INR PPP: 5.9

## 2023-04-04 RX ORDER — WARFARIN 4 MG/1
TABLET ORAL
Start: 2023-04-04 | End: 2023-04-25 | Stop reason: SDUPTHER

## 2023-04-04 NOTE — TELEPHONE ENCOUNTER
Patient called to report she tested her INR at home twice this morning and received result of 5.9, and 5.3     Last check - On 03/21 her INR came back 3.9 and you changed her dose to 4mg daily, except M/W/F take 8mg.   However - patient was confused and never changed to this dose. Patient remained on the 8mg except taking 4mg Tues/Thurs.     Patient asking what dose she should be on now and asking for your recommendation.

## 2023-04-04 NOTE — TELEPHONE ENCOUNTER
Spoke to patient, she will hold medication today and then begin new directions - 4mg daily except Tues/Thurs take 8mg. Updated chart to include these updated directions.   Sent patient [employer] message for reference.

## 2023-04-10 LAB — INR PPP: 2

## 2023-04-17 LAB — INR PPP: 1.4

## 2023-04-19 ENCOUNTER — DOCUMENTATION (OUTPATIENT)
Dept: PRIMARY CARE | Facility: CLINIC | Age: 68
End: 2023-04-19
Payer: MEDICARE

## 2023-04-25 ENCOUNTER — OFFICE VISIT (OUTPATIENT)
Dept: PRIMARY CARE | Facility: CLINIC | Age: 68
End: 2023-04-25
Payer: MEDICARE

## 2023-04-25 VITALS
DIASTOLIC BLOOD PRESSURE: 72 MMHG | OXYGEN SATURATION: 97 % | HEART RATE: 79 BPM | HEIGHT: 62 IN | WEIGHT: 189 LBS | RESPIRATION RATE: 16 BRPM | SYSTOLIC BLOOD PRESSURE: 110 MMHG | BODY MASS INDEX: 34.78 KG/M2

## 2023-04-25 DIAGNOSIS — I48.0 PAROXYSMAL ATRIAL FIBRILLATION (CMS/HCC): ICD-10-CM

## 2023-04-25 DIAGNOSIS — E66.01 MORBID OBESITY DUE TO EXCESS CALORIES (CMS/HCC): Chronic | ICD-10-CM

## 2023-04-25 DIAGNOSIS — E44.1 MILD PROTEIN-CALORIE MALNUTRITION (CMS/HCC): ICD-10-CM

## 2023-04-25 DIAGNOSIS — E78.5 HYPERLIPIDEMIA, UNSPECIFIED HYPERLIPIDEMIA TYPE: ICD-10-CM

## 2023-04-25 DIAGNOSIS — J45.20 MILD INTERMITTENT ASTHMA WITHOUT COMPLICATION: Chronic | ICD-10-CM

## 2023-04-25 DIAGNOSIS — I10 ESSENTIAL HYPERTENSION: ICD-10-CM

## 2023-04-25 DIAGNOSIS — E11.618 TYPE 2 DIABETES MELLITUS WITH OTHER DIABETIC ARTHROPATHY, WITHOUT LONG-TERM CURRENT USE OF INSULIN: ICD-10-CM

## 2023-04-25 DIAGNOSIS — R53.83 MALAISE AND FATIGUE: Primary | ICD-10-CM

## 2023-04-25 DIAGNOSIS — K21.9 GERD WITHOUT ESOPHAGITIS: ICD-10-CM

## 2023-04-25 DIAGNOSIS — I27.82 OTHER CHRONIC PULMONARY EMBOLISM WITHOUT ACUTE COR PULMONALE: Chronic | ICD-10-CM

## 2023-04-25 DIAGNOSIS — R53.81 MALAISE AND FATIGUE: Primary | ICD-10-CM

## 2023-04-25 PROBLEM — R73.01 IFG (IMPAIRED FASTING GLUCOSE): Chronic | Status: RESOLVED | Noted: 2018-03-14 | Resolved: 2023-04-25

## 2023-04-25 PROBLEM — R05.1 ACUTE COUGH: Status: RESOLVED | Noted: 2022-12-29 | Resolved: 2023-04-25

## 2023-04-25 PROCEDURE — G8752 SYS BP LESS 140: HCPCS | Performed by: FAMILY MEDICINE

## 2023-04-25 PROCEDURE — 99214 OFFICE O/P EST MOD 30 MIN: CPT | Performed by: FAMILY MEDICINE

## 2023-04-25 PROCEDURE — G8754 DIAS BP LESS 90: HCPCS | Performed by: FAMILY MEDICINE

## 2023-04-25 RX ORDER — WARFARIN 4 MG/1
TABLET ORAL
Qty: 40 TABLET | Refills: 11 | Status: SHIPPED | OUTPATIENT
Start: 2023-04-25 | End: 2023-07-13 | Stop reason: SDUPTHER

## 2023-04-25 RX ORDER — DILTIAZEM HYDROCHLORIDE 120 MG/1
120 CAPSULE, EXTENDED RELEASE ORAL DAILY
Qty: 90 CAPSULE | Refills: 3 | Status: SHIPPED | OUTPATIENT
Start: 2023-04-25 | End: 2024-06-25 | Stop reason: SDUPTHER

## 2023-04-25 RX ORDER — ATORVASTATIN CALCIUM 40 MG/1
40 TABLET, FILM COATED ORAL DAILY
Qty: 90 TABLET | Refills: 3 | Status: SHIPPED | OUTPATIENT
Start: 2023-04-25 | End: 2024-05-07 | Stop reason: SDUPTHER

## 2023-04-25 ASSESSMENT — ENCOUNTER SYMPTOMS
ABDOMINAL PAIN: 0
EYE ITCHING: 0
TROUBLE SWALLOWING: 0
COLOR CHANGE: 0
EYE DISCHARGE: 0
COUGH: 0
ACTIVITY CHANGE: 0
DIZZINESS: 0
EYE PAIN: 0
AGITATION: 0
FEVER: 0
DIARRHEA: 0
ADENOPATHY: 0
DYSURIA: 0
HEADACHES: 0
NAUSEA: 0
PALPITATIONS: 0
SINUS PAIN: 0
BACK PAIN: 0
FATIGUE: 0
VOMITING: 0
SINUS PRESSURE: 0
CHEST TIGHTNESS: 0
CONFUSION: 0
APPETITE CHANGE: 0
MYALGIAS: 0
SORE THROAT: 0
BLOOD IN STOOL: 0
ARTHRALGIAS: 0
SHORTNESS OF BREATH: 0

## 2023-04-25 NOTE — PROGRESS NOTES
Subjective      Patient ID: Jennifer Aly is a 67 y.o. female.    Jeninfer is a 64 yo female in for her 6 mo check up...    Active and Chronic:     -Neck pain  In the past,... L side, radiates above scapula  Pain gets worse w/ any head movements   L neck strength +4  reports no numbness/tingling of UE     -- Anemia  -Stable  Lab Results       Component                Value               Date                       WBC                      8.04                01/10/2023                 HGB                      12.7                01/10/2023                 HCT                      38.7                01/10/2023                 MCV                      85.8                01/10/2023                 PLT                      239                 01/10/2023                           HLD (hyperlipidemia)  - Stable  -Educated pt to continue take statin  -Educate pt on diet/TLC   - C/w statin  Lab Results       Component                Value               Date                       CHOL                     135                 10/24/2022                 CHOL                     167                 11/02/2021                 CHOL                     145                 02/15/2021            Lab Results       Component                Value               Date                       HDL                      72                  10/24/2022                 HDL                      70                  11/02/2021                 HDL                      63                  02/15/2021            Lab Results       Component                Value               Date                       LDLCALC                  53                  10/24/2022                 LDLCALC                  85                  11/02/2021                 LDLCALC                  72                  02/15/2021            Lab Results       Component                Value               Date                       TRIG                     49                  10/24/2022                  TRIG                     60                  11/02/2021                 TRIG                     48                  02/15/2021            No results found for: CHOLHDL  The 10-year ASCVD risk score (Nelson MONTOYA, et al., 2019) is: 14%    Values used to calculate the score:      Age: 67 years      Sex: Female      Is Non- : No      Diabetic: Yes      Tobacco smoker: No      Systolic Blood Pressure: 114 mmHg      Is BP treated: Yes      HDL Cholesterol: 72 mg/dL      Total Cholesterol: 135 mg/dL          Paroxysmal atrial fibrillation (CMS/HCC)  -stable  -Continue to take Diltiazem   -Haven't established an appointment w/ Cardiolgist     Essential hypertension  - Stable  - Continue taking BP med  - Continue diltiazam     Obstructive sleep apnea  - CPAP  -Sees Dr. Gallo     -IFG  Lab Results       Component                Value               Date                       HGBA1C                   5.8 (H)             11/02/2021        Wt Readings from Last 3 Encounters:  04/25/23 : 85.7 kg (189 lb)  12/30/22 : 83.9 kg (185 lb)  12/29/22 : 84.6 kg (186 lb 6.4 oz)                 The following have been reviewed and updated as appropriate in this visit:   Allergies  Meds  Problems         Past Medical History:   Diagnosis Date   • Arthritis    • Fistula    • Gout    • HLD (hyperlipidemia)    • IFG (impaired fasting glucose)    • Obesity    • Polyarthritis    • Pulmonary emboli (CMS/HCC)     pulmonary emboli   • Torn rotator cuff        Past Surgical History:   Procedure Laterality Date   • APPENDECTOMY  2008   • CARDIAC CATHETERIZATION  11/2012   • INTRACAPSULAR CATARACT EXTRACTION Bilateral 08/2019   • IVC FILTER RETRIEVAL  2012   • IVC FILTER RETRIEVAL  02/2013   • RECTOURETHRAL FISTULA REPAIR  2008, 5/2017   • ROTATOR CUFF REPAIR Left 01/2016   • ALTAF-EN-Y PROCEDURE  02/07/2017   • TOTAL ABDOMINAL HYSTERECTOMY  1987   • TOTAL KNEE ARTHROPLASTY Bilateral 2010   • TOTAL KNEE ARTHROPLASTY Right  2012    revision   • TRIGGER FINGER RELEASE Right 2012    right thumb   • ULNAR NERVE REPAIR  2013       Family History   Problem Relation Age of Onset   • Diabetes Biological Mother    • Lung cancer Biological Mother    • Alzheimer's disease Biological Mother    • Diabetes type II Biological Father    • Heart failure Biological Father    • Heart attack Biological Sister        Social History     Socioeconomic History   • Marital status:      Spouse name: Not on file   • Number of children: Not on file   • Years of education: Not on file   • Highest education level: Not on file   Occupational History   • Not on file   Tobacco Use   • Smoking status: Former     Years: 15.     Types: Cigarettes     Quit date:      Years since quittin.3   • Smokeless tobacco: Never   Vaping Use   • Vaping status: Not on file   Substance and Sexual Activity   • Alcohol use: Yes     Comment: occasional wine   • Drug use: No   • Sexual activity: Not Currently   Other Topics Concern   • Not on file   Social History Narrative   • Not on file     Social Determinants of Health     Financial Resource Strain: Not on file   Food Insecurity: Not on file   Transportation Needs: Not on file   Physical Activity: Not on file   Stress: Not on file   Social Connections: Not on file   Intimate Partner Violence: Not on file   Housing Stability: Not on file       Allergies   Allergen Reactions   • Sulfa (Sulfonamide Antibiotics) Hives       Current Outpatient Medications   Medication Sig Dispense Refill   • atorvastatin (LIPITOR) 40 mg tablet Take 1 tablet (40 mg total) by mouth daily. 90 tablet 3   • calcium carbonate-vitamin D3 500 mg(1,250mg) -200 unit per tablet Take 2 tablets by mouth 2 (two) times a day with meals.       • cholecalciferol, vitamin D3, 5,000 unit (125 mcg) tablet Take 5,000 Units by mouth daily.     • clobetasol (TEMOVATE) 0.05 % cream      • diltiazem (TIADYLT ER) 120 mg 24 hr capsule Take 1 capsule (120 mg total)  by mouth daily. 90 capsule 3   • fluticasone propionate (FLONASE) 50 mcg/actuation nasal spray Administer 1 spray into each nostril daily as needed for rhinitis. Make an appointment if help is needed for nasal symptoms. 1 Bottle 3   • ketoconazole (NIZORAL) 2 % cream APPLY ON THE SKIN TWICE A DAY APPLY TO AFFECTED AREA RASH IN GROIN/ABDOMEN TWICE A DAY FOR 3 WEEKS     • methocarbamoL (ROBAXIN-750) 750 mg tablet Take one by mouth ONLY as needed every 6-8 hours for muscle pains and spasms 50 tablet 1   • multivitamin capsule Take 1 capsule by mouth daily.     • semaglutide Inject 1 mg under the skin every (seven) 7 days. 3 mL 11   • SKYRIZI 150 mg/mL syringe Once every 12 weeks     • traMADoL (ULTRAM) 50 mg tablet Take 1 tablet (50 mg total) by mouth every 8 (eight) hours as needed for moderate pain or severe pain. 50 tablet 1   • vitamin B complex/folic acid (B COMPLEX 100 ORAL) Take by mouth.     • warfarin (COUMADIN) 4 mg tablet Take 4mg daily, except M/W/F take 8mg. 40 tablet 11   • diclofenac sodium (VOLTAREN) 1 % topical gel Apply topically 2 (two) times a day as needed for mild pain. (Patient not taking: Reported on 4/25/2023) 100 g 1   • warfarin (COUMADIN) 4 mg tablet Take 8mg daily except Wednesday take 4 mg 120 tablet 3     No current facility-administered medications for this visit.       Review of Systems   Constitutional: Negative for activity change, appetite change, fatigue and fever.   HENT: Negative for congestion, sinus pressure, sinus pain, sore throat and trouble swallowing.    Eyes: Negative for pain, discharge, itching and visual disturbance.   Respiratory: Negative for cough, chest tightness and shortness of breath.    Cardiovascular: Negative for chest pain, palpitations and leg swelling.   Gastrointestinal: Negative for abdominal pain, blood in stool, diarrhea, nausea and vomiting.   Endocrine: Negative for cold intolerance and heat intolerance.   Genitourinary: Negative for dysuria.    Musculoskeletal: Negative for arthralgias, back pain, gait problem and myalgias.   Skin: Negative for color change and rash.   Allergic/Immunologic: Negative for environmental allergies and food allergies.   Neurological: Negative for dizziness and headaches.   Hematological: Negative for adenopathy.   Psychiatric/Behavioral: Negative for agitation, behavioral problems and confusion.       Objective     Vitals:    04/25/23 1032   BP: 136/64   Pulse: 79   Resp: 16   SpO2: 97%       Physical Exam  Constitutional:       Appearance: She is well-developed.   HENT:      Head: Normocephalic and atraumatic.      Right Ear: Tympanic membrane, ear canal and external ear normal.      Left Ear: Tympanic membrane, ear canal and external ear normal.      Nose: Nose normal.   Eyes:      Conjunctiva/sclera: Conjunctivae normal.      Pupils: Pupils are equal, round, and reactive to light.   Neck:      Thyroid: No thyromegaly.   Cardiovascular:      Rate and Rhythm: Normal rate and regular rhythm.      Heart sounds: Normal heart sounds. No murmur heard.  Pulmonary:      Effort: Pulmonary effort is normal. No respiratory distress.      Breath sounds: Normal breath sounds. No wheezing or rales.   Abdominal:      General: Bowel sounds are normal. There is no distension.      Palpations: Abdomen is soft. There is no mass.      Tenderness: There is no abdominal tenderness. There is no guarding.   Musculoskeletal:         General: No tenderness. Normal range of motion.      Cervical back: Normal range of motion and neck supple.   Skin:     General: Skin is warm and dry.      Capillary Refill: Capillary refill takes less than 2 seconds.      Findings: No rash.   Neurological:      Mental Status: She is alert and oriented to person, place, and time.      Cranial Nerves: No cranial nerve deficit.      Deep Tendon Reflexes: Reflexes normal.   Psychiatric:         Behavior: Behavior normal.         Thought Content: Thought content normal.          Judgment: Judgment normal.         Assessment/Plan     Problem List Items Addressed This Visit        Respiratory    Unspecified asthma, uncomplicated (Chronic)    Overview     Overview:          Current Assessment & Plan     stable            Circulatory    Pulmonary emboli (CMS/[facility]) (Chronic)    Overview     Overview:          Current Assessment & Plan     Stable  Warfarin         Essential hypertension    Current Assessment & Plan     Stable  Meds  Continue to take all your blood pressure medications as instructed,  Stay well by eating a healthy diet with less salt and fatty foods. Maintain a healthy weight, and keep your regularly scheduled doctor appointments.            Relevant Medications    diltiazem (TIADYLT ER) 120 mg 24 hr capsule    Other Relevant Orders    Comprehensive metabolic panel    Paroxysmal atrial fibrillation (CMS/HCC)    Current Assessment & Plan     stable         Relevant Medications    warfarin (COUMADIN) 4 mg tablet    diltiazem (TIADYLT ER) 120 mg 24 hr capsule    atorvastatin (LIPITOR) 40 mg tablet    Other Relevant Orders    Comprehensive metabolic panel    Protime-INR       Digestive    GERD without esophagitis    Current Assessment & Plan     Avoid triggers  Meds as needed            Endocrine/Metabolic    Diabetes mellitus (CMS/[facility])    Current Assessment & Plan     Diet controlled  Lab Results   Component Value Date    HGBA1C 5.8 (H) 11/02/2021     Continue to take all your medications as prescribed. Remember, the main treatment for diabetes is self management-knowing to eat healthy with less carbohydrates and sweets, and staying in shape by exercising and  maintaining a healthy weight.  Don't forget, with diabetes you need lab work at least every 6 months, and annually should get your urine checked and see an Eye Doctor for a dilated retinal exam. Stay well, God Bless.           Relevant Orders    Lipid panel    Comprehensive metabolic panel    Hemoglobin A1c    Microalbumin  / creatinine urine ratio    Morbid obesity due to excess calories (CMS/HCC) (Chronic)    Overview     Overview:          Current Assessment & Plan     Diet and tlc         RESOLVED: Mild protein-calorie malnutrition (CMS/HCC)    Current Assessment & Plan     stable            Mental Health    Malaise and fatigue - Primary    Relevant Orders    CBC and differential    TSH       Other    HLD (hyperlipidemia)    Current Assessment & Plan     See HPI  Stable  Continue to take all your medications as instructed.  Eat healthy with less cholesterol rich and fatty foods.  Maintain a healthy weight by dieting and exercising.  Eating well and maintaining a healthy weight and a good cholesterol panel can decrease your risk of future heart disease and stroke. Come to your next appointment as scheduled. Stay well, God Bless.           Relevant Medications    atorvastatin (LIPITOR) 40 mg tablet    Other Relevant Orders    Lipid panel    Comprehensive metabolic panel       DIAGNOSIS  1. Malaise and fatigue    2. Other chronic pulmonary embolism without acute cor pulmonale (CMS/HCC)    3. Essential hypertension    4. Hyperlipidemia, unspecified hyperlipidemia type    5. Type 2 diabetes mellitus with other diabetic arthropathy, without long-term current use of insulin (CMS/HCC)    6. GERD without esophagitis    7. Mild protein-calorie malnutrition (CMS/HCC)    8. Mild intermittent asthma without complication    9. Paroxysmal atrial fibrillation (CMS/HCC)    10. Morbid obesity due to excess calories (CMS/HCC)        No results found.        Demarco Moreau, DO  4/25/2023

## 2023-04-25 NOTE — ASSESSMENT & PLAN NOTE
See HPI  Stable  Continue to take all your medications as instructed.  Eat healthy with less cholesterol rich and fatty foods.  Maintain a healthy weight by dieting and exercising.  Eating well and maintaining a healthy weight and a good cholesterol panel can decrease your risk of future heart disease and stroke. Come to your next appointment as scheduled. Stay well, God Bless.

## 2023-04-25 NOTE — ASSESSMENT & PLAN NOTE
Diet controlled  Lab Results   Component Value Date    HGBA1C 5.8 (H) 11/02/2021     Continue to take all your medications as prescribed. Remember, the main treatment for diabetes is self management-knowing to eat healthy with less carbohydrates and sweets, and staying in shape by exercising and  maintaining a healthy weight.  Don't forget, with diabetes you need lab work at least every 6 months, and annually should get your urine checked and see an Eye Doctor for a dilated retinal exam. Stay well, God Bless.

## 2023-05-02 LAB — INR PPP: 2.4

## 2023-05-09 LAB — INR PPP: 1.9

## 2023-05-17 NOTE — ASSESSMENT & PLAN NOTE
Continue current medication regimen as discussed and prescribed. Diet/TLC reinforced this visit, and pt verbalizes an understanding of the importance of both. Continue to monitor pt's progress with lipid panel/CMP as indicated/ordered. Exercise regimen reinforced and discussed today.   
Encouraged CPAP use  Educated re: risks of KIM and non-compliance with CPAP or prescribed treatment. Pt verbalized an understanding of edu.   Continue current management and f/u w/ pulm PRN.  
Encouraged diet, TLC, weight loss  Exercise regimen reinforced  Bariatric surgery in the past  Continue current care  
FLU SHOT: UTD  LIPID PANEL/CMP: UTD  MAMMOGRAMS: Due 4/18/18 - script given today  COLONOSCOPY: Due this summer with Dr. Rock - Dr. Rock from GI addresses this area.   GYN CARE/PAPS: UTD per patient  PCV-13: UTD  PPSV-23: UTD    
Inhalers PRN  Stable at this time - no acute exacerbations or complications noted.   
Left great toe joint pain onset a few months prior after a surgery. Pt states pain is extremely intense, she can barely tolerate the weight of a sheet on the toe.   Mild erythema, inflammation, and irritation noted at great toe joint.   No tophi noted at joint site.   Point tenderness noted on assessment.   Decreased ROM in toe joint.  
Left great toe joint pain onset a few months prior after a surgery. Pt states pain is extremely intense, she can barely tolerate the weight of a sheet on the toe.   Mild erythema, inflammation, and irritation noted at great toe joint.   No tophi noted at joint site.   Point tenderness noted on assessment.   Decreased ROM in toe joint.  
Pt has a present PMH of IFG.   Would like to have her HGB A1C done today.   Last done in August 2017 - 5.5   Diet, TLC reinforced today  No current med regimen  
Stable at this time  No SOB, ROGERS, Cp. Pr adventitious breath sounds on exam today  Continue current treatment and monitor at this time  
Stable at this time  No new s/s  Continue current management    
Take medications, both OTC and prescribed, as ordered for acute pain.   Rest, ice, and elevation of affected extremity(ies).   Continue to monitor for worsening or change in pain quality or quantity.   F/U PRN for new or worsening pain.  
ADL retraining/balance training/bed mobility training/strengthening/transfer training

## 2023-05-23 LAB — INR PPP: 1.9

## 2023-05-30 LAB — INR PPP: 2.3

## 2023-06-09 LAB — INR PPP: 2.3

## 2023-06-16 ENCOUNTER — APPOINTMENT (OUTPATIENT)
Dept: LAB | Facility: CLINIC | Age: 68
End: 2023-06-16
Attending: FAMILY MEDICINE
Payer: MEDICARE

## 2023-06-16 ENCOUNTER — TRANSCRIBE ORDERS (OUTPATIENT)
Dept: LAB | Facility: CLINIC | Age: 68
End: 2023-06-16

## 2023-06-16 DIAGNOSIS — I26.99 OTHER PULMONARY EMBOLISM WITHOUT ACUTE COR PULMONALE: ICD-10-CM

## 2023-06-16 DIAGNOSIS — I48.0 PAROXYSMAL ATRIAL FIBRILLATION (CMS/HCC): ICD-10-CM

## 2023-06-16 DIAGNOSIS — I10 ESSENTIAL HYPERTENSION: ICD-10-CM

## 2023-06-16 DIAGNOSIS — I26.99 OTHER PULMONARY EMBOLISM WITHOUT ACUTE COR PULMONALE: Primary | ICD-10-CM

## 2023-06-16 DIAGNOSIS — E11.618 TYPE 2 DIABETES MELLITUS WITH OTHER DIABETIC ARTHROPATHY, WITHOUT LONG-TERM CURRENT USE OF INSULIN: ICD-10-CM

## 2023-06-16 DIAGNOSIS — R53.81 MALAISE AND FATIGUE: ICD-10-CM

## 2023-06-16 DIAGNOSIS — E78.5 HYPERLIPIDEMIA, UNSPECIFIED HYPERLIPIDEMIA TYPE: ICD-10-CM

## 2023-06-16 DIAGNOSIS — R53.83 MALAISE AND FATIGUE: ICD-10-CM

## 2023-06-16 LAB
ALBUMIN SERPL-MCNC: 4 G/DL (ref 3.4–5)
ALP SERPL-CCNC: 109 IU/L (ref 35–126)
ALT SERPL-CCNC: 33 IU/L (ref 11–54)
ANION GAP SERPL CALC-SCNC: 7 MEQ/L (ref 3–15)
AST SERPL-CCNC: 29 IU/L (ref 15–41)
BASOPHILS # BLD: 0.02 K/UL (ref 0.01–0.1)
BASOPHILS NFR BLD: 0.4 %
BILIRUB SERPL-MCNC: 1.1 MG/DL (ref 0.3–1.2)
BUN SERPL-MCNC: 13 MG/DL (ref 8–20)
CALCIUM SERPL-MCNC: 9.9 MG/DL (ref 8.9–10.3)
CHLORIDE SERPL-SCNC: 108 MEQ/L (ref 98–109)
CHOLEST SERPL-MCNC: 162 MG/DL
CO2 SERPL-SCNC: 26 MEQ/L (ref 22–32)
CREAT SERPL-MCNC: 0.6 MG/DL (ref 0.6–1.1)
CREAT UR-MCNC: 61.5 MG/DL
DIFFERENTIAL METHOD BLD: ABNORMAL
EOSINOPHIL # BLD: 0.15 K/UL (ref 0.04–0.36)
EOSINOPHIL NFR BLD: 3 %
ERYTHROCYTE [DISTWIDTH] IN BLOOD BY AUTOMATED COUNT: 15.1 % (ref 11.7–14.4)
EST. AVERAGE GLUCOSE BLD GHB EST-MCNC: 111 MG/DL
GFR SERPL CREATININE-BSD FRML MDRD: >60 ML/MIN/1.73M*2
GLUCOSE SERPL-MCNC: 73 MG/DL (ref 70–99)
HBA1C MFR BLD: 5.5 %
HCT VFR BLDCO AUTO: 39.8 % (ref 35–45)
HDLC SERPL-MCNC: 68 MG/DL
HDLC SERPL: 2.4 {RATIO}
HGB BLD-MCNC: 12.6 G/DL (ref 11.8–15.7)
IMM GRANULOCYTES # BLD AUTO: 0.01 K/UL (ref 0–0.08)
IMM GRANULOCYTES NFR BLD AUTO: 0.2 %
INR PPP: 2.8
LDLC SERPL CALC-MCNC: 80 MG/DL
LYMPHOCYTES # BLD: 1.28 K/UL (ref 1.2–3.5)
LYMPHOCYTES NFR BLD: 26 %
MCH RBC QN AUTO: 30.2 PG (ref 28–33.2)
MCHC RBC AUTO-ENTMCNC: 31.7 G/DL (ref 32.2–35.5)
MCV RBC AUTO: 95.4 FL (ref 83–98)
MICROALBUMIN UR-MCNC: 5 MG/L
MICROALBUMIN/CREAT UR: 8.1 UG/MG
MONOCYTES # BLD: 0.44 K/UL (ref 0.28–0.8)
MONOCYTES NFR BLD: 8.9 %
NEUTROPHILS # BLD: 3.03 K/UL (ref 1.7–7)
NEUTS SEG NFR BLD: 61.5 %
NONHDLC SERPL-MCNC: 94 MG/DL
NRBC BLD-RTO: 0 %
PDW BLD AUTO: 10.7 FL (ref 9.4–12.3)
PLATELET # BLD AUTO: 228 K/UL (ref 150–369)
POTASSIUM SERPL-SCNC: 4.9 MEQ/L (ref 3.6–5.1)
PROT SERPL-MCNC: 6.2 G/DL (ref 6–8.2)
PROTHROMBIN TIME: 29.4 SEC (ref 12.2–14.5)
RBC # BLD AUTO: 4.17 M/UL (ref 3.93–5.22)
SODIUM SERPL-SCNC: 141 MEQ/L (ref 136–144)
TRIGL SERPL-MCNC: 71 MG/DL (ref 30–149)
TSH SERPL DL<=0.05 MIU/L-ACNC: 1.42 MIU/L (ref 0.34–5.6)
WBC # BLD AUTO: 4.93 K/UL (ref 3.8–10.5)

## 2023-06-16 PROCEDURE — 80053 COMPREHEN METABOLIC PANEL: CPT

## 2023-06-16 PROCEDURE — 84443 ASSAY THYROID STIM HORMONE: CPT

## 2023-06-16 PROCEDURE — 36415 COLL VENOUS BLD VENIPUNCTURE: CPT

## 2023-06-16 PROCEDURE — 83036 HEMOGLOBIN GLYCOSYLATED A1C: CPT

## 2023-06-16 PROCEDURE — 80061 LIPID PANEL: CPT

## 2023-06-16 PROCEDURE — 85025 COMPLETE CBC W/AUTO DIFF WBC: CPT

## 2023-06-16 PROCEDURE — 85610 PROTHROMBIN TIME: CPT

## 2023-06-16 PROCEDURE — 82570 ASSAY OF URINE CREATININE: CPT

## 2023-06-26 LAB — INR PPP: 1.9

## 2023-07-11 LAB — INR PPP: 1.6

## 2023-07-13 ENCOUNTER — TELEPHONE (OUTPATIENT)
Dept: PRIMARY CARE | Facility: CLINIC | Age: 68
End: 2023-07-13
Payer: MEDICARE

## 2023-07-13 RX ORDER — WARFARIN 4 MG/1
TABLET ORAL
Qty: 40 TABLET | Refills: 11
Start: 2023-07-13 | End: 2023-09-28 | Stop reason: SDUPTHER

## 2023-07-13 NOTE — TELEPHONE ENCOUNTER
Have her switch the days: ie, take 8 mg daily, except M/W/F take 4 then repeat in 1-2 weeks INR  Dr. HOOKS

## 2023-08-02 LAB — INR PPP: 1.7

## 2023-08-08 ENCOUNTER — TELEPHONE (OUTPATIENT)
Dept: PRIMARY CARE | Facility: CLINIC | Age: 68
End: 2023-08-08
Payer: MEDICARE

## 2023-08-08 LAB — INR PPP: 1.6

## 2023-08-08 NOTE — TELEPHONE ENCOUNTER
----- Message from Christiana Bird MA sent at 8/8/2023  9:20 AM EDT -----  Regarding: FW: INR test  Contact: [phone]    ----- Message -----  From: Jennifer Aly  Sent: 8/8/2023   9:07 AM EDT  To: [facility] Clinical Support [facility]  Subject: INR test                                         Good morning! I just did my INR test and my result was 1.6. Do you want me to continue with the same dose you prescribed or do you want to change it.   Sunday Tue Thur and Sat 8 mgs.  Mon Wed and Fri 4 mgs  Also I need a new script for Ozempic my pharmacy is [facility]  [address]  [phone]  Thank you,  Jennifer Aly

## 2023-08-18 ENCOUNTER — TELEPHONE (OUTPATIENT)
Dept: PRIMARY CARE | Facility: CLINIC | Age: 68
End: 2023-08-18
Payer: MEDICARE

## 2023-08-18 NOTE — TELEPHONE ENCOUNTER
Spoke with pt who stated she just spoke with the pharmacy this morning and they just got the medication in stock. No need for new rx.

## 2023-08-18 NOTE — TELEPHONE ENCOUNTER
Pt left VM stating her Ozempic is on back order at [facility] and the pharmacist recommended a prescription for Rybelsus. Please advise if this can be called in for pt. PCP out of office.

## 2023-09-05 LAB — INR PPP: 2.6

## 2023-09-18 LAB — INR PPP: 2.9

## 2023-09-25 LAB — INR PPP: 3.2

## 2023-09-28 RX ORDER — WARFARIN 4 MG/1
TABLET ORAL
Qty: 40 TABLET | Refills: 11 | Status: ON HOLD | OUTPATIENT
Start: 2023-09-28 | End: 2024-04-16

## 2023-09-28 NOTE — TELEPHONE ENCOUNTER
Pt is changing pharmacies and needs refills sent to the [facility] in [place]. Please send refills.

## 2023-10-03 LAB — INR PPP: 3

## 2023-10-11 LAB — INR PPP: 2.6

## 2023-10-17 LAB — INR PPP: 1.6

## 2023-10-23 ENCOUNTER — NURSE TRIAGE (OUTPATIENT)
Dept: CARDIOLOGY | Facility: CLINIC | Age: 68
End: 2023-10-23
Payer: MEDICARE

## 2023-10-23 ENCOUNTER — APPOINTMENT (OUTPATIENT)
Dept: RADIOLOGY | Age: 68
End: 2023-10-23
Attending: EMERGENCY MEDICINE
Payer: MEDICARE

## 2023-10-23 ENCOUNTER — HOSPITAL ENCOUNTER (OUTPATIENT)
Facility: CLINIC | Age: 68
Discharge: HOME | End: 2023-10-23
Attending: EMERGENCY MEDICINE
Payer: MEDICARE

## 2023-10-23 VITALS
HEART RATE: 67 BPM | TEMPERATURE: 98.5 F | DIASTOLIC BLOOD PRESSURE: 69 MMHG | SYSTOLIC BLOOD PRESSURE: 124 MMHG | OXYGEN SATURATION: 98 %
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
## 2023-10-23 DIAGNOSIS — S90.211A SUBUNGUAL HEMATOMA OF GREAT TOE OF RIGHT FOOT, INITIAL ENCOUNTER: ICD-10-CM

## 2023-10-23 PROCEDURE — 99213 OFFICE O/P EST LOW 20 MIN: CPT | Performed by: EMERGENCY MEDICINE

## 2023-10-23 PROCEDURE — 73630 X-RAY EXAM OF FOOT: CPT | Mod: RT | Performed by: EMERGENCY MEDICINE

## 2023-10-23 PROCEDURE — 73502 X-RAY EXAM HIP UNI 2-3 VIEWS: CPT | Performed by: EMERGENCY MEDICINE

## 2023-10-23 PROCEDURE — 73564 X-RAY EXAM KNEE 4 OR MORE: CPT | Mod: RT | Performed by: EMERGENCY MEDICINE

## 2023-10-23 RX ORDER — CEPHALEXIN 500 MG/1
500 CAPSULE ORAL 4 TIMES DAILY
Qty: 20 CAPSULE | Refills: 0 | Status: SHIPPED | OUTPATIENT
Start: 2023-10-23 | End: 2023-10-28

## 2023-10-23 ASSESSMENT — ENCOUNTER SYMPTOMS
ACTIVITY CHANGE: 0
APPETITE CHANGE: 0
ARTHRALGIAS: 1
JOINT SWELLING: 1
WOUND: 1

## 2023-10-23 NOTE — TELEPHONE ENCOUNTER
"Pt called NT line, reports fall on wet bathroom floor on Saturday. Reports pain in right shoulder, hip knee and especially foot (9/10). Bruising on ball of foot, top of foot and toes, big toenail ripped off and bleeding. Pt did not hit her head. Per practice no available SDS today. Recommended Urgent Care, location and contact info for [facility] provided, pt agrees to go and can drive herself.    Answer Assessment - Initial Assessment Questions  1. MECHANISM: \"How did the fall happen?\"       Wet bathroom floor  3. ONSET: \"When did the fall happen?\" (e.g., minutes, hours, or days ago)   saturdayu  4. LOCATION: \"What part of the body hit the ground?\" (e.g., back, buttocks, head, hips, knees, hands, head, stomach)    Went down really hard, full body, heard cracks in foot  5. INJURY: \"Did you hurt (injure) yourself when you fell?\" If Yes, ask: \"What did you injure? Tell me more about this?\" (e.g., body area; type of injury; pain severity)\"   right side is banged up, foot, knee,hip shoultder  6. PAIN: \"Is there any pain?\" If Yes, ask: \"How bad is the pain?\" (e.g., Scale 1-10; or mild,   moderate, severe)         9/10, foot and toes  7. SIZE: For cuts, bruises, or swelling, ask: \"How large is it?\" (e.g., inches or centimeters) bruised, ball of foot, top of foot and toes, big toenail possibly ripped off    Protocols used: FALLS AND FALLING-ADULT-[place]    "

## 2023-10-23 NOTE — DISCHARGE INSTRUCTIONS
Take a probiotic or eat yogurt everyday while you are on the antibiotic.   Keep toe covered until bleeding stops.

## 2023-10-23 NOTE — TELEPHONE ENCOUNTER
Regarding: redfalg  ----- Message from Melody Hazel sent at 10/23/2023  8:32 AM EDT -----  Pt fell 10-21 on bathroom floor, [pss 3 broken toes, hip, shoulder and knee pain on right side

## 2023-10-23 NOTE — ED PROVIDER NOTES
History  Chief Complaint   Patient presents with    Fall     Fell in bathroom while cleaning Saturday night. Hurt knee and previously had knee replacement w/ fractured tibia.     Pt slipped on wet floor 3 days ago  Landed on right side- hurt right 1, 2,3 rd toes, knee and hip  Unsure if LOC  NO headache, nausea, vomiting or evidence of trauma in UC  PT is on coumadin      History provided by:  Patient  Trauma  Mechanism of injury: fall  Injury location: right hip, knee, foot, toes.  Incident location: home  Time since incident: 3 days  Arrived directly from scene: no     Fall:       Fall occurred: standing       Impact surface: hard floor      Past Medical History:   Diagnosis Date    Arthritis     Fistula     Gout     HLD (hyperlipidemia)     IFG (impaired fasting glucose)     Obesity     Polyarthritis     Pulmonary emboli (CMS/HCC)     pulmonary emboli    Torn rotator cuff        Past Surgical History:   Procedure Laterality Date    APPENDECTOMY  2008    CARDIAC CATHETERIZATION  2012    INTRACAPSULAR CATARACT EXTRACTION Bilateral 2019    IVC FILTER RETRIEVAL      IVC FILTER RETRIEVAL  2013    RECTOURETHRAL FISTULA REPAIR  , 2017    ROTATOR CUFF REPAIR Left 2016    ALTAF-EN-Y PROCEDURE  2017    TOTAL ABDOMINAL HYSTERECTOMY  1987    TOTAL KNEE ARTHROPLASTY Bilateral 2010    TOTAL KNEE ARTHROPLASTY Right 2012    revision    TRIGGER FINGER RELEASE Right     right thumb    ULNAR NERVE REPAIR         Family History   Problem Relation Age of Onset    Diabetes Biological Mother     Lung cancer Biological Mother     Alzheimer's disease Biological Mother     Diabetes type II Biological Father     Heart failure Biological Father     Heart attack Biological Sister        Social History     Tobacco Use    Smoking status: Former     Years: 15     Types: Cigarettes     Quit date:      Years since quittin.8    Smokeless tobacco: Never   Substance Use  Topics    Alcohol use: Yes     Comment: occasional wine    Drug use: No       Review of Systems   Constitutional: Negative for activity change and appetite change.   Musculoskeletal: Positive for arthralgias, gait problem and joint swelling.   Skin: Positive for wound.   Allergic/Immunologic: Negative for immunocompromised state.       Physical Exam  ED Triage Vitals [10/23/23 1425]   Temp Heart Rate Resp BP SpO2   36.9 °C (98.5 °F) 67 -- 124/69 98 %      Temp Source Heart Rate Source Patient Position BP Location FiO2 (%) (Set)   Oral Monitor Sitting Right upper arm --       Physical Exam  Constitutional:       Appearance: She is normal weight.   HENT:      Head: Normocephalic and atraumatic.      Right Ear: External ear normal.      Left Ear: External ear normal.      Mouth/Throat:      Mouth: Mucous membranes are moist.   Eyes:      Extraocular Movements: Extraocular movements intact.      Conjunctiva/sclera: Conjunctivae normal.      Pupils: Pupils are equal, round, and reactive to light.   Musculoskeletal:         General: Swelling and tenderness present.      Comments: Tenderness to right great toe, 2, 3rd toe   Skin:     General: Skin is warm.      Comments: Right great toe- subungual hematoma- spontaneously draining     Neurological:      General: No focal deficit present.      Mental Status: She is alert. Mental status is at baseline.           Procedures  Procedures    UC Course       Medical Decision Making  CLINICAL HISTORY: fall, 1, 2, 3rd toe pain     PRIOR STUDY: None     TECHNIQUE: 5 images of the right foot     COMMENT:  There is no fracture, dislocation or abnormal bone density.  Degenerative change noted.     --  IMPRESSION: See above.    CLINICAL HISTORY: right knee pain after fall     PRIOR STUDY: None relevant     TECHNIQUE: 4 images of the right knee     COMMENT:  Surgical changes noted from revision knee arthroplasty. No  periprosthetic lucency, fracture, dislocation, joint effusion or  abnormal bone  density. Vascular calcifications noted.     --  IMPRESSION: No fracture.      Narrative & Impression  CLINICAL HISTORY: pain after fall     PRIOR STUDY: None     TECHNIQUE: AP image of the pelvis and AP and lateral image of the right hip     COMMENT:  There is no fracture, dislocation or abnormal bone density. There is  mild bilateral hip joint space narrowing.     --  IMPRESSION: Mild degenerative change.    Dressings discussed  Keflex prophyactically for toe  Rec rest, elevation.  Discussed probiotics                       Rosamaria Lawrence,   10/23/23 1535

## 2023-10-25 LAB — INR PPP: 3.3

## 2023-10-26 NOTE — TELEPHONE ENCOUNTER
----- Message from Gail Gallo MA sent at 7/13/2023  7:55 AM EDT -----  Regarding: FW: INR dose change  Contact: [phone]    ----- Message -----  From: Jennifer Aly  Sent: 7/12/2023  11:43 AM EDT  To: [facility] Clinical Support [facility]  Subject: INR dose change                                  Mom,Wed &Fri I take 8 mgs  Sun, Tue, Thur & Sat 4mgs  I tested my blood last night (7-11-23) it was still 1.6.  Also  my  prescription company [employer] is sending a pre-authorization form to you for approval of Ozempic. I told them I have been taking it for almost a year now but this is something new and they have to evaluate whether or not they will pay for it. (needs a pre-authorization) Thank you,  Jennifer     Estimated Blood Loss (Cc): minimal

## 2023-10-30 LAB — INR PPP: 4.1

## 2023-10-31 ENCOUNTER — TELEPHONE (OUTPATIENT)
Dept: PRIMARY CARE | Facility: CLINIC | Age: 68
End: 2023-10-31
Payer: MEDICARE

## 2023-10-31 PROBLEM — M79.674 PAIN OF TOE OF RIGHT FOOT: Status: ACTIVE | Noted: 2023-10-31

## 2023-10-31 LAB — INR PPP: 4.5

## 2023-10-31 ASSESSMENT — ENCOUNTER SYMPTOMS
MYALGIAS: 0
EYE ITCHING: 0
EYE DISCHARGE: 0
BLOOD IN STOOL: 0
DIARRHEA: 0
VOMITING: 0
ARTHRALGIAS: 0
FEVER: 0
APPETITE CHANGE: 0
HEADACHES: 0
CHEST TIGHTNESS: 0
SINUS PAIN: 0
COLOR CHANGE: 0
PALPITATIONS: 0
ADENOPATHY: 0
AGITATION: 0
DYSURIA: 0
COUGH: 0
ABDOMINAL PAIN: 0
DIZZINESS: 0
ACTIVITY CHANGE: 0
NAUSEA: 0
SORE THROAT: 0
EYE PAIN: 0
SHORTNESS OF BREATH: 0
SINUS PRESSURE: 0
TROUBLE SWALLOWING: 0
CONFUSION: 0
FATIGUE: 0

## 2023-10-31 NOTE — ASSESSMENT & PLAN NOTE
Continue current medication regimen as discussed and prescribed. Diet/TLC reinforced this visit, and pt verbalizes an understanding of the importance of both. Continue to monitor pt's progress with lipid panel/CMP as indicated/ordered. Exercise regimen reinforced and discussed today.

## 2023-10-31 NOTE — ASSESSMENT & PLAN NOTE
Stable  Diet and physical activity  Wt Readings from Last 3 Encounters:   04/25/23 85.7 kg (189 lb)   12/30/22 83.9 kg (185 lb)   12/29/22 84.6 kg (186 lb 6.4 oz)

## 2023-10-31 NOTE — PATIENT INSTRUCTIONS
You can go to the pharmacy and get the RSV shot, and the COVID shot when you are ready.   Stay well. Stay safe. Stay active. Have a great day!

## 2023-10-31 NOTE — TELEPHONE ENCOUNTER
Pt INR results are 4.5. Per Dr. Moreau called pt and told her to skip one dose of medication then resume next day. Pt expressed understanding and does have an appt here tomorrow.

## 2023-10-31 NOTE — PROGRESS NOTES
Subjective      Patient ID: Jennifer Aly is a 68 y.o. female.    Jennifer is a 67 yo female who presents to the office with complaints of ongoing R toe pain and lower back pain following a fall. The fall occurred 10/21 at home in here bathroom, and she injured her R foot and knee, her hip, shoulder, and head.  She went to  afterwards, and they did xrays of her R foot, knee, and hip, and everything came back WNL. Today, she is still having pain in her right big toe and lower back. Her big toe is black and discolored at the top and she reports she does not have any feeling in it. She is also still having lower back pain, which she has been taking tylenol for. She does not take NSAIDs due to bariatric surgery. She has had previous reactions to steroids. She has Voltaren gel at home but has not thought to try it on her back.     ========================================================    Active and Chronic:     -Neck pain  In the past,... L side, radiates above scapula  Pain gets worse w/ any head movements   L neck strength +4  reports no numbness/tingling of UE     -- Anemia  -Stable  Lab Results       Component                Value               Date                       WBC                      4.93                06/16/2023                 HGB                      12.6                06/16/2023                 HCT                      39.8                06/16/2023                 MCV                      95.4                06/16/2023                 PLT                      228                 06/16/2023                                  HLD (hyperlipidemia)  - Stable  -Educated pt to continue take statin  -Educate pt on diet/TLC   - C/w statin  Lab Results       Component                Value               Date                       CHOL                     162                 06/16/2023                 CHOL                     135                 10/24/2022                 CHOL                     167               "   11/02/2021            Lab Results       Component                Value               Date                       HDL                      68                  06/16/2023                 HDL                      72                  10/24/2022                 HDL                      70                  11/02/2021            Lab Results       Component                Value               Date                       LDLCALC                  80                  06/16/2023                 LDLCALC                  53                  10/24/2022                 LDLCALC                  85                  11/02/2021            Lab Results       Component                Value               Date                       TRIG                     71                  06/16/2023                 TRIG                     49                  10/24/2022                 TRIG                     60                  11/02/2021            No results found for: \"CHOLHDL\"  The 10-year ASCVD risk score (Nelson MONTOYA, et al., 2019) is: 19.7%    Values used to calculate the score:      Age: 68 years      Sex: Female      Is Non- : No      Diabetic: Yes      Tobacco smoker: No      Systolic Blood Pressure: 124 mmHg      Is BP treated: Yes      HDL Cholesterol: 68 mg/dL      Total Cholesterol: 162 mg/dL        Paroxysmal atrial fibrillation (CMS/HCC)  -stable  -Continue to take Diltiazem   -Haven't established an appointment w/ Cardiolgist     Essential hypertension  - Stable  - Continue taking BP med  - Continue diltiazam  BP Readings from Last 3 Encounters:  10/23/23 : 124/69  04/25/23 : 110/72  12/30/22 : 114/70     Obstructive sleep apnea  - CPAP  -Sees Dr. Gallo     -IFG  Lab Results       Component                Value               Date                       HGBA1C                   5.5                 06/16/2023            Lab Results       Component                Value               Date                       GLUCOSE "                  73                  06/16/2023                 CALCIUM                  9.9                 06/16/2023                 NA                       141                 06/16/2023                 K                        4.9                 06/16/2023                 CO2                      26                  06/16/2023                 CL                       108                 06/16/2023                 BUN                      13                  06/16/2023                 CREATININE               0.6                 06/16/2023                     The following have been reviewed and updated as appropriate in this visit:   Allergies  Meds  Problems         Past Medical History:   Diagnosis Date    Arthritis     Fistula     Gout     HLD (hyperlipidemia)     IFG (impaired fasting glucose)     Obesity     Polyarthritis     Pulmonary emboli (CMS/HCC)     pulmonary emboli    Torn rotator cuff        Past Surgical History:   Procedure Laterality Date    APPENDECTOMY  2008    CARDIAC CATHETERIZATION  11/2012    INTRACAPSULAR CATARACT EXTRACTION Bilateral 08/2019    IVC FILTER RETRIEVAL  2012    IVC FILTER RETRIEVAL  02/2013    RECTOURETHRAL FISTULA REPAIR  2008, 5/2017    ROTATOR CUFF REPAIR Left 01/2016    ALTAF-EN-Y PROCEDURE  02/07/2017    TOTAL ABDOMINAL HYSTERECTOMY  1987    TOTAL KNEE ARTHROPLASTY Bilateral 2010    TOTAL KNEE ARTHROPLASTY Right 2012    revision    TRIGGER FINGER RELEASE Right 2012    right thumb    ULNAR NERVE REPAIR  2013       Family History   Problem Relation Age of Onset    Diabetes Biological Mother     Lung cancer Biological Mother     Alzheimer's disease Biological Mother     Diabetes type II Biological Father     Heart failure Biological Father     Heart attack Biological Sister        Social History     Socioeconomic History    Marital status:      Spouse name: Not on file    Number of children: Not on file    Years of education: Not  on file    Highest education level: Not on file   Occupational History    Not on file   Tobacco Use    Smoking status: Former     Years: 15     Types: Cigarettes     Quit date:      Years since quittin.8    Smokeless tobacco: Never   Substance and Sexual Activity    Alcohol use: Yes     Comment: occasional wine    Drug use: No    Sexual activity: Not Currently   Other Topics Concern    Not on file   Social History Narrative    Not on file     Social Determinants of Health     Financial Resource Strain: Not on file   Food Insecurity: Not on file   Transportation Needs: Not on file   Physical Activity: Not on file   Stress: Not on file   Social Connections: Not on file   Intimate Partner Violence: Not on file   Housing Stability: Not on file       Allergies   Allergen Reactions    Sulfa (Sulfonamide Antibiotics) Hives       Current Outpatient Medications   Medication Sig Dispense Refill    albuterol HFA 90 mcg/actuation inhaler Inhale 2 puffs every 6 (six) hours as needed for wheezing. 18 g 6    atorvastatin (LIPITOR) 40 mg tablet Take 1 tablet (40 mg total) by mouth daily. 90 tablet 3    BIOTIN ORAL       calcium carbonate-vitamin D3 500 mg(1,250mg) -200 unit per tablet Take 2 tablets by mouth 2 (two) times a day with meals.        cholecalciferol, vitamin D3, 5,000 unit (125 mcg) tablet Take 5,000 Units by mouth daily.      clobetasol (TEMOVATE) 0.05 % cream       CLOBETASOL PROPIONATE, BULK, MISC       diltiazem (TIADYLT ER) 120 mg 24 hr capsule Take 1 capsule (120 mg total) by mouth daily. 90 capsule 3    fluticasone propionate (FLONASE) 50 mcg/actuation nasal spray Administer 1 spray into each nostril daily as needed for rhinitis. Make an appointment if help is needed for nasal symptoms. 16 g 6    ketoconazole (NIZORAL) 2 % cream APPLY ON THE SKIN TWICE A DAY APPLY TO AFFECTED AREA RASH IN GROIN/ABDOMEN TWICE A DAY FOR 3 WEEKS      lidocaine (LIDODERM) 5 % patch Place 1 patch on the  skin daily. Remove & discard patch within 12 hours or as directed by prescriber. 30 patch 3    methocarbamoL (ROBAXIN-750) 750 mg tablet Take one by mouth ONLY as needed every 6-8 hours for muscle pains and spasms 50 tablet 1    multivitamin capsule Take 1 capsule by mouth daily.      semaglutide (OZEMPIC) 1 mg/dose (4 mg/3 mL) subcutaneous injection Inject 1 mg under the skin every (seven) 7 days. 3 mL 11    SKYRIZI 150 mg/mL syringe Once every 12 weeks      traMADoL (ULTRAM) 50 mg tablet Take 1 tablet (50 mg total) by mouth every 8 (eight) hours as needed for moderate pain or severe pain. 50 tablet 1    vitamin B complex/folic acid (B COMPLEX 100 ORAL) Take by mouth.      warfarin (COUMADIN) 4 mg tablet Take 8mg daily, except Tuesday/ Thursday take 4mg. 40 tablet 11     No current facility-administered medications for this visit.       Review of Systems   Constitutional: Negative for activity change, appetite change, fatigue and fever.   HENT: Negative for congestion, sinus pressure, sinus pain, sore throat and trouble swallowing.    Eyes: Negative for pain, discharge, itching and visual disturbance.   Respiratory: Negative for cough, chest tightness and shortness of breath.    Cardiovascular: Negative for chest pain, palpitations and leg swelling.   Gastrointestinal: Negative for abdominal pain, blood in stool, diarrhea, nausea and vomiting.   Endocrine: Negative for cold intolerance and heat intolerance.   Genitourinary: Negative for dysuria.   Musculoskeletal: Positive for back pain. Negative for arthralgias, gait problem and myalgias.   Skin: Negative for color change and rash.        Black discoloration on R big toe   Allergic/Immunologic: Negative for environmental allergies and food allergies.   Neurological: Negative for dizziness and headaches.        Numbness in R big toe   Hematological: Negative for adenopathy.   Psychiatric/Behavioral: Negative for agitation, behavioral problems and confusion.        Objective     Vitals:    11/01/23 1056   BP: 130/72   Pulse: 81   Resp: 16   SpO2: 99%       Physical Exam  Constitutional:       Appearance: She is well-developed.   HENT:      Head: Normocephalic and atraumatic.      Right Ear: Tympanic membrane, ear canal and external ear normal.      Left Ear: Tympanic membrane, ear canal and external ear normal.      Nose: Nose normal.   Eyes:      Conjunctiva/sclera: Conjunctivae normal.      Pupils: Pupils are equal, round, and reactive to light.   Neck:      Thyroid: No thyromegaly.   Cardiovascular:      Rate and Rhythm: Normal rate and regular rhythm.      Heart sounds: Normal heart sounds. No murmur heard.  Pulmonary:      Effort: Pulmonary effort is normal. No respiratory distress.      Breath sounds: Normal breath sounds. No wheezing or rales.   Abdominal:      General: Bowel sounds are normal. There is no distension.      Palpations: Abdomen is soft. There is no mass.      Tenderness: There is no abdominal tenderness. There is no guarding.   Musculoskeletal:         General: Tenderness present.      Cervical back: Normal range of motion and neck supple.      Comments: Mildly decreased ROM, low back pain   Feet:      Right foot:      Protective Sensation: 10 sites tested. 7 sites sensed.      Left foot:      Protective Sensation: 10 sites tested. 10 sites sensed.      Comments: Numbness in R big toe 2/2 recent fall/injury  Skin:     General: Skin is warm and dry.      Capillary Refill: Capillary refill takes less than 2 seconds.      Findings: No rash.      Comments: dime sized black discoloration on top of R big toe   Neurological:      Mental Status: She is alert and oriented to person, place, and time.      Cranial Nerves: No cranial nerve deficit.      Deep Tendon Reflexes: Reflexes normal.   Psychiatric:         Behavior: Behavior normal.         Thought Content: Thought content normal.         Judgment: Judgment normal.         Assessment/Plan     Problem  List Items Addressed This Visit        Circulatory    Pulmonary emboli (CMS/HCC) (Chronic)    Overview     Overview:          Current Assessment & Plan     Stable at baseline         Paroxysmal atrial fibrillation (CMS/HCC)    Current Assessment & Plan     Stable  Continue meds  Follows with Dr. Soto            Musculoskeletal    Pain of toe of right foot    Current Assessment & Plan     Continue tylenol  xray WNL at             Endocrine/Metabolic    Diabetes mellitus (CMS/HCC)    Current Assessment & Plan     Meds, diet, TLC reinforced today.   Continue current regimen and continue to exercise and eat well.   Recheck HGB A1C as needed and trend closely.   Urine microalb:   Lab Results   Component Value Date    HGBA1C 5.5 06/16/2023            Morbid obesity due to excess calories (CMS/HCC) (Chronic)    Overview     Overview:          Current Assessment & Plan     Stable  Diet and physical activity  Wt Readings from Last 3 Encounters:   04/25/23 85.7 kg (189 lb)   12/30/22 83.9 kg (185 lb)   12/29/22 84.6 kg (186 lb 6.4 oz)                 Other    HLD (hyperlipidemia)    Current Assessment & Plan     Continue current medication regimen as discussed and prescribed. Diet/TLC reinforced this visit, and pt verbalizes an understanding of the importance of both. Continue to monitor pt's progress with lipid panel/CMP as indicated/ordered. Exercise regimen reinforced and discussed today.            Acute bilateral low back pain without sciatica - Primary    Current Assessment & Plan     S/p fall 2 weeks ago  Continue tylenol  Try voltaren gel at home  Lumbar xray         Relevant Orders    X-RAY LUMBAR SPINE 2 OR 3 VIEWS   Other Visit Diagnoses     Colon cancer screening        Relevant Orders    Ambulatory referral to Gastroenterology          DIAGNOSIS  1. Acute bilateral low back pain without sciatica    2. Pain of toe of right foot    3. Type 2 diabetes mellitus with other diabetic arthropathy, without long-term  current use of insulin (CMS/HCC)    4. Hyperlipidemia, unspecified hyperlipidemia type    5. Morbid obesity due to excess calories (CMS/HCC)    6. Paroxysmal atrial fibrillation (CMS/HCC)    7. Other chronic pulmonary embolism without acute cor pulmonale (CMS/HCC)    8. Colon cancer screening        No results found.        Demarco Moreau,   11/1/2023

## 2023-10-31 NOTE — ASSESSMENT & PLAN NOTE
Meds, diet, TLC reinforced today.   Continue current regimen and continue to exercise and eat well.   Recheck HGB A1C as needed and trend closely.   Urine microalb:   Lab Results   Component Value Date    HGBA1C 5.5 06/16/2023

## 2023-11-01 ENCOUNTER — OFFICE VISIT (OUTPATIENT)
Dept: PRIMARY CARE | Facility: CLINIC | Age: 68
End: 2023-11-01
Payer: MEDICARE

## 2023-11-01 VITALS
HEART RATE: 81 BPM | OXYGEN SATURATION: 99 % | HEIGHT: 62 IN | SYSTOLIC BLOOD PRESSURE: 130 MMHG | DIASTOLIC BLOOD PRESSURE: 72 MMHG | WEIGHT: 196.8 LBS | RESPIRATION RATE: 16 BRPM | BODY MASS INDEX: 36.22 KG/M2

## 2023-11-01 DIAGNOSIS — E11.618 TYPE 2 DIABETES MELLITUS WITH OTHER DIABETIC ARTHROPATHY, WITHOUT LONG-TERM CURRENT USE OF INSULIN: ICD-10-CM

## 2023-11-01 DIAGNOSIS — M54.50 ACUTE BILATERAL LOW BACK PAIN WITHOUT SCIATICA: Primary | ICD-10-CM

## 2023-11-01 DIAGNOSIS — M79.674 PAIN OF TOE OF RIGHT FOOT: ICD-10-CM

## 2023-11-01 DIAGNOSIS — E66.01 MORBID OBESITY DUE TO EXCESS CALORIES (CMS/HCC): Chronic | ICD-10-CM

## 2023-11-01 DIAGNOSIS — I48.0 PAROXYSMAL ATRIAL FIBRILLATION (CMS/HCC): ICD-10-CM

## 2023-11-01 DIAGNOSIS — I27.82 OTHER CHRONIC PULMONARY EMBOLISM WITHOUT ACUTE COR PULMONALE: Chronic | ICD-10-CM

## 2023-11-01 DIAGNOSIS — E78.5 HYPERLIPIDEMIA, UNSPECIFIED HYPERLIPIDEMIA TYPE: ICD-10-CM

## 2023-11-01 DIAGNOSIS — Z12.11 COLON CANCER SCREENING: ICD-10-CM

## 2023-11-01 PROCEDURE — 90694 VACC AIIV4 NO PRSRV 0.5ML IM: CPT | Performed by: FAMILY MEDICINE

## 2023-11-01 PROCEDURE — G0008 ADMIN INFLUENZA VIRUS VAC: HCPCS | Performed by: FAMILY MEDICINE

## 2023-11-01 PROCEDURE — G8754 DIAS BP LESS 90: HCPCS | Performed by: FAMILY MEDICINE

## 2023-11-01 PROCEDURE — G8752 SYS BP LESS 140: HCPCS | Performed by: FAMILY MEDICINE

## 2023-11-01 PROCEDURE — 99214 OFFICE O/P EST MOD 30 MIN: CPT | Mod: 25 | Performed by: FAMILY MEDICINE

## 2023-11-01 RX ORDER — LIDOCAINE 50 MG/G
1 PATCH TOPICAL DAILY
Qty: 30 PATCH | Refills: 3 | Status: SHIPPED | OUTPATIENT
Start: 2023-11-01 | End: 2024-05-07

## 2023-11-01 RX ORDER — ALBUTEROL SULFATE 90 UG/1
2 INHALANT RESPIRATORY (INHALATION) EVERY 6 HOURS PRN
COMMUNITY
End: 2023-11-01 | Stop reason: SDUPTHER

## 2023-11-01 RX ORDER — FLUTICASONE PROPIONATE 50 MCG
1 SPRAY, SUSPENSION (ML) NASAL DAILY PRN
Qty: 16 G | Refills: 6 | Status: SHIPPED | OUTPATIENT
Start: 2023-11-01 | End: 2024-05-07 | Stop reason: SDUPTHER

## 2023-11-01 RX ORDER — ALBUTEROL SULFATE 90 UG/1
2 INHALANT RESPIRATORY (INHALATION) EVERY 6 HOURS PRN
Qty: 18 G | Refills: 6 | Status: SHIPPED | OUTPATIENT
Start: 2023-11-01 | End: 2024-05-07 | Stop reason: SDUPTHER

## 2023-11-01 ASSESSMENT — ENCOUNTER SYMPTOMS: BACK PAIN: 1

## 2023-11-01 NOTE — Clinical Note
Larry, I saw Jennifer today and notice she is a few weeks overdue for her routine colonoscopy screening.  Happy Holidays, Dr. Moreau

## 2023-11-07 ENCOUNTER — APPOINTMENT (OUTPATIENT)
Dept: LAB | Facility: CLINIC | Age: 68
End: 2023-11-07
Attending: DERMATOLOGY
Payer: MEDICARE

## 2023-11-07 ENCOUNTER — TRANSCRIBE ORDERS (OUTPATIENT)
Dept: LAB | Facility: CLINIC | Age: 68
End: 2023-11-07

## 2023-11-07 ENCOUNTER — HOSPITAL ENCOUNTER (OUTPATIENT)
Dept: RADIOLOGY | Facility: CLINIC | Age: 68
Discharge: HOME | End: 2023-11-07
Attending: FAMILY MEDICINE
Payer: MEDICARE

## 2023-11-07 DIAGNOSIS — M54.50 ACUTE BILATERAL LOW BACK PAIN WITHOUT SCIATICA: ICD-10-CM

## 2023-11-07 DIAGNOSIS — L40.0 PSORIASIS VULGARIS: Primary | ICD-10-CM

## 2023-11-07 DIAGNOSIS — L40.0 PSORIASIS VULGARIS: ICD-10-CM

## 2023-11-07 PROCEDURE — 36415 COLL VENOUS BLD VENIPUNCTURE: CPT

## 2023-11-07 PROCEDURE — 72100 X-RAY EXAM L-S SPINE 2/3 VWS: CPT

## 2023-11-07 PROCEDURE — 86480 TB TEST CELL IMMUN MEASURE: CPT

## 2023-11-08 ENCOUNTER — TELEPHONE (OUTPATIENT)
Dept: SURGERY | Facility: CLINIC | Age: 68
End: 2023-11-08
Payer: MEDICARE

## 2023-11-08 NOTE — TELEPHONE ENCOUNTER
Left message for patient to call our office to schedule office appointment with Dr. Rock, as he wanted to see her prior scheduling procedure to go over her medical conditions and medications with her.

## 2023-11-09 LAB
M TB IFN-G BLD-IMP: NEGATIVE
MITOGEN-NIL: 4.89 IU/ML
NIL: 0.03 IU/ML
TB AG-NIL: 0 IU/ML
TB2 AG - NIL: 0 IU/ML

## 2023-11-13 ENCOUNTER — TELEPHONE (OUTPATIENT)
Dept: SURGERY | Facility: CLINIC | Age: 68
End: 2023-11-13
Payer: MEDICARE

## 2023-11-13 NOTE — TELEPHONE ENCOUNTER
----- Message from Larry Rock MD sent at 11/8/2023  3:37 PM EST -----  Regarding: Appointment  I think I need to sit down with her in the office to plan her colonoscopy because of her diabetes and anticoagulation.  ----- Message -----  From: Demarco Moreau DO  Sent: 11/1/2023  11:28 AM EST  To: Larry Rock MD    Larry,  I saw Jennifer today and notice she is a few weeks overdue for her routine colonoscopy screening.   Happy Holidays,  Dr. Prieto Rock messaged that has scheduled 12/21/23 appointment pre colonoscopy  with Dr. Rock.

## 2023-11-17 LAB — INR PPP: 3.1

## 2023-12-04 LAB — INR PPP: 2.4

## 2023-12-21 ENCOUNTER — OFFICE VISIT (OUTPATIENT)
Dept: SURGERY | Facility: CLINIC | Age: 68
End: 2023-12-21
Payer: MEDICARE

## 2023-12-21 VITALS
HEIGHT: 62 IN | OXYGEN SATURATION: 95 % | SYSTOLIC BLOOD PRESSURE: 150 MMHG | BODY MASS INDEX: 35.51 KG/M2 | HEART RATE: 65 BPM | WEIGHT: 193 LBS | TEMPERATURE: 98.1 F | DIASTOLIC BLOOD PRESSURE: 73 MMHG

## 2023-12-21 DIAGNOSIS — E11.618 TYPE 2 DIABETES MELLITUS WITH OTHER DIABETIC ARTHROPATHY, WITHOUT LONG-TERM CURRENT USE OF INSULIN: ICD-10-CM

## 2023-12-21 DIAGNOSIS — I27.82 OTHER CHRONIC PULMONARY EMBOLISM WITHOUT ACUTE COR PULMONALE: Chronic | ICD-10-CM

## 2023-12-21 DIAGNOSIS — Z86.0100 HISTORY OF COLONIC POLYPS: Primary | ICD-10-CM

## 2023-12-21 PROCEDURE — 99203 OFFICE O/P NEW LOW 30 MIN: CPT | Performed by: COLON & RECTAL SURGERY

## 2023-12-21 PROCEDURE — G8753 SYS BP > OR = 140: HCPCS | Performed by: COLON & RECTAL SURGERY

## 2023-12-21 PROCEDURE — G8754 DIAS BP LESS 90: HCPCS | Performed by: COLON & RECTAL SURGERY

## 2023-12-21 RX ORDER — SODIUM CHLORIDE 9 MG/ML
INJECTION, SOLUTION INTRAVENOUS CONTINUOUS
Status: CANCELLED | OUTPATIENT
Start: 2023-12-21 | End: 2023-12-22

## 2023-12-21 RX ORDER — SOD SULF/POT CHLORIDE/MAG SULF 1.479 G
1 TABLET ORAL
Qty: 24 TABLET | Refills: 0 | Status: SHIPPED | OUTPATIENT
Start: 2023-12-21 | End: 2024-05-07

## 2023-12-21 NOTE — ASSESSMENT & PLAN NOTE
At least for now, she continues on chronic lifelong anticoagulation for history of pulmonary embolism with some possible clotting disorder.  She will need to hold her Coumadin prior to the procedure.  She will hold the Coumadin for 5 days.  If we do not need any polypectomy, she will resume it immediately.  I will try to remove any polyps in a way that we hope to avoid delayed bleeding.

## 2023-12-21 NOTE — LETTER
Dear Demarco,    I saw Jennifer Ayl in the office today in follow-up. Please see my note below.    If you have any additional questions, please do not hesitate to call me.    Sincerely,    Amador Rock MD      _____________________________________      Colorectal Surgery Follow-up    Subjective     Jennifer Aly is a 68 y.o. female who needs colonoscopy scheduled for history of colon polyps.    Her last colonoscopy was 7/30/2019 and a 5 mm tubular adenoma was removed from the mid rectum.  A 5-year follow-up was recommended for which she is due this July.  Her previous colonoscopy in 2013 also had a 5 mm tubular adenoma.    She does not have any family history of colorectal cancer or polyps.  She has not any blood in the stool.  She gets some occasional episodes of constipation but nothing significant.    She was treated for an anal fissure/fistula in 2017.  She has not had any recent anal issues.    She is on chronic anticoagulation for a history of pulmonary embolism.  She may also have a history of atrial fibrillation but that is somewhat in doubt at the moment.  She is seeing a new hematologist to evaluate her clotting disorder issues but in the meantime continues on warfarin therapy.      Medical History:   Past Medical History:   Diagnosis Date   • Arthritis    • Fistula    • Gout    • HLD (hyperlipidemia)    • IFG (impaired fasting glucose)    • Obesity    • Polyarthritis    • Pulmonary emboli (CMS/HCC)     pulmonary emboli   • Torn rotator cuff        Surgical History:   Past Surgical History:   Procedure Laterality Date   • APPENDECTOMY  2008   • CARDIAC CATHETERIZATION  11/2012   • INTRACAPSULAR CATARACT EXTRACTION Bilateral 08/2019   • IVC FILTER RETRIEVAL  2012   • IVC FILTER RETRIEVAL  02/2013   • RECTOURETHRAL FISTULA REPAIR  2008, 5/2017   • ROTATOR CUFF REPAIR Left 01/2016   • ALTAF-EN-Y PROCEDURE  02/07/2017   • TOTAL ABDOMINAL HYSTERECTOMY  1987   • TOTAL KNEE ARTHROPLASTY Bilateral 2010   •  "TOTAL KNEE ARTHROPLASTY Right 2012    revision   • TRIGGER FINGER RELEASE Right 2012    right thumb   • ULNAR NERVE REPAIR  2013       Allergies: Sulfa (sulfonamide antibiotics)    Current Medications:  •  albuterol HFA  •  atorvastatin  •  BIOTIN ORAL  •  calcium carbonate-vitamin D3  •  cholecalciferol (vitamin D3)  •  clobetasoL  •  CLOBETASOL PROPIONATE, BULK, MISC  •  diltiazem  •  fluticasone propionate  •  ketoconazole  •  lidocaine  •  methocarbamoL  •  multivitamin  •  semaglutide  •  SKYRIZI  •  SUTAB  •  traMADoL  •  vitamin B complex/folic acid (B COMPLEX 100 ORAL)  •  warfarin    Objective     Physicial Exam  Visit Vitals  BP (!) 150/73 (BP Location: Left upper arm, Patient Position: Sitting)   Pulse 65   Temp 36.7 °C (98.1 °F) (Oral)   Ht 1.575 m (5' 2\")   Wt 87.5 kg (193 lb)   SpO2 95%   BMI 35.30 kg/m²       Physical Exam  Vitals reviewed.   Constitutional:       Appearance: Normal appearance. She is well-developed.   HENT:      Head: Normocephalic and atraumatic.   Eyes:      Conjunctiva/sclera: Conjunctivae normal.      Pupils: Pupils are equal, round, and reactive to light.   Cardiovascular:      Rate and Rhythm: Normal rate and regular rhythm.      Heart sounds: Normal heart sounds. No murmur heard.  Pulmonary:      Breath sounds: Normal breath sounds. No wheezing.   Abdominal:      General: Abdomen is flat. There is no distension.      Palpations: Abdomen is soft. There is no mass.      Tenderness: There is no abdominal tenderness.   Musculoskeletal:         General: Normal range of motion.      Cervical back: Normal range of motion and neck supple.   Lymphadenopathy:      Cervical: No cervical adenopathy.   Skin:     General: Skin is warm and dry.      Capillary Refill: Capillary refill takes less than 2 seconds.      Findings: No rash.   Neurological:      Mental Status: She is alert and oriented to person, place, and time.      Cranial Nerves: No cranial nerve deficit.   Psychiatric:         " Mood and Affect: Mood normal.         Behavior: Behavior normal.             Assessment     Problem List Items Addressed This Visit        Circulatory    Pulmonary emboli (CMS/HCC) (Chronic)    Overview     Overview:          Current Assessment & Plan     At least for now, she continues on chronic lifelong anticoagulation for history of pulmonary embolism with some possible clotting disorder.  She will need to hold her Coumadin prior to the procedure.  She will hold the Coumadin for 5 days.  If we do not need any polypectomy, she will resume it immediately.  I will try to remove any polyps in a way that we hope to avoid delayed bleeding.            Digestive    History of colonic polyps - Primary    Current Assessment & Plan     She will be due for follow-up colonoscopy in July. We discussed the reason for colonoscopy and cancer screening.  We discussed the procedure and the risks and benefits of colonoscopy including the risk of perforation and bleeding from a polyp site.  We discussed bowel prep instructions.  she understands these issues and consents.           Relevant Medications    sod sulf-pot chloride-mag sulf (SUTAB) 1.479-0.188- 0.225 gram tablet    Other Relevant Orders    Case request operating room: FLEXIBLE COLONOSCOPY FOR COLORECTAL CANCER SCREENING HIGH RISK (Completed)       Endocrine/Metabolic    Diabetes mellitus (CMS/HCC)    Current Assessment & Plan     She will hold her Ozempic 1 week prior to the procedure.  She typically takes it on Mondays so she will skip her dose the day before the procedure.                 Larry Rock MD

## 2023-12-21 NOTE — LETTER
Dear Demarco,    I saw Jennifer Aly in the office today in consultation. Please see my note below.    If you have any additional questions, please do not hesitate to call me.    Sincerely,    Amador Rock MD    By the way, I updated health maintenance with her colonoscopy date and surveillance interval.  _____________________________________      Colorectal Surgery Follow-up    Subjective     Jennifer Aly is a 68 y.o. female who needs colonoscopy scheduled for history of colon polyps.    Her last colonoscopy was 7/30/2019 and a 5 mm tubular adenoma was removed from the mid rectum.  A 5-year follow-up was recommended for which she is due this July.  Her previous colonoscopy in 2013 also had a 5 mm tubular adenoma.    She does not have any family history of colorectal cancer or polyps.  She has not any blood in the stool.  She gets some occasional episodes of constipation but nothing significant.    She was treated for an anal fissure/fistula in 2017.  She has not had any recent anal issues.    She is on chronic anticoagulation for a history of pulmonary embolism.  She may also have a history of atrial fibrillation but that is somewhat in doubt at the moment.  She is seeing a new hematologist to evaluate her clotting disorder issues but in the meantime continues on warfarin therapy.      Medical History:   Past Medical History:   Diagnosis Date    Arthritis     Fistula     Gout     HLD (hyperlipidemia)     IFG (impaired fasting glucose)     Obesity     Polyarthritis     Pulmonary emboli (CMS/HCC)     pulmonary emboli    Torn rotator cuff        Surgical History:   Past Surgical History:   Procedure Laterality Date    APPENDECTOMY  2008    CARDIAC CATHETERIZATION  11/2012    INTRACAPSULAR CATARACT EXTRACTION Bilateral 08/2019    IVC FILTER RETRIEVAL  2012    IVC FILTER RETRIEVAL  02/2013    RECTOURETHRAL FISTULA REPAIR  2008, 5/2017    ROTATOR CUFF REPAIR Left 01/2016    ALTAF-EN-Y PROCEDURE  02/07/2017    TOTAL  "ABDOMINAL HYSTERECTOMY  1987    TOTAL KNEE ARTHROPLASTY Bilateral 2010    TOTAL KNEE ARTHROPLASTY Right 2012    revision    TRIGGER FINGER RELEASE Right 2012    right thumb    ULNAR NERVE REPAIR  2013       Allergies: Sulfa (sulfonamide antibiotics)    Current Medications:    albuterol HFA    atorvastatin    BIOTIN ORAL    calcium carbonate-vitamin D3    cholecalciferol (vitamin D3)    clobetasoL    CLOBETASOL PROPIONATE, BULK, MISC    diltiazem    fluticasone propionate    ketoconazole    lidocaine    methocarbamoL    multivitamin    semaglutide    SKYRIZI    SUTAB    traMADoL    vitamin B complex/folic acid (B COMPLEX 100 ORAL)    warfarin    Objective     Physicial Exam  Visit Vitals  BP (!) 150/73 (BP Location: Left upper arm, Patient Position: Sitting)   Pulse 65   Temp 36.7 °C (98.1 °F) (Oral)   Ht 1.575 m (5' 2\")   Wt 87.5 kg (193 lb)   SpO2 95%   BMI 35.30 kg/m²       Physical Exam        Assessment     Problem List Items Addressed This Visit          Circulatory    Pulmonary emboli (CMS/HCC) (Chronic)    Overview     Overview:          Current Assessment & Plan     At least for now, she continues on chronic lifelong anticoagulation for history of pulmonary embolism with some possible clotting disorder.  She will need to hold her Coumadin prior to the procedure.  She will hold the Coumadin for 5 days.  If we do not need any polypectomy, she will resume it immediately.  I will try to remove any polyps in a way that we hope to avoid delayed bleeding.            Digestive    History of colonic polyps - Primary    Current Assessment & Plan     She will be due for follow-up colonoscopy in July. We discussed the reason for colonoscopy and cancer screening.  We discussed the procedure and the risks and benefits of colonoscopy including the risk of perforation and bleeding from a polyp site.  We discussed bowel prep instructions.  she understands these issues and consents.           Relevant Medications    sod " sulf-pot chloride-mag sulf (SUTAB) 1.479-0.188- 0.225 gram tablet    Other Relevant Orders    Case request operating room: FLEXIBLE COLONOSCOPY FOR COLORECTAL CANCER SCREENING HIGH RISK (Completed)       Endocrine/Metabolic    Diabetes mellitus (CMS/HCC)    Current Assessment & Plan     She will hold her Ozempic 1 week prior to the procedure.  She typically takes it on Mondays so she will skip her dose the day before the procedure.                 Larry Rock MD

## 2023-12-21 NOTE — PROGRESS NOTES
Colorectal Surgery Follow-up    Subjective     Jennifer Aly is a 68 y.o. female who needs colonoscopy scheduled for history of colon polyps.    Her last colonoscopy was 7/30/2019 and a 5 mm tubular adenoma was removed from the mid rectum.  A 5-year follow-up was recommended for which she is due this July.  Her previous colonoscopy in 2013 also had a 5 mm tubular adenoma.    She does not have any family history of colorectal cancer or polyps.  She has not any blood in the stool.  She gets some occasional episodes of constipation but nothing significant.    She was treated for an anal fissure/fistula in 2017.  She has not had any recent anal issues.    She is on chronic anticoagulation for a history of pulmonary embolism.  She may also have a history of atrial fibrillation but that is somewhat in doubt at the moment.  She is seeing a new hematologist to evaluate her clotting disorder issues but in the meantime continues on warfarin therapy.      Medical History:   Past Medical History:   Diagnosis Date   • Arthritis    • Fistula    • Gout    • HLD (hyperlipidemia)    • IFG (impaired fasting glucose)    • Obesity    • Polyarthritis    • Pulmonary emboli (CMS/HCC)     pulmonary emboli   • Torn rotator cuff        Surgical History:   Past Surgical History:   Procedure Laterality Date   • APPENDECTOMY  2008   • CARDIAC CATHETERIZATION  11/2012   • INTRACAPSULAR CATARACT EXTRACTION Bilateral 08/2019   • IVC FILTER RETRIEVAL  2012   • IVC FILTER RETRIEVAL  02/2013   • RECTOURETHRAL FISTULA REPAIR  2008, 5/2017   • ROTATOR CUFF REPAIR Left 01/2016   • ALTAF-EN-Y PROCEDURE  02/07/2017   • TOTAL ABDOMINAL HYSTERECTOMY  1987   • TOTAL KNEE ARTHROPLASTY Bilateral 2010   • TOTAL KNEE ARTHROPLASTY Right 2012    revision   • TRIGGER FINGER RELEASE Right 2012    right thumb   • ULNAR NERVE REPAIR  2013       Allergies: Sulfa (sulfonamide antibiotics)    Current Medications:  •  albuterol HFA  •  atorvastatin  •  BIOTIN ORAL  •   "calcium carbonate-vitamin D3  •  cholecalciferol (vitamin D3)  •  clobetasoL  •  CLOBETASOL PROPIONATE, BULK, MISC  •  diltiazem  •  fluticasone propionate  •  ketoconazole  •  lidocaine  •  methocarbamoL  •  multivitamin  •  semaglutide  •  SKYRIZI  •  SUTAB  •  traMADoL  •  vitamin B complex/folic acid (B COMPLEX 100 ORAL)  •  warfarin    Objective     Physicial Exam  Visit Vitals  BP (!) 150/73 (BP Location: Left upper arm, Patient Position: Sitting)   Pulse 65   Temp 36.7 °C (98.1 °F) (Oral)   Ht 1.575 m (5' 2\")   Wt 87.5 kg (193 lb)   SpO2 95%   BMI 35.30 kg/m²       Physical Exam  Vitals reviewed.   Constitutional:       Appearance: Normal appearance. She is well-developed.   HENT:      Head: Normocephalic and atraumatic.   Eyes:      Conjunctiva/sclera: Conjunctivae normal.      Pupils: Pupils are equal, round, and reactive to light.   Cardiovascular:      Rate and Rhythm: Normal rate and regular rhythm.      Heart sounds: Normal heart sounds. No murmur heard.  Pulmonary:      Breath sounds: Normal breath sounds. No wheezing.   Abdominal:      General: Abdomen is flat. There is no distension.      Palpations: Abdomen is soft. There is no mass.      Tenderness: There is no abdominal tenderness.   Musculoskeletal:         General: Normal range of motion.      Cervical back: Normal range of motion and neck supple.   Lymphadenopathy:      Cervical: No cervical adenopathy.   Skin:     General: Skin is warm and dry.      Capillary Refill: Capillary refill takes less than 2 seconds.      Findings: No rash.   Neurological:      Mental Status: She is alert and oriented to person, place, and time.      Cranial Nerves: No cranial nerve deficit.   Psychiatric:         Mood and Affect: Mood normal.         Behavior: Behavior normal.             Assessment     Problem List Items Addressed This Visit        Circulatory    Pulmonary emboli (CMS/HCC) (Chronic)    Overview     Overview:          Current Assessment & Plan     " At least for now, she continues on chronic lifelong anticoagulation for history of pulmonary embolism with some possible clotting disorder.  She will need to hold her Coumadin prior to the procedure.  She will hold the Coumadin for 5 days.  If we do not need any polypectomy, she will resume it immediately.  I will try to remove any polyps in a way that we hope to avoid delayed bleeding.            Digestive    History of colonic polyps - Primary    Current Assessment & Plan     She will be due for follow-up colonoscopy in July. We discussed the reason for colonoscopy and cancer screening.  We discussed the procedure and the risks and benefits of colonoscopy including the risk of perforation and bleeding from a polyp site.  We discussed bowel prep instructions.  she understands these issues and consents.           Relevant Medications    sod sulf-pot chloride-mag sulf (SUTAB) 1.479-0.188- 0.225 gram tablet    Other Relevant Orders    Case request operating room: FLEXIBLE COLONOSCOPY FOR COLORECTAL CANCER SCREENING HIGH RISK (Completed)       Endocrine/Metabolic    Diabetes mellitus (CMS/HCC)    Current Assessment & Plan     She will hold her Ozempic 1 week prior to the procedure.  She typically takes it on Mondays so she will skip her dose the day before the procedure.                  Larry Rcok MD

## 2023-12-21 NOTE — ASSESSMENT & PLAN NOTE
She will hold her Ozempic 1 week prior to the procedure.  She typically takes it on Mondays so she will skip her dose the day before the procedure.

## 2023-12-21 NOTE — ASSESSMENT & PLAN NOTE
She will be due for follow-up colonoscopy in July. We discussed the reason for colonoscopy and cancer screening.  We discussed the procedure and the risks and benefits of colonoscopy including the risk of perforation and bleeding from a polyp site.  We discussed bowel prep instructions.  she understands these issues and consents.

## 2024-01-05 LAB — INR PPP: 2.9

## 2024-01-22 LAB — INR PPP: 2.1

## 2024-02-07 NOTE — PROGRESS NOTES
Cardiology  Office Progress Note         Reason for visit:   Chief Complaint   Patient presents with   • Follow-up       HPI     Jennifer Aly is a 68 y.o. female who presents to the office for cardiovascular follow up and management of HTN, Sleep apnea with cpap, PE , pure hypercholesteremia, GI bleed, Bariatric surgery in 2017 and a questionable Afib.     She was last seen in the office 12/30/22 noting no further episodes of lightheadedness or exertional shortness of breath. She also had no further issues with the pounding in her ears. She had followed up with hematology (Dr Luna) and was awaiting some results. Given no recurrent ROGERS I planned no additional work up. I could consider an ischemic eval if symptoms recurr. Her Bp was well controlled on her diltiazem 120 mg daily. If hematology decided she no longer needed AC then I would consider ILR due to TTE showing increased risk of afib with biatrial enlargement. KIM also increases her risk. She would follow up in 1 year.     Today she notes that she is feeling well and denies any cardiac complaints.   FLP 6/23 , Tgl 71, HDL 68, LDL 80   CMP BUN 13, Creat 0.6 eGFR > 60     Past Medical History:   Diagnosis Date   • Arthritis    • Fistula    • Gout    • HLD (hyperlipidemia)    • IFG (impaired fasting glucose)    • Obesity    • Polyarthritis    • Pulmonary emboli (CMS/HCC)     pulmonary emboli   • Torn rotator cuff        Past Surgical History:   Procedure Laterality Date   • APPENDECTOMY  2008   • CARDIAC CATHETERIZATION  11/2012   • INTRACAPSULAR CATARACT EXTRACTION Bilateral 08/2019   • IVC FILTER RETRIEVAL  2012   • IVC FILTER RETRIEVAL  02/2013   • RECTOURETHRAL FISTULA REPAIR  2008, 5/2017   • ROTATOR CUFF REPAIR Left 01/2016   • ALTAF-EN-Y PROCEDURE  02/07/2017   • TOTAL ABDOMINAL HYSTERECTOMY  1987   • TOTAL KNEE ARTHROPLASTY Bilateral 2010   • TOTAL KNEE ARTHROPLASTY Right 2012    revision   • TRIGGER FINGER RELEASE Right 2012    right thumb   •  ULNAR NERVE REPAIR         Social History     Tobacco Use   • Smoking status: Former     Years: 15     Types: Cigarettes     Quit date:      Years since quittin.1   • Smokeless tobacco: Never   Substance Use Topics   • Alcohol use: Yes     Comment: occasional wine   • Drug use: No       Family History   Problem Relation Age of Onset   • Diabetes Biological Mother    • Lung cancer Biological Mother    • Alzheimer's disease Biological Mother    • Diabetes type II Biological Father    • Heart failure Biological Father    • Heart attack Biological Sister        Allergies:  Sulfa (sulfonamide antibiotics)    Current Outpatient Medications   Medication Sig Dispense Refill   • albuterol HFA 90 mcg/actuation inhaler Inhale 2 puffs every 6 (six) hours as needed for wheezing. 18 g 6   • atorvastatin (LIPITOR) 40 mg tablet Take 1 tablet (40 mg total) by mouth daily. 90 tablet 3   • BIOTIN ORAL      • calcium carbonate-vitamin D3 500 mg(1,250mg) -200 unit per tablet Take 2 tablets by mouth 2 (two) times a day with meals.       • cholecalciferol, vitamin D3, 5,000 unit (125 mcg) tablet Take 5,000 Units by mouth daily.     • clobetasol (TEMOVATE) 0.05 % cream      • diltiazem (TIADYLT ER) 120 mg 24 hr capsule Take 1 capsule (120 mg total) by mouth daily. 90 capsule 3   • fluticasone propionate (FLONASE) 50 mcg/actuation nasal spray Administer 1 spray into each nostril daily as needed for rhinitis. Make an appointment if help is needed for nasal symptoms. 16 g 6   • ketoconazole (NIZORAL) 2 % cream APPLY ON THE SKIN TWICE A DAY APPLY TO AFFECTED AREA RASH IN GROIN/ABDOMEN TWICE A DAY FOR 3 WEEKS     • methocarbamoL (ROBAXIN-750) 750 mg tablet Take one by mouth ONLY as needed every 6-8 hours for muscle pains and spasms 50 tablet 1   • multivitamin capsule Take 1 capsule by mouth daily.     • semaglutide (OZEMPIC) 1 mg/dose (4 mg/3 mL) subcutaneous injection Inject 1 mg under the skin every (seven) 7 days. 3 mL 11   •  SKYRIZI 150 mg/mL syringe Once every 12 weeks     • traMADoL (ULTRAM) 50 mg tablet Take 1 tablet (50 mg total) by mouth every 8 (eight) hours as needed for moderate pain or severe pain. 50 tablet 1   • vitamin B complex/folic acid (B COMPLEX 100 ORAL) Take by mouth.     • warfarin (COUMADIN) 4 mg tablet Take 8mg daily, except Tuesday/ Thursday take 4mg. 40 tablet 11   • CLOBETASOL PROPIONATE, BULK, MISC      • lidocaine (LIDODERM) 5 % patch Place 1 patch on the skin daily. Remove & discard patch within 12 hours or as directed by prescriber. 30 patch 3   • sod sulf-pot chloride-mag sulf (SUTAB) 1.479-0.188- 0.225 gram tablet Take 1 tablet by mouth every 5 (five) minutes. See instructions (Patient not taking: Reported on 2/8/2024) 24 tablet 0     No current facility-administered medications for this visit.       Review of Systems   Constitutional: Negative for malaise/fatigue.   Cardiovascular: Negative for chest pain, dyspnea on exertion, irregular heartbeat, leg swelling and palpitations.   Respiratory: Negative for shortness of breath and sleep disturbances due to breathing.    Neurological: Negative for dizziness and light-headedness.       Objective     Vitals:    02/08/24 1000   BP: 130/70   Pulse: 60   SpO2: 96%       Wt Readings from Last 3 Encounters:   02/08/24 88 kg (194 lb)   12/21/23 87.5 kg (193 lb)   11/01/23 89.3 kg (196 lb 12.8 oz)       Body mass index is 35.48 kg/m².    Physical Exam  Vitals reviewed.   Cardiovascular:      Rate and Rhythm: Normal rate and regular rhythm.      Pulses: Normal pulses.           Carotid pulses are 2+ on the right side and 2+ on the left side.     Heart sounds: Normal heart sounds.   Pulmonary:      Effort: Pulmonary effort is normal.   Musculoskeletal:         General: Normal range of motion.   Skin:     General: Skin is warm.      Capillary Refill: Capillary refill takes less than 2 seconds.   Neurological:      Mental Status: She is alert and oriented to person,  place, and time.       ECG   Sinus bradycardia  Otherwise normal ECG    Hematology  Lab Results   Component Value Date    WBC 4.93 06/16/2023    HGB 12.6 06/16/2023    HCT 39.8 06/16/2023     06/16/2023    INR 2.1 01/22/2024       Chemistries  Lab Results   Component Value Date     06/16/2023    K 4.9 06/16/2023     06/16/2023    CREATININE 0.6 06/16/2023    BUN 13 06/16/2023    CO2 26 06/16/2023    GLUCOSE 73 06/16/2023    CALCIUM 9.9 06/16/2023    MG 1.9 02/03/2020    ALT 33 06/16/2023    AST 29 06/16/2023       Cholesterol  Lab Results   Component Value Date    CHOL 162 06/16/2023    TRIG 71 06/16/2023    HDL 68 06/16/2023    LDLCALC 80 06/16/2023    NONHDLCALC 94 06/16/2023       Endocrine  Lab Results   Component Value Date    TSH 1.42 06/16/2023    HGBA1C 5.5 06/16/2023       Cardiac Imaging    TRANSTHORACIC ECHO (TTE) COMPLETE 10/03/2022    Interpretation Summary  · Normal-sized LV. Preserved LV systolic function. Estimated EF 70%. No regional wall motion abnormalities. Mild concentric left ventricular hypertrophy. Grade II LV diastolic dysfunction.  · Normal-sized RV. Normal RV systolic function.  · Mildly dilated LA. Mildly dilated RA.  · Tricuspid aortic valve. No aortic valve regurgitation. No aortic valve stenosis.  · Moderate mitral annular calcification. Mild mitral valve regurgitation. No significant mitral valve stenosis.  · Mild to moderate tricuspid valve regurgitation. Estimated RVSP = 38 mmHg.  · Mild pulmonic valve regurgitation.  · Normal-sized IVC. IVC demonstrates normal respiratory collapse.  · No evidence of pericardial effusion.      Assessment/Plan      Obstructive sleep apnea   Wears CPAP  Follows with Danita Cummins at [facility]    ROGERS (dyspnea on exertion)  Overall improved   TTE showed G2 DD. Mild MR, mild-mod TR, mild LAE/KAMERON as well. Consider HfpEF in ddx     If recurrent ROGERS, can consider SGLT2 inhibitor  Can also consider ischemic eval  defered further workup  at previous visit given improvement in symptoms and reassuring exam     She continues to feel well and no s/sx CHF    Essential hypertension  BP well controlled on diltiazem 120 daily     Diastolic Dysfunction  G2DD  Check TTE with f/u visit    Paroxysmal atrial fibrillation (CMS/HCC)  Dx is in chart but pt denies hx of this. She does not believe it is a true diagnosis and has asked Dr. Moreau to remove the dx from the chart  She wears a FitBit sense and I advised her to set up Heart rhythm assessment with the Fitbit ECG elizabeth.      2-week MCOT did not show Afib  TTE does show findings that increase the risk of afib; mild MR, mild-mod TR, biatrial enlargement. KIM hx also increases incidence of afib     She is currently on coumadin for PE with lupus anticoagulant. From an afib standpoint, there is not currently a clear indication for AC but she is certainly at risk of recurrent afib. Were she to no longer need AC from a hematologic perspective, would consider ILR placement to detect recurrent afib      Pulmonary emboli (CMS/HCC)  Management per Dr. Luna. Lupus anticoagulant detected per pt report. Current plan is to remain on coumadin. He is repeating testing to confirm finding.      Diabetes mellitus (CMS/HCC)  Off med since bypass surgery  Recent a1c 5.8     HLD (hyperlipidemia)  On atorvastatin 40 mg daily   LDL 85 on recent check  Check lipid panel, hsCRP, Lp(a) prior to next visit.      Morbid obesity due to excess calories (CMS/HCC)  Sees Dr. Osman, had bypass surgery  Weight stable on last several visits      Orders Placed This Encounter   Procedures   • High sensitivity CRP     Standing Status:   Future     Standing Expiration Date:   2/8/2025     Order Specific Question:   Release to patient     Answer:   Immediate [1]   • Lipid panel     Standing Status:   Future     Standing Expiration Date:   2/8/2025     Order Specific Question:   Release to patient     Answer:   Immediate [1]   • Lipoprotein (a)      Standing Status:   Future     Standing Expiration Date:   2/8/2025     Order Specific Question:   Release to patient     Answer:   Immediate [1]   • ECG 12 lead     Scheduling Instructions:      PLEASE USE THIS ORDER FOR ECG'S PERFORMED IN PHYSICIAN OFFICES     Order Specific Question:   Release to patient     Answer:   Immediate     Order Specific Question:   Release to patient     Answer:   Immediate [1]       Follow Up Plans:  1 year with echo         Clair DOWNING, am scribing for, and in the presence of, Charles Vasquez MD.     Charles DOWNING MD, personally performed the services described in this documentation as scribed by Clair Collins in my presence, and it is both accurate and complete.     Charles Vasquez MD  2/8/2024

## 2024-02-08 ENCOUNTER — OFFICE VISIT (OUTPATIENT)
Dept: CARDIOLOGY | Facility: CLINIC | Age: 69
End: 2024-02-08
Payer: MEDICARE

## 2024-02-08 VITALS
OXYGEN SATURATION: 96 % | HEIGHT: 62 IN | HEART RATE: 60 BPM | DIASTOLIC BLOOD PRESSURE: 70 MMHG | SYSTOLIC BLOOD PRESSURE: 130 MMHG | BODY MASS INDEX: 35.7 KG/M2 | WEIGHT: 194 LBS

## 2024-02-08 DIAGNOSIS — G47.33 OBSTRUCTIVE SLEEP APNEA: Chronic | ICD-10-CM

## 2024-02-08 DIAGNOSIS — I48.0 PAROXYSMAL ATRIAL FIBRILLATION (CMS/HCC): Primary | ICD-10-CM

## 2024-02-08 DIAGNOSIS — E78.49 OTHER HYPERLIPIDEMIA: ICD-10-CM

## 2024-02-08 DIAGNOSIS — E78.5 HYPERLIPIDEMIA, UNSPECIFIED HYPERLIPIDEMIA TYPE: ICD-10-CM

## 2024-02-08 LAB
ATRIAL RATE: 55
P AXIS: 65
PR INTERVAL: 168
QRS DURATION: 90
QT INTERVAL: 444
QTC CALCULATION(BAZETT): 424
R AXIS: 66
T WAVE AXIS: 33
VENTRICULAR RATE: 55

## 2024-02-08 PROCEDURE — G8752 SYS BP LESS 140: HCPCS | Performed by: INTERNAL MEDICINE

## 2024-02-08 PROCEDURE — G8754 DIAS BP LESS 90: HCPCS | Performed by: INTERNAL MEDICINE

## 2024-02-08 PROCEDURE — 99214 OFFICE O/P EST MOD 30 MIN: CPT | Performed by: INTERNAL MEDICINE

## 2024-02-08 PROCEDURE — 93000 ELECTROCARDIOGRAM COMPLETE: CPT | Performed by: INTERNAL MEDICINE

## 2024-02-08 ASSESSMENT — ENCOUNTER SYMPTOMS
IRREGULAR HEARTBEAT: 0
PALPITATIONS: 0
LIGHT-HEADEDNESS: 0
SLEEP DISTURBANCES DUE TO BREATHING: 0
SHORTNESS OF BREATH: 0
DIZZINESS: 0
DYSPNEA ON EXERTION: 0

## 2024-02-12 LAB — INR PPP: 2.4

## 2024-02-19 ENCOUNTER — TRANSCRIBE ORDERS (OUTPATIENT)
Dept: LAB | Facility: CLINIC | Age: 69
End: 2024-02-19

## 2024-02-19 ENCOUNTER — APPOINTMENT (OUTPATIENT)
Dept: LAB | Facility: CLINIC | Age: 69
End: 2024-02-19
Attending: INTERNAL MEDICINE
Payer: MEDICARE

## 2024-02-19 DIAGNOSIS — E78.5 HYPERLIPIDEMIA, UNSPECIFIED HYPERLIPIDEMIA TYPE: ICD-10-CM

## 2024-02-19 DIAGNOSIS — I26.99 OTHER PULMONARY EMBOLISM WITHOUT ACUTE COR PULMONALE: ICD-10-CM

## 2024-02-19 DIAGNOSIS — E78.49 OTHER HYPERLIPIDEMIA: ICD-10-CM

## 2024-02-19 DIAGNOSIS — I26.99 OTHER PULMONARY EMBOLISM WITHOUT ACUTE COR PULMONALE: Primary | ICD-10-CM

## 2024-02-19 LAB
CHOLEST SERPL-MCNC: 167 MG/DL
CRP SERPL HS-MCNC: 0.88 MG/L
HDLC SERPL-MCNC: 75 MG/DL
HDLC SERPL: 2.2 {RATIO}
INR PPP: 2.8
INR PPP: 3.3
LDLC SERPL CALC-MCNC: 76 MG/DL
NONHDLC SERPL-MCNC: 92 MG/DL
PROTHROMBIN TIME: 33.5 SEC (ref 12.2–14.5)
TRIGL SERPL-MCNC: 78 MG/DL

## 2024-02-19 PROCEDURE — 85610 PROTHROMBIN TIME: CPT

## 2024-02-19 PROCEDURE — 36415 COLL VENOUS BLD VENIPUNCTURE: CPT

## 2024-02-19 PROCEDURE — 80061 LIPID PANEL: CPT

## 2024-02-19 PROCEDURE — 86141 C-REACTIVE PROTEIN HS: CPT

## 2024-02-19 PROCEDURE — 83695 ASSAY OF LIPOPROTEIN(A): CPT

## 2024-02-22 LAB — LPA SERPL-SCNC: <10 NMOL/L

## 2024-02-26 LAB — INR PPP: 2.6

## 2024-03-05 LAB — INR PPP: 1.5

## 2024-03-11 LAB — INR PPP: 1.9

## 2024-03-18 LAB — INR PPP: 2.6

## 2024-03-25 LAB — INR PPP: 3.2

## 2024-04-05 LAB — INR PPP: 4.1

## 2024-04-09 LAB — INR PPP: 2.6

## 2024-04-15 NOTE — H&P
General Surgery History and Physical      HPI     Patient is a 68 y.o. female who presents for colonoscopy for high risk colon cancer screening.     Her last colonoscopy was 7/30/2019 and a 5 mm tubular adenoma was removed from the mid rectum.  A 5-year follow-up was recommended for which she is due this July.  Her previous colonoscopy in 2013 also had a 5 mm tubular adenoma.     She does not have any family history of colorectal cancer or polyps.  She has not any blood in the stool.  She gets some occasional episodes of constipation but nothing significant.     She was treated for an anal fissure/fistula in 2017.  She has not had any recent anal issues.     She is on chronic anticoagulation for a history of pulmonary embolism.  She may also have a history of atrial fibrillation but that is somewhat in doubt at the moment.  She is seeing a new hematologist to evaluate her clotting disorder issues but in the meantime continues on warfarin therapy.    Medical History:   Past Medical History:   Diagnosis Date    Arthritis     Fistula     Gout     HLD (hyperlipidemia)     IFG (impaired fasting glucose)     Obesity     Polyarthritis     Pulmonary emboli (CMS/HCC)     pulmonary emboli    Torn rotator cuff        Surgical History:   Past Surgical History:   Procedure Laterality Date    APPENDECTOMY  2008    CARDIAC CATHETERIZATION  11/2012    INTRACAPSULAR CATARACT EXTRACTION Bilateral 08/2019    IVC FILTER RETRIEVAL  2012    IVC FILTER RETRIEVAL  02/2013    RECTOURETHRAL FISTULA REPAIR  2008, 5/2017    ROTATOR CUFF REPAIR Left 01/2016    ALTAF-EN-Y PROCEDURE  02/07/2017    TOTAL ABDOMINAL HYSTERECTOMY  1987    TOTAL KNEE ARTHROPLASTY Bilateral 2010    TOTAL KNEE ARTHROPLASTY Right 2012    revision    TRIGGER FINGER RELEASE Right 2012    right thumb    ULNAR NERVE REPAIR  2013       Social History:   Social History     Social History Narrative    Not on file       Family History:   Family History   Problem Relation Age of  Onset    Diabetes Biological Mother     Lung cancer Biological Mother     Alzheimer's disease Biological Mother     Diabetes type II Biological Father     Heart failure Biological Father     Heart attack Biological Sister        Allergies: Sulfa (sulfonamide antibiotics)    Home Medications:  Current Outpatient Medications   Medication Instructions    albuterol HFA 90 mcg/actuation inhaler 2 puffs, inhalation, Every 6 hours PRN    atorvastatin (LIPITOR) 40 mg, oral, Daily    BIOTIN ORAL No dose, route, or frequency recorded.    calcium carbonate-vitamin D3 500 mg(1,250mg) -200 unit per tablet 2 tablets, oral, 2 times daily with meals    cholecalciferol (vitamin D3) 5,000 Units, oral, Daily    clobetasol (TEMOVATE) 0.05 % cream No dose, route, or frequency recorded.    CLOBETASOL PROPIONATE, BULK, MISC No dose, route, or frequency recorded.    diltiazem (TIADYLT ER) 120 mg, oral, Daily    fluticasone propionate (FLONASE) 50 mcg/actuation nasal spray 1 spray, Each Nostril, Daily PRN, Make an appointment if help is needed for nasal symptoms.    ketoconazole (NIZORAL) 2 % cream APPLY ON THE SKIN TWICE A DAY APPLY TO AFFECTED AREA RASH IN GROIN/ABDOMEN TWICE A DAY FOR 3 WEEKS    lidocaine (LIDODERM) 5 % patch 1 patch, transdermal, Daily, Remove & discard patch within 12 hours or as directed by prescriber.    methocarbamoL (ROBAXIN-750) 750 mg tablet Take one by mouth ONLY as needed every 6-8 hours for muscle pains and spasms    multivitamin capsule 1 capsule, oral, Daily    semaglutide (OZEMPIC) 1 mg, subcutaneous, Every 7 days    SKYRIZI 150 mg/mL syringe Once every 12 weeks    sod sulf-pot chloride-mag sulf (SUTAB) 1.479-0.188- 0.225 gram tablet 1 tablet, oral, Every 5 min, See instructions    traMADoL (ULTRAM) 50 mg, oral, Every 8 hours PRN    vitamin B complex/folic acid (B COMPLEX 100 ORAL) oral    warfarin (COUMADIN) 4 mg tablet Take 8mg daily, except Tuesday/ Thursday take 4mg.        Review of Systems   All other  systems reviewed and are negative.      Objective     Vital Signs for the last 24 hours:  Temp:  [36.3 °C (97.3 °F)] 36.3 °C (97.3 °F)  Heart Rate:  [62] 62  Resp:  [16] 16  BP: (177)/(83) 177/83    Physicial Exam  Lungs: clear to auscultation bilaterally  Heart: regular rate and rhythm, S1, S2 normal, no murmur, click, rub or gallop  Abdomen: soft, non-tender; bowel sounds normal; no masses, no organomegaly      Plan: Proceed with endoscopy as scheduled.    Larry Rock MD

## 2024-04-16 ENCOUNTER — ANESTHESIA EVENT (OUTPATIENT)
Dept: ENDOSCOPY | Facility: HOSPITAL | Age: 69
End: 2024-04-16
Payer: MEDICARE

## 2024-04-16 ENCOUNTER — ANESTHESIA (OUTPATIENT)
Dept: ENDOSCOPY | Facility: HOSPITAL | Age: 69
End: 2024-04-16
Payer: MEDICARE

## 2024-04-16 ENCOUNTER — HOSPITAL ENCOUNTER (OUTPATIENT)
Facility: HOSPITAL | Age: 69
Discharge: HOME | End: 2024-04-16
Attending: COLON & RECTAL SURGERY | Admitting: COLON & RECTAL SURGERY
Payer: MEDICARE

## 2024-04-16 VITALS
RESPIRATION RATE: 18 BRPM | HEIGHT: 62 IN | HEART RATE: 66 BPM | SYSTOLIC BLOOD PRESSURE: 120 MMHG | DIASTOLIC BLOOD PRESSURE: 65 MMHG | TEMPERATURE: 96.2 F | BODY MASS INDEX: 35.51 KG/M2 | WEIGHT: 193 LBS | OXYGEN SATURATION: 99 %

## 2024-04-16 DIAGNOSIS — Z86.0100 HISTORY OF COLONIC POLYPS: ICD-10-CM

## 2024-04-16 LAB
GLUCOSE BLD-MCNC: 87 MG/DL (ref 70–99)
POCT TEST: NORMAL

## 2024-04-16 PROCEDURE — 45385 COLONOSCOPY W/LESION REMOVAL: CPT | Performed by: COLON & RECTAL SURGERY

## 2024-04-16 PROCEDURE — 88305 TISSUE EXAM BY PATHOLOGIST: CPT | Performed by: COLON & RECTAL SURGERY

## 2024-04-16 PROCEDURE — C1773 RET DEV, INSERTABLE: HCPCS | Performed by: COLON & RECTAL SURGERY

## 2024-04-16 PROCEDURE — 45380 COLONOSCOPY AND BIOPSY: CPT | Performed by: COLON & RECTAL SURGERY

## 2024-04-16 PROCEDURE — 0DBM8ZX EXCISION OF DESCENDING COLON, VIA NATURAL OR ARTIFICIAL OPENING ENDOSCOPIC, DIAGNOSTIC: ICD-10-PCS | Performed by: COLON & RECTAL SURGERY

## 2024-04-16 PROCEDURE — 71000011 HC PACU PHASE 1 EA ADDL MIN: Performed by: COLON & RECTAL SURGERY

## 2024-04-16 PROCEDURE — 63600000 HC DRUGS/DETAIL CODE: Mod: JW | Performed by: NURSE ANESTHETIST, CERTIFIED REGISTERED

## 2024-04-16 PROCEDURE — 25800000 HC PHARMACY IV SOLUTIONS: Performed by: NURSE ANESTHETIST, CERTIFIED REGISTERED

## 2024-04-16 PROCEDURE — 0DBP8ZX EXCISION OF RECTUM, VIA NATURAL OR ARTIFICIAL OPENING ENDOSCOPIC, DIAGNOSTIC: ICD-10-PCS | Performed by: COLON & RECTAL SURGERY

## 2024-04-16 PROCEDURE — 71000001 HC PACU PHASE 1 INITIAL 30MIN: Performed by: COLON & RECTAL SURGERY

## 2024-04-16 PROCEDURE — 45380 COLONOSCOPY AND BIOPSY: CPT | Mod: XS | Performed by: COLON & RECTAL SURGERY

## 2024-04-16 PROCEDURE — 63600000 HC DRUGS/DETAIL CODE: Mod: JZ | Performed by: NURSE ANESTHETIST, CERTIFIED REGISTERED

## 2024-04-16 PROCEDURE — 37000002 HC ANESTHESIA MAC: Performed by: COLON & RECTAL SURGERY

## 2024-04-16 PROCEDURE — 75000020 HC COLONSCOPY SNARE: Performed by: COLON & RECTAL SURGERY

## 2024-04-16 RX ORDER — SODIUM CHLORIDE 9 MG/ML
INJECTION, SOLUTION INTRAVENOUS CONTINUOUS PRN
Status: DISCONTINUED | OUTPATIENT
Start: 2024-04-16 | End: 2024-04-16 | Stop reason: SURG

## 2024-04-16 RX ORDER — PROPOFOL 10 MG/ML
INJECTION, EMULSION INTRAVENOUS CONTINUOUS PRN
Status: DISCONTINUED | OUTPATIENT
Start: 2024-04-16 | End: 2024-04-16 | Stop reason: SURG

## 2024-04-16 RX ORDER — WARFARIN 4 MG/1
TABLET ORAL
Start: 2024-04-16 | End: 2024-05-07 | Stop reason: SDUPTHER

## 2024-04-16 RX ORDER — PROPOFOL 200MG/20ML
SYRINGE (ML) INTRAVENOUS AS NEEDED
Status: DISCONTINUED | OUTPATIENT
Start: 2024-04-16 | End: 2024-04-16 | Stop reason: SURG

## 2024-04-16 RX ORDER — LIDOCAINE HCL/PF 100 MG/5ML
SYRINGE (ML) INTRAVENOUS AS NEEDED
Status: DISCONTINUED | OUTPATIENT
Start: 2024-04-16 | End: 2024-04-16 | Stop reason: SURG

## 2024-04-16 RX ORDER — METOCLOPRAMIDE HYDROCHLORIDE 5 MG/ML
INJECTION INTRAMUSCULAR; INTRAVENOUS AS NEEDED
Status: DISCONTINUED | OUTPATIENT
Start: 2024-04-16 | End: 2024-04-16 | Stop reason: SURG

## 2024-04-16 RX ADMIN — LIDOCAINE HYDROCHLORIDE 100 MG: 20 INJECTION, SOLUTION INTRAVENOUS at 09:31

## 2024-04-16 RX ADMIN — PROPOFOL 40 MG: 10 INJECTION, EMULSION INTRAVENOUS at 09:33

## 2024-04-16 RX ADMIN — SODIUM CHLORIDE: 9 INJECTION, SOLUTION INTRAVENOUS at 09:26

## 2024-04-16 RX ADMIN — METOCLOPRAMIDE 10 MG: 5 INJECTION, SOLUTION INTRAMUSCULAR; INTRAVENOUS at 09:29

## 2024-04-16 RX ADMIN — PROPOFOL 90 MG: 10 INJECTION, EMULSION INTRAVENOUS at 09:31

## 2024-04-16 RX ADMIN — PROPOFOL 150 MCG/KG/MIN: 10 INJECTION, EMULSION INTRAVENOUS at 09:31

## 2024-04-16 NOTE — ANESTHESIA POSTPROCEDURE EVALUATION
Patient: Jennifer Aly    Procedure Summary       Date: 04/16/24 Room / Location: [facility] (D) / [facility]    Anesthesia Start: 0926 Anesthesia Stop: 1012    Procedures:       FLEXIBLE COLONOSCOPY PROXIMAL TO SPLENIC FLEXURE WITH REMOVAL OF TUMOR USING SNARE (Anus)      FLEXIBLE COLONOSCOPY PROXIMAL TO SPLENIC FLEXURE WITH BIOPSY (Anus) Diagnosis:       History of colonic polyps      (History of colonic polyps [Z86.010])    Providers: Larry Rock MD Responsible Provider: Kamila Rose MD    Anesthesia Type: MAC ASA Status: 2            Anesthesia Type: MAC  PACU Vitals  4/16/2024 1008 - 4/16/2024 1052        4/16/2024  1010 4/16/2024  1020 4/16/2024  1030 4/16/2024  1040    BP: 118/68 118/66 118/63 120/65    Temp: 35.7 °C (96.2 °F) -- -- --    Pulse: 69 63 63 66    Resp: 18 20 -- 18    SpO2: 94 % 96 % 98 % 99 %              Anesthesia Post Evaluation    Pain management: adequate  Mode of pain management: IV medication  Patient location during evaluation: PACU  Patient participation: complete - patient participated  Level of consciousness: awake and alert  Cardiovascular status: acceptable  Airway Patency: adequate  Respiratory status: acceptable  Hydration status: acceptable  Anesthetic complications: no

## 2024-04-16 NOTE — ANESTHESIA PREPROCEDURE EVALUATION
Relevant Problems   CARDIOVASCULAR   (+) ROGERS (dyspnea on exertion)   (+) Essential hypertension   (+) Paroxysmal atrial fibrillation (CMS/HCC)      GASTROINTESTINAL   (+) GERD without esophagitis   (+) GI bleed      HEMATOLOGY   (+) Anemia   (+) Iron deficiency anemia secondary to inadequate dietary iron intake      MUSCULOSKELETAL   (+) Acute gout involving toe of left foot   (+) Polyosteoarthritis, unspecified      RESPIRATORY SYSTEM   (+) ROGERS (dyspnea on exertion)   (+) Obstructive sleep apnea   (+) Pulmonary emboli (CMS/HCC)   (+) Unspecified asthma, uncomplicated      Other   (+) Chronic UTI   (+) Diabetes mellitus (CMS/HCC)       ROS/Med Hx     Past Surgical History:   Procedure Laterality Date    APPENDECTOMY  2008    CARDIAC CATHETERIZATION  11/2012    INTRACAPSULAR CATARACT EXTRACTION Bilateral 08/2019    IVC FILTER RETRIEVAL  2012    IVC FILTER RETRIEVAL  02/2013    RECTOURETHRAL FISTULA REPAIR  2008, 5/2017    ROTATOR CUFF REPAIR Left 01/2016    ALTAF-EN-Y PROCEDURE  02/07/2017    TOTAL ABDOMINAL HYSTERECTOMY  1987    TOTAL KNEE ARTHROPLASTY Bilateral 2010    TOTAL KNEE ARTHROPLASTY Right 2012    revision    TRIGGER FINGER RELEASE Right 2012    right thumb    ULNAR NERVE REPAIR  2013       Physical Exam    Airway   Mallampati: II   TM distance: >3 FB   Neck ROM: full  Cardiovascular - normal   Rhythm: regular   Rate: normalPulmonary - normal   clear to auscultation  Dental - normal        Anesthesia Plan    Plan: MAC    Technique: MAC      Airway: natural airway / supplemental oxygen    2 ASA  Anesthetic plan and risks discussed with: patient  Induction:    intravenous   Postop Plan:   Patient Disposition: phase II then home   Pain Management: IV analgesics

## 2024-04-16 NOTE — ANESTHESIOLOGIST PRE-PROCEDURE ATTESTATION
Pre-Procedure Patient Identification:  I am the Primary Anesthesiologist and have identified the patient on 04/16/24 at 0900.  I have confirmed the procedure(s) will be performed by the following surgeon/proceduralist Larry Rock MD.

## 2024-04-16 NOTE — OP NOTE
_______________________________________________________________________________  Patient Name: Jennifer Aly               Procedure Date: 4/16/2024 9:13 AM  MRN: [id number]                     Account Number: [id number]  YOB: 1955              Age: 68  Gender: Female                        Note Status: Finalized  Attending MD: ELLY PRESTON MD,  _______________________________________________________________________________  Procedure:             Colonoscopy  Indications:           High risk colon cancer surveillance: Personal history  of colonic polyps  Providers:             ELLY PRESTON MD (Doctor)  Referring MD:          JULIA MORGAN, KAMILA  Requesting Provider:  Medicines:             Monitored Anesthesia Care  Complications:         No immediate complications.  _______________________________________________________________________________  Procedure:             After I obtained informed consent, the scope was  passed under direct vision. Throughout the procedure,  the patient's blood pressure, pulse, and oxygen  saturations were monitored continuously. The  colonoscope was introduced through the anus and  advanced to the cecum, identified by the ileocecal  valve. The colonoscopy was performed without  difficulty. The patient tolerated the procedure well.  The quality of the bowel preparation was excellent.  Findings:  Multiple medium-mouthed diverticula were found in the sigmoid colon and  descending colon.  Two sessile polyps were found at 60 cm proximal to the anus. The polyps  were 4 to 5 mm in size. These polyps were removed with a cold snare.  Resection and retrieval were complete.  A 4 mm polyp was found at 20 cm proximal to the anus. The polyp was  sessile. The polyp was removed with a cold snare. Resection and  retrieval were complete.  Three sessile polyps were found in the rectum. The polyps were  diminutive in size. These polyps were removed with a cold  biopsy  forceps. Resection and retrieval were complete.  No additional abnormalities were found on retroflexion.  Impression:            - Diverticulosis in the sigmoid colon and in the  descending colon.  - Two 4 to 5 mm polyps at 60 cm proximal to the anus,  removed with a cold snare. Resected and retrieved.  - One 4 mm polyp at 20 cm proximal to the anus,  removed with a cold snare. Resected and retrieved.  - Three diminutive polyps in the rectum, removed with  a cold biopsy forceps. Resected and retrieved.  Recommendation:        - I will release the pathology results via [employer] and  communcate the plan for the next colonscopy.  - Repeat colonoscopy in 5 years for surveillance based  on pathology results.  Procedure Code(s):     --- Professional ---  14597, Colonoscopy, flexible; with removal of  tumor(s), polyp(s), or other lesion(s) by snare  technique  82948, 59, Colonoscopy, flexible; with biopsy, single  or multiple  Diagnosis Code(s):     --- Professional ---  Z86.010, Personal history of colonic polyps  D12.6, Benign neoplasm of colon, unspecified  D12.8, Benign neoplasm of rectum  K57.30, Diverticulosis of large intestine without  perforation or abscess without bleeding  CPT copyright 2021 [employer]. All rights reserved.  The codes documented in this report are preliminary and upon  review may  be revised to meet current compliance requirements.  Attending Participation:  I personally performed the entire procedure.  Larry Preston JR, MD  __________________  LARRY PRESTON MD  4/16/2024 10:08:42 AM  This report has been signed electronically.  Number of Addenda: 0  Note Initiated On: 4/16/2024 9:13 AM

## 2024-04-16 NOTE — OR SURGEON
Pre-Procedure patient identification:  I am the primary operating surgeon/proceduralist and I have reviewed the applicable pathology reports and radiology studies for this procedure. I have identified the patient on 04/16/24 at 9:17 AM Larry Rock MD  Phone Number: [phone]

## 2024-04-18 ENCOUNTER — TELEPHONE (OUTPATIENT)
Dept: PRIMARY CARE | Facility: CLINIC | Age: 69
End: 2024-04-18
Payer: MEDICARE

## 2024-04-18 NOTE — TELEPHONE ENCOUNTER
Received call from [facility] and MD INR stating pt INR yesterday was 1.1      Last week pt did notify us that she would e off her Warfarin leading up to this week for a colonoscopy she had.    Please advise on recommendation- pt coloscopy was completed 4/16.

## 2024-04-24 LAB — INR PPP: 1.5

## 2024-04-29 LAB — INR PPP: 1.9

## 2024-05-07 ENCOUNTER — OFFICE VISIT (OUTPATIENT)
Dept: PRIMARY CARE | Facility: CLINIC | Age: 69
End: 2024-05-07
Payer: MEDICARE

## 2024-05-07 VITALS
BODY MASS INDEX: 35.15 KG/M2 | RESPIRATION RATE: 16 BRPM | OXYGEN SATURATION: 97 % | DIASTOLIC BLOOD PRESSURE: 72 MMHG | HEIGHT: 62 IN | WEIGHT: 191 LBS | HEART RATE: 74 BPM | SYSTOLIC BLOOD PRESSURE: 120 MMHG

## 2024-05-07 DIAGNOSIS — R53.83 MALAISE AND FATIGUE: ICD-10-CM

## 2024-05-07 DIAGNOSIS — Z12.31 ENCOUNTER FOR SCREENING MAMMOGRAM FOR MALIGNANT NEOPLASM OF BREAST: ICD-10-CM

## 2024-05-07 DIAGNOSIS — M81.0 OSTEOPOROSIS WITHOUT CURRENT PATHOLOGICAL FRACTURE, UNSPECIFIED OSTEOPOROSIS TYPE: ICD-10-CM

## 2024-05-07 DIAGNOSIS — I48.0 PAROXYSMAL ATRIAL FIBRILLATION (CMS/HCC): ICD-10-CM

## 2024-05-07 DIAGNOSIS — E66.01 MORBID OBESITY DUE TO EXCESS CALORIES (CMS/HCC): Chronic | ICD-10-CM

## 2024-05-07 DIAGNOSIS — E11.69 TYPE 2 DIABETES MELLITUS WITH OTHER SPECIFIED COMPLICATION, WITHOUT LONG-TERM CURRENT USE OF INSULIN: ICD-10-CM

## 2024-05-07 DIAGNOSIS — D68.62 LUPUS ANTICOAGULANT SYNDROME (CMS/HCC): ICD-10-CM

## 2024-05-07 DIAGNOSIS — R53.81 MALAISE AND FATIGUE: ICD-10-CM

## 2024-05-07 DIAGNOSIS — I27.82 OTHER CHRONIC PULMONARY EMBOLISM WITHOUT ACUTE COR PULMONALE: Primary | Chronic | ICD-10-CM

## 2024-05-07 LAB — INR PPP: 1.7

## 2024-05-07 PROCEDURE — 99214 OFFICE O/P EST MOD 30 MIN: CPT | Performed by: FAMILY MEDICINE

## 2024-05-07 PROCEDURE — G8752 SYS BP LESS 140: HCPCS | Performed by: FAMILY MEDICINE

## 2024-05-07 PROCEDURE — G8754 DIAS BP LESS 90: HCPCS | Performed by: FAMILY MEDICINE

## 2024-05-07 RX ORDER — WARFARIN 4 MG/1
TABLET ORAL
Qty: 90 TABLET | Refills: 3 | Status: SHIPPED | OUTPATIENT
Start: 2024-05-07 | End: 2024-07-01 | Stop reason: SDUPTHER

## 2024-05-07 RX ORDER — ATORVASTATIN CALCIUM 40 MG/1
40 TABLET, FILM COATED ORAL DAILY
Qty: 90 TABLET | Refills: 3 | Status: SHIPPED | OUTPATIENT
Start: 2024-05-07 | End: 2024-05-08

## 2024-05-07 RX ORDER — FLUTICASONE PROPIONATE 50 MCG
1 SPRAY, SUSPENSION (ML) NASAL DAILY PRN
Qty: 16 G | Refills: 6 | Status: SHIPPED | OUTPATIENT
Start: 2024-05-07 | End: 2024-11-07

## 2024-05-07 RX ORDER — ALBUTEROL SULFATE 90 UG/1
2 INHALANT RESPIRATORY (INHALATION) EVERY 6 HOURS PRN
Qty: 18 G | Refills: 6 | Status: SHIPPED | OUTPATIENT
Start: 2024-05-07 | End: 2025-02-10

## 2024-05-07 ASSESSMENT — ENCOUNTER SYMPTOMS
APPETITE CHANGE: 0
CHEST TIGHTNESS: 0
DIARRHEA: 0
ARTHRALGIAS: 0
SHORTNESS OF BREATH: 0
FEVER: 0
PALPITATIONS: 0
CONFUSION: 0
ACTIVITY CHANGE: 0
MYALGIAS: 0
HEADACHES: 0
COUGH: 0
SINUS PRESSURE: 0
ADENOPATHY: 0
SINUS PAIN: 0
VOMITING: 0
FATIGUE: 0
BACK PAIN: 0
EYE ITCHING: 0
SORE THROAT: 0
TROUBLE SWALLOWING: 0
BLOOD IN STOOL: 0
ABDOMINAL PAIN: 0
DYSURIA: 0
NAUSEA: 0
DIZZINESS: 0
COLOR CHANGE: 0
EYE PAIN: 0
EYE DISCHARGE: 0
AGITATION: 0

## 2024-05-07 NOTE — PATIENT INSTRUCTIONS
Stay well. Stay safe. Stay active. Have a great summer!    Consider getting your RSV vaccine at the pharmacy

## 2024-05-07 NOTE — PROGRESS NOTES
"Subjective      Patient ID: Jennifer Aly is a 68 y.o. female.    Jennifer is a 67 yo female who presents to the office for a 6 mo follow up and med eval....    She does report feeling more tired lately. She does wake up at 4:30 am for work. She remains active and is still working.      Active and Chronic Issues:     -Neck pain  In the past... L side, radiates above scapula  Pain gets worse w/ any head movements  L neck strength +4  reports no numbness/tingling of UE     -- Anemia  Stable    Lab Results  Component Value Date   WBC 4.93 06/16/2023   HGB 12.6 06/16/2023   HCT 39.8 06/16/2023   MCV 95.4 06/16/2023    06/16/2023        HLD (hyperlipidemia)  - Stable  -Educated pt to continue take statin  -Educate pt on diet/TLC   - C/w statin    Lab Results  Component Value Date   CHOL 167 02/19/2024   CHOL 162 06/16/2023   CHOL 135 10/24/2022    Lab Results  Component Value Date   HDL 75 02/19/2024   HDL 68 06/16/2023   HDL 72 10/24/2022    Lab Results  Component Value Date   LDLCALC 76 02/19/2024   LDLCALC 80 06/16/2023   LDLCALC 53 10/24/2022    Lab Results  Component Value Date   TRIG 78 02/19/2024   TRIG 71 06/16/2023   TRIG 49 10/24/2022    No results found for: \"CHOLHDL\"     The 10-year ASCVD risk score (Nelson DK, et al., 2019) is: 18.4%    Values used to calculate the score:      Age: 68 years      Sex: Female      Is Non- : No      Diabetic: Yes      Tobacco smoker: No      Systolic Blood Pressure: 120 mmHg      Is BP treated: Yes      HDL Cholesterol: 75 mg/dL      Total Cholesterol: 167 mg/dL         Paroxysmal atrial fibrillation (CMS/HCC)  -stable  -Continue to take Diltiazem   -Haven't established an appointment w/ Cardiolgist     Essential hypertension  - Stable  - Continue taking BP med  - Continue diltiazam    BP Readings from Last 3 Encounters:  04/16/24 120/65  02/08/24 130/70  12/21/23 (!) 150/73     Obstructive sleep apnea  - CPAP  -Sees Dr. Gallo     -[facility]    Lab " Results  Component Value Date   GLUCOSE 73 2023   CALCIUM 9.9 2023    2023   K 4.9 2023   CO2 26 2023    2023   BUN 13 2023   CREATININE 0.6 2023      Lab Results  Component Value Date   HGBA1C 5.5 2023                   The following have been reviewed and updated as appropriate in this visit:   Allergies  Meds  Problems         Past Medical History:   Diagnosis Date    Arthritis     Fistula     Gout     HLD (hyperlipidemia)     IFG (impaired fasting glucose)     Obesity     Polyarthritis     Pulmonary emboli (CMS/HCC)     pulmonary emboli    Torn rotator cuff        Past Surgical History:   Procedure Laterality Date    APPENDECTOMY      CARDIAC CATHETERIZATION  2012    INTRACAPSULAR CATARACT EXTRACTION Bilateral 2019    IVC FILTER RETRIEVAL      IVC FILTER RETRIEVAL  2013    RECTOURETHRAL FISTULA REPAIR  , 2017    ROTATOR CUFF REPAIR Left 2016    ALTAF-EN-Y PROCEDURE  2017    TOTAL ABDOMINAL HYSTERECTOMY  1987    TOTAL KNEE ARTHROPLASTY Bilateral 2010    TOTAL KNEE ARTHROPLASTY Right 2012    revision    TRIGGER FINGER RELEASE Right 2012    right thumb    ULNAR NERVE REPAIR  2013       Family History   Problem Relation Age of Onset    Diabetes Biological Mother     Lung cancer Biological Mother     Alzheimer's disease Biological Mother     Diabetes type II Biological Father     Heart failure Biological Father     Heart attack Biological Sister        Social History     Socioeconomic History    Marital status:      Spouse name: Not on file    Number of children: Not on file    Years of education: Not on file    Highest education level: Not on file   Occupational History    Not on file   Tobacco Use    Smoking status: Former     Years: 15     Types: Cigarettes     Quit date:      Years since quittin.3    Smokeless tobacco: Never   Substance and Sexual Activity    Alcohol use: Yes     Comment: occasional  wine    Drug use: No    Sexual activity: Not Currently   Other Topics Concern    Not on file   Social History Narrative    Not on file     Social Determinants of Health     Financial Resource Strain: Not on file   Food Insecurity: Not on file   Transportation Needs: Not on file   Physical Activity: Not on file   Stress: Not on file   Social Connections: Not on file   Intimate Partner Violence: Not on file   Housing Stability: Not on file       Allergies   Allergen Reactions    Sulfa (Sulfonamide Antibiotics) Hives       Current Outpatient Medications   Medication Sig Dispense Refill    albuterol HFA 90 mcg/actuation inhaler Inhale 2 puffs every 6 (six) hours as needed for wheezing. 18 g 6    atorvastatin (LIPITOR) 40 mg tablet Take 1 tablet (40 mg total) by mouth daily. 90 tablet 3    BIOTIN ORAL       calcium carbonate-vitamin D3 500 mg(1,250mg) -200 unit per tablet Take 2 tablets by mouth 2 (two) times a day with meals.        cholecalciferol, vitamin D3, 5,000 unit (125 mcg) tablet Take 5,000 Units by mouth daily.      clobetasol (TEMOVATE) 0.05 % cream       diltiazem (TIADYLT ER) 120 mg 24 hr capsule Take 1 capsule (120 mg total) by mouth daily. 90 capsule 3    fluticasone propionate (FLONASE) 50 mcg/actuation nasal spray Administer 1 spray into each nostril daily as needed for rhinitis. Make an appointment if help is needed for nasal symptoms. 16 g 6    ketoconazole (NIZORAL) 2 % cream APPLY ON THE SKIN TWICE A DAY APPLY TO AFFECTED AREA RASH IN GROIN/ABDOMEN TWICE A DAY FOR 3 WEEKS      methocarbamoL (ROBAXIN-750) 750 mg tablet Take one by mouth ONLY as needed every 6-8 hours for muscle pains and spasms 50 tablet 1    multivitamin capsule Take 1 capsule by mouth daily.      semaglutide (OZEMPIC) 1 mg/dose (4 mg/3 mL) subcutaneous injection Inject 1 mg under the skin every (seven) 7 days. 3 mL 11    SKYRIZI 150 mg/mL syringe Once every 12 weeks      traMADoL (ULTRAM) 50 mg tablet Take 1 tablet (50 mg total) by  mouth every 8 (eight) hours as needed for moderate pain or severe pain. 50 tablet 1    vitamin B complex/folic acid (B COMPLEX 100 ORAL) Take by mouth.      warfarin (COUMADIN) 4 mg tablet Take 8mg daily, except Tuesday/ Thursday take 4mg. 90 tablet 3     No current facility-administered medications for this visit.       Review of Systems   Constitutional:  Negative for activity change, appetite change, fatigue and fever.   HENT:  Negative for congestion, sinus pressure, sinus pain, sore throat and trouble swallowing.    Eyes:  Negative for pain, discharge, itching and visual disturbance.   Respiratory:  Negative for cough, chest tightness and shortness of breath.    Cardiovascular:  Negative for chest pain, palpitations and leg swelling.   Gastrointestinal:  Negative for abdominal pain, blood in stool, diarrhea, nausea and vomiting.   Endocrine: Negative for cold intolerance and heat intolerance.   Genitourinary:  Negative for dysuria.   Musculoskeletal:  Negative for arthralgias, back pain, gait problem and myalgias.   Skin:  Negative for color change and rash.   Allergic/Immunologic: Negative for environmental allergies and food allergies.   Neurological:  Negative for dizziness and headaches.   Hematological:  Negative for adenopathy.   Psychiatric/Behavioral:  Negative for agitation, behavioral problems and confusion.        Objective     Vitals:    05/07/24 1032   BP: 120/72   Pulse: 74   Resp: 16   SpO2: 97%       Physical Exam  Constitutional:       Appearance: She is well-developed.   HENT:      Head: Normocephalic and atraumatic.      Right Ear: Tympanic membrane, ear canal and external ear normal.      Left Ear: Tympanic membrane, ear canal and external ear normal.      Nose: Nose normal.   Eyes:      Conjunctiva/sclera: Conjunctivae normal.      Pupils: Pupils are equal, round, and reactive to light.   Neck:      Thyroid: No thyromegaly.   Cardiovascular:      Rate and Rhythm: Normal rate and regular  rhythm.      Heart sounds: Normal heart sounds. No murmur heard.  Pulmonary:      Effort: Pulmonary effort is normal. No respiratory distress.      Breath sounds: Normal breath sounds. No wheezing or rales.   Abdominal:      General: Bowel sounds are normal. There is no distension.      Palpations: Abdomen is soft. There is no mass.      Tenderness: There is no abdominal tenderness. There is no guarding.   Musculoskeletal:         General: No tenderness. Normal range of motion.      Cervical back: Normal range of motion and neck supple.   Skin:     General: Skin is warm and dry.      Capillary Refill: Capillary refill takes less than 2 seconds.      Findings: No rash.   Neurological:      Mental Status: She is alert and oriented to person, place, and time.      Cranial Nerves: No cranial nerve deficit.      Deep Tendon Reflexes: Reflexes normal.   Psychiatric:         Behavior: Behavior normal.         Thought Content: Thought content normal.         Judgment: Judgment normal.         Assessment/Plan     Problem List Items Addressed This Visit          Circulatory    Pulmonary emboli (CMS/HCC) - Primary (Chronic)    Overview     Overview:          Current Assessment & Plan     On meds  Stable           Paroxysmal atrial fibrillation (CMS/HCC)    Current Assessment & Plan     On meds  Stable  Follows with cardiologist          Relevant Medications    atorvastatin (LIPITOR) 40 mg tablet    warfarin (COUMADIN) 4 mg tablet       Endocrine/Metabolic    Diabetes mellitus (CMS/HCC)    Current Assessment & Plan     Lab Results   Component Value Date    HGBA1C 5.5 06/16/2023             Relevant Medications    semaglutide (OZEMPIC) 1 mg/dose (4 mg/3 mL) subcutaneous injection    Other Relevant Orders    Hemoglobin A1c    Morbid obesity due to excess calories (CMS/HCC) (Chronic)    Overview     Overview:          Current Assessment & Plan     Stable  Diet and TLC       Wt Readings from Last 3 Encounters:   04/16/24 87.5 kg  (193 lb)   02/08/24 88 kg (194 lb)   12/21/23 87.5 kg (193 lb)                Hematologic    Lupus anticoagulant syndrome (CMS/HCC)    Relevant Medications    warfarin (COUMADIN) 4 mg tablet       Mental Health    Malaise and fatigue    Relevant Orders    CBC and differential    Comprehensive metabolic panel     Other Visit Diagnoses       Encounter for screening mammogram for malignant neoplasm of breast        Relevant Orders    BI SCREENING MAMMOGRAM BILATERAL(TOMOSYNTHESIS)    Osteoporosis without current pathological fracture, unspecified osteoporosis type        Relevant Orders    DEXA BONE DENSITY            DIAGNOSIS  1. Other chronic pulmonary embolism without acute cor pulmonale (CMS/HCC)    2. Paroxysmal atrial fibrillation (CMS/HCC)    3. Morbid obesity due to excess calories (CMS/HCC)    4. Malaise and fatigue    5. Encounter for screening mammogram for malignant neoplasm of breast    6. Osteoporosis without current pathological fracture, unspecified osteoporosis type    7. Type 2 diabetes mellitus with other specified complication, without long-term current use of insulin (CMS/HCC)    8. Lupus anticoagulant syndrome (CMS/HCC)        No results found.        Demarco Moreau,   5/7/2024

## 2024-05-07 NOTE — ASSESSMENT & PLAN NOTE
Stable  Diet and TLC       Wt Readings from Last 3 Encounters:   04/16/24 87.5 kg (193 lb)   02/08/24 88 kg (194 lb)   12/21/23 87.5 kg (193 lb)

## 2024-05-07 NOTE — PROGRESS NOTES
"Jennifer is a 67 yo female who presents to the office for a 6 mo follow up and med eval....     Active and Chronic Issues:     -Neck pain  In the past... L side, radiates above scapula  Pain gets worse w/ any head movements  L neck strength +4  reports no numbness/tingling of UE     -- Anemia  Stable    Lab Results   Component Value Date    WBC 4.93 06/16/2023    HGB 12.6 06/16/2023    HCT 39.8 06/16/2023    MCV 95.4 06/16/2023     06/16/2023         HLD (hyperlipidemia)  - Stable  -Educated pt to continue take statin  -Educate pt on diet/TLC   - C/w statin    Lab Results   Component Value Date    CHOL 167 02/19/2024    CHOL 162 06/16/2023    CHOL 135 10/24/2022     Lab Results   Component Value Date    HDL 75 02/19/2024    HDL 68 06/16/2023    HDL 72 10/24/2022     Lab Results   Component Value Date    LDLCALC 76 02/19/2024    LDLCALC 80 06/16/2023    LDLCALC 53 10/24/2022     Lab Results   Component Value Date    TRIG 78 02/19/2024    TRIG 71 06/16/2023    TRIG 49 10/24/2022     No results found for: \"CHOLHDL\"     The 10-year ASCVD risk score (Nelson MONTOYA, et al., 2019) is: 18.4%    Values used to calculate the score:      Age: 68 years      Sex: Female      Is Non- : No      Diabetic: Yes      Tobacco smoker: No      Systolic Blood Pressure: 120 mmHg      Is BP treated: Yes      HDL Cholesterol: 75 mg/dL      Total Cholesterol: 167 mg/dL         Paroxysmal atrial fibrillation (CMS/HCC)  -stable  -Continue to take Diltiazem   -Haven't established an appointment w/ Cardiolgist     Essential hypertension  - Stable  - Continue taking BP med  - Continue diltiazam    BP Readings from Last 3 Encounters:   04/16/24 120/65   02/08/24 130/70   12/21/23 (!) 150/73      Obstructive sleep apnea  - CPAP  -Sees Dr. Gallo     -IFG    Lab Results   Component Value Date    GLUCOSE 73 06/16/2023    CALCIUM 9.9 06/16/2023     06/16/2023    K 4.9 06/16/2023    CO2 26 06/16/2023     06/16/2023    " BUN 13 06/16/2023    CREATININE 0.6 06/16/2023       Lab Results   Component Value Date    HGBA1C 5.5 06/16/2023

## 2024-05-08 RX ORDER — ATORVASTATIN CALCIUM 40 MG/1
40 TABLET, FILM COATED ORAL DAILY
Qty: 90 TABLET | Refills: 3 | Status: SHIPPED | OUTPATIENT
Start: 2024-05-08 | End: 2024-07-01 | Stop reason: SDUPTHER

## 2024-05-14 LAB — INR PPP: 2.9

## 2024-06-25 RX ORDER — DILTIAZEM HYDROCHLORIDE 120 MG/1
120 CAPSULE, EXTENDED RELEASE ORAL DAILY
Qty: 90 CAPSULE | Refills: 3 | Status: SHIPPED | OUTPATIENT
Start: 2024-06-25

## 2024-06-25 NOTE — TELEPHONE ENCOUNTER
Medicine Refill Request    Last Office Visit: Visit date not found   Last Consult Visit: Visit date not found  Last Telemedicine Visit: Visit date not found    Next Appointment: 11/13/2024      Current Outpatient Medications:     albuterol HFA 90 mcg/actuation inhaler, Inhale 2 puffs every 6 (six) hours as needed for wheezing., Disp: 18 g, Rfl: 6    atorvastatin (LIPITOR) 40 mg tablet, TAKE ONE TABLET BY MOUTH ONE TIME DAILY, Disp: 90 tablet, Rfl: 3    BIOTIN ORAL, , Disp: , Rfl:     calcium carbonate-vitamin D3 500 mg(1,250mg) -200 unit per tablet, Take 2 tablets by mouth 2 (two) times a day with meals.  , Disp: , Rfl:     cholecalciferol, vitamin D3, 5,000 unit (125 mcg) tablet, Take 5,000 Units by mouth daily., Disp: , Rfl:     clobetasol (TEMOVATE) 0.05 % cream, , Disp: , Rfl:     diltiazem (TIADYLT ER) 120 mg 24 hr capsule, Take 1 capsule (120 mg total) by mouth daily., Disp: 90 capsule, Rfl: 3    fluticasone propionate (FLONASE) 50 mcg/actuation nasal spray, Administer 1 spray into each nostril daily as needed for rhinitis. Make an appointment if help is needed for nasal symptoms., Disp: 16 g, Rfl: 6    ketoconazole (NIZORAL) 2 % cream, APPLY ON THE SKIN TWICE A DAY APPLY TO AFFECTED AREA RASH IN GROIN/ABDOMEN TWICE A DAY FOR 3 WEEKS, Disp: , Rfl:     methocarbamoL (ROBAXIN-750) 750 mg tablet, Take one by mouth ONLY as needed every 6-8 hours for muscle pains and spasms, Disp: 50 tablet, Rfl: 1    multivitamin capsule, Take 1 capsule by mouth daily., Disp: , Rfl:     semaglutide (OZEMPIC) 1 mg/dose (4 mg/3 mL) subcutaneous injection, Inject 1 mg under the skin every (seven) 7 days., Disp: 3 mL, Rfl: 11    SKYRIZI 150 mg/mL syringe, Once every 12 weeks, Disp: , Rfl:     traMADoL (ULTRAM) 50 mg tablet, Take 1 tablet (50 mg total) by mouth every 8 (eight) hours as needed for moderate pain or severe pain., Disp: 50 tablet, Rfl: 1    vitamin B complex/folic acid (B COMPLEX 100 ORAL), Take by mouth., Disp: , Rfl:      warfarin (COUMADIN) 4 mg tablet, Take 8mg daily, except Tuesday/ Thursday take 4mg., Disp: 90 tablet, Rfl: 3      BP Readings from Last 3 Encounters:   05/07/24 120/72   04/16/24 120/65   02/08/24 130/70       Recent Lab results:  Lab Results   Component Value Date    CHOL 167 02/19/2024   ,   Lab Results   Component Value Date    HDL 75 02/19/2024   ,   Lab Results   Component Value Date    LDLCALC 76 02/19/2024   ,   Lab Results   Component Value Date    TRIG 78 02/19/2024        Lab Results   Component Value Date    GLUCOSE 73 06/16/2023   ,   Lab Results   Component Value Date    HGBA1C 5.5 06/16/2023         Lab Results   Component Value Date    CREATININE 0.6 06/16/2023       Lab Results   Component Value Date    TSH 1.42 06/16/2023           Lab Results   Component Value Date    HGBA1C 5.5 06/16/2023

## 2024-07-01 RX ORDER — ATORVASTATIN CALCIUM 40 MG/1
40 TABLET, FILM COATED ORAL DAILY
Qty: 90 TABLET | Refills: 3 | Status: SHIPPED | OUTPATIENT
Start: 2024-07-01

## 2024-07-01 RX ORDER — WARFARIN 4 MG/1
TABLET ORAL
Qty: 90 TABLET | Refills: 3 | Status: SHIPPED | OUTPATIENT
Start: 2024-07-01 | End: 2024-10-15

## 2024-07-01 NOTE — TELEPHONE ENCOUNTER
Pt is asking for a medication refill ( med not named ) , however the pharmacy will not refill it due to the sudden illness of the PCP. Pt is asking for a callback     Queue message

## 2024-07-01 NOTE — TELEPHONE ENCOUNTER
Spoke with pt who needs refills for Warfarin, Ozempic and Atorvastatin because pharmacy won't fill scripts with Dr. Moreau's name on it.

## 2024-07-16 ENCOUNTER — TELEPHONE (OUTPATIENT)
Dept: PRIMARY CARE | Facility: CLINIC | Age: 69
End: 2024-07-16
Payer: MEDICARE

## 2024-07-16 NOTE — TELEPHONE ENCOUNTER
Patient contacted regarding newly assigned provider at [facility].     Left detailed vm alerting pt of assigned provider. They have a Nov appt that will need to be rescheduled. Thanks!

## 2024-07-16 NOTE — TELEPHONE ENCOUNTER
----- Message from Iam Luna MD sent at 7/16/2024 11:24 AM EDT -----  Let patient know INR is in the therapeutic range. Continue with present dose of Warfarin    Iam Luna MD   ----- Message -----  From: Interface, Transcription Incoming  Sent: 7/16/2024  10:43 AM EDT  To: Iam Luna MD

## 2024-07-16 NOTE — TELEPHONE ENCOUNTER
Pt notified of results verbalized understanding, discussed that she would be shortly receiving a call as to who her new PCP Is .

## 2024-07-25 ENCOUNTER — TELEPHONE (OUTPATIENT)
Dept: PRIMARY CARE | Facility: CLINIC | Age: 69
End: 2024-07-25
Payer: MEDICARE

## 2024-07-25 NOTE — RESULT ENCOUNTER NOTE
Please recommend that patient take warfarin 4 mg on 3 days per week and 8 mg on all other days.  (Assuming the current med rec is correct and she has been taking 4 mg twice per week and 8 mg on all other days).  Thx!

## 2024-07-25 NOTE — TELEPHONE ENCOUNTER
----- Message from Chelsi Hopper MD sent at 7/24/2024  9:31 PM EDT -----  Please recommend that patient take warfarin 4 mg on 3 days per week and 8 mg on all other days.  (Assuming the current med rec is correct and she has been taking 4 mg twice per week and 8 mg on all other days).  Thx!

## 2024-08-01 NOTE — PROGRESS NOTES
Please let patient know that I recommend she lower her warfarin further to take 4 mg on 4 days per week, 8 mg on all other days.  Can we also see if she can be seen by me sooner so I can understand why her doses are needing to be changed?  She is on ozempic and it may be due to weight loss or how she is absorbing the medication....

## 2024-08-26 ENCOUNTER — TELEPHONE (OUTPATIENT)
Dept: PRIMARY CARE | Facility: CLINIC | Age: 69
End: 2024-08-26
Payer: MEDICARE

## 2024-08-26 NOTE — TELEPHONE ENCOUNTER
Informed per her inr as of 24 was 1.7, which is outside goal (2-3).    Pt taking 4 mg Sun.MWFSat.  8 mg Tues. + Thurs.    Sx began: 4 days ago  Sx: gross hematuria, lower abdominal pressure, fatigue  Denies: urinary frequency, burning, discomfort, fever, chills, flank pain  Meds: Cipro 500 mg? Bid - had leftover (likely )- no improvement

## 2024-08-26 NOTE — TELEPHONE ENCOUNTER
Would have her continue her presents coumadin dosing in light of her gross hematuria. Should schedule appt for evaluation of her urinary symptoms

## 2024-08-27 ENCOUNTER — NURSE TRIAGE (OUTPATIENT)
Dept: PRIMARY CARE | Facility: CLINIC | Age: 69
End: 2024-08-27
Payer: MEDICARE

## 2024-08-27 NOTE — TELEPHONE ENCOUNTER
"Care Advice Given       Given By Given At Modified    BoclaPat roblero RN 8/27/2024  1:39 PM Yes    GO TO ED NOW:   Pt went to the  in [place] and was told that she does not have a UTI, but does have a lot of blood with her urine and she should call her PCP.  Pt triaged and since PCP not available and pt is in [place] for the week, instructed pt to go to the ED down in [place] right away.  * You need to be seen in the Emergency Department.  * Go to the ED at the nearest Hospital.  * Leave now. Drive carefully.  Pt in agreement and is going to [facility] ED in [place] right now.           Patient/Caregiver understands and will follow care advice?  Yes, plans to follow advice         Reason for Disposition   Passing pure blood or large blood clots (i.e., larger than a dime or grape)   Taking Coumadin (warfarin) or other strong blood thinner, or known bleeding disorder (e.g., thrombocytopenia)    Answer Assessment - Initial Assessment Questions  Rosangela notified me that the pt called in stated that she has blood in her urine and yesterday was told to go to than , which she did and they said that she did not have a UTI, but is having \"lots of blood\" when urinating and should call her PCP.  1. COLOR of URINE: \"Describe the color of the urine.\"  (e.g., tea-colored, pink, red, bloody) \"Do you have blood clots in your urine?\" (e.g., none, pea, grape, small coin)      Pt reported that she is [place] in [facility], and since Friday she has had blood in her urine, but no pain or burning with urination; pt stated that she blew it off, thinking that she probably had a UTI, so she took left over antibiotic that she had a home. Pt stated that it has not improved, she is still having blood with her urine and stated that sometimes it is orane mixed with the blood, so she called here yesterday and was told to go to an  in [place], which she did and they told her that she did not have a UTI, but is having \"lots of blood\" " "when urinating and should call her PCP.  Today pt is having some pain, cramping in her very lower middle abdomen. When asked if the pt started any new medication or new food, she said that she did eat beets recently but if was after she has had the blood in her urine and pt denied any trauma, falls or injuries. Pt is on Coumadin.  2. ONSET: \"When did the bleeding start?\"       See neno  3. EPISODES: \"How many times has there been blood in the urine?\" or \"How many times today?\"      Each time she has urinated  4. PAIN with URINATION: \"Is there any pain with passing your urine?\" If Yes, ask: \"How bad is the pain?\"  (Scale 1-10; or mild, moderate, severe)     - MILD: Complains slightly about urination hurting.     - MODERATE: Interferes with normal activities.       - SEVERE: Excruciating, unwilling or unable to urinate because of the pain.       No burning on urination but now having cramping in lower middle abdomen  5. FEVER: \"Do you have a fever?\" If Yes, ask: \"What is your temperature, how was it measured, and when did it start?\"      Denied any other symptoms at all, such as: fever or chills  6. ASSOCIATED SYMPTOMS: \"Are you passing urine more frequently than usual?\"      Denied any urinary frequency, urgency or burning upon urination.  7. OTHER SYMPTOMS: \"Do you have any other symptoms?\" (e.g., back/flank pain, abdomen pain, vomiting)      Denied any other symptoms, such as back pain, nausea, vomiting, etc.  8. PREGNANCY: \"Is there any chance you are pregnant?\" \"When was your last menstrual period?\"      N/A    Protocols used: Urine - Blood In-ADULT-[place]    "

## 2024-08-29 ENCOUNTER — TELEPHONE (OUTPATIENT)
Dept: CARDIOLOGY | Facility: CLINIC | Age: 69
End: 2024-08-29
Payer: MEDICARE

## 2024-08-29 NOTE — TELEPHONE ENCOUNTER
----- Message from Franci Smith sent at 2/8/2024 10:49 AM EST -----  Please call and schedule annual with echo due in 02/2025 with dr hill

## 2024-09-03 ENCOUNTER — OFFICE VISIT (OUTPATIENT)
Dept: PRIMARY CARE | Facility: CLINIC | Age: 69
End: 2024-09-03
Payer: MEDICARE

## 2024-09-03 VITALS
WEIGHT: 197.4 LBS | SYSTOLIC BLOOD PRESSURE: 130 MMHG | HEART RATE: 70 BPM | TEMPERATURE: 97.4 F | BODY MASS INDEX: 36.33 KG/M2 | DIASTOLIC BLOOD PRESSURE: 72 MMHG | HEIGHT: 62 IN | RESPIRATION RATE: 16 BRPM | OXYGEN SATURATION: 97 %

## 2024-09-03 DIAGNOSIS — N20.0 KIDNEY STONES: Primary | ICD-10-CM

## 2024-09-03 DIAGNOSIS — R39.15 URGENCY OF URINATION: ICD-10-CM

## 2024-09-03 LAB
BILIRUBIN, POC: NEGATIVE
BLOOD URINE, POC: POSITIVE
CLARITY, POC: CLEAR
COLOR, POC: YELLOW
EXPIRATION DATE: ABNORMAL
GLUCOSE URINE, POC: NEGATIVE
KETONES, POC: NEGATIVE
LEUKOCYTE EST, POC: NEGATIVE
Lab: ABNORMAL
NITRITE, POC: NEGATIVE
PH, POC: 6
POCT MANUFACTURER: ABNORMAL
PROTEIN, POC: ABNORMAL
SPECIFIC GRAVITY, POC: 1.03
UROBILINOGEN, POC: 0.2

## 2024-09-03 PROCEDURE — 81002 URINALYSIS NONAUTO W/O SCOPE: CPT | Performed by: FAMILY MEDICINE

## 2024-09-03 PROCEDURE — G8752 SYS BP LESS 140: HCPCS | Performed by: FAMILY MEDICINE

## 2024-09-03 PROCEDURE — 99214 OFFICE O/P EST MOD 30 MIN: CPT | Performed by: FAMILY MEDICINE

## 2024-09-03 PROCEDURE — G8754 DIAS BP LESS 90: HCPCS | Performed by: FAMILY MEDICINE

## 2024-09-03 RX ORDER — HYDROCODONE BITARTRATE AND ACETAMINOPHEN 5; 325 MG/1; MG/1
1 TABLET ORAL EVERY 8 HOURS PRN
Qty: 15 TABLET | Refills: 0 | Status: SHIPPED | OUTPATIENT
Start: 2024-09-03 | End: 2024-11-15

## 2024-09-03 RX ORDER — HYDROCODONE BITARTRATE AND ACETAMINOPHEN 5; 325 MG/1; MG/1
1 TABLET ORAL EVERY 6 HOURS PRN
COMMUNITY
Start: 2024-08-28 | End: 2024-09-03 | Stop reason: SDUPTHER

## 2024-09-03 ASSESSMENT — ENCOUNTER SYMPTOMS
EYE PAIN: 0
EYE ITCHING: 0
PHOTOPHOBIA: 0
CHEST TIGHTNESS: 0
POLYDIPSIA: 0
FACIAL SWELLING: 0
CHOKING: 0
FACIAL ASYMMETRY: 0
MYALGIAS: 0
LIGHT-HEADEDNESS: 0
FLANK PAIN: 1
PALPITATIONS: 0
DIFFICULTY URINATING: 0
ANAL BLEEDING: 0
SPEECH DIFFICULTY: 0
STRIDOR: 0
WHEEZING: 0
FREQUENCY: 0
DIAPHORESIS: 0
DYSURIA: 0
CHILLS: 0
SEIZURES: 0
VOICE CHANGE: 0
BLOOD IN STOOL: 0
EYE DISCHARGE: 0
ABDOMINAL PAIN: 0
COLOR CHANGE: 0
POLYPHAGIA: 0
FEVER: 0
EYE REDNESS: 0
ABDOMINAL DISTENTION: 0
ACTIVITY CHANGE: 0
NUMBNESS: 0
JOINT SWELLING: 0
VOMITING: 0

## 2024-09-03 NOTE — H&P (VIEW-ONLY)
samra Almaraz D.O.  [facility]  [address]  [place] [address]  Phone: [phone]  Fax: [phone]     History of Present Illness       Patient ID: Jennifer Aly is a 69 y.o. female.    Subjective     HPI  69 y.o.female  presents today with the following complaints/concerns:    Pt present today for eval of newly diagnosed B/L kidney stones.   [place] Left flank pain   and was seen at [facility] [place] in [place] on 8/27/24.  Had a CT scan and it showed   left kidney with 4 stones, 4mm and a larger 30mm stone.  Also noted to have a  Rt stone.  Still with left flank pain dull in nature.  Urine has been clear.   No fever or gross hematuria.   She was given a prescription in the ER for hydrocodone tabs which she finished.           Past Medical/Surgical/Family/Social History     The following have been reviewed and updated as appropriate in this visit:   Problems         Past Medical History:   Diagnosis Date    Arthritis     Fistula     Gout     HLD (hyperlipidemia)     IFG (impaired fasting glucose)     Obesity     Polyarthritis     Pulmonary emboli (CMS/HCC)     pulmonary emboli    Torn rotator cuff        Past Surgical History:   Procedure Laterality Date    APPENDECTOMY  2008    CARDIAC CATHETERIZATION  11/2012    INTRACAPSULAR CATARACT EXTRACTION Bilateral 08/2019    IVC FILTER RETRIEVAL  2012    IVC FILTER RETRIEVAL  02/2013    RECTOURETHRAL FISTULA REPAIR  2008, 5/2017    ROTATOR CUFF REPAIR Left 01/2016    ALTAF-EN-Y PROCEDURE  02/07/2017    TOTAL ABDOMINAL HYSTERECTOMY  1987    TOTAL KNEE ARTHROPLASTY Bilateral 2010    TOTAL KNEE ARTHROPLASTY Right 2012    revision    TRIGGER FINGER RELEASE Right 2012    right thumb    ULNAR NERVE REPAIR  2013       Family History   Problem Relation Age of Onset    Diabetes Biological Mother     Lung cancer Biological Mother     Alzheimer's disease Biological Mother      Diabetes type II Biological Father     Heart failure Biological Father     Heart attack Biological Sister        Social History     Tobacco Use    Smoking status: Former     Years: 15     Types: Cigarettes     Quit date:      Years since quittin.6    Smokeless tobacco: Never   Substance Use Topics    Alcohol use: Yes     Comment: occasional wine    Drug use: No       Patient Care Team:  Chelsi Hopper MD as PCP - General (Internal Medicine)  Tita Man MD as Consulting Physician (Otolaryngology)  Kash Luna DO as Consulting Physician (Hematology and Oncology)  Charles Vasquez MD as Consulting Physician (Cardiovascular Disease)  Jeevan Cole MD as Consulting Physician (Hematology)   Allergies and Medications     Allergies   Allergen Reactions    Sulfa (Sulfonamide Antibiotics) Hives       Current Outpatient Medications   Medication Sig Dispense Refill    albuterol HFA 90 mcg/actuation inhaler Inhale 2 puffs every 6 (six) hours as needed for wheezing. 18 g 6    atorvastatin (LIPITOR) 40 mg tablet Take 1 tablet (40 mg total) by mouth daily. 90 tablet 3    BIOTIN ORAL       calcium carbonate-vitamin D3 500 mg(1,250mg) -200 unit per tablet Take 2 tablets by mouth 2 (two) times a day with meals.        clobetasol (TEMOVATE) 0.05 % cream       diltiazem (TIADYLT ER) 120 mg 24 hr capsule Take 1 capsule (120 mg total) by mouth daily. 90 capsule 3    fluticasone propionate (FLONASE) 50 mcg/actuation nasal spray Administer 1 spray into each nostril daily as needed for rhinitis. Make an appointment if help is needed for nasal symptoms. 16 g 6    HYDROcodone-acetaminophen (NORCO) 5-325 mg per tablet Take 1 tablet by mouth every 8 (eight) hours as needed for severe pain. for pain 15 tablet 0    ketoconazole (NIZORAL) 2 % cream APPLY ON THE SKIN TWICE A DAY APPLY TO AFFECTED AREA RASH IN GROIN/ABDOMEN TWICE A DAY FOR 3 WEEKS      methocarbamoL (ROBAXIN-750) 750 mg tablet Take one by mouth ONLY as needed  every 6-8 hours for muscle pains and spasms 50 tablet 1    multivitamin capsule Take 1 capsule by mouth daily.      semaglutide (OZEMPIC) 1 mg/dose (4 mg/3 mL) subcutaneous injection Inject 1 mg under the skin every (seven) 7 days. 3 mL 11    SKYRIZI 150 mg/mL syringe Once every 12 weeks      traMADoL (ULTRAM) 50 mg tablet Take 1 tablet (50 mg total) by mouth every 8 (eight) hours as needed for moderate pain or severe pain. 50 tablet 1    vitamin B complex/folic acid (B COMPLEX 100 ORAL) Take by mouth.      warfarin (COUMADIN) 4 mg tablet Take 8mg daily, except Tuesday/ Thursday take 4mg. 90 tablet 3     No current facility-administered medications for this visit.      Review of Systems           Review of Systems   Constitutional:  Negative for activity change, chills, diaphoresis and fever.   HENT:  Negative for congestion, ear discharge, ear pain, facial swelling, mouth sores, nosebleeds and voice change.    Eyes:  Negative for photophobia, pain, discharge, redness and itching.   Respiratory:  Negative for choking, chest tightness, wheezing and stridor.    Cardiovascular:  Negative for chest pain, palpitations and leg swelling.   Gastrointestinal:  Negative for abdominal distention, abdominal pain, anal bleeding, blood in stool and vomiting.   Endocrine: Negative for cold intolerance, heat intolerance, polydipsia and polyphagia.   Genitourinary:  Positive for flank pain. Negative for difficulty urinating, dysuria, frequency and urgency.   Musculoskeletal:  Negative for gait problem, joint swelling and myalgias.   Skin:  Negative for color change, pallor and rash.   Neurological:  Negative for seizures, facial asymmetry, speech difficulty, light-headedness and numbness.      Physical Examination       Objective     Vitals:    09/03/24 1116   BP: 130/72   Pulse: 70   Resp: 16   Temp: 36.3 °C (97.4 °F)   SpO2: 97%       Vitals:    09/03/24 1116   BP: 130/72   BP Location: Left upper arm   Patient Position: Sitting  "  Pulse: 70   Resp: 16   Temp: 36.3 °C (97.4 °F)   TempSrc: Temporal   SpO2: 97%   Weight: 89.5 kg (197 lb 6.4 oz)   Height: 1.575 m (5' 2.01\")       Wt Readings from Last 5 Encounters:   09/03/24 89.5 kg (197 lb 6.4 oz)   05/07/24 86.6 kg (191 lb)   04/16/24 87.5 kg (193 lb)   02/08/24 88 kg (194 lb)   12/21/23 87.5 kg (193 lb)       Ht Readings from Last 5 Encounters:   09/03/24 1.575 m (5' 2.01\")   05/07/24 1.575 m (5' 2\")   04/16/24 1.575 m (5' 2\")   02/08/24 1.575 m (5' 2\")   12/21/23 1.575 m (5' 2\")       BP Readings from Last 5 Encounters:   09/03/24 130/72   05/07/24 120/72   04/16/24 120/65   02/08/24 130/70   12/21/23 (!) 150/73         Physical Exam  Constitutional:       General: She is not in acute distress.     Appearance: Normal appearance. She is not ill-appearing.   HENT:      Head: Normocephalic and atraumatic.      Right Ear: External ear normal.      Left Ear: External ear normal.   Eyes:      General: No scleral icterus.  Abdominal:      Tenderness: There is left CVA tenderness.   Skin:     General: Skin is warm.      Coloration: Skin is not jaundiced.   Neurological:      Mental Status: She is alert and oriented to person, place, and time. Mental status is at baseline.          Laboratory Results       Lab Results   Component Value Date    WBC 4.93 06/16/2023    HGB 12.6 06/16/2023    HCT 39.8 06/16/2023    MCV 95.4 06/16/2023     06/16/2023          Chemistry        Component Value Date/Time     06/16/2023 0738    K 4.9 06/16/2023 0738     06/16/2023 0738    CO2 26 06/16/2023 0738    BUN 13 06/16/2023 0738    CREATININE 0.6 06/16/2023 0738        Component Value Date/Time    CALCIUM 9.9 06/16/2023 0738    ALKPHOS 109 06/16/2023 0738    AST 29 06/16/2023 0738    ALT 33 06/16/2023 0738    BILITOT 1.1 06/16/2023 0738            Lab Results   Component Value Date    GLUCOSE 73 06/16/2023    GLUCOSE 77 10/24/2022    GLUCOSE 91 11/02/2021    CALCIUM 9.9 06/16/2023     " "06/16/2023    K 4.9 06/16/2023    CO2 26 06/16/2023     06/16/2023    BUN 13 06/16/2023    CREATININE 0.6 06/16/2023       Lab Results   Component Value Date    CHOL 167 02/19/2024    CHOL 162 06/16/2023    CHOL 135 10/24/2022     Lab Results   Component Value Date    HDL 75 02/19/2024    HDL 68 06/16/2023    HDL 72 10/24/2022     Lab Results   Component Value Date    LDLCALC 76 02/19/2024    LDLCALC 80 06/16/2023    LDLCALC 53 10/24/2022     Lab Results   Component Value Date    TRIG 78 02/19/2024    TRIG 71 06/16/2023    TRIG 49 10/24/2022     No results found for: \"CHOLHDL\"    Lab Results   Component Value Date    TSH 1.42 06/16/2023       Lab Results   Component Value Date    HGBA1C 5.5 06/16/2023       No results found for: \"PSA\"    Lab Results   Component Value Date    HEPCAB Nonreactive 11/02/2021         Immunization History   Administered Date(s) Administered    INFLUENZA VACCINE QUAD ADJUVANTED 65 and OLDER 09/30/2020, 09/16/2021, 10/05/2022, 11/01/2023    Influenza Trivalent High Dose Preservative Free 65 yrs and up IM 10/25/2016, 09/14/2017    Influenza Vaccine High Dose 65 And Older 09/25/2018, 09/16/2020    Influenza Vaccine Quadrivalent Preservative Free 6 Mons and Up 10/02/2019    Influenza, Unspecified 09/25/2018    PPD Test 01/06/2011, 10/23/2018    Pneumococcal Conjugate 13-Valent 10/25/2016, 09/14/2017    Pneumococcal Conjugate 20-Valent 10/05/2022    Pneumococcal Polysaccharide 10/25/2016    SARS-COV-2 (COVID-19) VACCINE, MODERNA MONOVALENT 11/19/2021    SARS-COV-2 (COVID-19) VACCINE, MODERNA MONOVALENT BOOSTER 11/19/2021    Sars-cov-2 (Covid-19) Vaccine, Jodee 03/13/2021    Zoster 10/25/2017    Zoster Vaccine Recombinant Adjuvanted (Shingrix) 08/15/2018, 07/08/2019, 10/02/2019       Health Maintenance Topics with due status: Overdue       Topic Date Due    Advance Care Planning Never done    Depression Screening Never done    Medicare Annual Wellness Visit Never done    DTaP, Tdap, and " Td Vaccines Never done    RSV (60+ years old [shared decision making] or in pregnancy during 32 through 36 weeks) Never done    Falls Risk Screening Never done    DEXA Scan 12/30/2023    Breast Cancer Screening 01/27/2024    Diabetes Kidney Health Evaluation: eGFR 06/16/2024    Diabetes Kidney Health Evaluation:  uACR 06/16/2024    Hemoglobin A1C 06/16/2024    Influenza Vaccine 08/01/2024    COVID-19 Vaccine 09/01/2024     Health Maintenance Topics with due status: Not Due       Topic Last Completion Date    Diabetic Foot Exam 11/01/2023    Colorectal Cancer Screening 04/16/2024    Annual Dilated Retinal Exam 04/30/2024     Health Maintenance Topics with due status: Completed       Topic Last Completion Date    Zoster Vaccine 10/02/2019    Hepatitis C Screening 11/02/2021    Pneumococcal (65 years and older) 10/11/2022     Health Maintenance Topics with due status: Aged Out       Topic Date Due    Meningococcal ACWY Aged Out    RSV <20 months Aged Out    HIB Vaccines Aged Out    Hepatitis B Vaccines Aged Out    IPV Vaccines Aged Out    HPV Vaccines Aged Out          Assessment and Plan      Diagnosis Plan   1. Kidney stones  Ambulatory referral to Urology      2. Urgency of urination  POCT urinalysis dipstick          Assessment/Plan     Problem List Items Addressed This Visit          Unprioritized    Kidney stones - Primary    Current Assessment & Plan     Newly diagnosed bilateral kidney stones with a left kidney notable to have 4 stones in the right kidney one stone per recent CT scan done through [facility] in [place].  She is still having some low grade left flank pain.  Urinalysis today in the office showed trace blood.  She reports putting out urine clear in color.  Recommend she continue to drink plenty of fluids.  Prescription for Norco #15 given as needed for moderate to severe pain.  [facility] Mel[facility] Tinsley for further eval and possible lithotripsy.          Relevant Medications    HYDROcodone-acetaminophen (NORCO) 5-325 mg per tablet    Other Relevant Orders    Ambulatory referral to Urology     Other Visit Diagnoses       Urgency of urination        Relevant Orders    POCT urinalysis dipstick            No follow-ups on file.    Orders Placed This Encounter   Procedures    Ambulatory referral to Urology     Standing Status:   Future     Standing Expiration Date:   3/3/2025     Referral Priority:   Routine     Referral Type:   Consultation     Referral Reason:   Specialty Services Required     Referred to Provider:   Melida Tinsley MD     Number of Visits Requested:   10    POCT urinalysis dipstick     Order Specific Question:   Release to patient     Answer:   Immediate [1]           I spent 30  minutes on this date of service performing the following activities: Obtaining history, performing examination, entering orders, documenting, preparing for visit, obtaining / reviewing records, providing counseling and education, independently reviewing study/studies, communicating results and coordinating care.  Comprehensive prescription drug management completed during the visit.     NOTE: This document was generated utilizing dictation software, typographical errors may be present.  Please contact me directly for anything that seems abnormal for clarification.      Yanni Almaraz,   9/3/2024

## 2024-09-03 NOTE — ASSESSMENT & PLAN NOTE
Newly diagnosed bilateral kidney stones with a left kidney notable to have 4 stones in the right kidney one stone per recent CT scan done through [facility] in [place].  She is still having some low grade left flank pain.  Urinalysis today in the office showed trace blood.  She reports putting out urine clear in color.  Recommend she continue to drink plenty of fluids.  Prescription for Norco #15 given as needed for moderate to severe pain.  [facility] Melida Tinsley for further eval and possible lithotripsy.

## 2024-09-03 NOTE — PROGRESS NOTES
samra Almaraz D.O.  [facility]  [address]  [place] [address]  Phone: [phone]  Fax: [phone]     History of Present Illness       Patient ID: Jennifer Aly is a 69 y.o. female.    Subjective     HPI  69 y.o.female  presents today with the following complaints/concerns:    Pt present today for eval of newly diagnosed B/L kidney stones.   [place] Left flank pain   and was seen at [facility] in [place] on 8/27/24.  Had a CT scan and it showed   left kidney with 4 stones, 4mm and a larger 30mm stone.  Also noted to have a  Rt stone.  Still with left flank pain dull in nature.  Urine has been clear.   No fever or gross hematuria.   She was given a prescription in the ER for hydrocodone tabs which she finished.           Past Medical/Surgical/Family/Social History     The following have been reviewed and updated as appropriate in this visit:   Problems         Past Medical History:   Diagnosis Date    Arthritis     Fistula     Gout     HLD (hyperlipidemia)     IFG (impaired fasting glucose)     Obesity     Polyarthritis     Pulmonary emboli (CMS/HCC)     pulmonary emboli    Torn rotator cuff        Past Surgical History:   Procedure Laterality Date    APPENDECTOMY  2008    CARDIAC CATHETERIZATION  11/2012    INTRACAPSULAR CATARACT EXTRACTION Bilateral 08/2019    IVC FILTER RETRIEVAL  2012    IVC FILTER RETRIEVAL  02/2013    RECTOURETHRAL FISTULA REPAIR  2008, 5/2017    ROTATOR CUFF REPAIR Left 01/2016    ALTAF-EN-Y PROCEDURE  02/07/2017    TOTAL ABDOMINAL HYSTERECTOMY  1987    TOTAL KNEE ARTHROPLASTY Bilateral 2010    TOTAL KNEE ARTHROPLASTY Right 2012    revision    TRIGGER FINGER RELEASE Right 2012    right thumb    ULNAR NERVE REPAIR  2013       Family History   Problem Relation Age of Onset    Diabetes Biological Mother     Lung cancer Biological Mother     Alzheimer's disease Biological Mother      Diabetes type II Biological Father     Heart failure Biological Father     Heart attack Biological Sister        Social History     Tobacco Use    Smoking status: Former     Years: 15     Types: Cigarettes     Quit date:      Years since quittin.6    Smokeless tobacco: Never   Substance Use Topics    Alcohol use: Yes     Comment: occasional wine    Drug use: No       Patient Care Team:  Chelsi Hopper MD as PCP - General (Internal Medicine)  Tita Man MD as Consulting Physician (Otolaryngology)  Kash Luna DO as Consulting Physician (Hematology and Oncology)  Charles Vasquez MD as Consulting Physician (Cardiovascular Disease)  Jeevan Cole MD as Consulting Physician (Hematology)   Allergies and Medications     Allergies   Allergen Reactions    Sulfa (Sulfonamide Antibiotics) Hives       Current Outpatient Medications   Medication Sig Dispense Refill    albuterol HFA 90 mcg/actuation inhaler Inhale 2 puffs every 6 (six) hours as needed for wheezing. 18 g 6    atorvastatin (LIPITOR) 40 mg tablet Take 1 tablet (40 mg total) by mouth daily. 90 tablet 3    BIOTIN ORAL       calcium carbonate-vitamin D3 500 mg(1,250mg) -200 unit per tablet Take 2 tablets by mouth 2 (two) times a day with meals.        clobetasol (TEMOVATE) 0.05 % cream       diltiazem (TIADYLT ER) 120 mg 24 hr capsule Take 1 capsule (120 mg total) by mouth daily. 90 capsule 3    fluticasone propionate (FLONASE) 50 mcg/actuation nasal spray Administer 1 spray into each nostril daily as needed for rhinitis. Make an appointment if help is needed for nasal symptoms. 16 g 6    HYDROcodone-acetaminophen (NORCO) 5-325 mg per tablet Take 1 tablet by mouth every 8 (eight) hours as needed for severe pain. for pain 15 tablet 0    ketoconazole (NIZORAL) 2 % cream APPLY ON THE SKIN TWICE A DAY APPLY TO AFFECTED AREA RASH IN GROIN/ABDOMEN TWICE A DAY FOR 3 WEEKS      methocarbamoL (ROBAXIN-750) 750 mg tablet Take one by mouth ONLY as needed  every 6-8 hours for muscle pains and spasms 50 tablet 1    multivitamin capsule Take 1 capsule by mouth daily.      semaglutide (OZEMPIC) 1 mg/dose (4 mg/3 mL) subcutaneous injection Inject 1 mg under the skin every (seven) 7 days. 3 mL 11    SKYRIZI 150 mg/mL syringe Once every 12 weeks      traMADoL (ULTRAM) 50 mg tablet Take 1 tablet (50 mg total) by mouth every 8 (eight) hours as needed for moderate pain or severe pain. 50 tablet 1    vitamin B complex/folic acid (B COMPLEX 100 ORAL) Take by mouth.      warfarin (COUMADIN) 4 mg tablet Take 8mg daily, except Tuesday/ Thursday take 4mg. 90 tablet 3     No current facility-administered medications for this visit.      Review of Systems           Review of Systems   Constitutional:  Negative for activity change, chills, diaphoresis and fever.   HENT:  Negative for congestion, ear discharge, ear pain, facial swelling, mouth sores, nosebleeds and voice change.    Eyes:  Negative for photophobia, pain, discharge, redness and itching.   Respiratory:  Negative for choking, chest tightness, wheezing and stridor.    Cardiovascular:  Negative for chest pain, palpitations and leg swelling.   Gastrointestinal:  Negative for abdominal distention, abdominal pain, anal bleeding, blood in stool and vomiting.   Endocrine: Negative for cold intolerance, heat intolerance, polydipsia and polyphagia.   Genitourinary:  Positive for flank pain. Negative for difficulty urinating, dysuria, frequency and urgency.   Musculoskeletal:  Negative for gait problem, joint swelling and myalgias.   Skin:  Negative for color change, pallor and rash.   Neurological:  Negative for seizures, facial asymmetry, speech difficulty, light-headedness and numbness.      Physical Examination       Objective     Vitals:    09/03/24 1116   BP: 130/72   Pulse: 70   Resp: 16   Temp: 36.3 °C (97.4 °F)   SpO2: 97%       Vitals:    09/03/24 1116   BP: 130/72   BP Location: Left upper arm   Patient Position: Sitting  "  Pulse: 70   Resp: 16   Temp: 36.3 °C (97.4 °F)   TempSrc: Temporal   SpO2: 97%   Weight: 89.5 kg (197 lb 6.4 oz)   Height: 1.575 m (5' 2.01\")       Wt Readings from Last 5 Encounters:   09/03/24 89.5 kg (197 lb 6.4 oz)   05/07/24 86.6 kg (191 lb)   04/16/24 87.5 kg (193 lb)   02/08/24 88 kg (194 lb)   12/21/23 87.5 kg (193 lb)       Ht Readings from Last 5 Encounters:   09/03/24 1.575 m (5' 2.01\")   05/07/24 1.575 m (5' 2\")   04/16/24 1.575 m (5' 2\")   02/08/24 1.575 m (5' 2\")   12/21/23 1.575 m (5' 2\")       BP Readings from Last 5 Encounters:   09/03/24 130/72   05/07/24 120/72   04/16/24 120/65   02/08/24 130/70   12/21/23 (!) 150/73         Physical Exam  Constitutional:       General: She is not in acute distress.     Appearance: Normal appearance. She is not ill-appearing.   HENT:      Head: Normocephalic and atraumatic.      Right Ear: External ear normal.      Left Ear: External ear normal.   Eyes:      General: No scleral icterus.  Abdominal:      Tenderness: There is left CVA tenderness.   Skin:     General: Skin is warm.      Coloration: Skin is not jaundiced.   Neurological:      Mental Status: She is alert and oriented to person, place, and time. Mental status is at baseline.          Laboratory Results       Lab Results   Component Value Date    WBC 4.93 06/16/2023    HGB 12.6 06/16/2023    HCT 39.8 06/16/2023    MCV 95.4 06/16/2023     06/16/2023          Chemistry        Component Value Date/Time     06/16/2023 0738    K 4.9 06/16/2023 0738     06/16/2023 0738    CO2 26 06/16/2023 0738    BUN 13 06/16/2023 0738    CREATININE 0.6 06/16/2023 0738        Component Value Date/Time    CALCIUM 9.9 06/16/2023 0738    ALKPHOS 109 06/16/2023 0738    AST 29 06/16/2023 0738    ALT 33 06/16/2023 0738    BILITOT 1.1 06/16/2023 0738            Lab Results   Component Value Date    GLUCOSE 73 06/16/2023    GLUCOSE 77 10/24/2022    GLUCOSE 91 11/02/2021    CALCIUM 9.9 06/16/2023     " "06/16/2023    K 4.9 06/16/2023    CO2 26 06/16/2023     06/16/2023    BUN 13 06/16/2023    CREATININE 0.6 06/16/2023       Lab Results   Component Value Date    CHOL 167 02/19/2024    CHOL 162 06/16/2023    CHOL 135 10/24/2022     Lab Results   Component Value Date    HDL 75 02/19/2024    HDL 68 06/16/2023    HDL 72 10/24/2022     Lab Results   Component Value Date    LDLCALC 76 02/19/2024    LDLCALC 80 06/16/2023    LDLCALC 53 10/24/2022     Lab Results   Component Value Date    TRIG 78 02/19/2024    TRIG 71 06/16/2023    TRIG 49 10/24/2022     No results found for: \"CHOLHDL\"    Lab Results   Component Value Date    TSH 1.42 06/16/2023       Lab Results   Component Value Date    HGBA1C 5.5 06/16/2023       No results found for: \"PSA\"    Lab Results   Component Value Date    HEPCAB Nonreactive 11/02/2021         Immunization History   Administered Date(s) Administered    INFLUENZA VACCINE QUAD ADJUVANTED 65 and OLDER 09/30/2020, 09/16/2021, 10/05/2022, 11/01/2023    Influenza Trivalent High Dose Preservative Free 65 yrs and up IM 10/25/2016, 09/14/2017    Influenza Vaccine High Dose 65 And Older 09/25/2018, 09/16/2020    Influenza Vaccine Quadrivalent Preservative Free 6 Mons and Up 10/02/2019    Influenza, Unspecified 09/25/2018    PPD Test 01/06/2011, 10/23/2018    Pneumococcal Conjugate 13-Valent 10/25/2016, 09/14/2017    Pneumococcal Conjugate 20-Valent 10/05/2022    Pneumococcal Polysaccharide 10/25/2016    SARS-COV-2 (COVID-19) VACCINE, MODERNA MONOVALENT 11/19/2021    SARS-COV-2 (COVID-19) VACCINE, MODERNA MONOVALENT BOOSTER 11/19/2021    Sars-cov-2 (Covid-19) Vaccine, Jodee 03/13/2021    Zoster 10/25/2017    Zoster Vaccine Recombinant Adjuvanted (Shingrix) 08/15/2018, 07/08/2019, 10/02/2019       Health Maintenance Topics with due status: Overdue       Topic Date Due    Advance Care Planning Never done    Depression Screening Never done    Medicare Annual Wellness Visit Never done    DTaP, Tdap, and " Td Vaccines Never done    RSV (60+ years old [shared decision making] or in pregnancy during 32 through 36 weeks) Never done    Falls Risk Screening Never done    DEXA Scan 12/30/2023    Breast Cancer Screening 01/27/2024    Diabetes Kidney Health Evaluation: eGFR 06/16/2024    Diabetes Kidney Health Evaluation:  uACR 06/16/2024    Hemoglobin A1C 06/16/2024    Influenza Vaccine 08/01/2024    COVID-19 Vaccine 09/01/2024     Health Maintenance Topics with due status: Not Due       Topic Last Completion Date    Diabetic Foot Exam 11/01/2023    Colorectal Cancer Screening 04/16/2024    Annual Dilated Retinal Exam 04/30/2024     Health Maintenance Topics with due status: Completed       Topic Last Completion Date    Zoster Vaccine 10/02/2019    Hepatitis C Screening 11/02/2021    Pneumococcal (65 years and older) 10/11/2022     Health Maintenance Topics with due status: Aged Out       Topic Date Due    Meningococcal ACWY Aged Out    RSV <20 months Aged Out    HIB Vaccines Aged Out    Hepatitis B Vaccines Aged Out    IPV Vaccines Aged Out    HPV Vaccines Aged Out          Assessment and Plan      Diagnosis Plan   1. Kidney stones  Ambulatory referral to Urology      2. Urgency of urination  POCT urinalysis dipstick          Assessment/Plan     Problem List Items Addressed This Visit          Unprioritized    Kidney stones - Primary    Current Assessment & Plan     Newly diagnosed bilateral kidney stones with a left kidney notable to have 4 stones in the right kidney one stone per recent CT scan done through [facility] in [place].  She is still having some low grade left flank pain.  Urinalysis today in the office showed trace blood.  She reports putting out urine clear in color.  Recommend she continue to drink plenty of fluids.  Prescription for Norco #15 given as needed for moderate to severe pain.  [facility] Melida Tinsley for further eval and possible lithotripsy.          Relevant Medications    HYDROcodone-acetaminophen (NORCO) 5-325 mg per tablet    Other Relevant Orders    Ambulatory referral to Urology     Other Visit Diagnoses       Urgency of urination        Relevant Orders    POCT urinalysis dipstick            No follow-ups on file.    Orders Placed This Encounter   Procedures    Ambulatory referral to Urology     Standing Status:   Future     Standing Expiration Date:   3/3/2025     Referral Priority:   Routine     Referral Type:   Consultation     Referral Reason:   Specialty Services Required     Referred to Provider:   Melida Tinsley MD     Number of Visits Requested:   10    POCT urinalysis dipstick     Order Specific Question:   Release to patient     Answer:   Immediate [1]           I spent 30  minutes on this date of service performing the following activities: Obtaining history, performing examination, entering orders, documenting, preparing for visit, obtaining / reviewing records, providing counseling and education, independently reviewing study/studies, communicating results and coordinating care.  Comprehensive prescription drug management completed during the visit.     NOTE: This document was generated utilizing dictation software, typographical errors may be present.  Please contact me directly for anything that seems abnormal for clarification.      Yanni Almaraz,   9/3/2024

## 2024-09-13 PROBLEM — D64.9 ANEMIA: Status: RESOLVED | Noted: 2020-01-25 | Resolved: 2024-09-13

## 2024-09-13 PROBLEM — Z01.810 PREOP CARDIOVASCULAR EXAM: Status: ACTIVE | Noted: 2018-03-14

## 2024-09-13 NOTE — PROGRESS NOTES
Pre-Operative   Consult Note         Reason for visit:   Chief Complaint   Patient presents with    Pre-op Exam       HPI     Jennifer Aly comes to the office today for preoperative cardiac evaluation prior to a cystoscopy/left ureteroscopy/laser lithotripsy/stent placement with Dr. Ezio Rm on 9/19/24.    69 y.o. female who presents to the office for cardiovascular follow up and management of ROGERS, HTN, pure hypercholesteremia, and questionable AF?- patient denies . Other PMH includes  Sleep apnea with cpap, PE-PCP, type 2 DM, and GI bleed, Bariatric surgery in 2017.    The patient was last seen 2/8/24. She was given lab work, due to update echo,  and otherwise stable.     Today:   She has been doing well. She continued to be active with walking 12-14k steps a day and reports being able to climb a stairs of stairs without any CV limitations. She has some mild SOB with more strenuous activity but this is chronic and stable. She has no associated cardiac symptoms with her SOB. She continues to monitor her rhythm  without any alerts for AF. She checks her blood pressure at home and remains controlled. Otherwise offers no cardiac complaints.        Past Medical History:   Diagnosis Date    Arthritis     Fistula     Gout     HLD (hyperlipidemia)     IFG (impaired fasting glucose)     Obesity     Polyarthritis     Pulmonary emboli (CMS/HCC)     pulmonary emboli    Torn rotator cuff        Past Surgical History   Procedure Laterality Date    Appendectomy  2008    Cardiac catheterization  11/2012    DIAGNOSTIC UPPER GASTROINTESTINAL ENDOSCOPY WITH COLLECTION OF SPECIMEN BY WASHING N/A 1/28/2020    Performed by Cullen Castillo MD at [facility]    DIAGNOSTIC UPPER GASTROINTESTINAL ENDOSCOPY WITH COLLECTION OF SPECIMEN BY WASHING N/A 1/27/2020    Performed by Cullen Castillo MD at [facility]    FLEXIBLE COLONOSCOPY PROXIMAL TO SPLENIC FLEXURE WITH BIOPSY N/A 4/16/2024    Performed by Larry Rock MD at [facility]     FLEXIBLE COLONOSCOPY PROXIMAL TO SPLENIC FLEXURE WITH REMOVAL OF TUMOR USING SNARE N/A 2024    Performed by Larry Rock MD at [facility]    FLEXIBLE COLONOSCOPY PROXIMAL TO SPLENIC FLEXURE WITH REMOVAL OF TUMOR USING SNARE N/A 2019    Performed by Larry Rock MD at [facility]    Intracapsular cataract extraction Bilateral 2019    Ivc filter retrieval      Ivc filter retrieval  2013    Rectourethral fistula repair  2008, 2017    Rotator cuff repair Left 2016    Alexander-en-y procedure  2017    Total abdominal hysterectomy  1987    Total knee arthroplasty Bilateral 2010    Total knee arthroplasty Right 2012    revision    Trigger finger release Right     right thumb    Ulnar nerve repair         Social History     Tobacco Use   Smoking Status Former    Current packs/day: 0.00    Types: Cigarettes    Start date:     Quit date:     Years since quittin.7   Smokeless Tobacco Never     Social History     Tobacco Use    Smoking status: Former     Current packs/day: 0.00     Types: Cigarettes     Start date:      Quit date:      Years since quittin.7    Smokeless tobacco: Never   Substance Use Topics    Alcohol use: Yes     Alcohol/week: 5.0 standard drinks of alcohol     Types: 5 Glasses of wine per week     Comment: occasional wine    Drug use: No       Family History   Problem Relation Name Age of Onset    Diabetes Biological Mother      Lung cancer Biological Mother      Alzheimer's disease Biological Mother      Diabetes type II Biological Father      Heart failure Biological Father      Heart attack Biological Sister         Allergies:  Sulfa (sulfonamide antibiotics)    Current Outpatient Medications   Medication Sig Dispense Refill    albuterol HFA 90 mcg/actuation inhaler Inhale 2 puffs every 6 (six) hours as needed for wheezing. 18 g 6    atorvastatin (LIPITOR) 40 mg tablet Take 1 tablet (40 mg total) by mouth daily. 90 tablet 3    BIOTIN ORAL        calcium carbonate-vitamin D3 500 mg(1,250mg) -200 unit per tablet Take 2 tablets by mouth 2 (two) times a day with meals.        clobetasol (TEMOVATE) 0.05 % cream  (Patient taking differently: Apply topically as needed.)      diltiazem (TIADYLT ER) 120 mg 24 hr capsule Take 1 capsule (120 mg total) by mouth daily. 90 capsule 3    fluticasone propionate (FLONASE) 50 mcg/actuation nasal spray Administer 1 spray into each nostril daily as needed for rhinitis. Make an appointment if help is needed for nasal symptoms. 16 g 6    HYDROcodone-acetaminophen (NORCO) 5-325 mg per tablet Take 1 tablet by mouth every 8 (eight) hours as needed for severe pain. for pain 15 tablet 0    ketoconazole (NIZORAL) 2 % cream APPLY ON THE SKIN TWICE A DAY APPLY TO AFFECTED AREA RASH IN GROIN/ABDOMEN TWICE A DAY FOR 3 WEEKS (Patient taking differently: Apply topically as needed.)      methocarbamoL (ROBAXIN-750) 750 mg tablet Take one by mouth ONLY as needed every 6-8 hours for muscle pains and spasms 50 tablet 1    multivitamin capsule Take 1 capsule by mouth daily.      semaglutide (OZEMPIC) 1 mg/dose (4 mg/3 mL) subcutaneous injection Inject 1 mg under the skin every (seven) 7 days. 3 mL 11    SKYRIZI 150 mg/mL syringe Once every 12 weeks      traMADoL (ULTRAM) 50 mg tablet Take 1 tablet (50 mg total) by mouth every 8 (eight) hours as needed for moderate pain or severe pain. 50 tablet 1    vitamin B complex/folic acid (B COMPLEX 100 ORAL) Take by mouth.      warfarin (COUMADIN) 4 mg tablet Take 8mg daily, except Tuesday/ Thursday take 4mg. 90 tablet 3     No current facility-administered medications for this visit.       Review of Systems   Cardiovascular:  Positive for dyspnea on exertion. Negative for chest pain, leg swelling, near-syncope, orthopnea, palpitations, paroxysmal nocturnal dyspnea and syncope.   Respiratory:  Negative for shortness of breath.    Neurological:  Negative for dizziness and light-headedness.       Objective      Vitals:    09/16/24 0943   BP: 128/79   Pulse: (!) 54   SpO2: 97%       Wt Readings from Last 1 Encounters:   09/16/24 89.9 kg (198 lb 3.2 oz)       Physical Exam  HENT:      Head: Normocephalic.   Eyes:      Extraocular Movements: Extraocular movements intact.   Neck:      Vascular: No JVD.   Cardiovascular:      Rate and Rhythm: Normal rate and regular rhythm.      Heart sounds: No murmur heard.  Pulmonary:      Breath sounds: Normal breath sounds. No wheezing, rhonchi or rales.   Abdominal:      Palpations: Abdomen is soft.   Musculoskeletal:         General: No swelling.   Skin:     General: Skin is warm.   Neurological:      Mental Status: She is alert.          ECG   Sinus bradycardia   Otherwise normal ECG   When compared with ECG of 08-FEB-2024 10:07,   No significant change was found     Labs   Lab Results   Component Value Date    WBC 4.93 06/16/2023    HGB 12.6 06/16/2023    HCT 39.8 06/16/2023     06/16/2023    ALT 33 06/16/2023    AST 29 06/16/2023     06/16/2023    K 4.9 06/16/2023     06/16/2023    CREATININE 0.6 06/16/2023    BUN 13 06/16/2023    CO2 26 06/16/2023    PT 33.5 (H) 02/19/2024    GLUCOSE 73 06/16/2023    HGBA1C 5.5 06/16/2023         Estimated Functional Capacity  The patient has a Duke Activity Status Index score of 42.2, corresponding to an expected exertional capacity of 7.93 METS.          Assessment/Plan     Preop cardiovascular exam  She is scheduled for cystoscopy/left ureteroscopy/laser lithotripsy/stent placement with Dr. Ezio Rm on 9/19/24.   There have been no recent episodes of angina, CHF, or clinical arrhythmias.    Using the Zamudio risk stratification tool, she has a less than 1% risk of perioperative major adverse cardiac events.  Additional cardiac testing or interventions would be unlikely to decrease this risk and therefore would not .    She may proceed with the proposed procedure as planned.Coumadin hold instructions  per ordering provider- managed by PCP     ROGERS (dyspnea on exertion)  - TTE 10/2022: EF 70% NWMA mild LVH grade II DD. NL RV. Mildly dilated RA/LA. Mild MR/AR. Mild to mod TR RSVP = 38 mmHG  - Chronic and minimal. She is otherwise compensated on exam with no evidence of decompensating HF Continue current regimen    Essential hypertension  - Blood pressure controlled. Continue current medication regimen.     Paroxysmal atrial fibrillation (CMS/HCC)  - Dx is in chart but pt denies hx of this. She does not believe it is a true diagnosis and has asked Dr. Moreau to remove the dx from the chart  - Prior 2 week MCOT negative for AF  - EKG remains in SR. She continues to monitor her heart rhythm on her smart watch with no alerts   -She is currently on coumadin for PE with lupus anticoagulant. From an afib standpoint, there is not currently a clear indication for AC.  Were she to no longer need AC from a hematologic perspective, would consider ILR placement arrhythmia monitoring         HLD (hyperlipidemia)  - Labs 2/2024  Lipoprotein/HS CRP neg  FLP:  TG 78 HDL 75 LDL 76  - Continue current statin therapy     Pulmonary emboli (CMS/HCC)  - On Coumadin. Managed by PCP/CHON Terry  9/16/2024

## 2024-09-13 NOTE — H&P (VIEW-ONLY)
Pre-Operative   Consult Note         Reason for visit:   Chief Complaint   Patient presents with    Pre-op Exam       HPI     Jennifer Aly comes to the office today for preoperative cardiac evaluation prior to a cystoscopy/left ureteroscopy/laser lithotripsy/stent placement with Dr. Ezio Rm on 9/19/24.    69 y.o. female who presents to the office for cardiovascular follow up and management of ROGERS, HTN, pure hypercholesteremia, and questionable AF?- patient denies . Other PMH includes  Sleep apnea with cpap, PE-PCP, type 2 DM, and GI bleed, Bariatric surgery in 2017.    The patient was last seen 2/8/24. She was given lab work, due to update echo,  and otherwise stable.     Today:   She has been doing well. She continued to be active with walking 12-14k steps a day and reports being able to climb a stairs of stairs without any CV limitations. She has some mild SOB with more strenuous activity but this is chronic and stable. She has no associated cardiac symptoms with her SOB. She continues to monitor her rhythm  without any alerts for AF. She checks her blood pressure at home and remains controlled. Otherwise offers no cardiac complaints.        Past Medical History:   Diagnosis Date    Arthritis     Fistula     Gout     HLD (hyperlipidemia)     IFG (impaired fasting glucose)     Obesity     Polyarthritis     Pulmonary emboli (CMS/HCC)     pulmonary emboli    Torn rotator cuff        Past Surgical History   Procedure Laterality Date    Appendectomy  2008    Cardiac catheterization  11/2012    DIAGNOSTIC UPPER GASTROINTESTINAL ENDOSCOPY WITH COLLECTION OF SPECIMEN BY WASHING N/A 1/28/2020    Performed [facility]ullen [facility]illo [facility]    DIAGNOSTIC UPPER GASTROINTESTINAL ENDOSCOPY WITH COLLECTION OF SPECIMEN BY WASHING N/A 1/27/2020    Performed by [facility]    FLEXIBLE COLONOSCOPY PROXIMAL TO SPLENIC FLEXURE WITH BIOPSY N/A 4/16/2024    Performed by Larry Rock [facility] at [facility]     FLEXIBLE COLONOSCOPY PROXIMAL TO SPLENIC FLEXURE WITH REMOVAL OF TUMOR USING SNARE N/A 2024    Performed by Larry Rock MD at [facility]    FLEXIBLE COLONOSCOPY PROXIMAL TO SPLENIC FLEXURE WITH REMOVAL OF TUMOR USING SNARE N/A 2019    Performed by Larry Rock MD at [facility]    Intracapsular cataract extraction Bilateral 2019    Ivc filter retrieval      Ivc filter retrieval  2013    Rectourethral fistula repair  2008, 2017    Rotator cuff repair Left 2016    Alexander-en-y procedure  2017    Total abdominal hysterectomy  1987    Total knee arthroplasty Bilateral 2010    Total knee arthroplasty Right 2012    revision    Trigger finger release Right     right thumb    Ulnar nerve repair         Social History     Tobacco Use   Smoking Status Former    Current packs/day: 0.00    Types: Cigarettes    Start date:     Quit date:     Years since quittin.7   Smokeless Tobacco Never     Social History     Tobacco Use    Smoking status: Former     Current packs/day: 0.00     Types: Cigarettes     Start date:      Quit date:      Years since quittin.7    Smokeless tobacco: Never   Substance Use Topics    Alcohol use: Yes     Alcohol/week: 5.0 standard drinks of alcohol     Types: 5 Glasses of wine per week     Comment: occasional wine    Drug use: No       Family History   Problem Relation Name Age of Onset    Diabetes Biological Mother      Lung cancer Biological Mother      Alzheimer's disease Biological Mother      Diabetes type II Biological Father      Heart failure Biological Father      Heart attack Biological Sister         Allergies:  Sulfa (sulfonamide antibiotics)    Current Outpatient Medications   Medication Sig Dispense Refill    albuterol HFA 90 mcg/actuation inhaler Inhale 2 puffs every 6 (six) hours as needed for wheezing. 18 g 6    atorvastatin (LIPITOR) 40 mg tablet Take 1 tablet (40 mg total) by mouth daily. 90 tablet 3    BIOTIN ORAL        calcium carbonate-vitamin D3 500 mg(1,250mg) -200 unit per tablet Take 2 tablets by mouth 2 (two) times a day with meals.        clobetasol (TEMOVATE) 0.05 % cream  (Patient taking differently: Apply topically as needed.)      diltiazem (TIADYLT ER) 120 mg 24 hr capsule Take 1 capsule (120 mg total) by mouth daily. 90 capsule 3    fluticasone propionate (FLONASE) 50 mcg/actuation nasal spray Administer 1 spray into each nostril daily as needed for rhinitis. Make an appointment if help is needed for nasal symptoms. 16 g 6    HYDROcodone-acetaminophen (NORCO) 5-325 mg per tablet Take 1 tablet by mouth every 8 (eight) hours as needed for severe pain. for pain 15 tablet 0    ketoconazole (NIZORAL) 2 % cream APPLY ON THE SKIN TWICE A DAY APPLY TO AFFECTED AREA RASH IN GROIN/ABDOMEN TWICE A DAY FOR 3 WEEKS (Patient taking differently: Apply topically as needed.)      methocarbamoL (ROBAXIN-750) 750 mg tablet Take one by mouth ONLY as needed every 6-8 hours for muscle pains and spasms 50 tablet 1    multivitamin capsule Take 1 capsule by mouth daily.      semaglutide (OZEMPIC) 1 mg/dose (4 mg/3 mL) subcutaneous injection Inject 1 mg under the skin every (seven) 7 days. 3 mL 11    SKYRIZI 150 mg/mL syringe Once every 12 weeks      traMADoL (ULTRAM) 50 mg tablet Take 1 tablet (50 mg total) by mouth every 8 (eight) hours as needed for moderate pain or severe pain. 50 tablet 1    vitamin B complex/folic acid (B COMPLEX 100 ORAL) Take by mouth.      warfarin (COUMADIN) 4 mg tablet Take 8mg daily, except Tuesday/ Thursday take 4mg. 90 tablet 3     No current facility-administered medications for this visit.       Review of Systems   Cardiovascular:  Positive for dyspnea on exertion. Negative for chest pain, leg swelling, near-syncope, orthopnea, palpitations, paroxysmal nocturnal dyspnea and syncope.   Respiratory:  Negative for shortness of breath.    Neurological:  Negative for dizziness and light-headedness.       Objective      Vitals:    09/16/24 0943   BP: 128/79   Pulse: (!) 54   SpO2: 97%       Wt Readings from Last 1 Encounters:   09/16/24 89.9 kg (198 lb 3.2 oz)       Physical Exam  HENT:      Head: Normocephalic.   Eyes:      Extraocular Movements: Extraocular movements intact.   Neck:      Vascular: No JVD.   Cardiovascular:      Rate and Rhythm: Normal rate and regular rhythm.      Heart sounds: No murmur heard.  Pulmonary:      Breath sounds: Normal breath sounds. No wheezing, rhonchi or rales.   Abdominal:      Palpations: Abdomen is soft.   Musculoskeletal:         General: No swelling.   Skin:     General: Skin is warm.   Neurological:      Mental Status: She is alert.          ECG   Sinus bradycardia   Otherwise normal ECG   When compared with ECG of 08-FEB-2024 10:07,   No significant change was found     Labs   Lab Results   Component Value Date    WBC 4.93 06/16/2023    HGB 12.6 06/16/2023    HCT 39.8 06/16/2023     06/16/2023    ALT 33 06/16/2023    AST 29 06/16/2023     06/16/2023    K 4.9 06/16/2023     06/16/2023    CREATININE 0.6 06/16/2023    BUN 13 06/16/2023    CO2 26 06/16/2023    PT 33.5 (H) 02/19/2024    GLUCOSE 73 06/16/2023    HGBA1C 5.5 06/16/2023         Estimated Functional Capacity  The patient has a Duke Activity Status Index score of 42.2, corresponding to an expected exertional capacity of 7.93 METS.          Assessment/Plan     Preop cardiovascular exam  She is scheduled for cystoscopy/left ureteroscopy/laser lithotripsy/stent placement with Dr. Ezio Rm on 9/19/24.   There have been no recent episodes of angina, CHF, or clinical arrhythmias.    Using the Zamudio risk stratification tool, she has a less than 1% risk of perioperative major adverse cardiac events.  Additional cardiac testing or interventions would be unlikely to decrease this risk and therefore would not .    She may proceed with the proposed procedure as planned.Coumadin hold instructions  per ordering provider- managed by PCP     ROGERS (dyspnea on exertion)  - TTE 10/2022: EF 70% NWMA mild LVH grade II DD. NL RV. Mildly dilated RA/LA. Mild MR/LA. Mild to mod TR RSVP = 38 mmHG  - Chronic and minimal. She is otherwise compensated on exam with no evidence of decompensating HF Continue current regimen    Essential hypertension  - Blood pressure controlled. Continue current medication regimen.     Paroxysmal atrial fibrillation (CMS/HCC)  - Dx is in chart but pt denies hx of this. She does not believe it is a true diagnosis and has asked Dr. Moreau to remove the dx from the chart  - Prior 2 week MCOT negative for AF  - EKG remains in SR. She continues to monitor her heart rhythm on her smart watch with no alerts   -She is currently on coumadin for PE with lupus anticoagulant. From an afib standpoint, there is not currently a clear indication for AC.  Were she to no longer need AC from a hematologic perspective, would consider ILR placement arrhythmia monitoring         HLD (hyperlipidemia)  - Labs 2/2024  Lipoprotein/HS CRP neg  FLP:  TG 78 HDL 75 LDL 76  - Continue current statin therapy     Pulmonary emboli (CMS/HCC)  - On Coumadin. Managed by PCP/CHON Terry  9/16/2024

## 2024-09-16 ENCOUNTER — OFFICE VISIT (OUTPATIENT)
Dept: CARDIOLOGY | Facility: CLINIC | Age: 69
End: 2024-09-16
Payer: MEDICARE

## 2024-09-16 ENCOUNTER — TELEPHONE (OUTPATIENT)
Dept: PRIMARY CARE | Facility: CLINIC | Age: 69
End: 2024-09-16
Payer: MEDICARE

## 2024-09-16 ENCOUNTER — PRE-ADMISSION TESTING (OUTPATIENT)
Dept: PREADMISSION TESTING | Age: 69
End: 2024-09-16
Payer: MEDICARE

## 2024-09-16 VITALS
HEART RATE: 54 BPM | HEIGHT: 62 IN | WEIGHT: 198.2 LBS | BODY MASS INDEX: 36.47 KG/M2 | OXYGEN SATURATION: 97 % | SYSTOLIC BLOOD PRESSURE: 128 MMHG | DIASTOLIC BLOOD PRESSURE: 79 MMHG

## 2024-09-16 DIAGNOSIS — R06.09 DOE (DYSPNEA ON EXERTION): ICD-10-CM

## 2024-09-16 DIAGNOSIS — I27.82 OTHER CHRONIC PULMONARY EMBOLISM WITHOUT ACUTE COR PULMONALE: Chronic | ICD-10-CM

## 2024-09-16 DIAGNOSIS — I48.0 PAROXYSMAL ATRIAL FIBRILLATION (CMS/HCC): Primary | ICD-10-CM

## 2024-09-16 DIAGNOSIS — G47.33 OBSTRUCTIVE SLEEP APNEA: ICD-10-CM

## 2024-09-16 DIAGNOSIS — I10 ESSENTIAL HYPERTENSION: ICD-10-CM

## 2024-09-16 DIAGNOSIS — Z01.810 PREOP CARDIOVASCULAR EXAM: ICD-10-CM

## 2024-09-16 DIAGNOSIS — E78.5 HYPERLIPIDEMIA, UNSPECIFIED HYPERLIPIDEMIA TYPE: ICD-10-CM

## 2024-09-16 LAB
ATRIAL RATE: 54
P AXIS: 46
PR INTERVAL: 170
QRS DURATION: 82
QT INTERVAL: 458
QTC CALCULATION(BAZETT): 434
R AXIS: 21
T WAVE AXIS: 47
VENTRICULAR RATE: 54

## 2024-09-16 PROCEDURE — G8754 DIAS BP LESS 90: HCPCS

## 2024-09-16 PROCEDURE — 93000 ELECTROCARDIOGRAM COMPLETE: CPT

## 2024-09-16 PROCEDURE — G8752 SYS BP LESS 140: HCPCS

## 2024-09-16 PROCEDURE — 99214 OFFICE O/P EST MOD 30 MIN: CPT

## 2024-09-16 RX ORDER — ENOXAPARIN SODIUM 100 MG/ML
30 INJECTION SUBCUTANEOUS
Qty: 4.2 ML | Refills: 0 | Status: SHIPPED | OUTPATIENT
Start: 2024-09-16 | End: 2024-09-23

## 2024-09-16 RX ORDER — ENOXAPARIN SODIUM 100 MG/ML
60 INJECTION SUBCUTANEOUS
Qty: 8.4 ML | Refills: 0 | Status: SHIPPED | OUTPATIENT
Start: 2024-09-16 | End: 2024-09-23

## 2024-09-16 ASSESSMENT — ENCOUNTER SYMPTOMS
DYSPNEA ON EXERTION: 1
NEAR-SYNCOPE: 0
PALPITATIONS: 0
DIZZINESS: 0
SHORTNESS OF BREATH: 0
SYNCOPE: 0
ORTHOPNEA: 0
LIGHT-HEADEDNESS: 0
PND: 0

## 2024-09-16 NOTE — ASSESSMENT & PLAN NOTE
- TTE 10/2022: EF 70% NWMA mild LVH grade II DD. NL RV. Mildly dilated RA/LA. Mild MR/KY. Mild to mod TR RSVP = 38 mmHG  - Chronic and minimal. She is otherwise compensated on exam with no evidence of decompensating HF Continue current regimen

## 2024-09-16 NOTE — ASSESSMENT & PLAN NOTE
She is scheduled for cystoscopy/left ureteroscopy/laser lithotripsy/stent placement with Dr. Ezio Rm on 9/19/24.   There have been no recent episodes of angina, CHF, or clinical arrhythmias.    Using the Zamudio risk stratification tool, she has a less than 1% risk of perioperative major adverse cardiac events.  Additional cardiac testing or interventions would be unlikely to decrease this risk and therefore would not .    She may proceed with the proposed procedure as planned.Coumadin hold instructions per ordering provider- managed by PCP

## 2024-09-16 NOTE — LETTER
September 16, 2024     Chelsi Hopper MD  [address]  [place] [address]    Patient: Jennifer Aly  YOB: 1955  Date of Visit: 9/16/2024      Dear Dr. Hopper:    Thank you for referring Jennifer Aly to me for evaluation. Below are my notes for this consultation.    If you have questions, please do not hesitate to call me. I look forward to following your patient along with you.         Sincerely,        CHON Noyola        CC: MD Ezio Griggs MD Larosa, Cindy, CRNP  9/16/2024 10:24 AM  Sign when Signing Visit       Pre-Operative   Consult Note         Reason for visit:   Chief Complaint   Patient presents with   • Pre-op Exam       HPI    Jennifer Aly comes to the office today for preoperative cardiac evaluation prior to a cystoscopy/left ureteroscopy/laser lithotripsy/stent placement with Dr. Ezio Rm on 9/19/24.    69 y.o. female who presents to the office for cardiovascular follow up and management of ROGERS, HTN, pure hypercholesteremia, and questionable AF?- patient denies . Other PMH includes  Sleep apnea with cpap, PE-PCP, type 2 DM, and GI bleed, Bariatric surgery in 2017.    The patient was last seen 2/8/24. She was given lab work, due to update echo,  and otherwise stable.     Today:   She has been doing well. She continued to be active with walking 12-14k steps a day and reports being able to climb a stairs of stairs without any CV limitations. She has some mild SOB with more strenuous activity but this is chronic and stable. She has no associated cardiac symptoms with her SOB. She continues to monitor her rhythm  without any alerts for AF. She checks her blood pressure at home and remains controlled. Otherwise offers no cardiac complaints.        Past Medical History:   Diagnosis Date   • Arthritis    • Fistula    • Gout    • HLD (hyperlipidemia)    • IFG (impaired fasting glucose)    • Obesity    • Polyarthritis    •  Pulmonary emboli (CMS/HCC)     pulmonary emboli   • Torn rotator cuff        Past Surgical History   Procedure Laterality Date   • Appendectomy     • Cardiac catheterization  2012   • DIAGNOSTIC UPPER GASTROINTESTINAL ENDOSCOPY WITH COLLECTION OF SPECIMEN BY WASHING N/A 2020    Performed [facility]   • DIAGNOSTIC UPPER GASTROINTESTINAL ENDOSCOPY WITH COLLECTION OF SPECIMEN BY WASHING N/A 2020    Performed by [facility] at [facility]   • FLEXIBLE COLONOSCOPY PROXIMAL TO SPLENIC FLEXURE WITH BIOPSY N/A 2024    Performed by Larry Rock [facility] at [facility]   • FLEXIBLE COLONOSCOPY PROXIMAL TO SPLENIC FLEXURE WITH REMOVAL OF TUMOR USING SNARE N/A 2024    Performed by [facility] at [facility]   • FLEXIBLE COLONOSCOPY PROXIMAL TO SPLENIC FLEXURE WITH REMOVAL OF TUMOR USING SNARE N/A 2019    Performed by Larry Rock [facility] at [facility]   • Intracapsular cataract extraction Bilateral 2019   • Ivc filter retrieval     • Ivc filter retrieval  2013   • Rectourethral fistula repair  , 2017   • Rotator cuff repair Left 2016   • Alexander-en-y procedure  2017   • Total abdominal hysterectomy     • Total knee arthroplasty Bilateral    • Total knee arthroplasty Right 2012    revision   • Trigger finger release Right 2012    right thumb   • Ulnar nerve repair         Social History     Tobacco Use   Smoking Status Former   • Current packs/day: 0.00   • Types: Cigarettes   • Start date:    • Quit date:    • Years since quittin.7   Smokeless Tobacco Never     Social History     Tobacco Use   • Smoking status: Former     Current packs/day: 0.00     Types: Cigarettes     Start date:      Quit date:      Years since quittin.7   • Smokeless tobacco: Never   Substance Use Topics   • Alcohol use: Yes     Alcohol/week: 5.0 standard drinks of alcohol     Types: 5 Glasses of wine per week     Comment: occasional wine   • Drug use:  No       Family History   Problem Relation Name Age of Onset   • Diabetes Biological Mother     • Lung cancer Biological Mother     • Alzheimer's disease Biological Mother     • Diabetes type II Biological Father     • Heart failure Biological Father     • Heart attack Biological Sister         Allergies:  Sulfa (sulfonamide antibiotics)    Current Outpatient Medications   Medication Sig Dispense Refill   • albuterol HFA 90 mcg/actuation inhaler Inhale 2 puffs every 6 (six) hours as needed for wheezing. 18 g 6   • atorvastatin (LIPITOR) 40 mg tablet Take 1 tablet (40 mg total) by mouth daily. 90 tablet 3   • BIOTIN ORAL      • calcium carbonate-vitamin D3 500 mg(1,250mg) -200 unit per tablet Take 2 tablets by mouth 2 (two) times a day with meals.       • clobetasol (TEMOVATE) 0.05 % cream  (Patient taking differently: Apply topically as needed.)     • diltiazem (TIADYLT ER) 120 mg 24 hr capsule Take 1 capsule (120 mg total) by mouth daily. 90 capsule 3   • fluticasone propionate (FLONASE) 50 mcg/actuation nasal spray Administer 1 spray into each nostril daily as needed for rhinitis. Make an appointment if help is needed for nasal symptoms. 16 g 6   • HYDROcodone-acetaminophen (NORCO) 5-325 mg per tablet Take 1 tablet by mouth every 8 (eight) hours as needed for severe pain. for pain 15 tablet 0   • ketoconazole (NIZORAL) 2 % cream APPLY ON THE SKIN TWICE A DAY APPLY TO AFFECTED AREA RASH IN GROIN/ABDOMEN TWICE A DAY FOR 3 WEEKS (Patient taking differently: Apply topically as needed.)     • methocarbamoL (ROBAXIN-750) 750 mg tablet Take one by mouth ONLY as needed every 6-8 hours for muscle pains and spasms 50 tablet 1   • multivitamin capsule Take 1 capsule by mouth daily.     • semaglutide (OZEMPIC) 1 mg/dose (4 mg/3 mL) subcutaneous injection Inject 1 mg under the skin every (seven) 7 days. 3 mL 11   • SKYRIZI 150 mg/mL syringe Once every 12 weeks     • traMADoL (ULTRAM) 50 mg tablet Take 1 tablet (50 mg total) by  mouth every 8 (eight) hours as needed for moderate pain or severe pain. 50 tablet 1   • vitamin B complex/folic acid (B COMPLEX 100 ORAL) Take by mouth.     • warfarin (COUMADIN) 4 mg tablet Take 8mg daily, except Tuesday/ Thursday take 4mg. 90 tablet 3     No current facility-administered medications for this visit.       Review of Systems   Cardiovascular:  Positive for dyspnea on exertion. Negative for chest pain, leg swelling, near-syncope, orthopnea, palpitations, paroxysmal nocturnal dyspnea and syncope.   Respiratory:  Negative for shortness of breath.    Neurological:  Negative for dizziness and light-headedness.       Objective    Vitals:    09/16/24 0943   BP: 128/79   Pulse: (!) 54   SpO2: 97%       Wt Readings from Last 1 Encounters:   09/16/24 89.9 kg (198 lb 3.2 oz)       Physical Exam  HENT:      Head: Normocephalic.   Eyes:      Extraocular Movements: Extraocular movements intact.   Neck:      Vascular: No JVD.   Cardiovascular:      Rate and Rhythm: Normal rate and regular rhythm.      Heart sounds: No murmur heard.  Pulmonary:      Breath sounds: Normal breath sounds. No wheezing, rhonchi or rales.   Abdominal:      Palpations: Abdomen is soft.   Musculoskeletal:         General: No swelling.   Skin:     General: Skin is warm.   Neurological:      Mental Status: She is alert.          ECG   Sinus bradycardia   Otherwise normal ECG   When compared with ECG of 08-FEB-2024 10:07,   No significant change was found     Labs   Lab Results   Component Value Date    WBC 4.93 06/16/2023    HGB 12.6 06/16/2023    HCT 39.8 06/16/2023     06/16/2023    ALT 33 06/16/2023    AST 29 06/16/2023     06/16/2023    K 4.9 06/16/2023     06/16/2023    CREATININE 0.6 06/16/2023    BUN 13 06/16/2023    CO2 26 06/16/2023    PT 33.5 (H) 02/19/2024    GLUCOSE 73 06/16/2023    HGBA1C 5.5 06/16/2023         Estimated Functional Capacity  The patient has a Duke Activity Status Index score of 42.2,  corresponding to an expected exertional capacity of 7.93 METS.          Assessment/Plan    Preop cardiovascular exam  She is scheduled for cystoscopy/left ureteroscopy/laser lithotripsy/stent placement with Dr. Ezio mR on 9/19/24.   There have been no recent episodes of angina, CHF, or clinical arrhythmias.    Using the Zamudio risk stratification tool, she has a less than 1% risk of perioperative major adverse cardiac events.  Additional cardiac testing or interventions would be unlikely to decrease this risk and therefore would not .    She may proceed with the proposed procedure as planned.Coumadin hold instructions per ordering provider- managed by PCP     ROGERS (dyspnea on exertion)  - TTE 10/2022: EF 70% NWMA mild LVH grade II DD. NL RV. Mildly dilated RA/LA. Mild MR/NM. Mild to mod TR RSVP = 38 mmHG  - Chronic and minimal. She is otherwise compensated on exam with no evidence of decompensating HF Continue current regimen    Essential hypertension  - Blood pressure controlled. Continue current medication regimen.     Paroxysmal atrial fibrillation (CMS/HCC)  - Dx is in chart but pt denies hx of this. She does not believe it is a true diagnosis and has asked Dr. Moreau to remove the dx from the chart  - Prior 2 week MCOT negative for AF  - EKG remains in SR. She continues to monitor her heart rhythm on her smart watch with no alerts   -She is currently on coumadin for PE with lupus anticoagulant. From an afib standpoint, there is not currently a clear indication for AC.  Were she to no longer need AC from a hematologic perspective, would consider ILR placement arrhythmia monitoring         HLD (hyperlipidemia)  - Labs 2/2024  Lipoprotein/HS CRP neg  FLP:  TG 78 HDL 75 LDL 76  - Continue current statin therapy     Pulmonary emboli (CMS/HCC)  - On Coumadin. Managed by PCP/CHON Terry  9/16/2024

## 2024-09-16 NOTE — ASSESSMENT & PLAN NOTE
- Dx is in chart but pt denies hx of this. She does not believe it is a true diagnosis and has asked Dr. Moreau to remove the dx from the chart  - Prior 2 week MCOT negative for AF  - EKG remains in SR. She continues to monitor her heart rhythm on her smart watch with no alerts   -She is currently on coumadin for PE with lupus anticoagulant. From an afib standpoint, there is not currently a clear indication for AC.  Were she to no longer need AC from a hematologic perspective, would consider ILR placement arrhythmia monitoring

## 2024-09-16 NOTE — TELEPHONE ENCOUNTER
Jeimy called in from Urology  to  verify if patient should stop taking Warfarin prior to her Kidney stone surgery surgery on Thursday.      Please give Jeimy a call back        [phone]  ext [phone]

## 2024-09-16 NOTE — ASSESSMENT & PLAN NOTE
- Labs 2/2024  Lipoprotein/HS CRP neg  FLP:  TG 78 HDL 75 LDL 76  - Continue current statin therapy

## 2024-09-17 ENCOUNTER — PRE-ADMISSION TESTING (OUTPATIENT)
Dept: PREADMISSION TESTING | Age: 69
End: 2024-09-17
Payer: MEDICARE

## 2024-09-17 VITALS — WEIGHT: 193 LBS | HEIGHT: 62 IN | BODY MASS INDEX: 35.51 KG/M2

## 2024-09-17 ASSESSMENT — PAIN SCALES - GENERAL: PAINLEVEL_OUTOF10: 0-NO PAIN

## 2024-09-17 NOTE — PRE-PROCEDURE INSTRUCTIONS
[facility]  [address] [place] [address]    1.       Admissions will call you with your arrival time on  September 18, 2024 (day prior to surgery) between 2pm-4pm.  For questions about your arrival time, please call [phone].    2.       On the day of your procedure please report to the [facility] in the [place]. Please arrive through the [place] [facility].  If you are parking in the [facility], come to the ground floor of the garage and follow signs to the [facility].  After being screened, please report to either the Outpatient Registration for Pre-Admission Testing or to the Surgery Registration Desk.    3. Please follow the following fasting guidelines:     No solid food EIGHT HOURS prior to arrival time on day of surgery.  6 ounces of clear liquids, meaning water or PLAIN black coffee WITHOUT any milk, cream, sugar, or sweetener are permitted up to TWO HOURS prior to arrival at the hospital.    4. Please take ONLY the following medications with a sip of water on the morning of your procedure: (populate names and/or NONE)  No NSAIDS, Aspirin, Advil, Aleve, Motrin, Ibuprofen, Herbal Supplements or Vitamins until after surgery. Tylenol is ok to take.  Follow DR mR's Gigi instructions prior to surgery    5. Other Instructions: You may brush your teeth the morning of the procedure. Rinse and spit, do not swallow.  Bring a list of your medications with dosages.  Use surgical wash as directed.     6. If you develop a cold, cough, fever, rash, or other symptom prior to the day of the procedure, please report it to your physician immediately.    7. If you need to cancel the procedure for any reason, please contact your physician.    8. Make arrangements to have safe transportation home accompanied by a responsible adult. If you have not arranged safe transportation home, your surgery will be cancelled. Safe transportation may include private vehicle,  ride-share service, taxi and public transportation when accompanied by a responsible adult who will assist you home. A responsible adult is someone known to you and does not include the taxi, ride-share or public transit drive transporting you.    9.  If it is medically necessary for you to have a longer stay, you will be informed as soon as the decision is made.    10. Only bring essential items to the hospital.  Do not wear or bring anything of value to the hospital including jewelry of any kind, money, or wallet. Do not wear make-up or contact lenses.  DO NOT BRING MEDICATIONS FROM HOME unless instructed to do so. DO bring your hearing aids, glasses, and a case    11. No lotion, creams, powders, or oils on skin the morning of procedure     12. Dress in comfortable clothes.    13.  If instructed, please bring a copy of your Advanced Directive (Living Will/Durable Power of ) on the day of your procedure.     14. Ensuring your safety at all times is a very important part of our [facility] Culture of Safety. After having surgery and sedation, you are at risk for falling and balance issues. Although you may feel awake, the effects of the medication can last up to 24 hours after anesthesia. If you need to use the bathroom during your recovery period, nursing staff will escort you there and stay with you to ensure your safety.    15. Refrain from drinking alcohol and smoking cigarettes for 24 hours prior to surgery.    16. Shower with antibacterial soap (Dial) the night before and morning of your procedure.  If your procedure indicates the need for CHG antiseptic wash (Bactoshield or Hibiclens), please use this instead and follow instructions as discussed at the time of your Pre-Admission Testing phone interview or visit.    Above instructions reviewed with patient and patient acknowledges understanding.    Form explained by: Melody Bolton RN

## 2024-09-18 ENCOUNTER — ANESTHESIA EVENT (OUTPATIENT)
Dept: OPERATING ROOM | Facility: HOSPITAL | Age: 69
Setting detail: HOSPITAL OUTPATIENT SURGERY
End: 2024-09-18
Payer: MEDICARE

## 2024-09-19 ENCOUNTER — ANESTHESIA (OUTPATIENT)
Dept: OPERATING ROOM | Facility: HOSPITAL | Age: 69
Setting detail: HOSPITAL OUTPATIENT SURGERY
End: 2024-09-19
Payer: MEDICARE

## 2024-09-19 ENCOUNTER — HOSPITAL ENCOUNTER (OUTPATIENT)
Facility: HOSPITAL | Age: 69
Setting detail: HOSPITAL OUTPATIENT SURGERY
Discharge: HOME | End: 2024-09-19
Attending: STUDENT IN AN ORGANIZED HEALTH CARE EDUCATION/TRAINING PROGRAM | Admitting: STUDENT IN AN ORGANIZED HEALTH CARE EDUCATION/TRAINING PROGRAM
Payer: MEDICARE

## 2024-09-19 VITALS
BODY MASS INDEX: 35.51 KG/M2 | HEART RATE: 68 BPM | DIASTOLIC BLOOD PRESSURE: 78 MMHG | RESPIRATION RATE: 16 BRPM | TEMPERATURE: 97.6 F | HEIGHT: 62 IN | OXYGEN SATURATION: 97 % | WEIGHT: 193 LBS | SYSTOLIC BLOOD PRESSURE: 156 MMHG

## 2024-09-19 DIAGNOSIS — N20.0 KIDNEY STONE: ICD-10-CM

## 2024-09-19 PROCEDURE — 71000011 HC PACU PHASE 1 EA ADDL MIN: Performed by: STUDENT IN AN ORGANIZED HEALTH CARE EDUCATION/TRAINING PROGRAM

## 2024-09-19 PROCEDURE — 36000013 HC OR LEVEL 3 EA ADDL MIN: Performed by: STUDENT IN AN ORGANIZED HEALTH CARE EDUCATION/TRAINING PROGRAM

## 2024-09-19 PROCEDURE — 25800000 HC PHARMACY IV SOLUTIONS

## 2024-09-19 PROCEDURE — 25000000 HC PHARMACY GENERAL: Performed by: NURSE ANESTHETIST, CERTIFIED REGISTERED

## 2024-09-19 PROCEDURE — 0T778DZ DILATION OF LEFT URETER WITH INTRALUMINAL DEVICE, VIA NATURAL OR ARTIFICIAL OPENING ENDOSCOPIC: ICD-10-PCS | Performed by: STUDENT IN AN ORGANIZED HEALTH CARE EDUCATION/TRAINING PROGRAM

## 2024-09-19 PROCEDURE — 63600105 HC IODINE BASED CONTRAST: Performed by: STUDENT IN AN ORGANIZED HEALTH CARE EDUCATION/TRAINING PROGRAM

## 2024-09-19 PROCEDURE — 63600000 HC DRUGS/DETAIL CODE: Mod: JZ | Performed by: NURSE ANESTHETIST, CERTIFIED REGISTERED

## 2024-09-19 PROCEDURE — C2617 STENT, NON-COR, TEM W/O DEL: HCPCS | Performed by: STUDENT IN AN ORGANIZED HEALTH CARE EDUCATION/TRAINING PROGRAM

## 2024-09-19 PROCEDURE — 71000012 HC PACU PHASE 2 EA ADDL MIN: Performed by: STUDENT IN AN ORGANIZED HEALTH CARE EDUCATION/TRAINING PROGRAM

## 2024-09-19 PROCEDURE — C1769 GUIDE WIRE: HCPCS | Performed by: STUDENT IN AN ORGANIZED HEALTH CARE EDUCATION/TRAINING PROGRAM

## 2024-09-19 PROCEDURE — 71000001 HC PACU PHASE 1 INITIAL 30MIN: Performed by: STUDENT IN AN ORGANIZED HEALTH CARE EDUCATION/TRAINING PROGRAM

## 2024-09-19 PROCEDURE — 25800000 HC PHARMACY IV SOLUTIONS: Performed by: NURSE ANESTHETIST, CERTIFIED REGISTERED

## 2024-09-19 PROCEDURE — 82365 CALCULUS SPECTROSCOPY: CPT | Performed by: STUDENT IN AN ORGANIZED HEALTH CARE EDUCATION/TRAINING PROGRAM

## 2024-09-19 PROCEDURE — 71000002 HC PACU PHASE 2 INITIAL 30MIN: Performed by: STUDENT IN AN ORGANIZED HEALTH CARE EDUCATION/TRAINING PROGRAM

## 2024-09-19 PROCEDURE — 36000003 HC OR LEVEL 3 INITIAL 30MIN: Performed by: STUDENT IN AN ORGANIZED HEALTH CARE EDUCATION/TRAINING PROGRAM

## 2024-09-19 PROCEDURE — 27200000 HC STERILE SUPPLY: Performed by: STUDENT IN AN ORGANIZED HEALTH CARE EDUCATION/TRAINING PROGRAM

## 2024-09-19 PROCEDURE — 63600000 HC DRUGS/DETAIL CODE: Mod: JZ

## 2024-09-19 PROCEDURE — 0TC78ZZ EXTIRPATION OF MATTER FROM LEFT URETER, VIA NATURAL OR ARTIFICIAL OPENING ENDOSCOPIC: ICD-10-PCS | Performed by: STUDENT IN AN ORGANIZED HEALTH CARE EDUCATION/TRAINING PROGRAM

## 2024-09-19 PROCEDURE — 37000001 HC ANESTHESIA GENERAL: Performed by: STUDENT IN AN ORGANIZED HEALTH CARE EDUCATION/TRAINING PROGRAM

## 2024-09-19 PROCEDURE — C1758 CATHETER, URETERAL: HCPCS | Performed by: STUDENT IN AN ORGANIZED HEALTH CARE EDUCATION/TRAINING PROGRAM

## 2024-09-19 DEVICE — SET URETERAL STENT 6FR L24CM AQ SOFT MONOFILAMENT TETHER GRA: Type: IMPLANTABLE DEVICE | Site: URETER | Status: FUNCTIONAL

## 2024-09-19 RX ORDER — HEPARIN SODIUM 5000 [USP'U]/ML
5000 INJECTION, SOLUTION INTRAVENOUS; SUBCUTANEOUS
Status: COMPLETED | OUTPATIENT
Start: 2024-09-19 | End: 2024-09-19

## 2024-09-19 RX ORDER — TAMSULOSIN HYDROCHLORIDE 0.4 MG/1
0.4 CAPSULE ORAL DAILY
Qty: 30 CAPSULE | Refills: 0 | Status: SHIPPED | OUTPATIENT
Start: 2024-09-19 | End: 2024-11-15

## 2024-09-19 RX ORDER — DEXAMETHASONE SODIUM PHOSPHATE 4 MG/ML
INJECTION, SOLUTION INTRA-ARTICULAR; INTRALESIONAL; INTRAMUSCULAR; INTRAVENOUS; SOFT TISSUE AS NEEDED
Status: DISCONTINUED | OUTPATIENT
Start: 2024-09-19 | End: 2024-09-19 | Stop reason: SURG

## 2024-09-19 RX ORDER — FENTANYL CITRATE 50 UG/ML
INJECTION, SOLUTION INTRAMUSCULAR; INTRAVENOUS AS NEEDED
Status: DISCONTINUED | OUTPATIENT
Start: 2024-09-19 | End: 2024-09-19 | Stop reason: SURG

## 2024-09-19 RX ORDER — SODIUM CHLORIDE 9 MG/ML
INJECTION, SOLUTION INTRAVENOUS CONTINUOUS PRN
Status: DISCONTINUED | OUTPATIENT
Start: 2024-09-19 | End: 2024-09-19 | Stop reason: SURG

## 2024-09-19 RX ORDER — LIDOCAINE HYDROCHLORIDE 10 MG/ML
INJECTION, SOLUTION INFILTRATION; PERINEURAL AS NEEDED
Status: DISCONTINUED | OUTPATIENT
Start: 2024-09-19 | End: 2024-09-19 | Stop reason: SURG

## 2024-09-19 RX ORDER — ONDANSETRON HYDROCHLORIDE 2 MG/ML
INJECTION, SOLUTION INTRAVENOUS AS NEEDED
Status: DISCONTINUED | OUTPATIENT
Start: 2024-09-19 | End: 2024-09-19 | Stop reason: SURG

## 2024-09-19 RX ORDER — MIDAZOLAM HYDROCHLORIDE 2 MG/2ML
INJECTION, SOLUTION INTRAMUSCULAR; INTRAVENOUS AS NEEDED
Status: DISCONTINUED | OUTPATIENT
Start: 2024-09-19 | End: 2024-09-19 | Stop reason: SURG

## 2024-09-19 RX ORDER — OXYBUTYNIN CHLORIDE 5 MG/1
5 TABLET ORAL 3 TIMES DAILY PRN
Qty: 90 TABLET | Refills: 0 | Status: SHIPPED | OUTPATIENT
Start: 2024-09-19 | End: 2024-11-15

## 2024-09-19 RX ORDER — PROPOFOL 10 MG/ML
INJECTION, EMULSION INTRAVENOUS AS NEEDED
Status: DISCONTINUED | OUTPATIENT
Start: 2024-09-19 | End: 2024-09-19 | Stop reason: SURG

## 2024-09-19 RX ORDER — HEPARIN SODIUM 5000 [USP'U]/ML
INJECTION, SOLUTION INTRAVENOUS; SUBCUTANEOUS
Status: COMPLETED
Start: 2024-09-19 | End: 2024-09-19

## 2024-09-19 RX ORDER — ROCURONIUM BROMIDE 10 MG/ML
INJECTION, SOLUTION INTRAVENOUS AS NEEDED
Status: DISCONTINUED | OUTPATIENT
Start: 2024-09-19 | End: 2024-09-19 | Stop reason: SURG

## 2024-09-19 RX ADMIN — MIDAZOLAM HYDROCHLORIDE 2 MG: 1 INJECTION, SOLUTION INTRAMUSCULAR; INTRAVENOUS at 14:27

## 2024-09-19 RX ADMIN — CEFTRIAXONE SODIUM 1 G: 1 INJECTION, POWDER, FOR SOLUTION INTRAMUSCULAR; INTRAVENOUS at 13:16

## 2024-09-19 RX ADMIN — DEXAMETHASONE SODIUM PHOSPHATE 4 MG: 4 INJECTION, SOLUTION INTRA-ARTICULAR; INTRALESIONAL; INTRAMUSCULAR; INTRAVENOUS; SOFT TISSUE at 14:55

## 2024-09-19 RX ADMIN — HEPARIN SODIUM 5000 UNITS: 5000 INJECTION, SOLUTION INTRAVENOUS; SUBCUTANEOUS at 14:04

## 2024-09-19 RX ADMIN — SUGAMMADEX 200 MG: 100 INJECTION, SOLUTION INTRAVENOUS at 15:11

## 2024-09-19 RX ADMIN — FENTANYL CITRATE 100 MCG: 50 INJECTION INTRAMUSCULAR; INTRAVENOUS at 14:36

## 2024-09-19 RX ADMIN — LIDOCAINE HYDROCHLORIDE 8 ML: 10 INJECTION, SOLUTION INFILTRATION; PERINEURAL at 14:36

## 2024-09-19 RX ADMIN — SODIUM CHLORIDE: 0.9 INJECTION, SOLUTION INTRAVENOUS at 14:29

## 2024-09-19 RX ADMIN — ONDANSETRON 4 MG: 2 INJECTION INTRAMUSCULAR; INTRAVENOUS at 14:55

## 2024-09-19 RX ADMIN — PROPOFOL 200 MG: 10 INJECTION, EMULSION INTRAVENOUS at 14:36

## 2024-09-19 RX ADMIN — ROCURONIUM BROMIDE 60 MG: 10 INJECTION, SOLUTION INTRAVENOUS at 14:36

## 2024-09-19 NOTE — PERIOPERATIVE NURSING NOTE
OOB to BR with asst steady on feet - voided without out difficulty. Denies dysuria but moderately blood tinged urine

## 2024-09-19 NOTE — ANESTHESIA PROCEDURE NOTES
Airway  Urgency: elective    Start Time: 9/19/2024 2:40 PM  Airway not difficult    General Information and Staff    Patient location during procedure: OR  Anesthesiologist: Tyra Wolf MD  Resident/CRNA: Debra Moran CRNA  Performed: resident/CRNA   Performed by: Debra Moran CRNA  Authorized by: Tyra Wolf MD      Indications and Patient Condition  Indications for airway management: anesthesia  Sedation level: deep  Preoxygenated: yes  Patient position: sniffing  Mask difficulty assessment: 2 - vent by mask + OA or adjuvant +/- NMBA    Final Airway Details  Final airway type: endotracheal airway      Successful airway: ETT  Cuffed: yes   Successful intubation technique: video laryngoscopy  Facilitating devices/methods: intubating stylet  Endotracheal tube insertion site: oral  Blade: Maurice  Blade size: #3  ETT size (mm): 7.0  Cormack-Lehane Classification: grade I - full view of glottis  Placement verified by: chest auscultation and capnometry   Measured from: teeth  ETT to teeth (cm): 21  Number of attempts at approach: 1  Number of other approaches attempted: 0  Atraumatic airway insertion

## 2024-09-19 NOTE — OP NOTE
Operative Note     Preoperative Diagnosis:   1. Left ureteral stone    Postoperative Diagnosis:   1. Left ureteral stone    Procedure:  Cystoscopy, left ureteroscopy, laser lithotripsy, ureteral stent placement, retrograde pyelogram    Surgeon: Ezio Rm MD    Assistants: Raj Paniagua MD    Anesthesia: General endotracheal anesthesia      Complications: None           Drains: 6 x 24 left ureteral stent    Specimens: left ureteral stone    Estimated Blood Loss:  No blood loss documented.    Findings:   1. On cystourethroscopy the patient had a normal appearing urethra. Bilateral ureteral orifices were in orthotopic position. There were no suspicious bladder mucosal lesions.   2. The stone was encountered in the left distal ureter         Indications of Procedure:  The patient is a 69 y.o. female with a history of a left ureteral stone and small calcifications within the intrarenal collecting system. She now presents for stone treatment    Details of Procedure: Patient was identified by name and medical record number in the holding area.  The risks, benefits, and alternatives to the procedure were discussed and informed consent was obtained. The patient was transported to the operating room and transferred to the operating room table.  Bilateral lower extremity compression devices were applied and in good working order.  Perioperative antibiotics were administered.  General endotracheal anesthesia was administered.  Patient was then positioned in dorsal lithotomy with care to pad all pressure points and bony prominences. The patient was prepped with Betadine and draped in the usual sterile fashion.  A surgical timeout was performed and all parties were in agreement.    We began by performing cystourethroscopy with a 22 Slovenian rigid cystoscope. Those findings are listed above. We cannulated the left ureteral orifice with a 0.038 Motion wire. We then performed ureteroscopy with a semi rigid ureteroscope. We  encountered a stone at the distal ureter. We performed laser lithotripsy with a 365 micron laser fiber. Once the stone was fragmented into sufficiently small pieces with extracted the fragments with an Encircle basket. We advanced the scope to the renal pelvis and deployed a second wire. We then advanced a flexible ureteroscopy into the renal pelvis. WE encountered some small stones that were less than 1 mm in size. We then performed completion pyeloscopy with the aid of contrast irrigation to confirm there were no further sizable fragments.   At this point, we deployed a 6 x 24 ureteral stent  with the aid of fluoroscopy noting the proximal and distal curls in good position. We then drained the patient's bladder and concluded the procedure.     The patient was then awoken from general anesthesia and taken to the recovery room in stable condition.     I, the attending physician, was present for and/or performed all critical aspects of the procedure.

## 2024-09-19 NOTE — OR SURGEON
Pre-Procedure patient identification:  I am the primary operating surgeon/proceduralist and I have reviewed the applicable pathology reports and radiology studies for this procedure. I have identified the patient and confirmed laterality is left on 09/19/24 at 2:01 PM Ezio Rm MD  Phone Number: [phone]

## 2024-09-19 NOTE — ANESTHESIA POSTPROCEDURE EVALUATION
Patient: Jennifer Aly    Procedure Summary       Date: 09/19/24 Room / Location: [facility] PAV OR 09 / [facility] OR [facility]    Anesthesia Start: 1429 Anesthesia Stop: 1528    Procedure: Cystoscopy, Left Ureteroscopy, Laser Lithotripsy, And Stent, Bilateral Retrograde Pyelogram (#[id number]) (Ureter) Diagnosis:       Kidney stone      (Kidney stone [N20.0])    Surgeons: Ezio Rm MD Responsible Provider: Tyra Wolf MD    Anesthesia Type: general ASA Status: 3            Anesthesia Type: general  PACU Vitals  9/19/2024 1522 - 9/19/2024 1622        9/19/2024  1526 9/19/2024  1530 9/19/2024  1545 9/19/2024  1600    BP: 170/80 170/80 148/106 172/79    Temp: 36.1 °C (97 °F) -- -- 36.2 °C (97.2 °F)    Pulse: 69 63 58 55    Resp: 18 14 11 16    SpO2: 95 % 96 % 98 % 97 %                9/19/2024  1615             BP: 168/78       Temp: --       Pulse: 52       Resp: 11       SpO2: 100 %                 Anesthesia Post Evaluation    Pain management: adequate  Mode of pain management: IV medication  Patient location during evaluation: PACU  Patient participation: complete - patient participated  Level of consciousness: awake and alert  Cardiovascular status: acceptable  Airway Patency: adequate  Respiratory status: acceptable  Hydration status: acceptable  Anesthetic complications: no

## 2024-09-19 NOTE — DISCHARGE INSTRUCTIONS
DISCHARGE INSTRUCTIONS FOR URETEROSCOPY     ACTIVITY:   You should be physically active and try to do a little more each day to build your stamina.  You may walk and climb stairs without limitations.   You may not lift more than 15 pounds, or do any vigorous physical activity for 1 week (running, swimming, gym, golf, tennis, etc.).     Prolonged sitting or lying in bed should be avoided     DRIVING:   You should not drive while taking narcotic pain killers     PAIN:  You can expect to have some pain that may require pain medications a few days after discharge, after which Tylenol should be sufficient to control your pain  Do NOT take Percocet and Tylenol at the same time as they both contain Tylenol.  Burning and bleeding with urination are common after this procedure.  It will gradually become less intense after first few times you pass your urine.     MEDICATIONS:   After surgery you should immediately resume your regular medications.   - Coumadin can be restarted tomorrow if no visible blood in the urine. If continuing to have blood in the urine 2 days after procedure, please call the office for guidance.    You may receive the following prescriptions:  A narcotic pain medication  An antibiotic  A stool softener     DIET AND BOWELS:   Drink plenty of fluids during the day.  Water is the best, but juices, coffee, tea are all acceptable.  The purpose is to increase the urine output enough to flush out any blood.   Narcotic pain pills can cause constipation.  If needed, you may use over the counter oral stool softeners (such as miralax) to help your bowels get started.       YOU MAY HAVE AN INTERNAL STENT:   This device is a long skinny tube that is placed during surgery that extends from the kidney down to the bladder.  It allows urine to drain while things heal.     This temporary device is removed in the office 1-2 weeks after your surgery.  It is critical you return to have the stent removed as prolonged  indwelling stents can result in encrustation by stone debris, infection, or loss of kidney function.   While the stent is in place, you may experience “stent discomfort,” such as urinary frequency/urgency, blood in the urine or flank pain with urination.     WHEN TO CALL US:   Temperature of 101.0 degrees or above (fevers <101 may be normal after surgery).   Severe pain - not relieved by narcotic pain medication, especially if it is associated with nausea, vomiting, or dizziness   Pain, burning or other difficulty urinating     APPOINTMENTS:   One to two weeks postoperatively or as instructed for stent removal in the office.   Interval appointments as needed for problems or concerns.

## 2024-09-19 NOTE — ANESTHESIA PREPROCEDURE EVALUATION
Relevant Problems   CARDIOVASCULAR   (+) ROGERS (dyspnea on exertion)   (+) Essential hypertension   (+) Paroxysmal atrial fibrillation (CMS/HCC)      GASTROINTESTINAL   (+) GERD without esophagitis   (+) GI bleed      HEMATOLOGY   (+) Iron deficiency anemia secondary to inadequate dietary iron intake   (+) Lupus anticoagulant syndrome (CMS/HCC)      MUSCULOSKELETAL   (+) Acute gout involving toe of left foot   (+) Polyosteoarthritis, unspecified      RESPIRATORY SYSTEM   (+) ROGERS (dyspnea on exertion)   (+) Obstructive sleep apnea   (+) Pulmonary emboli (CMS/HCC)   (+) Unspecified asthma, uncomplicated      URINARY SYSTEM   (+) Kidney stones      Other   (+) Chronic UTI   (+) Diabetes mellitus (CMS/HCC)       68 y/o F h/o HTN, HLD, questionable AF, asthma, KIM w/CPAP, PE, DM2, s/p ricco-en-y gastric bypass in 2017, GERD, DM, nephrolithiasis here for cyst, laser lithotripsy and stent.    On ozempic- stopped 9 days ago    Anesthesia ROS/MED HX    Anesthesia History    Previous anesthetics  No family history of anesthetic complications  No history of anesthetic complications  Pulmonary    asthma   Sleep apnea   PE  Neuro/Psych - neg  Cardiovascular   ROGERS (stable)   dyslipidemia   hypertension   Cardiac clearance reviewed  Hematological - neg  GI/Hepatic   GERD  Musculoskeletal- neg  Renal Disease   renal calculi  Endo/Other   Diabetes  Body Habitus: Morbidly Obese  ROS/MED HX Comments:    GI/Hepatic/Renal: S/p gastric bypass       Past Surgical History   Procedure Laterality Date    Appendectomy  2008    Cardiac catheterization  11/2012    Colonoscopy      DIAGNOSTIC UPPER GASTROINTESTINAL ENDOSCOPY WITH COLLECTION OF SPECIMEN BY WASHING N/A 1/28/2020    Performed by Cullen Castillo MD at [facility]    DIAGNOSTIC UPPER GASTROINTESTINAL ENDOSCOPY WITH COLLECTION OF SPECIMEN BY WASHING N/A 1/27/2020    Performed by [facility] [place] [facility]    Esophagoscopy / egd      FLEXIBLE COLONOSCOPY PROXIMAL TO SPLENIC FLEXURE WITH  BIOPSY N/A 4/16/2024    Performed by Larry Rock MD at [facility]    FLEXIBLE COLONOSCOPY PROXIMAL TO SPLENIC FLEXURE WITH REMOVAL OF TUMOR USING SNARE N/A 4/16/2024    Performed by Larry Rock MD at [facility]    FLEXIBLE COLONOSCOPY PROXIMAL TO SPLENIC FLEXURE WITH REMOVAL OF TUMOR USING SNARE N/A 7/30/2019    Performed by Larry Rock MD at [facility]    Intracapsular cataract extraction Bilateral 08/2019    Ivc filter retrieval  2012    Ivc filter retrieval  02/2013    Rectourethral fistula repair  2008, 5/2017    Rotator cuff repair Left 01/2016    Alexander-en-y procedure  02/07/2017    Total abdominal hysterectomy  1987    Total knee arthroplasty Bilateral 2010    Total knee arthroplasty Right 2012    revision    Trigger finger release Right 2012    right thumb    Ulnar nerve repair  2013    Kyle tooth extraction         Physical Exam    Airway   Mallampati: II   TM distance: >3 FB  Cardiovascular - normal   Rhythm: regular   Rate: normalPulmonary - normal   clear to auscultation  Dental - normal        ECG 9/16/2024  SB, otherwise nl ECG, no sig change    9/4/2024  WBC 6.8, Hgb 12.9, Plt 239  Na 141, K 4.6, Cl 106, CO2 28, BUN 18, Cr 0.51    10/2022 TTE  LVEF 70%, NWBA, mild LVH, grade 2 DD, nl RV, mildly dilated RA/LA, mild MR/RI, mild to mod TR, RSP 38    Anesthesia Plan    Plan: general    Technique: general endotracheal     Lines and Monitors: PIV     Airway: oral intubation    3 ASA  Anesthetic plan and risks discussed with: patient  Induction:    intravenous   Postop Plan:   Pain Management: IV analgesics

## 2024-09-19 NOTE — ANESTHESIOLOGIST PRE-PROCEDURE ATTESTATION
Pre-Procedure Patient Identification:  I am the Primary Anesthesiologist and have identified the patient on 09/19/24 at 2:02 PM.   I have confirmed the procedure(s) will be performed by the following surgeon/proceduralist Ezio Rm MD.

## 2024-09-23 ENCOUNTER — TELEPHONE (OUTPATIENT)
Dept: PRIMARY CARE | Facility: CLINIC | Age: 69
End: 2024-09-23
Payer: MEDICARE

## 2024-09-23 NOTE — TELEPHONE ENCOUNTER
Thank you patient is currently bridging with lovenox after a procedure.  She knows to take lovenox BID until INR is back between 2-3.

## 2024-09-26 LAB
COMPN STONE: NORMAL
ORIGIN STONE: NORMAL
WT STONE: 0 G

## 2024-10-01 ENCOUNTER — TRANSCRIBE ORDERS (OUTPATIENT)
Dept: LAB | Facility: CLINIC | Age: 69
End: 2024-10-01

## 2024-10-01 ENCOUNTER — APPOINTMENT (OUTPATIENT)
Dept: LAB | Facility: CLINIC | Age: 69
End: 2024-10-01
Attending: STUDENT IN AN ORGANIZED HEALTH CARE EDUCATION/TRAINING PROGRAM
Payer: MEDICARE

## 2024-10-01 DIAGNOSIS — R31.0 GROSS HEMATURIA: Primary | ICD-10-CM

## 2024-10-01 DIAGNOSIS — R31.0 GROSS HEMATURIA: ICD-10-CM

## 2024-10-01 LAB
BACTERIA URNS QL MICRO: ABNORMAL /HPF
BILIRUB UR QL STRIP.AUTO: NEGATIVE MG/DL
CLARITY UR REFRACT.AUTO: CLEAR
COLOR UR AUTO: YELLOW
GLUCOSE UR STRIP.AUTO-MCNC: NEGATIVE MG/DL
HGB UR QL STRIP.AUTO: ABNORMAL
HYALINE CASTS #/AREA URNS LPF: ABNORMAL /LPF
KETONES UR STRIP.AUTO-MCNC: NEGATIVE MG/DL
LEUKOCYTE ESTERASE UR QL STRIP.AUTO: NEGATIVE
MUCOUS THREADS URNS QL MICRO: ABNORMAL /LPF
NITRITE UR QL STRIP.AUTO: NEGATIVE
PH UR STRIP.AUTO: 7.5 [PH]
PROT UR QL STRIP.AUTO: NEGATIVE
RBC #/AREA URNS HPF: ABNORMAL /HPF
SP GR UR REFRACT.AUTO: 1.02
SQUAMOUS URNS QL MICRO: ABNORMAL /HPF
UROBILINOGEN UR STRIP-ACNC: 0.2 EU/DL
WBC #/AREA URNS HPF: ABNORMAL /HPF

## 2024-10-01 PROCEDURE — 87086 URINE CULTURE/COLONY COUNT: CPT

## 2024-10-01 PROCEDURE — 81003 URINALYSIS AUTO W/O SCOPE: CPT

## 2024-10-05 LAB
BACTERIA UR CULT: ABNORMAL

## 2024-10-11 ENCOUNTER — TRANSCRIBE ORDERS (OUTPATIENT)
Dept: SCHEDULING | Age: 69
End: 2024-10-11

## 2024-10-11 DIAGNOSIS — N20.0 CALCULUS OF KIDNEY: Primary | ICD-10-CM

## 2024-10-15 ENCOUNTER — TELEPHONE (OUTPATIENT)
Dept: PRIMARY CARE | Facility: CLINIC | Age: 69
End: 2024-10-15
Payer: MEDICARE

## 2024-10-15 RX ORDER — WARFARIN 4 MG/1
TABLET ORAL
Qty: 90 TABLET | Refills: 3 | Status: SHIPPED | OUTPATIENT
Start: 2024-10-15 | End: 2024-12-13

## 2024-10-15 NOTE — TELEPHONE ENCOUNTER
I spoke with patient.  She will hold her warfarin for 2 days and then restart on October 17.  She will take 8 mg on 3 days/week and 4 mg on all other days, Alternating.

## 2024-10-18 ENCOUNTER — HOSPITAL ENCOUNTER (OUTPATIENT)
Dept: RADIOLOGY | Facility: CLINIC | Age: 69
Discharge: HOME | End: 2024-10-18
Attending: STUDENT IN AN ORGANIZED HEALTH CARE EDUCATION/TRAINING PROGRAM
Payer: MEDICARE

## 2024-10-18 DIAGNOSIS — N20.0 CALCULUS OF KIDNEY: ICD-10-CM

## 2024-10-18 PROCEDURE — 76770 US EXAM ABDO BACK WALL COMP: CPT

## 2024-11-04 ENCOUNTER — TELEPHONE (OUTPATIENT)
Dept: PRIMARY CARE | Facility: CLINIC | Age: 69
End: 2024-11-04
Payer: MEDICARE

## 2024-11-07 RX ORDER — FLUTICASONE PROPIONATE 50 MCG
2 SPRAY, SUSPENSION (ML) NASAL DAILY PRN
Qty: 16 G | Refills: 6 | Status: SHIPPED | OUTPATIENT
Start: 2024-11-07

## 2024-11-07 NOTE — TELEPHONE ENCOUNTER
Dr Hopper, You have not previously ordered this medication (was Dr Moreau's pt, now will be yours).     Please refill, if appropriate.    -----------------------------------------------------    Medicine Refill Request    Last Office Visit: 9/3/2024   Last Consult Visit: Visit date not found  Last Telemedicine Visit: Visit date not found    Next Appointment: 11/15/2024      Current Outpatient Medications:     albuterol HFA 90 mcg/actuation inhaler, Inhale 2 puffs every 6 (six) hours as needed for wheezing., Disp: 18 g, Rfl: 6    atorvastatin (LIPITOR) 40 mg tablet, Take 1 tablet (40 mg total) by mouth daily. (Patient taking differently: Take 40 mg by mouth nightly.), Disp: 90 tablet, Rfl: 3    BIOTIN ORAL, Take 1 tablet by mouth daily., Disp: , Rfl:     calcium carbonate-vitamin D3 500 mg(1,250mg) -200 unit per tablet, Take 1 tablet by mouth daily., Disp: , Rfl:     clobetasol (TEMOVATE) 0.05 % cream, , Disp: , Rfl:     diltiazem (TIADYLT ER) 120 mg 24 hr capsule, Take 1 capsule (120 mg total) by mouth daily. (Patient taking differently: Take 120 mg by mouth nightly.), Disp: 90 capsule, Rfl: 3    fluticasone propionate (FLONASE) 50 mcg/actuation nasal spray, Administer 1 spray into each nostril daily as needed for rhinitis. Make an appointment if help is needed for nasal symptoms., Disp: 16 g, Rfl: 6    HYDROcodone-acetaminophen (NORCO) 5-325 mg per tablet, Take 1 tablet by mouth every 8 (eight) hours as needed for severe pain. for pain, Disp: 15 tablet, Rfl: 0    ketoconazole (NIZORAL) 2 % cream, APPLY ON THE SKIN TWICE A DAY APPLY TO AFFECTED AREA RASH IN GROIN/ABDOMEN TWICE A DAY FOR 3 WEEKS (Patient taking differently: Apply topically as needed.), Disp: , Rfl:     methocarbamoL (ROBAXIN-750) 750 mg tablet, Take one by mouth ONLY as needed every 6-8 hours for muscle pains and spasms (Patient taking differently: Take 750 mg by mouth as needed. Take one by mouth ONLY as needed every 6-8 hours for muscle pains  and spasms), Disp: 50 tablet, Rfl: 1    multivitamin capsule, Take 1 capsule by mouth daily., Disp: , Rfl:     oxybutynin (DITROPAN) 5 mg tablet, Take 1 tablet (5 mg total) by mouth 3 (three) times a day as needed (for stent discomfort or bladder spasms)., Disp: 90 tablet, Rfl: 0    semaglutide (OZEMPIC) 1 mg/dose (4 mg/3 mL) subcutaneous injection, Inject 1 mg under the skin every (seven) 7 days. (Patient taking differently: Inject 1 mg under the skin every (seven) 7 days. LD 9/10/24), Disp: 3 mL, Rfl: 11    SKYRIZI 150 mg/mL syringe, Once every 12 weeks (  Holding next dose until after surgery with DR Rm ), Disp: , Rfl:     tamsulosin (FLOMAX) 0.4 mg capsule, Take 1 capsule (0.4 mg total) by mouth daily., Disp: 30 capsule, Rfl: 0    traMADoL (ULTRAM) 50 mg tablet, Take 1 tablet (50 mg total) by mouth every 8 (eight) hours as needed for moderate pain or severe pain., Disp: 50 tablet, Rfl: 1    vitamin B complex/folic acid (B COMPLEX 100 ORAL), Take 1 tablet by mouth daily., Disp: , Rfl:     warfarin (COUMADIN) 4 mg tablet, Take 8mg every other day three days per week and 4 mg on alternating days four days per week, Disp: 90 tablet, Rfl: 3      BP Readings from Last 3 Encounters:   09/19/24 (!) 156/78   09/16/24 128/79   09/03/24 130/72       Recent Lab results:  Lab Results   Component Value Date    CHOL 167 02/19/2024   ,   Lab Results   Component Value Date    HDL 75 02/19/2024   ,   Lab Results   Component Value Date    LDLCALC 76 02/19/2024   ,   Lab Results   Component Value Date    TRIG 78 02/19/2024        Lab Results   Component Value Date    GLUCOSE 73 06/16/2023   ,   Lab Results   Component Value Date    HGBA1C 5.5 06/16/2023         Lab Results   Component Value Date    CREATININE 0.6 06/16/2023       Lab Results   Component Value Date    TSH 1.42 06/16/2023           Lab Results   Component Value Date    HGBA1C 5.5 06/16/2023

## 2024-11-15 ENCOUNTER — OFFICE VISIT (OUTPATIENT)
Dept: PRIMARY CARE | Facility: CLINIC | Age: 69
End: 2024-11-15
Payer: MEDICARE

## 2024-11-15 VITALS
OXYGEN SATURATION: 98 % | HEART RATE: 72 BPM | HEIGHT: 62 IN | DIASTOLIC BLOOD PRESSURE: 64 MMHG | RESPIRATION RATE: 16 BRPM | BODY MASS INDEX: 35.33 KG/M2 | WEIGHT: 192 LBS | SYSTOLIC BLOOD PRESSURE: 122 MMHG

## 2024-11-15 DIAGNOSIS — D68.62 LUPUS ANTICOAGULANT SYNDROME (CMS/HCC): ICD-10-CM

## 2024-11-15 DIAGNOSIS — E11.618 TYPE 2 DIABETES MELLITUS WITH OTHER DIABETIC ARTHROPATHY, WITHOUT LONG-TERM CURRENT USE OF INSULIN: ICD-10-CM

## 2024-11-15 DIAGNOSIS — Z12.31 ENCOUNTER FOR SCREENING MAMMOGRAM FOR MALIGNANT NEOPLASM OF BREAST: ICD-10-CM

## 2024-11-15 DIAGNOSIS — Z98.84 HISTORY OF BARIATRIC SURGERY: ICD-10-CM

## 2024-11-15 DIAGNOSIS — I10 ESSENTIAL HYPERTENSION: ICD-10-CM

## 2024-11-15 DIAGNOSIS — Z78.0 MENOPAUSE: ICD-10-CM

## 2024-11-15 DIAGNOSIS — K90.9 INTESTINAL MALABSORPTION, UNSPECIFIED TYPE: ICD-10-CM

## 2024-11-15 DIAGNOSIS — L40.9 PSORIASIS: ICD-10-CM

## 2024-11-15 DIAGNOSIS — E66.812 CLASS 2 OBESITY DUE TO EXCESS CALORIES WITHOUT SERIOUS COMORBIDITY WITH BODY MASS INDEX (BMI) OF 35.0 TO 35.9 IN ADULT: ICD-10-CM

## 2024-11-15 DIAGNOSIS — R53.83 FATIGUE, UNSPECIFIED TYPE: ICD-10-CM

## 2024-11-15 DIAGNOSIS — E78.2 MIXED HYPERLIPIDEMIA: ICD-10-CM

## 2024-11-15 DIAGNOSIS — G47.33 OBSTRUCTIVE SLEEP APNEA: Chronic | ICD-10-CM

## 2024-11-15 DIAGNOSIS — M25.50 ARTHRALGIA, UNSPECIFIED JOINT: Primary | ICD-10-CM

## 2024-11-15 DIAGNOSIS — E66.09 CLASS 2 OBESITY DUE TO EXCESS CALORIES WITHOUT SERIOUS COMORBIDITY WITH BODY MASS INDEX (BMI) OF 35.0 TO 35.9 IN ADULT: ICD-10-CM

## 2024-11-15 PROCEDURE — G8752 SYS BP LESS 140: HCPCS | Performed by: INTERNAL MEDICINE

## 2024-11-15 PROCEDURE — 90662 IIV NO PRSV INCREASED AG IM: CPT | Performed by: INTERNAL MEDICINE

## 2024-11-15 PROCEDURE — 99214 OFFICE O/P EST MOD 30 MIN: CPT | Mod: 25 | Performed by: INTERNAL MEDICINE

## 2024-11-15 PROCEDURE — G8754 DIAS BP LESS 90: HCPCS | Performed by: INTERNAL MEDICINE

## 2024-11-15 PROCEDURE — G0008 ADMIN INFLUENZA VIRUS VAC: HCPCS | Performed by: INTERNAL MEDICINE

## 2024-11-15 ASSESSMENT — PATIENT HEALTH QUESTIONNAIRE - PHQ9: SUM OF ALL RESPONSES TO PHQ9 QUESTIONS 1 & 2: 0

## 2024-11-15 NOTE — PROGRESS NOTES
Chelsi Hopper MD    [facility]  [address]  [place] [address]  Phone: [phone]  Fax: [phone]     History of Present Illness     Subjective     Patient ID: Jennifer Aly is a 69 y.o. female.    Patient is here to establish care.    Medical History:  KIM, on CPAP, Dr. Gallo every 6 months  Pulmoary Emboli-hx of IVC filters-taken out  Gout in the past before bariatric surgery, no issues  HTN  Afib, sees Dr. Vasquez.  Found when she had PE. No other episodes.  Not on wararin for this  GERD  Upper GI bleed in hospital for 14 days in the past.  Could not find a source on upper endoscopy or capsule  Lupus anticoagulant syndrome on warfarin  HLD  DMII  Overactive bladder  Nephrothiasis, Dr. Corona  Psoriasis Dermatologist On Syrizi Dr. Rogers-recently suggested she sees a rheumatologist  Allergies and post nasal drip.  Flonase, uses albuterol sometimes    Surgical History:  Bariatric surgery 7-8 lbs ago.  Lost 90 lbs.  Gained 20 lbs since then.  Takes a multivitamin.  3 knee replacements  CHU BSO for endometriosis and fibroids    Health Maintenance:  Last Pap: CHU BSO  Last Mammogram: 1/2023, given new script  Last Colonoscopy: 4/2024 Dr. Rock, return 5 years  Last DEXA: 2023 osteopenia  Last cholesterol and glucose:      Works [employer], , retiring in January  Lives with Artie together, together 17 years, friend marrying them in 2 weeks at the [facility]  1 child, lives near here, Grandchildren       Past Medical/Surgical/Family/Social History       The following have been reviewed and updated as appropriate in this visit:          Past Medical History:   Diagnosis Date    A-fib (CMS/HCC)     Pt denies history    Arthritis     COVID 2022    COVID-19 vaccine series started     Fistula     Fistula of intestine     Dr Rock performed surgery / [place]    GI bleed 2020    Gout     HLD (hyperlipidemia)     Hypertension     IFG  (impaired fasting glucose)     Kidney stone     Obesity     KIM (obstructive sleep apnea)     Polyarthritis     Pre-diabetes     Pulmonary emboli (CMS/HCC) 2014    pulmonary emboli    Sciatica     Torn rotator cuff        Past Surgical History   Procedure Laterality Date    Appendectomy  2008    Cardiac catheterization  11/2012    Colonoscopy      Cystoscopy, Left Ureteroscopy, Laser Lithotripsy, And Stent, Bilateral Retrograde Pyelogram (#[id number]) N/A 9/19/2024    Performed by Ezio Rm MD at [facility] OR [facility]    DIAGNOSTIC UPPER GASTROINTESTINAL ENDOSCOPY WITH COLLECTION OF SPECIMEN BY WASHING N/A 1/28/2020    Performed by Cullen Castillo MD at [facility]    DIAGNOSTIC UPPER GASTROINTESTINAL ENDOSCOPY WITH COLLECTION OF SPECIMEN BY WASHING N/A 1/27/2020    Performed by Cullen Castillo MD at [facility]    Esophagoscopy / egd      FLEXIBLE COLONOSCOPY PROXIMAL TO SPLENIC FLEXURE WITH BIOPSY N/A 4/16/2024    Performed by Larry Rock MD at [facility]    FLEXIBLE COLONOSCOPY PROXIMAL TO SPLENIC FLEXURE WITH REMOVAL OF TUMOR USING SNARE N/A 4/16/2024    Performed by Larry Rock MD at [facility]    FLEXIBLE COLONOSCOPY PROXIMAL TO SPLENIC FLEXURE WITH REMOVAL OF TUMOR USING SNARE N/A 7/30/2019    Performed by Larry Rock MD at [facility]    Intracapsular cataract extraction Bilateral 08/2019    Ivc filter retrieval  2012    Ivc filter retrieval  02/2013    Rectourethral fistula repair  2008, 5/2017    Rotator cuff repair Left 01/2016    Alexander-en-y procedure  02/07/2017    Total abdominal hysterectomy  1987    Total knee arthroplasty Bilateral 2010    Total knee arthroplasty Right 2012    revision    Trigger finger release Right 2012    right thumb    Ulnar nerve repair  2013    [place] tooth extraction         Family History   Problem Relation Name Age of Onset    Diabetes Biological Mother      Lung cancer Biological Mother      Alzheimer's disease Biological Mother      Diabetes type II Biological Father      Heart  failure Biological Father      Heart attack Biological Sister         Social History     Tobacco Use    Smoking status: Former     Current packs/day: 0.00     Types: Cigarettes     Start date:      Quit date:      Years since quittin.9    Smokeless tobacco: Never   Vaping Use    Vaping status: Never Used   Substance Use Topics    Alcohol use: Yes     Alcohol/week: 5.0 standard drinks of alcohol     Types: 5 Glasses of wine per week     Comment: 2 glasses of wine daily    Drug use: No       Patient Care Team:  Chelsi Hopper MD as PCP - General (Internal Medicine)  Tita Man MD as Consulting Physician (Otolaryngology)  Kash Luna DO as Consulting Physician (Hematology and Oncology)  Charles Vasquez MD as Consulting Physician (Cardiovascular Disease)  Jeevan Cole MD as Consulting Physician (Hematology)  Ezio Rm MD as Consulting Physician (Urology)     Allergies and Medications       Allergies   Allergen Reactions    Sulfa (Sulfonamide Antibiotics) Hives       Current Outpatient Medications   Medication Sig Dispense Refill    albuterol HFA 90 mcg/actuation inhaler Inhale 2 puffs every 6 (six) hours as needed for wheezing. 18 g 6    atorvastatin (LIPITOR) 40 mg tablet Take 1 tablet (40 mg total) by mouth daily. (Patient taking differently: Take 40 mg by mouth nightly.) 90 tablet 3    BIOTIN ORAL Take 1 tablet by mouth daily.      calcium carbonate-vitamin D3 500 mg(1,250mg) -200 unit per tablet Take 1 tablet by mouth daily.      clobetasol (TEMOVATE) 0.05 % cream  (Patient taking differently: Apply topically as needed.)      diltiazem (TIADYLT ER) 120 mg 24 hr capsule Take 1 capsule (120 mg total) by mouth daily. (Patient taking differently: Take 120 mg by mouth nightly.) 90 capsule 3    fluticasone propionate (FLONASE) 50 mcg/actuation nasal spray Administer 2 sprays into each nostril daily as needed for rhinitis. Make an appointment if help is needed for nasal symptoms.  "16 g 6    ketoconazole (NIZORAL) 2 % cream APPLY ON THE SKIN TWICE A DAY APPLY TO AFFECTED AREA RASH IN GROIN/ABDOMEN TWICE A DAY FOR 3 WEEKS (Patient taking differently: Apply topically as needed.)      multivitamin capsule Take 1 capsule by mouth daily.      semaglutide (OZEMPIC) 1 mg/dose (4 mg/3 mL) subcutaneous injection Inject 1 mg under the skin every (seven) 7 days. (Patient taking differently: Inject 1 mg under the skin every (seven) 7 days. LD 9/10/24) 3 mL 11    SKYRIZI 150 mg/mL syringe Once every 12 weeks (  Holding next dose until after surgery with DR Rm )      vitamin B complex/folic acid (B COMPLEX 100 ORAL) Take 1 tablet by mouth daily.      warfarin (COUMADIN) 4 mg tablet Take 8mg every other day three days per week and 4 mg on alternating days four days per week 90 tablet 3     No current facility-administered medications for this visit.          Review of Systems       Review of Systems     Physical Examination       Objective     Vitals:    11/15/24 1026   BP: 122/64   Pulse: 72   Resp: 16   SpO2: 98%       Pulse Readings from Last 5 Encounters:   11/15/24 72   09/19/24 68   09/16/24 (!) 54   09/03/24 70   05/07/24 74       Wt Readings from Last 5 Encounters:   11/15/24 87.1 kg (192 lb)   09/19/24 87.5 kg (193 lb)   09/17/24 87.5 kg (193 lb)   09/16/24 89.9 kg (198 lb 3.2 oz)   09/03/24 89.5 kg (197 lb 6.4 oz)       Ht Readings from Last 5 Encounters:   11/15/24 1.575 m (5' 2\")   09/19/24 1.575 m (5' 2\")   09/17/24 1.575 m (5' 2\")   09/16/24 1.575 m (5' 2\")   09/03/24 1.575 m (5' 2.01\")       BP Readings from Last 5 Encounters:   11/15/24 122/64   09/19/24 (!) 156/78   09/16/24 128/79   09/03/24 130/72   05/07/24 120/72       BMI Readings from Last 4 Encounters:   11/15/24 35.12 kg/m²   09/19/24 35.30 kg/m²   09/17/24 35.30 kg/m²   09/16/24 36.25 kg/m²       Physical Exam  Constitutional:       Appearance: Normal appearance.   HENT:      Nose: Nose normal.      Mouth/Throat:      Mouth: " Mucous membranes are moist.   Eyes:      Conjunctiva/sclera: Conjunctivae normal.   Cardiovascular:      Rate and Rhythm: Normal rate and regular rhythm.      Heart sounds: Normal heart sounds. No murmur heard.  Pulmonary:      Effort: Pulmonary effort is normal.      Breath sounds: Normal breath sounds. No wheezing.   Neurological:      Mental Status: She is alert.   Psychiatric:         Mood and Affect: Mood normal.         Behavior: Behavior normal.         Thought Content: Thought content normal.          Laboratory Results     Lab Results   Component Value Date    WBC 4.93 06/16/2023    WBC 8.04 01/10/2023    WBC 5.62 10/24/2022    WBC 5.62 10/24/2022    HGB 12.6 06/16/2023    HGB 12.7 01/10/2023    HGB 12.4 10/24/2022    HGB 12.4 10/24/2022    HCT 39.8 06/16/2023    HCT 38.7 01/10/2023    HCT 39.5 10/24/2022    HCT 39.5 10/24/2022    MCV 95.4 06/16/2023    MCV 85.8 01/10/2023    MCV 93.8 10/24/2022    MCV 93.8 10/24/2022     06/16/2023     01/10/2023     10/24/2022     10/24/2022        Lab Results   Component Value Date    GLUCOSE 73 06/16/2023    GLUCOSE 77 10/24/2022    GLUCOSE 91 11/02/2021    CALCIUM 9.9 06/16/2023    CALCIUM 9.9 10/24/2022    CALCIUM 9.9 11/02/2021     06/16/2023     10/24/2022     11/02/2021    K 4.9 06/16/2023    K 4.2 10/24/2022    K 4.7 11/02/2021    CO2 26 06/16/2023    CO2 24 10/24/2022    CO2 23 11/02/2021     06/16/2023     10/24/2022     11/02/2021    BUN 13 06/16/2023    BUN 12 10/24/2022    BUN 19 11/02/2021    CREATININE 0.6 06/16/2023    CREATININE 0.6 10/24/2022    CREATININE 0.6 11/02/2021    ALKPHOS 109 06/16/2023    ALKPHOS 95 10/24/2022    ALKPHOS 100 11/02/2021    AST 29 06/16/2023    AST 22 10/24/2022    AST 27 11/02/2021    ALT 33 06/16/2023    ALT 20 10/24/2022    ALT 27 11/02/2021    BILITOT 1.1 06/16/2023    BILITOT 0.6 10/24/2022    BILITOT 0.8 11/02/2021    ALBUMIN 4.0 06/16/2023    ALBUMIN 4.0  "10/24/2022    ALBUMIN 4.2 11/02/2021       Lab Results   Component Value Date    CHOL 167 02/19/2024    CHOL 162 06/16/2023    CHOL 135 10/24/2022     Lab Results   Component Value Date    HDL 75 02/19/2024    HDL 68 06/16/2023    HDL 72 10/24/2022     Lab Results   Component Value Date    LDLCALC 76 02/19/2024    LDLCALC 80 06/16/2023    LDLCALC 53 10/24/2022     Lab Results   Component Value Date    TRIG 78 02/19/2024    TRIG 71 06/16/2023    TRIG 49 10/24/2022       The 10-year ASCVD risk score (Nelson MONTOYA, et al., 2019) is: 21.4%    Values used to calculate the score:      Age: 69 years      Sex: Female      Is Non- : No      Diabetic: Yes      Tobacco smoker: No      Systolic Blood Pressure: 122 mmHg      Is BP treated: Yes      HDL Cholesterol: 75 mg/dL      Total Cholesterol: 167 mg/dL    Lab Results   Component Value Date    TSH 1.42 06/16/2023    TSH 1.18 10/24/2022    TSH 1.83 11/02/2021     No results found for: \"FREET4\"      Lab Results   Component Value Date    HGBA1C 5.5 06/16/2023    HGBA1C 5.8 (H) 11/02/2021    HGBA1C 6.3 (H) 01/20/2020       Lab Results   Component Value Date    CREATUR 213.1 04/05/2017    CREATUR 122.1 03/22/2016     Lab Results   Component Value Date    MICROALBUR 5.0 06/16/2023    MICROALBUR 7.0 11/02/2021    MICROALBUR 7.4 04/05/2017     No results found for: \"ALBCRET\"    Lab Results   Component Value Date    HEPCAB Nonreactive 11/02/2021       Lab Results   Component Value Date    MG 1.9 02/03/2020    MG 1.9 02/02/2020    MG 1.9 02/01/2020            Immunization History   Administered Date(s) Administered    INFLUENZA VACCINE QUAD ADJUVANTED 65 and OLDER 09/30/2020, 09/16/2021, 10/05/2022, 11/01/2023    Influenza Trivalent High Dose Preservative Free 65 yrs and up IM 10/25/2016, 09/14/2017, 11/15/2024    Influenza Vaccine High Dose 65 And Older 09/25/2018, 09/16/2020    Influenza Vaccine Quadrivalent Preservative Free 6 Mons and Up 10/02/2019    " Influenza, Unspecified 09/25/2018    PPD Test 01/06/2011, 10/23/2018    Pneumococcal Conjugate 13-Valent 10/25/2016, 09/14/2017    Pneumococcal Conjugate 20-Valent 10/05/2022    Pneumococcal Polysaccharide 10/25/2016    SARS-COV-2 (COVID-19) VACCINE, MODERNA MONOVALENT 11/19/2021    SARS-COV-2 (COVID-19) VACCINE, MODERNA MONOVALENT BOOSTER 11/19/2021    Sars-cov-2 (Covid-19) Vaccine, Jodee 03/13/2021    Zoster 10/25/2017    Zoster Vaccine Recombinant Adjuvanted (Shingrix) 08/15/2018, 07/08/2019, 10/02/2019         Health Maintenance Topics with due status: Overdue       Topic Date Due    Advance Care Planning Never done    Medicare Annual Wellness Visit Never done    DTaP, Tdap, and Td Vaccines Never done    RSV Vaccine Never done    Falls Risk Screening Never done    DEXA Scan 12/30/2023    Breast Cancer Screening 01/27/2024    Diabetes Kidney Health Evaluation: eGFR 06/16/2024    Diabetes Kidney Health Evaluation:  uACR 06/16/2024    Hemoglobin A1C 06/16/2024    COVID-19 Vaccine 09/01/2024    Diabetic Foot Exam 11/01/2024     Health Maintenance Topics with due status: Not Due       Topic Last Completion Date    Colorectal Cancer Screening 04/16/2024    Annual Dilated Retinal Exam 04/30/2024    Depression Screening 11/15/2024     Health Maintenance Topics with due status: Completed       Topic Last Completion Date    Zoster Vaccine 10/02/2019    Hepatitis C Screening 11/02/2021    Pneumococcal (65 years and older) 10/05/2022    Influenza Vaccine 11/15/2024     Health Maintenance Topics with due status: Aged Out       Topic Date Due    Meningococcal ACWY Aged Out    RSV <20 months Aged Out    HIB Vaccines Aged Out    Hepatitis B Vaccines Aged Out    IPV Vaccines Aged Out    HPV Vaccines Aged Out        Assessment and Plan       Assessment/Plan     Problem List Items Addressed This Visit       Obstructive sleep apnea (Chronic)    Overview     On CPAP  Followed by Dr. Gallo         Essential hypertension    Current  Assessment & Plan     Well controlled on diltiazem         HLD (hyperlipidemia)    Relevant Orders    Lipid panel    Psoriasis    Overview     On Skyrizi followed by Dermatology         Current Assessment & Plan     Pt recently suggested to see rheumatology given some arthralgias as well.  Will check MAURIZIO with next labs         Relevant Orders    Ambulatory referral to Rheumatology    MAURIZIO Screen (Automated)    Diabetes mellitus (CMS/[facility])    Current Assessment & Plan     A1c 5.5 in June.  Repeat labs.  Continue semaglutide, atorvastatin.  Blood pressure under good control.  Check in on eye exams, do foot exam next visit.         Lupus anticoagulant syndrome (CMS/[facility])    Overview     History of PE  On warfarin lifelong         Current Assessment & Plan     Continue warfarin.  She has home monitoring with INR weekly.  She will be seeing rheumatology for psoriasis and arthralgias.  Pt has copy of her bloodwork showing positive antibody diagnosis lupus antigoagulant         History of bariatric surgery    Current Assessment & Plan     Advised multivitamin.  Check labs as ordered         Relevant Orders    Lipid panel    CBC    Comprehensive metabolic panel    TSH w reflex FT4    Vitamin D 25 hydroxy    Ferritin    Vitamin A (Retinol)    Vitamin B1 (Thiamine), Blood    Vitamin B12    Folate    Copper    Zinc, Plasma     Other Visit Diagnoses       Arthralgia, unspecified joint    -  Primary    Relevant Orders    Ambulatory referral to Rheumatology    MAURIZIO Screen (Automated)    Encounter for screening mammogram for malignant neoplasm of breast        Relevant Orders    BI SCREENING MAMMOGRAM BILATERAL(TOMOSYNTHESIS)    Menopause        Relevant Orders    DEXA BONE DENSITY    Intestinal malabsorption, unspecified type        Relevant Orders    Lipid panel    CBC    Comprehensive metabolic panel    TSH w reflex FT4    Vitamin D 25 hydroxy    Ferritin    Vitamin A (Retinol)    Vitamin B1 (Thiamine), Blood    Vitamin B12     Folate    Copper    Zinc, Plasma    Class 2 obesity due to excess calories without serious comorbidity with body mass index (BMI) of 35.0 to 35.9 in adult        Relevant Orders    TSH w reflex FT4    Fatigue, unspecified type        Relevant Orders    TSH w reflex FT4            Return in about 6 months (around 5/15/2025) for 6 month follow up.    Orders Placed This Encounter   Procedures    BI SCREENING MAMMOGRAM BILATERAL(TOMOSYNTHESIS)     Standing Status:   Future     Standing Expiration Date:   1/15/2026     Order Specific Question:   Release to patient     Answer:   Immediate [1]    DEXA BONE DENSITY     Standing Status:   Future     Standing Expiration Date:   11/15/2025     Order Specific Question:   Release to patient     Answer:   Immediate [1]    Influenza vaccine high dose trivalent 65 and older IM (Fluzone High Dose)    Lipid panel     Standing Status:   Future     Standing Expiration Date:   11/15/2025     Order Specific Question:   Release to patient     Answer:   Immediate     Order Specific Question:   Release to patient     Answer:   Immediate [1]    CBC     Standing Status:   Future     Standing Expiration Date:   11/15/2025     Order Specific Question:   Release to patient     Answer:   Immediate     Order Specific Question:   Release to patient     Answer:   Immediate [1]    Comprehensive metabolic panel     Standing Status:   Future     Standing Expiration Date:   11/15/2025     Order Specific Question:   Release to patient     Answer:   Immediate     Order Specific Question:   Release to patient     Answer:   Immediate [1]    TSH w reflex FT4     Standing Status:   Future     Standing Expiration Date:   11/15/2025     Order Specific Question:   Release to patient     Answer:   Immediate     Order Specific Question:   Release to patient     Answer:   Immediate [1]    Vitamin D 25 hydroxy     Standing Status:   Future     Standing Expiration Date:   11/15/2025     Order Specific Question:    Release to patient     Answer:   Immediate     Order Specific Question:   Release to patient     Answer:   Immediate [1]    Ferritin     Standing Status:   Future     Standing Expiration Date:   11/15/2025     Order Specific Question:   Release to patient     Answer:   Immediate     Order Specific Question:   Release to patient     Answer:   Immediate [1]    Vitamin A (Retinol)     Standing Status:   Future     Standing Expiration Date:   11/15/2025     Order Specific Question:   Release to patient     Answer:   Immediate [1]    Vitamin B1 (Thiamine), Blood     Standing Status:   Future     Standing Expiration Date:   11/15/2025     Order Specific Question:   Release to patient     Answer:   Immediate [1]    Vitamin B12     Standing Status:   Future     Standing Expiration Date:   11/15/2025     Order Specific Question:   Release to patient     Answer:   Immediate [1]    Folate     Standing Status:   Future     Standing Expiration Date:   11/15/2025     Order Specific Question:   Release to patient     Answer:   Immediate [1]    Copper     Standing Status:   Future     Standing Expiration Date:   11/15/2025     Order Specific Question:   Release to patient     Answer:   Immediate [1]    Zinc, Plasma     Standing Status:   Future     Standing Expiration Date:   11/15/2025     Order Specific Question:   Release to patient     Answer:   Immediate [1]    MAURIZIO Screen (Automated)     Standing Status:   Future     Standing Expiration Date:   11/15/2025     Order Specific Question:   Release to patient     Answer:   Immediate     Order Specific Question:   Release to patient     Answer:   Immediate [1]    Ambulatory referral to Rheumatology     Standing Status:   Future     Standing Expiration Date:   5/15/2025     Referral Priority:   Routine     Referral Type:   Consultation     Referral Reason:   Specialty Services Required     Referred to Provider:   Anu Vargas MD     Requested Specialty:   Rheumatology      Number of Visits Requested:   1              Chelsi Hopper MD    11/18/2024

## 2024-11-18 PROBLEM — Z98.84 HISTORY OF BARIATRIC SURGERY: Status: ACTIVE | Noted: 2024-11-18

## 2024-11-18 NOTE — ASSESSMENT & PLAN NOTE
A1c 5.5 in June.  Repeat labs.  Continue semaglutide, atorvastatin.  Blood pressure under good control.  Check in on eye exams, do foot exam next visit.

## 2024-11-18 NOTE — ASSESSMENT & PLAN NOTE
Pt recently suggested to see rheumatology given some arthralgias as well.  Will check MAURIZIO with next labs

## 2024-11-18 NOTE — ASSESSMENT & PLAN NOTE
Continue warfarin.  She has home monitoring with INR weekly.  She will be seeing rheumatology for psoriasis and arthralgias.  Pt has copy of her bloodwork showing positive antibody diagnosis lupus antigoagulant

## 2024-12-04 ENCOUNTER — TRANSCRIBE ORDERS (OUTPATIENT)
Dept: LAB | Facility: CLINIC | Age: 69
End: 2024-12-04

## 2024-12-04 ENCOUNTER — APPOINTMENT (OUTPATIENT)
Dept: LAB | Facility: CLINIC | Age: 69
End: 2024-12-04
Attending: INTERNAL MEDICINE
Payer: MEDICARE

## 2024-12-04 DIAGNOSIS — E66.09 CLASS 2 OBESITY DUE TO EXCESS CALORIES WITHOUT SERIOUS COMORBIDITY WITH BODY MASS INDEX (BMI) OF 35.0 TO 35.9 IN ADULT: ICD-10-CM

## 2024-12-04 DIAGNOSIS — L40.9 PSORIASIS: ICD-10-CM

## 2024-12-04 DIAGNOSIS — K90.9 INTESTINAL MALABSORPTION, UNSPECIFIED TYPE: ICD-10-CM

## 2024-12-04 DIAGNOSIS — M25.50 ARTHRALGIA, UNSPECIFIED JOINT: ICD-10-CM

## 2024-12-04 DIAGNOSIS — E78.2 MIXED HYPERLIPIDEMIA: ICD-10-CM

## 2024-12-04 DIAGNOSIS — E66.812 CLASS 2 OBESITY DUE TO EXCESS CALORIES WITHOUT SERIOUS COMORBIDITY WITH BODY MASS INDEX (BMI) OF 35.0 TO 35.9 IN ADULT: ICD-10-CM

## 2024-12-04 DIAGNOSIS — L40.0 PSORIASIS VULGARIS: Primary | ICD-10-CM

## 2024-12-04 DIAGNOSIS — R53.83 FATIGUE, UNSPECIFIED TYPE: ICD-10-CM

## 2024-12-04 DIAGNOSIS — L40.0 PSORIASIS VULGARIS: ICD-10-CM

## 2024-12-04 DIAGNOSIS — Z98.84 HISTORY OF BARIATRIC SURGERY: ICD-10-CM

## 2024-12-04 LAB
25(OH)D3 SERPL-MCNC: 56 NG/ML (ref 30–100)
ALBUMIN SERPL-MCNC: 4.3 G/DL (ref 3.5–5.7)
ALP SERPL-CCNC: 116 IU/L (ref 34–125)
ALT SERPL-CCNC: 24 IU/L (ref 7–52)
ANION GAP SERPL CALC-SCNC: 5 MEQ/L (ref 3–15)
AST SERPL-CCNC: 23 IU/L (ref 13–39)
BILIRUB SERPL-MCNC: 0.9 MG/DL (ref 0.3–1.2)
BUN SERPL-MCNC: 18 MG/DL (ref 7–25)
CALCIUM SERPL-MCNC: 9.8 MG/DL (ref 8.6–10.3)
CHLORIDE SERPL-SCNC: 107 MEQ/L (ref 98–107)
CHOLEST SERPL-MCNC: 155 MG/DL
CO2 SERPL-SCNC: 28 MEQ/L (ref 21–31)
CREAT SERPL-MCNC: 0.5 MG/DL (ref 0.6–1.2)
EGFRCR SERPLBLD CKD-EPI 2021: >60 ML/MIN/1.73M*2
ERYTHROCYTE [DISTWIDTH] IN BLOOD BY AUTOMATED COUNT: 14.6 % (ref 11.7–14.4)
FERRITIN SERPL-MCNC: 14 NG/ML (ref 11–250)
FOLATE SERPL-MCNC: >20 NG/ML
GLUCOSE SERPL-MCNC: 81 MG/DL (ref 70–99)
HCT VFR BLD AUTO: 41.5 % (ref 35–45)
HDLC SERPL-MCNC: 83 MG/DL
HDLC SERPL: 1.9 {RATIO}
HGB BLD-MCNC: 13.3 G/DL (ref 11.8–15.7)
LDLC SERPL CALC-MCNC: 58 MG/DL
MCH RBC QN AUTO: 30.9 PG (ref 28–33.2)
MCHC RBC AUTO-ENTMCNC: 32 G/DL (ref 32.2–35.5)
MCV RBC AUTO: 96.3 FL (ref 83–98)
NONHDLC SERPL-MCNC: 72 MG/DL
PLATELET # BLD AUTO: 236 K/UL (ref 150–369)
PMV BLD AUTO: 10.8 FL (ref 9.4–12.3)
POTASSIUM SERPL-SCNC: 4.1 MEQ/L (ref 3.5–5.1)
PROT SERPL-MCNC: 6.9 G/DL (ref 6–8.2)
RBC # BLD AUTO: 4.31 M/UL (ref 3.93–5.22)
SODIUM SERPL-SCNC: 140 MEQ/L (ref 136–145)
TRIGL SERPL-MCNC: 70 MG/DL
TSH SERPL DL<=0.05 MIU/L-ACNC: 0.8 MIU/L (ref 0.34–5.6)
VIT B12 SERPL-MCNC: 706 PG/ML (ref 180–914)
WBC # BLD AUTO: 5.84 K/UL (ref 3.8–10.5)

## 2024-12-04 PROCEDURE — 86038 ANTINUCLEAR ANTIBODIES: CPT

## 2024-12-04 PROCEDURE — 84590 ASSAY OF VITAMIN A: CPT

## 2024-12-04 PROCEDURE — 86480 TB TEST CELL IMMUN MEASURE: CPT

## 2024-12-04 PROCEDURE — 84443 ASSAY THYROID STIM HORMONE: CPT

## 2024-12-04 PROCEDURE — 84630 ASSAY OF ZINC: CPT

## 2024-12-04 PROCEDURE — 80053 COMPREHEN METABOLIC PANEL: CPT

## 2024-12-04 PROCEDURE — 82728 ASSAY OF FERRITIN: CPT

## 2024-12-04 PROCEDURE — 82525 ASSAY OF COPPER: CPT

## 2024-12-04 PROCEDURE — 36415 COLL VENOUS BLD VENIPUNCTURE: CPT

## 2024-12-04 PROCEDURE — 82607 VITAMIN B-12: CPT

## 2024-12-04 PROCEDURE — 80061 LIPID PANEL: CPT

## 2024-12-04 PROCEDURE — 85027 COMPLETE CBC AUTOMATED: CPT

## 2024-12-04 PROCEDURE — 82746 ASSAY OF FOLIC ACID SERUM: CPT

## 2024-12-04 PROCEDURE — 84425 ASSAY OF VITAMIN B-1: CPT

## 2024-12-04 PROCEDURE — 82306 VITAMIN D 25 HYDROXY: CPT

## 2024-12-05 LAB
ANA SER QL IA: NEGATIVE
M TB IFN-G BLD-IMP: NEGATIVE
MITOGEN-NIL: 1.45 IU/ML
NIL: 0.02 IU/ML
TB AG-NIL: 0 IU/ML
TB2 AG - NIL: 0 IU/ML

## 2024-12-05 NOTE — RESULT ENCOUNTER NOTE
James Rodriguez,  Your labs so far are looking great!  I am waiting on a few more vitamin levels and the rheumatology lab.  I will be in touch.  Please let me know if you have any questions.  Best,  Chelsi Hopper MD

## 2024-12-07 LAB
COPPER SERPL-MCNC: 141 MCG/DL (ref 70–175)
VIT A SERPL-MCNC: 40 MCG/DL (ref 38–98)
ZINC SERPL-MCNC: 67 MCG/DL (ref 60–130)

## 2024-12-09 LAB — VIT B1 BLD-SCNC: 370 NMOL/L (ref 78–185)

## 2024-12-10 NOTE — RESULT ENCOUNTER NOTE
James Rodriguez,  All of the vitamin labs have now come back-(tested because of the history of gastric bypass surgery).  They all look good.  It does look like vitamin B1 (also called thiamine) is elevated.  If you have extra of this in any supplements you take I would cut back on the dosage but otherwise no concerns.  Please let me know if you have any questions.  Best,  Chelsi Hopper MD

## 2025-01-03 ENCOUNTER — HOSPITAL ENCOUNTER (OUTPATIENT)
Dept: RADIOLOGY | Facility: CLINIC | Age: 70
Discharge: HOME | End: 2025-01-03
Attending: INTERNAL MEDICINE
Payer: MEDICARE

## 2025-01-03 DIAGNOSIS — Z12.31 ENCOUNTER FOR SCREENING MAMMOGRAM FOR MALIGNANT NEOPLASM OF BREAST: ICD-10-CM

## 2025-01-03 DIAGNOSIS — Z78.0 MENOPAUSE: ICD-10-CM

## 2025-01-03 PROCEDURE — 77063 BREAST TOMOSYNTHESIS BI: CPT

## 2025-01-03 PROCEDURE — 77080 DXA BONE DENSITY AXIAL: CPT

## 2025-01-04 ENCOUNTER — HOSPITAL ENCOUNTER (OUTPATIENT)
Facility: CLINIC | Age: 70
Discharge: HOME | End: 2025-01-04
Attending: FAMILY MEDICINE
Payer: MEDICARE

## 2025-01-04 VITALS — WEIGHT: 190 LBS | BODY MASS INDEX: 34.96 KG/M2 | TEMPERATURE: 98.2 F | HEIGHT: 62 IN | OXYGEN SATURATION: 96 %

## 2025-01-04 DIAGNOSIS — H61.21 IMPACTED CERUMEN OF RIGHT EAR: ICD-10-CM

## 2025-01-04 DIAGNOSIS — H66.92 ACUTE LEFT OTITIS MEDIA: Primary | ICD-10-CM

## 2025-01-04 PROCEDURE — 99213 OFFICE O/P EST LOW 20 MIN: CPT | Performed by: NURSE PRACTITIONER

## 2025-01-04 RX ORDER — AMOXICILLIN AND CLAVULANATE POTASSIUM 875; 125 MG/1; MG/1
1 TABLET, FILM COATED ORAL
Qty: 14 TABLET | Refills: 0 | Status: SHIPPED | OUTPATIENT
Start: 2025-01-04 | End: 2025-01-11

## 2025-01-04 ASSESSMENT — ENCOUNTER SYMPTOMS: FEVER: 0

## 2025-01-04 NOTE — ED PROVIDER NOTES
Emergency Medicine Note  HPI   HISTORY OF PRESENT ILLNESS     70 y/o female presents today with c/o bilateral ear pain and fullness. Symptoms started a few days ago. States that she irrigated her left ear and was able to remove the wax but is now having significant pain.  States that her right ear feels clogged.          Patient History   PAST HISTORY     Reviewed from Nursing Triage:       Past Medical History:   Diagnosis Date    A-fib (CMS/HCC)     Pt denies history    Arthritis     COVID 2022    COVID-19 vaccine series started     Fistula     Fistula of intestine     Dr Rock performed surgery / [place]    GI bleed 2020    Gout     HLD (hyperlipidemia)     Hypertension     IFG (impaired fasting glucose)     Kidney stone     Obesity     KIM (obstructive sleep apnea)     Polyarthritis     Pre-diabetes     Pulmonary emboli (CMS/HCC) 2014    pulmonary emboli    Sciatica     Torn rotator cuff        Past Surgical History   Procedure Laterality Date    Appendectomy  2008    Cardiac catheterization  11/2012    Colonoscopy      Cystoscopy, Left Ureteroscopy, Laser Lithotripsy, And Stent, Bilateral Retrograde Pyelogram (#[id number]) N/A 9/19/2024    Performed by Ezio Rm MD at [facility] OR [facility]    DIAGNOSTIC UPPER GASTROINTESTINAL ENDOSCOPY WITH COLLECTION OF SPECIMEN BY WASHING N/A 1/28/2020    Performed by Cullen Castillo MD at [facility]    DIAGNOSTIC UPPER GASTROINTESTINAL ENDOSCOPY WITH COLLECTION OF SPECIMEN BY WASHING N/A 1/27/2020    Performed by Cullen Castillo MD at [facility]    Esophagoscopy / egd      FLEXIBLE COLONOSCOPY PROXIMAL TO SPLENIC FLEXURE WITH BIOPSY N/A 4/16/2024    Performed by Larry Rock MD at [facility]    FLEXIBLE COLONOSCOPY PROXIMAL TO SPLENIC FLEXURE WITH REMOVAL OF TUMOR USING SNARE N/A 4/16/2024    Performed by Larry Rock MD at [facility]    FLEXIBLE COLONOSCOPY PROXIMAL TO SPLENIC FLEXURE WITH REMOVAL OF TUMOR USING SNARE N/A 7/30/2019    Performed by Larry Rock MD at [facility]     Intracapsular cataract extraction Bilateral 2019    Ivc filter retrieval  2012    Ivc filter retrieval  2013    Rectourethral fistula repair  2008, 2017    Rotator cuff repair Left 2016    Alexander-en-y procedure  2017    Total abdominal hysterectomy  1987    Total knee arthroplasty Bilateral 2010    Total knee arthroplasty Right 2012    revision    Trigger finger release Right 2012    right thumb    Ulnar nerve repair  2013    [place] tooth extraction         Family History   Problem Relation Name Age of Onset    Diabetes Biological Mother      Lung cancer Biological Mother      Alzheimer's disease Biological Mother      Diabetes type II Biological Father      Heart failure Biological Father      Heart attack Biological Sister         Social History     Tobacco Use    Smoking status: Former     Current packs/day: 0.00     Types: Cigarettes     Start date:      Quit date:      Years since quittin.0    Smokeless tobacco: Never   Vaping Use    Vaping status: Never Used   Substance Use Topics    Alcohol use: Yes     Alcohol/week: 5.0 standard drinks of alcohol     Types: 5 Glasses of wine per week     Comment: 2 glasses of wine daily    Drug use: No         Review of Systems   REVIEW OF SYSTEMS     Review of Systems   Constitutional:  Negative for fever.   HENT:  Positive for ear pain (right). Negative for ear discharge.          VITALS     ED Vitals      Date/Time Temp Pulse Resp BP SpO2 [facility]   25 1444 36.8 °C (98.2 °F) -- -- -- 96 % KR                         Physical Exam   PHYSICAL EXAM     Physical Exam  Vitals reviewed.   Constitutional:       Appearance: Normal appearance.   HENT:      Right Ear: There is impacted cerumen.      Left Ear: Tympanic membrane is erythematous and bulging.   Neurological:      Mental Status: She is alert and oriented to person, place, and time.   Psychiatric:         Mood and Affect: Mood normal.         Behavior: Behavior normal.         Thought Content:  Thought content normal.         Judgment: Judgment normal.           PROCEDURES     Ear Cerumen Removal    Date/Time: 1/4/2025 3:29 PM    Performed by: Riya Lee CRNP  Authorized by: Kelsi Hernandez MD    Consent:     Consent obtained:  Verbal    Consent given by:  Patient    Risks discussed:  Bleeding, infection, pain and incomplete removal  Universal protocol:     Patient identity confirmed:  Verbally with patient  Procedure details:     Location:  L ear and R ear    Procedure type: irrigation    Post-procedure details:     Inspection:  TM intact and no bleeding    Hearing quality:  Improved    Procedure completion:  Tolerated well, no immediate complications       DATA     Results       None                No orders to display       Scoring tools                                  ED Course & MDM   MDM / ED COURSE / CLINICAL IMPRESSION / DISPO     Medical Decision Making  Patient with acute left otitis media on examination.  Right ear completely blocked with cerumen.  Cerumen was successfully removed via irrigation in the office today.  Post removal of the right ear, there were no signs of infection noted.  Will start on Augmentin p.o. twice daily x 7 days for the otitis media of the left ear           Clinical Impression      None                 Riya Lee CRNP  01/04/25 [id number]

## 2025-01-06 NOTE — RESULT ENCOUNTER NOTE
James Rodriguez,  Your bone density scan shows osteopenia.  Your bone density scan shows slightly low bone density (osteopenia).  I recommend the following:  -1200 mg of calcium in diet or supplement form daily  -1000 units of vitamin D daily  - weightbearing exercise such as walking  - Repeat DEXA in 2 years  Please let me know if you have any questions.  Best,  Chelsi Hopper MD

## 2025-01-29 NOTE — PROGRESS NOTES
Cardiology  Office Progress Note         Reason for visit:   Chief Complaint   Patient presents with    Follow-up       HPI     Jennifer Aly is a 69 y.o. female who presents to the office for cardiovascular follow up and management of  ROGERS, HTN, pure hypercholesteremia, and questionable AF?- patient denies . Other PMH includes  Sleep apnea with cpap, PE-PCP, type 2 DM, and GI bleed, Bariatric surgery in 2017.     She was last seen in the office 9/16/24 with Breanne GIVENS  for clearance for a procedure and she continued to be active walking 12-14K steps daily. She noted some mild SOB with strenuous activity. She continued to monitor her heart rhythm without any afib alerts and was checking her BP at home and this remained controlled. She was cleared for her upcoming procedure and was asked to return in follow up in 5 months.    She returns today for a TTE. She continues to have no afib alerts on her fitbit.she is feeling well and denies any cardiac complaints and continues to take her medication as prescribed.     12/4/24 FLP , Tgl 70, HDL 83, LDL 58   CMP BUN 18, Creat 0.5 eGFR >60   Past Medical History:   Diagnosis Date    A-fib (CMS/HCC)     Pt denies history    Arthritis     COVID 2022    COVID-19 vaccine series started     Fistula     Fistula of intestine     Dr Rock performed surgery / [facility]    GI bleed 2020    Gout     HLD (hyperlipidemia)     Hypertension     IFG (impaired fasting glucose)     Kidney stone     Obesity     KIM (obstructive sleep apnea)     Polyarthritis     Pre-diabetes     Pulmonary emboli (CMS/HCC) 2014    pulmonary emboli    Sciatica     Torn rotator cuff        Past Surgical History   Procedure Laterality Date    Appendectomy  2008    Cardiac catheterization  11/2012    Colonoscopy      Cystoscopy, Left Ureteroscopy, Laser Lithotripsy, And Stent, Bilateral Retrograde Pyelogram (#[id number]) N/A 9/19/2024    Performed by Ezio Rm MD at [facility] OR [facility]    DIAGNOSTIC  UPPER GASTROINTESTINAL ENDOSCOPY WITH COLLECTION OF SPECIMEN BY WASHING N/A 2020    Performed by Cullen Castillo MD at [facility]    DIAGNOSTIC UPPER GASTROINTESTINAL ENDOSCOPY WITH COLLECTION OF SPECIMEN BY WASHING N/A 2020    Performed by Cullen Castillo MD at [facility]    Esophagoscopy / egd      FLEXIBLE COLONOSCOPY PROXIMAL TO SPLENIC FLEXURE WITH BIOPSY N/A 2024    Performed by Larry Rock MD at [facility]    FLEXIBLE COLONOSCOPY PROXIMAL TO SPLENIC FLEXURE WITH REMOVAL OF TUMOR USING SNARE N/A 2024    Performed by Larry Rock MD at [facility]    FLEXIBLE COLONOSCOPY PROXIMAL TO SPLENIC FLEXURE WITH REMOVAL OF TUMOR USING SNARE N/A 2019    Performed by Larry Rock MD at [facility]    Intracapsular cataract extraction Bilateral 2019    Ivc filter retrieval  2012    Ivc filter retrieval  2013    Rectourethral fistula repair  , 2017    Rotator cuff repair Left 2016    Alexander-en-y procedure  2017    Total abdominal hysterectomy  1987    Total knee arthroplasty Bilateral 2010    Total knee arthroplasty Right 2012    revision    Trigger finger release Right 2012    right thumb    Ulnar nerve repair  2013    [place] tooth extraction         Social History     Tobacco Use    Smoking status: Former     Current packs/day: 0.00     Types: Cigarettes     Start date:      Quit date:      Years since quittin.1    Smokeless tobacco: Never   Vaping Use    Vaping status: Never Used   Substance Use Topics    Alcohol use: Yes     Alcohol/week: 5.0 standard drinks of alcohol     Types: 5 Glasses of wine per week     Comment: 2 glasses of wine daily    Drug use: No       Family History   Problem Relation Name Age of Onset    Diabetes Biological Mother      Lung cancer Biological Mother      Alzheimer's disease Biological Mother      Diabetes type II Biological Father      Heart failure Biological Father      Heart attack Biological Sister         Allergies:  Sulfa (sulfonamide  antibiotics)    Current Outpatient Medications   Medication Sig Dispense Refill    albuterol HFA 90 mcg/actuation inhaler Inhale 2 puffs every 6 (six) hours as needed for wheezing. 18 g 6    atorvastatin (LIPITOR) 40 mg tablet Take 1 tablet (40 mg total) by mouth daily. (Patient taking differently: Take 40 mg by mouth nightly.) 90 tablet 3    BIOTIN ORAL Take 1 tablet by mouth daily.      calcium carbonate-vitamin D3 500 mg(1,250mg) -200 unit per tablet Take 1 tablet by mouth daily.      clobetasol (TEMOVATE) 0.05 % cream  (Patient taking differently: Apply topically as needed.)      diltiazem (TIADYLT ER) 120 mg 24 hr capsule Take 1 capsule (120 mg total) by mouth daily. (Patient taking differently: Take 120 mg by mouth nightly.) 90 capsule 3    fluticasone propionate (FLONASE) 50 mcg/actuation nasal spray Administer 2 sprays into each nostril daily as needed for rhinitis. Make an appointment if help is needed for nasal symptoms. 16 g 6    ketoconazole (NIZORAL) 2 % cream APPLY ON THE SKIN TWICE A DAY APPLY TO AFFECTED AREA RASH IN GROIN/ABDOMEN TWICE A DAY FOR 3 WEEKS (Patient taking differently: Apply topically as needed.)      multivitamin capsule Take 1 capsule by mouth daily.      semaglutide (OZEMPIC) 1 mg/dose (4 mg/3 mL) subcutaneous injection Inject 1 mg under the skin every (seven) 7 days. (Patient taking differently: Inject 1 mg under the skin every (seven) 7 days. LD 9/10/24) 3 mL 11    SKYRIZI 150 mg/mL syringe Once every 12 weeks (  Holding next dose until after surgery with DR Rm )      vitamin B complex/folic acid (B COMPLEX 100 ORAL) Take 1 tablet by mouth daily.      warfarin (COUMADIN) 2 mg tablet Take 6 mg by mouth on five days per week and 4 mg on two days per week. 228 tablet 3    warfarin (COUMADIN) 4 mg tablet Take 6 mg by mouth for five days per week and 4 mg on two days per week.  To be used in combination with the 2 mg tablets. 90 tablet 3     No current facility-administered  medications for this visit.       Review of Systems   Constitutional: Negative for malaise/fatigue.   Cardiovascular:  Negative for chest pain, dyspnea on exertion, irregular heartbeat, leg swelling and palpitations.   Respiratory:  Negative for shortness of breath and sleep disturbances due to breathing.    Neurological:  Negative for dizziness and light-headedness.       Objective     Vitals:    02/05/25 0900   BP: 138/80   Pulse: 62   SpO2: 94%       Wt Readings from Last 3 Encounters:   02/05/25 86.2 kg (190 lb)   02/05/25 86.2 kg (190 lb)   01/04/25 86.2 kg (190 lb)       Body mass index is 34.75 kg/m².    Physical Exam  Vitals reviewed.   Cardiovascular:      Rate and Rhythm: Normal rate and regular rhythm.      Pulses: Normal pulses.           Carotid pulses are 2+ on the left side.     Heart sounds: Normal heart sounds.   Pulmonary:      Effort: Pulmonary effort is normal.   Musculoskeletal:         General: Normal range of motion.   Skin:     General: Skin is warm.      Capillary Refill: Capillary refill takes less than 2 seconds.   Neurological:      Mental Status: She is alert and oriented to person, place, and time.         ECG   Normal sinus rhythm  Nonspecific T wave abnormality  Abnormal ECG  When compared with ECG of 16-SEP-2024 10:06,  No significant change was found     Hematology  Lab Results   Component Value Date    WBC 5.84 12/04/2024    HGB 13.3 12/04/2024    HCT 41.5 12/04/2024     12/04/2024    INR 2.9 05/14/2024       Chemistries  Lab Results   Component Value Date     12/04/2024    K 4.1 12/04/2024     12/04/2024    CREATININE 0.5 (L) 12/04/2024    BUN 18 12/04/2024    CO2 28 12/04/2024    GLUCOSE 81 12/04/2024    CALCIUM 9.8 12/04/2024    MG 1.9 02/03/2020    ALT 24 12/04/2024    AST 23 12/04/2024       Cholesterol  Lab Results   Component Value Date    CHOL 155 12/04/2024    TRIG 70 12/04/2024    HDL 83 12/04/2024    LDLCALC 58 12/04/2024    NONHDLCALC 72 12/04/2024        Endocrine  Lab Results   Component Value Date    TSH 0.80 12/04/2024    HGBA1C 5.5 06/16/2023       Cardiac Imaging    TRANSTHORACIC ECHO (TTE) COMPLETE 10/03/2022    Interpretation Summary  · Normal-sized LV. Preserved LV systolic function. Estimated EF 70%. No regional wall motion abnormalities. Mild concentric left ventricular hypertrophy. Grade II LV diastolic dysfunction.  · Normal-sized RV. Normal RV systolic function.  · Mildly dilated LA. Mildly dilated RA.  · Tricuspid aortic valve. No aortic valve regurgitation. No aortic valve stenosis.  · Moderate mitral annular calcification. Mild mitral valve regurgitation. No significant mitral valve stenosis.  · Mild to moderate tricuspid valve regurgitation. Estimated RVSP = 38 mmHg.  · Mild pulmonic valve regurgitation.  · Normal-sized IVC. IVC demonstrates normal respiratory collapse.  · No evidence of pericardial effusion.      Assessment/Plan     Obstructive sleep apnea   Wears CPAP  Follows with Danita Cummins at [facility]     ROGERS (dyspnea on exertion)  Resolved   10/3/22 TTE showed G2 DD. Mild MR, mild-mod TR, mild LAE/KAMERON as well. Consider HfpEF in ddx  2/5/25 TTE: normal diastolic function, mild MR     She is feeling well  If recurrent ROGERS, can consider SGLT2 inhibitor  Can also consider ischemic eval  defered further workup at previous visit given improvement in symptoms and reassuring exam  She continues to feel well and no s/sx CHF     Essential hypertension  BP mildly elevated  on diltiazem 120 daily alone  She is getting over a cold  She will monitor with MyChart BP flowsheet     Diastolic Dysfunction  G2DD on previous study  Reassuring TTE 2/5/25  Will continue to monitor     Paroxysmal atrial fibrillation (CMS/HCC)  Dx is in chart but pt denies hx of this. She does not believe it is a true diagnosis and has asked Dr. Moreau to remove the dx from the chart  She wears a FitBit sense and I advised her to set up Heart rhythm assessment with the  [facility] ECG elizabeth. Confirmed today; set up properly and no alerts  2-week MCOT did not show Afib  TTE in the past showed findings that increase the risk of afib; mild MR, mild-mod TR, biatrial enlargement. KIM hx also increases incidence of afib  Will plan repeat interval TTE for monitoring    She is currently on coumadin for PE with lupus anticoagulant. From an afib standpoint, there is not currently a clear indication for AC but she is certainly at risk of recurrent afib. Were she to no longer need AC from a hematologic perspective, would consider ILR placement to detect recurrent afib  Her last INR was 1.8 and she is adjusting this for goal INR 2-3      Pulmonary emboli (CMS/HCC)  Management per Dr. Luna.   Lupus anticoagulant detected per pt report.   Current plan is to remain on coumadin.   He is repeating testing to confirm finding.      Diabetes mellitus (CMS/HCC)  Off med since bypass surgery  Recent a1c 5.8     HLD (hyperlipidemia)  On atorvastatin 40 mg daily   12/4/24: LDL 58, Hdl 83, TGL 70  2/19/24: Lp(a) <10, hsCRP 0.88     Morbid obesity due to excess calories (CMS/HCC)  Sees Dr. Osman, had bypass surgery  Weight stable on last several visits  Wt Readings from Last 3 Encounters:   02/05/25 86.2 kg (190 lb)   02/05/25 86.2 kg (190 lb)   01/04/25 86.2 kg (190 lb)       Orders Placed This Encounter   Procedures    [facility] MUSE ECG 12 lead (clinic performed)     Scheduling Instructions:      PLEASE USE THIS ORDER FOR ECG'S PERFORMED IN PHYSICIAN OFFICES     Order Specific Question:   Release to patient     Answer:   Immediate [1]       Follow Up Plans:  6 months         Clair DOWNING am scribing for, and in the presence of, Chrales Vasquez MD.     Charles DOWNING MD, personally performed the services described in this documentation as scribed by Clair Collins in my presence, and it is both accurate and complete.     Charles Vasquez MD  2/5/2025

## 2025-02-05 ENCOUNTER — TELEPHONE (OUTPATIENT)
Dept: PRIMARY CARE | Facility: CLINIC | Age: 70
End: 2025-02-05
Payer: MEDICARE

## 2025-02-05 ENCOUNTER — HOSPITAL ENCOUNTER (OUTPATIENT)
Dept: CARDIOLOGY | Facility: CLINIC | Age: 70
Discharge: HOME | End: 2025-02-05
Attending: INTERNAL MEDICINE
Payer: MEDICARE

## 2025-02-05 ENCOUNTER — OFFICE VISIT (OUTPATIENT)
Dept: CARDIOLOGY | Facility: CLINIC | Age: 70
End: 2025-02-05
Payer: MEDICARE

## 2025-02-05 VITALS
BODY MASS INDEX: 34.96 KG/M2 | DIASTOLIC BLOOD PRESSURE: 72 MMHG | HEIGHT: 62 IN | SYSTOLIC BLOOD PRESSURE: 122 MMHG | WEIGHT: 190 LBS

## 2025-02-05 VITALS
HEART RATE: 62 BPM | SYSTOLIC BLOOD PRESSURE: 138 MMHG | OXYGEN SATURATION: 94 % | HEIGHT: 62 IN | BODY MASS INDEX: 34.96 KG/M2 | WEIGHT: 190 LBS | DIASTOLIC BLOOD PRESSURE: 80 MMHG

## 2025-02-05 DIAGNOSIS — I48.0 PAROXYSMAL ATRIAL FIBRILLATION (CMS/HCC): Primary | ICD-10-CM

## 2025-02-05 DIAGNOSIS — J45.20 MILD INTERMITTENT ASTHMA WITHOUT COMPLICATION: Primary | Chronic | ICD-10-CM

## 2025-02-05 DIAGNOSIS — I48.0 PAROXYSMAL ATRIAL FIBRILLATION (CMS/HCC): ICD-10-CM

## 2025-02-05 DIAGNOSIS — E78.5 HYPERLIPIDEMIA, UNSPECIFIED HYPERLIPIDEMIA TYPE: ICD-10-CM

## 2025-02-05 DIAGNOSIS — R06.09 DOE (DYSPNEA ON EXERTION): ICD-10-CM

## 2025-02-05 DIAGNOSIS — I10 ESSENTIAL HYPERTENSION: ICD-10-CM

## 2025-02-05 LAB
AORTIC ROOT ANNULUS: 3.2 CM
ASCENDING AORTA: 3.5 CM
ATRIAL RATE: 70
AV PEAK GRADIENT: 9 MMHG
AV PEAK VELOCITY-S: 1.51 M/S
AV VALVE AREA: 1.66 CM2
BSA FOR ECHO PROCEDURE: 1.94 M2
CUSP SEPARATION: 1.7 CM
E WAVE DECELERATION TIME: 304 MS
E/A RATIO: 0.9
E/E' RATIO: 12.5
E/LAT E' RATIO: 11.6
EDV (BP): 77.1 CM3
EF (A4C): 63.4 %
EF A2C: 50.8 %
EJECTION FRACTION: 56.9 %
EST RIGHT VENT SYSTOLIC PRESSURE BY TRICUSPID REGURGITATION JET: 24 MMHG
ESV (BP): 38.3 CM3
FRACTIONAL SHORTENING: 37.78 %
INTERVENTRICULAR SEPTUM: 1 CM
LA ESV (BP): 36.7 CM3
LA ESV INDEX (A2C): 22.11 CM3/M2
LA ESV INDEX (BP): 18.92 CM3/M2
LA/AORTA RATIO: 1.38
LAAS-AP2: 17.1 CM2
LAAS-AP4: 14.4 CM2
LAD 2D: 4.4 CM
LALD A4C: 5.07 CM
LALD A4C: 5.17 CM
LAV-S: 42.9 CM3
LEFT ATRIUM VOLUME INDEX: 15.93 CM3/M2
LEFT ATRIUM VOLUME: 30.9 CM3
LEFT INTERNAL DIMENSION IN SYSTOLE: 2.8 CM (ref 2.61–3.96)
LEFT VENTRICLE DIASTOLIC VOLUME INDEX: 52.58 CM3/M2
LEFT VENTRICLE DIASTOLIC VOLUME: 102 CM3
LEFT VENTRICLE SYSTOLIC VOLUME INDEX: 19.23 CM3/M2
LEFT VENTRICLE SYSTOLIC VOLUME: 37.3 CM3
LEFT VENTRICULAR INTERNAL DIMENSION IN DIASTOLE: 4.5 CM (ref 4.42–6.14)
LEFT VENTRICULAR POSTERIOR WALL IN END DIASTOLE: 1.1 CM (ref 0.58–1.08)
LV ESV (APICAL 2 CHAMBER): 37.9 CM3
LVAD-AP2: 28.1 CM2
LVAD-AP4: 32.9 CM2
LVAS-AP2: 17.8 CM2
LVAS-AP4: 17.7 CM2
LVEDVI(BP): 39.74 CM3/M2
LVESVI(A2C): 19.54 CM3/M2
LVESVI(BP): 19.74 CM3/M2
LVLD-AP2: 8.62 CM
LVLD-AP4: 8.72 CM
LVLS-AP2: 7.29 CM
LVLS-AP4: 6.95 CM
LVOT 2D: 2.1 CM
LVOT A: 3.46 CM2
LVOT PEAK VELOCITY: 0.92 M/S
LVOT PG: 3 MMHG
MLH CV ECHO AVA INDEX VELOCITY RATIO: 0.9
MV E'TISSUE VEL-LAT: 0.08 M/S
MV E'TISSUE VEL-MED: 0.08 M/S
MV MEAN GRADIENT: 2 MMHG
MV PEAK A VEL: 1.05 M/S
MV PEAK E VEL: 0.95 M/S
MV PEAK GRADIENT: 5 MMHG
MV STENOSIS PRESSURE HALF TIME: 89 MS
MV VALVE AREA P 1/2 METHOD: 2.47 CM2
MV VTI: 41.8 CM
P AXIS: 44
POSTERIOR WALL: 1.1 CM
PR INTERVAL: 176
PV PEAK GRADIENT: 3 MMHG
PV PV: 0.92 M/S
QRS DURATION: 94
QT INTERVAL: 420
QTC CALCULATION(BAZETT): 453
R AXIS: 54
RAP: 3 MMHG
RVOT VMAX: 0.43 M/S
SEPTAL TISSUE DOPPLER FREE WALL LATE DIA VELOCITY (APICAL 4 CHAMBER VIEW): 0.11 M/S
T WAVE AXIS: 44
TR MAX PG: 20.98 MMHG
TRICUSPID VALVE PEAK REGURGITATION VELOCITY: 2.29 M/S
VENTRICULAR RATE: 70
Z-SCORE OF LEFT VENTRICULAR DIMENSION IN END DIASTOLE: -1.47
Z-SCORE OF LEFT VENTRICULAR DIMENSION IN END SYSTOLE: -1.09
Z-SCORE OF LEFT VENTRICULAR POSTERIOR WALL IN END DIASTOLE: 1.72

## 2025-02-05 PROCEDURE — G8754 DIAS BP LESS 90: HCPCS | Performed by: INTERNAL MEDICINE

## 2025-02-05 PROCEDURE — G8752 SYS BP LESS 140: HCPCS | Performed by: INTERNAL MEDICINE

## 2025-02-05 PROCEDURE — 93306 TTE W/DOPPLER COMPLETE: CPT | Performed by: INTERNAL MEDICINE

## 2025-02-05 PROCEDURE — 99214 OFFICE O/P EST MOD 30 MIN: CPT | Performed by: INTERNAL MEDICINE

## 2025-02-05 PROCEDURE — 93000 ELECTROCARDIOGRAM COMPLETE: CPT | Performed by: INTERNAL MEDICINE

## 2025-02-05 ASSESSMENT — ENCOUNTER SYMPTOMS
DIZZINESS: 0
DYSPNEA ON EXERTION: 0
SHORTNESS OF BREATH: 0
LIGHT-HEADEDNESS: 0
SLEEP DISTURBANCES DUE TO BREATHING: 0
PALPITATIONS: 0
IRREGULAR HEARTBEAT: 0

## 2025-02-05 NOTE — PATIENT INSTRUCTIONS
"Please start checking blood pressure a few times a week. Bring in your cuff and readings to your next appointment. If you do not already have a cuff, we recommend an Omron upper arm cuff. The website ValidateBP.org is also an excellent resource for BP cuffs that are proven to be accurate.     Please record the values in the ScaleGrid BP flowsheet and we'll go over the results in a few weeks once I see the results of this report. If for any reason you are unable to record the numbers in ScaleGrid, please send a message with the readings or call the office with a list of numbers after monitoring for 1-2 weeks.    An internet or Youtube search for \"aha blood pressure monitoring video\" will bring up a video on how to properly record blood pressure.    "

## 2025-02-10 RX ORDER — ALBUTEROL SULFATE 90 UG/1
INHALANT RESPIRATORY (INHALATION)
Qty: 18 G | Refills: 6 | Status: SHIPPED | OUTPATIENT
Start: 2025-02-10

## 2025-02-10 NOTE — TELEPHONE ENCOUNTER
Medicine Refill Request    Last Office Visit: 11/15/2024   Last Consult Visit: Visit date not found  Last Telemedicine Visit: Visit date not found    Next Appointment: 5/16/2025      Current Outpatient Medications:     albuterol HFA 90 mcg/actuation inhaler, Inhale 2 puffs every 6 (six) hours as needed for wheezing., Disp: 18 g, Rfl: 6    atorvastatin (LIPITOR) 40 mg tablet, Take 1 tablet (40 mg total) by mouth daily. (Patient taking differently: Take 40 mg by mouth nightly.), Disp: 90 tablet, Rfl: 3    BIOTIN ORAL, Take 1 tablet by mouth daily., Disp: , Rfl:     calcium carbonate-vitamin D3 500 mg(1,250mg) -200 unit per tablet, Take 1 tablet by mouth daily., Disp: , Rfl:     clobetasol (TEMOVATE) 0.05 % cream, , Disp: , Rfl:     diltiazem (TIADYLT ER) 120 mg 24 hr capsule, Take 1 capsule (120 mg total) by mouth daily. (Patient taking differently: Take 120 mg by mouth nightly.), Disp: 90 capsule, Rfl: 3    fluticasone propionate (FLONASE) 50 mcg/actuation nasal spray, Administer 2 sprays into each nostril daily as needed for rhinitis. Make an appointment if help is needed for nasal symptoms., Disp: 16 g, Rfl: 6    ketoconazole (NIZORAL) 2 % cream, APPLY ON THE SKIN TWICE A DAY APPLY TO AFFECTED AREA RASH IN GROIN/ABDOMEN TWICE A DAY FOR 3 WEEKS (Patient taking differently: Apply topically as needed.), Disp: , Rfl:     multivitamin capsule, Take 1 capsule by mouth daily., Disp: , Rfl:     semaglutide (OZEMPIC) 1 mg/dose (4 mg/3 mL) subcutaneous injection, Inject 1 mg under the skin every (seven) 7 days. (Patient taking differently: Inject 1 mg under the skin every (seven) 7 days. LD 9/10/24), Disp: 3 mL, Rfl: 11    SKYRIZI 150 mg/mL syringe, Once every 12 weeks (  Holding next dose until after surgery with DR Rm ), Disp: , Rfl:     vitamin B complex/folic acid (B COMPLEX 100 ORAL), Take 1 tablet by mouth daily., Disp: , Rfl:     warfarin (COUMADIN) 2 mg tablet, Take 6 mg by mouth on five days per week and 4 mg  on two days per week., Disp: 228 tablet, Rfl: 3    warfarin (COUMADIN) 4 mg tablet, Take 6 mg by mouth for five days per week and 4 mg on two days per week.  To be used in combination with the 2 mg tablets., Disp: 90 tablet, Rfl: 3      BP Readings from Last 3 Encounters:   02/05/25 122/72   02/05/25 138/80   11/15/24 122/64       Recent Lab results:  Lab Results   Component Value Date    CHOL 155 12/04/2024   ,   Lab Results   Component Value Date    HDL 83 12/04/2024   ,   Lab Results   Component Value Date    LDLCALC 58 12/04/2024   ,   Lab Results   Component Value Date    TRIG 70 12/04/2024        Lab Results   Component Value Date    GLUCOSE 81 12/04/2024   ,   Lab Results   Component Value Date    HGBA1C 5.5 06/16/2023         Lab Results   Component Value Date    CREATININE 0.5 (L) 12/04/2024       Lab Results   Component Value Date    TSH 0.80 12/04/2024           Lab Results   Component Value Date    HGBA1C 5.5 06/16/2023

## 2025-04-11 LAB — MLHC DIABETIC EYE EXAM (EXTERNAL): NORMAL

## 2025-05-01 ENCOUNTER — TRANSCRIBE ORDERS (OUTPATIENT)
Dept: SCHEDULING | Age: 70
End: 2025-05-01

## 2025-05-01 DIAGNOSIS — N20.0 KIDNEY STONE: Primary | ICD-10-CM

## 2025-05-12 ENCOUNTER — HOSPITAL ENCOUNTER (OUTPATIENT)
Dept: RADIOLOGY | Facility: CLINIC | Age: 70
Discharge: HOME | End: 2025-05-12
Attending: STUDENT IN AN ORGANIZED HEALTH CARE EDUCATION/TRAINING PROGRAM
Payer: MEDICARE

## 2025-05-12 DIAGNOSIS — N20.0 KIDNEY STONE: ICD-10-CM

## 2025-05-12 PROCEDURE — 76770 US EXAM ABDO BACK WALL COMP: CPT

## 2025-05-16 ENCOUNTER — OFFICE VISIT (OUTPATIENT)
Dept: PRIMARY CARE | Facility: CLINIC | Age: 70
End: 2025-05-16
Payer: MEDICARE

## 2025-05-16 VITALS
HEART RATE: 67 BPM | DIASTOLIC BLOOD PRESSURE: 66 MMHG | BODY MASS INDEX: 35.81 KG/M2 | HEIGHT: 62 IN | OXYGEN SATURATION: 96 % | WEIGHT: 194.6 LBS | SYSTOLIC BLOOD PRESSURE: 122 MMHG

## 2025-05-16 DIAGNOSIS — M79.644 PAIN OF RIGHT THUMB: ICD-10-CM

## 2025-05-16 DIAGNOSIS — E11.9 TYPE 2 DIABETES MELLITUS WITHOUT COMPLICATION, WITHOUT LONG-TERM CURRENT USE OF INSULIN (CMS/HCC): ICD-10-CM

## 2025-05-16 DIAGNOSIS — D68.62 LUPUS ANTICOAGULANT SYNDROME (CMS/HCC): Primary | ICD-10-CM

## 2025-05-16 DIAGNOSIS — E66.01 MORBID OBESITY DUE TO EXCESS CALORIES (CMS/HCC): ICD-10-CM

## 2025-05-16 PROCEDURE — 99214 OFFICE O/P EST MOD 30 MIN: CPT | Performed by: INTERNAL MEDICINE

## 2025-05-16 PROCEDURE — G8752 SYS BP LESS 140: HCPCS | Performed by: INTERNAL MEDICINE

## 2025-05-16 PROCEDURE — G8754 DIAS BP LESS 90: HCPCS | Performed by: INTERNAL MEDICINE

## 2025-05-16 RX ORDER — WARFARIN 2 MG/1
TABLET ORAL
Qty: 228 TABLET | Refills: 3 | Status: SHIPPED | OUTPATIENT
Start: 2025-05-16 | End: 2025-06-15

## 2025-05-16 RX ORDER — ATORVASTATIN CALCIUM 40 MG/1
40 TABLET, FILM COATED ORAL NIGHTLY
Qty: 90 TABLET | Refills: 3 | Status: SHIPPED | OUTPATIENT
Start: 2025-05-16 | End: 2025-08-14

## 2025-05-16 RX ORDER — DILTIAZEM HYDROCHLORIDE 120 MG/1
120 CAPSULE, EXTENDED RELEASE ORAL NIGHTLY
Qty: 90 CAPSULE | Refills: 3 | Status: SHIPPED | OUTPATIENT
Start: 2025-05-16 | End: 2025-08-14

## 2025-05-16 RX ORDER — WARFARIN 4 MG/1
TABLET ORAL
Qty: 90 TABLET | Refills: 3 | Status: SHIPPED | OUTPATIENT
Start: 2025-05-16 | End: 2026-05-16

## 2025-06-23 ENCOUNTER — TELEPHONE (OUTPATIENT)
Dept: PRIMARY CARE | Facility: CLINIC | Age: 70
End: 2025-06-23
Payer: MEDICARE

## 2025-06-23 NOTE — TELEPHONE ENCOUNTER
Received a call from patient's INR company.  The INR is in the 5 range.  Spoke with patient.  No active bleeding.  Told to hold Coumadin at least 2 days and recheck on Monday.  Further recommendation should be given by primary care doctor based on the follow-up results on Monday.  Patient was told to call back if there is any bleeding.

## 2025-08-29 ENCOUNTER — DOCUMENTATION (OUTPATIENT)
Dept: PRIMARY CARE | Facility: CLINIC | Age: 70
End: 2025-08-29
Payer: MEDICARE

## (undated) DEVICE — FORCEP COLD RADIAL JAW

## (undated) DEVICE — WIRE GUIDE SENSOR DUAL FLEX .038

## (undated) DEVICE — SET IRRIGATION LEVEL I CYSTO

## (undated) DEVICE — HEEL  ELBOW PROTECTOR

## (undated) DEVICE — PACK RFID CYSTOSCOPY

## (undated) DEVICE — TOWEL SURGICAL W17XL27IN BLUE COTTON STANDARD PREWASHED DELI

## (undated) DEVICE — SOLN IRRIG STER WATER 500ML

## (undated) DEVICE — SOLN IRRIG .9%SOD 500ML

## (undated) DEVICE — SNARE COLD EXACTO9MM

## (undated) DEVICE — CONNECTOR PORT W/VALVE FOR OLYMPUS 160/180 SCOPE

## (undated) DEVICE — CONTAINER SPECIMEN STERILE 5OZ

## (undated) DEVICE — GLOVE SZ 6.5 PROTEXIS PI

## (undated) DEVICE — TRAY WET SKIN PREP PREMIUM

## (undated) DEVICE — FIBER LASER DIA365UM RED CONNECTOR HOLMIUM W/SMARTSYNC TECHN

## (undated) DEVICE — COVER MAYO STAND ST 30/CS

## (undated) DEVICE — MOUTHPIECE 60FR ENDO BITEBLOCK

## (undated) DEVICE — ADAPTER CATHETER PREVENT LEAKAGE SELF SEALING WITH SIDEARM S

## (undated) DEVICE — CATHETER URETERAL 8FR L70CM 0.038IN PVC OPEN-END FOR DRAINAG

## (undated) DEVICE — BULB PATHFINDER PLUS

## (undated) DEVICE — GOWN SIRUS FABRNF RAGLAN XL ST 28/CS

## (undated) DEVICE — EXTRACTOR STONE 2.2FR L115CM BASKET DIA1CM NITINOL WIRE TIPL

## (undated) DEVICE — SLEEVE SCD COMFORT KNEE LENGTH MED

## (undated) DEVICE — SOLN IV 0.9% NSS 1000ML

## (undated) DEVICE — SOLN IRRIG .9%SOD 3L